# Patient Record
Sex: FEMALE | Race: WHITE | NOT HISPANIC OR LATINO | Employment: FULL TIME | URBAN - METROPOLITAN AREA
[De-identification: names, ages, dates, MRNs, and addresses within clinical notes are randomized per-mention and may not be internally consistent; named-entity substitution may affect disease eponyms.]

---

## 2017-06-03 ENCOUNTER — OFFICE VISIT (OUTPATIENT)
Dept: RETAIL CLINIC | Facility: CLINIC | Age: 57
End: 2017-06-03

## 2017-06-03 VITALS
OXYGEN SATURATION: 96 % | HEART RATE: 112 BPM | DIASTOLIC BLOOD PRESSURE: 78 MMHG | TEMPERATURE: 98.2 F | RESPIRATION RATE: 20 BRPM | SYSTOLIC BLOOD PRESSURE: 152 MMHG

## 2017-06-03 DIAGNOSIS — J44.1 COPD EXACERBATION (HCC): Primary | ICD-10-CM

## 2017-06-03 DIAGNOSIS — J01.10 ACUTE FRONTAL SINUSITIS, RECURRENCE NOT SPECIFIED: ICD-10-CM

## 2017-06-03 PROBLEM — M19.90 ARTHRITIS: Status: ACTIVE | Noted: 2017-06-03

## 2017-06-03 PROBLEM — J43.9 PULMONARY EMPHYSEMA: Status: ACTIVE | Noted: 2017-06-03

## 2017-06-03 PROBLEM — E78.00 HYPERCHOLESTEROLEMIA: Status: ACTIVE | Noted: 2017-06-03

## 2017-06-03 PROBLEM — E66.9 OBESITY: Status: ACTIVE | Noted: 2017-06-03

## 2017-06-03 PROBLEM — J44.9 COPD (CHRONIC OBSTRUCTIVE PULMONARY DISEASE): Status: ACTIVE | Noted: 2017-06-03

## 2017-06-03 PROBLEM — I10 ESSENTIAL HYPERTENSION: Status: ACTIVE | Noted: 2017-06-03

## 2017-06-03 PROBLEM — E11.9 TYPE 2 DIABETES MELLITUS: Status: ACTIVE | Noted: 2017-06-03

## 2017-06-03 PROCEDURE — 94640 AIRWAY INHALATION TREATMENT: CPT | Performed by: NURSE PRACTITIONER

## 2017-06-03 PROCEDURE — 99214 OFFICE O/P EST MOD 30 MIN: CPT | Performed by: NURSE PRACTITIONER

## 2017-06-03 RX ORDER — ALBUTEROL SULFATE 0.63 MG/3ML
0.63 SOLUTION RESPIRATORY (INHALATION) EVERY 6 HOURS PRN
Status: SHIPPED | OUTPATIENT
Start: 2017-06-03

## 2017-06-03 RX ORDER — MULTIVITAMIN
1 TABLET ORAL
COMMUNITY

## 2017-06-03 RX ORDER — PREDNISONE 10 MG/1
TABLET ORAL
Qty: 21 TABLET | Refills: 0 | Status: SHIPPED | OUTPATIENT
Start: 2017-06-03

## 2017-06-03 RX ORDER — DOXYCYCLINE HYCLATE 100 MG
100 TABLET ORAL 2 TIMES DAILY
Qty: 20 TABLET | Refills: 0 | Status: SHIPPED | OUTPATIENT
Start: 2017-06-03 | End: 2017-06-13

## 2017-06-03 RX ORDER — MIRTAZAPINE 15 MG/1
15 TABLET, FILM COATED ORAL
COMMUNITY

## 2017-06-03 RX ORDER — PRAVASTATIN SODIUM 10 MG
10 TABLET ORAL DAILY
COMMUNITY

## 2017-06-03 RX ADMIN — ALBUTEROL SULFATE 0.63 MG: 0.63 SOLUTION RESPIRATORY (INHALATION) at 14:48

## 2017-06-03 NOTE — PATIENT INSTRUCTIONS
Chronic Obstructive Pulmonary Disease  Chronic obstructive pulmonary disease (COPD) is a common lung condition in which airflow from the lungs is limited. COPD is a general term that can be used to describe many different lung problems that limit airflow, including both chronic bronchitis and emphysema. If you have COPD, your lung function will probably never return to normal, but there are measures you can take to improve lung function and make yourself feel better.  CAUSES   · Smoking (common).  · Exposure to secondhand smoke.  · Genetic problems.  · Chronic inflammatory lung diseases or recurrent infections.  SYMPTOMS  · Shortness of breath, especially with physical activity.  · Deep, persistent (chronic) cough with a large amount of thick mucus.  · Wheezing.  · Rapid breaths (tachypnea).  · Gray or bluish discoloration (cyanosis) of the skin, especially in your fingers, toes, or lips.  · Fatigue.  · Weight loss.  · Frequent infections or episodes when breathing symptoms become much worse (exacerbations).  · Chest tightness.  DIAGNOSIS  Your health care provider will take a medical history and perform a physical examination to diagnose COPD. Additional tests for COPD may include:  · Lung (pulmonary) function tests.  · Chest X-ray.  · CT scan.  · Blood tests.  TREATMENT   Treatment for COPD may include:  · Inhaler and nebulizer medicines. These help manage the symptoms of COPD and make your breathing more comfortable.  · Supplemental oxygen. Supplemental oxygen is only helpful if you have a low oxygen level in your blood.  · Exercise and physical activity. These are beneficial for nearly all people with COPD.  · Lung surgery or transplant.  · Nutrition therapy to gain weight, if you are underweight.  · Pulmonary rehabilitation. This may involve working with a team of health care providers and specialists, such as respiratory, occupational, and physical therapists.  HOME CARE INSTRUCTIONS  · Take all medicines  (inhaled or pills) as directed by your health care provider.  · Avoid over-the-counter medicines or cough syrups that dry up your airway (such as antihistamines) and slow down the elimination of secretions unless instructed otherwise by your health care provider.  · If you are a smoker, the most important thing that you can do is stop smoking. Continuing to smoke will cause further lung damage and breathing trouble. Ask your health care provider for help with quitting smoking. He or she can direct you to community resources or hospitals that provide support.  · Avoid exposure to irritants such as smoke, chemicals, and fumes that aggravate your breathing.  · Use oxygen therapy and pulmonary rehabilitation if directed by your health care provider. If you require home oxygen therapy, ask your health care provider whether you should purchase a pulse oximeter to measure your oxygen level at home.  · Avoid contact with individuals who have a contagious illness.  · Avoid extreme temperature and humidity changes.  · Eat healthy foods. Eating smaller, more frequent meals and resting before meals may help you maintain your strength.  · Stay active, but balance activity with periods of rest. Exercise and physical activity will help you maintain your ability to do things you want to do.  · Preventing infection and hospitalization is very important when you have COPD. Make sure to receive all the vaccines your health care provider recommends, especially the pneumococcal and influenza vaccines. Ask your health care provider whether you need a pneumonia vaccine.  · Learn and use relaxation techniques to manage stress.  · Learn and use controlled breathing techniques as directed by your health care provider. Controlled breathing techniques include:    Pursed lip breathing. Start by breathing in (inhaling) through your nose for 1 second. Then, purse your lips as if you were going to whistle and breathe out (exhale) through the  pursed lips for 2 seconds.    Diaphragmatic breathing. Start by putting one hand on your abdomen just above your waist. Inhale slowly through your nose. The hand on your abdomen should move out. Then purse your lips and exhale slowly. You should be able to feel the hand on your abdomen moving in as you exhale.  · Learn and use controlled coughing to clear mucus from your lungs. Controlled coughing is a series of short, progressive coughs. The steps of controlled coughing are:  . Lean your head slightly forward.  . Breathe in deeply using diaphragmatic breathing.  . Try to hold your breath for 3 seconds.  . Keep your mouth slightly open while coughing twice.  . Spit any mucus out into a tissue.  . Rest and repeat the steps once or twice as needed.  SEEK MEDICAL CARE IF:  · You are coughing up more mucus than usual.  · There is a change in the color or thickness of your mucus.  · Your breathing is more labored than usual.  · Your breathing is faster than usual.  SEEK IMMEDIATE MEDICAL CARE IF:  · You have shortness of breath while you are resting.  · You have shortness of breath that prevents you from:    Being able to talk.    Performing your usual physical activities.  · You have chest pain lasting longer than 5 minutes.  · Your skin color is more cyanotic than usual.  · You measure low oxygen saturations for longer than 5 minutes with a pulse oximeter.  MAKE SURE YOU:  · Understand these instructions.  · Will watch your condition.  · Will get help right away if you are not doing well or get worse.     This information is not intended to replace advice given to you by your health care provider. Make sure you discuss any questions you have with your health care provider.     Document Released: 09/27/2006 Document Revised: 01/08/2016 Document Reviewed: 08/14/2014  [employer] Interactive Patient Education ©2017 [employer]

## 2017-06-03 NOTE — PROGRESS NOTES
Subjective   Gely Kimble is a 56 y.o. female.     URI    This is a new problem. The current episode started yesterday. There has been no fever. Associated symptoms include chest pain, congestion, coughing (productive), headaches, neck pain, a sore throat and wheezing. Pertinent negatives include no nausea or vomiting. Treatments tried: nyquil, tessalon pearls. The treatment provided no relief.   Cough   This is a new problem. The current episode started yesterday. The cough is productive of sputum. Associated symptoms include chest pain, headaches, a sore throat, shortness of breath and wheezing. Pertinent negatives include no chills, fever or postnasal drip. The symptoms are aggravated by lying down. Risk factors for lung disease include smoking/tobacco exposure. She has tried a beta-agonist inhaler for the symptoms. The treatment provided mild relief. Her past medical history is significant for COPD and emphysema.        The following portions of the patient's history were reviewed and updated as appropriate: allergies, current medications, past family history, past medical history, past social history, past surgical history and problem list.    Review of Systems   Constitutional: Negative for chills and fever.   HENT: Positive for congestion and sore throat. Negative for postnasal drip.    Respiratory: Positive for cough (productive), shortness of breath and wheezing.    Cardiovascular: Positive for chest pain.   Gastrointestinal: Negative for nausea and vomiting.   Musculoskeletal: Positive for neck pain.   Neurological: Positive for headaches.       Objective   Physical Exam   Constitutional: She is oriented to person, place, and time. She appears well-developed and well-nourished. No distress.   HENT:   Head: Normocephalic and atraumatic.   Right Ear: Hearing, tympanic membrane, external ear and ear canal normal.   Left Ear: Hearing, tympanic membrane, external ear and ear canal normal.   Nose: Nose normal.    Mouth/Throat: Oropharynx is clear and moist.   Eyes: Conjunctivae and EOM are normal. Pupils are equal, round, and reactive to light.   Neck: Normal range of motion. Neck supple.   Cardiovascular: Normal rate, regular rhythm and normal heart sounds.    Pulmonary/Chest: Effort normal. Tachypnea noted. No respiratory distress. She has decreased breath sounds in the right lower field and the left lower field. She has wheezes in the right upper field, the right middle field, the right lower field, the left upper field, the left middle field and the left lower field.   Post nebulizer treatment wheezing still present throughout all lung fields but air moving now in lower lobes, no rales, no rhonchi   Neurological: She is alert and oriented to person, place, and time.   Skin: Skin is warm and dry.   Psychiatric: She has a normal mood and affect. Her behavior is normal.       Assessment/Plan   Diagnoses and all orders for this visit:    COPD exacerbation  -     albuterol (ACCUNEB) nebulizer solution 0.63 mg; Take 3 mL by nebulization Every 6 (Six) Hours As Needed for Wheezing.    Acute frontal sinusitis, recurrence not specified    Other orders  -     mirtazapine (REMERON) 15 MG tablet; Take 15 mg by mouth.  -     Multiple Vitamin (MULTIVITAMIN) tablet; Take 1 tablet by mouth.  -     Potassium 95 MG tablet; Take 595 mg by mouth.  -     insulin detemir (LEVEMIR) 100 UNIT/ML injection; Inject  under the skin Daily.  -     pravastatin (PRAVACHOL) 10 MG tablet; Take 10 mg by mouth Daily.  -     doxycycline (VIBRAMYICN) 100 MG tablet; Take 1 tablet by mouth 2 (Two) Times a Day for 10 days.  -     predniSONE (DELTASONE) 10 MG tablet; Take 6 tabs on day 1, 5 tabs on day 2, 4 tabs on day 3, 3 tabs on day 4, 2 tabs on day 5 and 1 tab on day 6  Use Combivent inhaler 2 puffs twice daily instead of as needed as currently doing.  Use Albuterol either in nebulizer when at home or in ProAir inhaler if out of home every 4 hours while  awake for the next 48 hours.  Go to ER for fever, worsening of symptoms, for low oxygen levels for longer than 5 minutes, no improvement with ALbuterol or for any new concerns.  Follow up with PCP on Monday.  Monitor blood glucose and completely avoid sweets while on steroids.

## 2017-07-15 ENCOUNTER — APPOINTMENT (OUTPATIENT)
Dept: CARDIOLOGY | Facility: HOSPITAL | Age: 57
End: 2017-07-15

## 2017-07-15 ENCOUNTER — HOSPITAL ENCOUNTER (EMERGENCY)
Facility: HOSPITAL | Age: 57
Discharge: HOME | End: 2017-07-15
Attending: EMERGENCY MEDICINE | Admitting: EMERGENCY MEDICINE

## 2017-07-15 ENCOUNTER — APPOINTMENT (OUTPATIENT)
Dept: CT IMAGING | Facility: HOSPITAL | Age: 57
End: 2017-07-15

## 2017-07-15 VITALS
BODY MASS INDEX: 28.25 KG/M2 | SYSTOLIC BLOOD PRESSURE: 148 MMHG | HEART RATE: 78 BPM | DIASTOLIC BLOOD PRESSURE: 81 MMHG | RESPIRATION RATE: 18 BRPM | TEMPERATURE: 99.4 F | OXYGEN SATURATION: 97 % | WEIGHT: 180 LBS | HEIGHT: 67 IN

## 2017-07-15 DIAGNOSIS — S76.211A GROIN STRAIN, RIGHT, INITIAL ENCOUNTER: ICD-10-CM

## 2017-07-15 DIAGNOSIS — M25.551 RIGHT HIP PAIN: Primary | ICD-10-CM

## 2017-07-15 LAB
ALBUMIN SERPL-MCNC: 4.3 G/DL (ref 3.5–5.2)
ALBUMIN/GLOB SERPL: 1.6 G/DL
ALP SERPL-CCNC: 60 U/L (ref 39–117)
ALT SERPL W P-5'-P-CCNC: 21 U/L (ref 1–33)
ANION GAP SERPL CALCULATED.3IONS-SCNC: 15.2 MMOL/L
AST SERPL-CCNC: 12 U/L (ref 1–32)
BACTERIA UR QL AUTO: ABNORMAL /HPF
BASOPHILS # BLD AUTO: 0.09 10*3/MM3 (ref 0–0.2)
BASOPHILS NFR BLD AUTO: 1 % (ref 0–1.5)
BILIRUB SERPL-MCNC: 0.2 MG/DL (ref 0.1–1.2)
BILIRUB UR QL STRIP: NEGATIVE
BUN BLD-MCNC: 14 MG/DL (ref 6–20)
BUN/CREAT SERPL: 28 (ref 7–25)
CALCIUM SPEC-SCNC: 9.5 MG/DL (ref 8.6–10.5)
CHLORIDE SERPL-SCNC: 100 MMOL/L (ref 98–107)
CLARITY UR: CLEAR
CO2 SERPL-SCNC: 25.8 MMOL/L (ref 22–29)
COLOR UR: YELLOW
CREAT BLD-MCNC: 0.5 MG/DL (ref 0.57–1)
DEPRECATED RDW RBC AUTO: 43.3 FL (ref 37–54)
EOSINOPHIL # BLD AUTO: 0.04 10*3/MM3 (ref 0–0.7)
EOSINOPHIL NFR BLD AUTO: 0.4 % (ref 0.3–6.2)
ERYTHROCYTE [DISTWIDTH] IN BLOOD BY AUTOMATED COUNT: 13.1 % (ref 11.7–13)
GFR SERPL CREATININE-BSD FRML MDRD: 127 ML/MIN/1.73
GLOBULIN UR ELPH-MCNC: 2.7 GM/DL
GLUCOSE BLD-MCNC: 123 MG/DL (ref 65–99)
GLUCOSE UR STRIP-MCNC: NEGATIVE MG/DL
HCT VFR BLD AUTO: 43 % (ref 35.6–45.5)
HGB BLD-MCNC: 14.3 G/DL (ref 11.9–15.5)
HGB UR QL STRIP.AUTO: NEGATIVE
HYALINE CASTS UR QL AUTO: ABNORMAL /LPF
IMM GRANULOCYTES # BLD: 0.09 10*3/MM3 (ref 0–0.03)
IMM GRANULOCYTES NFR BLD: 1 % (ref 0–0.5)
KETONES UR QL STRIP: NEGATIVE
LEUKOCYTE ESTERASE UR QL STRIP.AUTO: ABNORMAL
LYMPHOCYTES # BLD AUTO: 2.71 10*3/MM3 (ref 0.9–4.8)
LYMPHOCYTES NFR BLD AUTO: 29.6 % (ref 19.6–45.3)
MCH RBC QN AUTO: 29.9 PG (ref 26.9–32)
MCHC RBC AUTO-ENTMCNC: 33.3 G/DL (ref 32.4–36.3)
MCV RBC AUTO: 90 FL (ref 80.5–98.2)
MONOCYTES # BLD AUTO: 0.47 10*3/MM3 (ref 0.2–1.2)
MONOCYTES NFR BLD AUTO: 5.1 % (ref 5–12)
NEUTROPHILS # BLD AUTO: 5.76 10*3/MM3 (ref 1.9–8.1)
NEUTROPHILS NFR BLD AUTO: 62.9 % (ref 42.7–76)
NITRITE UR QL STRIP: NEGATIVE
NRBC BLD MANUAL-RTO: 0 /100 WBC (ref 0–0)
PH UR STRIP.AUTO: 6.5 [PH] (ref 5–8)
PLATELET # BLD AUTO: 173 10*3/MM3 (ref 140–500)
PMV BLD AUTO: 11.6 FL (ref 6–12)
POTASSIUM BLD-SCNC: 3.7 MMOL/L (ref 3.5–5.2)
PROT SERPL-MCNC: 7 G/DL (ref 6–8.5)
PROT UR QL STRIP: NEGATIVE
RBC # BLD AUTO: 4.78 10*6/MM3 (ref 3.9–5.2)
RBC # UR: ABNORMAL /HPF
REF LAB TEST METHOD: ABNORMAL
SODIUM BLD-SCNC: 141 MMOL/L (ref 136–145)
SP GR UR STRIP: >=1.03 (ref 1–1.03)
SQUAMOUS #/AREA URNS HPF: ABNORMAL /HPF
TROPONIN T SERPL-MCNC: <0.01 NG/ML (ref 0–0.03)
UROBILINOGEN UR QL STRIP: ABNORMAL
WBC NRBC COR # BLD: 9.16 10*3/MM3 (ref 4.5–10.7)
WBC UR QL AUTO: ABNORMAL /HPF

## 2017-07-15 PROCEDURE — 93971 EXTREMITY STUDY: CPT

## 2017-07-15 PROCEDURE — 80053 COMPREHEN METABOLIC PANEL: CPT | Performed by: PHYSICIAN ASSISTANT

## 2017-07-15 PROCEDURE — 93010 ELECTROCARDIOGRAM REPORT: CPT | Performed by: INTERNAL MEDICINE

## 2017-07-15 PROCEDURE — 25010000002 ONDANSETRON PER 1 MG: Performed by: PHYSICIAN ASSISTANT

## 2017-07-15 PROCEDURE — 81001 URINALYSIS AUTO W/SCOPE: CPT | Performed by: PHYSICIAN ASSISTANT

## 2017-07-15 PROCEDURE — 85025 COMPLETE CBC W/AUTO DIFF WBC: CPT | Performed by: PHYSICIAN ASSISTANT

## 2017-07-15 PROCEDURE — 96374 THER/PROPH/DIAG INJ IV PUSH: CPT

## 2017-07-15 PROCEDURE — 96375 TX/PRO/DX INJ NEW DRUG ADDON: CPT

## 2017-07-15 PROCEDURE — 99284 EMERGENCY DEPT VISIT MOD MDM: CPT

## 2017-07-15 PROCEDURE — 74177 CT ABD & PELVIS W/CONTRAST: CPT

## 2017-07-15 PROCEDURE — 84484 ASSAY OF TROPONIN QUANT: CPT | Performed by: PHYSICIAN ASSISTANT

## 2017-07-15 PROCEDURE — 25010000002 MORPHINE PER 10 MG: Performed by: EMERGENCY MEDICINE

## 2017-07-15 PROCEDURE — 0 IOPAMIDOL 61 % SOLUTION: Performed by: EMERGENCY MEDICINE

## 2017-07-15 PROCEDURE — 93005 ELECTROCARDIOGRAM TRACING: CPT | Performed by: PHYSICIAN ASSISTANT

## 2017-07-15 RX ORDER — ONDANSETRON 2 MG/ML
4 INJECTION INTRAMUSCULAR; INTRAVENOUS ONCE
Status: COMPLETED | OUTPATIENT
Start: 2017-07-15 | End: 2017-07-15

## 2017-07-15 RX ORDER — PAROXETINE HYDROCHLORIDE 40 MG/1
40 TABLET, FILM COATED ORAL NIGHTLY
COMMUNITY

## 2017-07-15 RX ORDER — HYDROCHLOROTHIAZIDE 25 MG/1
25 TABLET ORAL DAILY
COMMUNITY

## 2017-07-15 RX ORDER — SODIUM CHLORIDE 0.9 % (FLUSH) 0.9 %
10 SYRINGE (ML) INJECTION AS NEEDED
Status: DISCONTINUED | OUTPATIENT
Start: 2017-07-15 | End: 2017-07-16 | Stop reason: HOSPADM

## 2017-07-15 RX ORDER — ALBUTEROL SULFATE 90 UG/1
2 AEROSOL, METERED RESPIRATORY (INHALATION) EVERY 4 HOURS PRN
COMMUNITY

## 2017-07-15 RX ORDER — METHOCARBAMOL 500 MG/1
1000 TABLET, FILM COATED ORAL 4 TIMES DAILY
Qty: 40 TABLET | Refills: 0 | Status: SHIPPED | OUTPATIENT
Start: 2017-07-15

## 2017-07-15 RX ADMIN — IOPAMIDOL 85 ML: 612 INJECTION, SOLUTION INTRAVENOUS at 21:07

## 2017-07-15 RX ADMIN — ONDANSETRON 4 MG: 2 INJECTION INTRAMUSCULAR; INTRAVENOUS at 18:52

## 2017-07-15 RX ADMIN — MORPHINE SULFATE 4 MG: 4 INJECTION, SOLUTION INTRAMUSCULAR; INTRAVENOUS at 18:52

## 2017-07-15 NOTE — ED PROVIDER NOTES
The patient presents complaining of R groin pain onset 2 weeks ago. Pt states that the pain began radiating down her R leg to her R knee 2 days ago. Pt denies diarrhea, numbness/tingling, urinary symptoms, or urinary/bowel incontinence. Pt states that nothing worsens her pain and nothing betters her pain.     Patient is nontoxic appearing   Abdomen: Soft, non tender  Back/extremities: Femoral pulses intact and equal bilaterally, NVID, intact distal pulses, pain in inguinal crease, no swelling    Plan is to check pt's labs and doppler for further evaluation.   Lab work reviewed.       I supervised care provided by the midlevel provider.  We have discussed this patient's history, physical exam, and treatment plan.  I have reviewed the note and personally saw and examined the patient and agree with the plan of care.    Entered by Sunny Enriquez, acting as scribe for Reji Quach MD.       Sunny Enriquez  07/15/17 [id number]       Reji Quach MD  07/15/17 [id number]

## 2017-07-15 NOTE — PROGRESS NOTES
RLE Venous doppler study complete. Preliminary negative for DVT, new onset of C/P called to Duglas GONSALEZ

## 2017-07-15 NOTE — ED TRIAGE NOTES
"2 weeks of R leg pain/ R groin pain. PT states pain now radiates down leg. \"Family wants me to rule out clot\"  "

## 2017-07-15 NOTE — ED PROVIDER NOTES
EMERGENCY DEPARTMENT ENCOUNTER    CHIEF COMPLAINT  Chief Complaint: Groin pain  History given by: patient  History limited by: nothing  Room Number: 03/03  PMD: Cristela Lazcano MD      HPI:  Pt is a 56 y.o. female who presents complaining of groin pain onset 2 weeks ago which began to radiate to her anterior RLE. Pt denies experiencing any trauma or injury to cause her pain. Pt states she feels numbness, and has water retention in her RLE (she takes H2O pills) but denies having trouble ambulating, dysuria, bowel/bladder incontinence, knee pain, back pain ,fever, or N/V/D. Pt states her pain worsens when she turns her pelvis. Pt denies having similar sx in the past. Pt states she smokes cigarettes. Pt states she has family hx of sciatica, and blood clots.     Duration:  2 weeks  Onset: gradual  Timing: constant  Location: R groin  Radiation: RLE  Quality: numbness  Intensity/Severity: moderate  Progression: worsening  Associated Symptoms: numbness  Aggravating Factors: pelvis movement  Alleviating Factors: unknown  Previous Episodes: Pt denies having similar episodes in the past  Treatment before arrival: none.     PAST MEDICAL HISTORY  Active Ambulatory Problems     Diagnosis Date Noted   • Type 2 diabetes mellitus 06/03/2017   • Pulmonary emphysema 06/03/2017   • Arthritis 06/03/2017   • Hypercholesterolemia 06/03/2017   • Essential hypertension 06/03/2017   • Obesity 06/03/2017   • Myocardial perfusion defect 02/16/2016   • Abnormal liver function tests 02/16/2016   • Impingement syndrome of shoulder region 07/03/2013   • Neck pain 05/08/2013   • Tobacco use 02/12/2016     Resolved Ambulatory Problems     Diagnosis Date Noted   • No Resolved Ambulatory Problems     Past Medical History:   Diagnosis Date   • Arthritis    • COPD (chronic obstructive pulmonary disease)    • Elevated cholesterol    • Emphysema of lung    • Gallbladder abscess    • Hypertension    • Type 2 diabetes mellitus        PAST SURGICAL  HISTORY  Past Surgical History:   Procedure Laterality Date   • APPENDECTOMY     • CHOLECYSTECTOMY     • HYSTERECTOMY     • TUBAL ABDOMINAL LIGATION         FAMILY HISTORY  Family History   Problem Relation Age of Onset   • Diabetes Mother    • Heart disease Mother    • Diabetes Father    • Diabetes Brother    • Heart disease Maternal Grandmother    • Cancer Paternal Grandfather        SOCIAL HISTORY  Social History     Social History   • Marital status:      Spouse name: N/A   • Number of children: N/A   • Years of education: N/A     Occupational History   • Not on file.     Social History Main Topics   • Smoking status: Current Every Day Smoker   • Smokeless tobacco: Not on file   • Alcohol use No   • Drug use: No   • Sexual activity: Not on file     Other Topics Concern   • Not on file     Social History Narrative   • No narrative on file       ALLERGIES  Review of patient's allergies indicates no known allergies.    REVIEW OF SYSTEMS  Review of Systems   Constitutional: Negative for fever.   HENT: Negative for sore throat.    Eyes: Negative.    Respiratory: Negative for cough and shortness of breath.    Cardiovascular: Negative for chest pain.   Gastrointestinal: Negative for abdominal pain, diarrhea, nausea and vomiting.   Genitourinary: Negative for dysuria.        Bowel/bladder incontinence negative   Musculoskeletal: Positive for myalgias (R groin, RLE). Negative for back pain and neck pain.   Skin: Negative for rash.   Allergic/Immunologic: Negative.    Neurological: Positive for numbness. Negative for weakness and headaches.   Hematological: Negative.    Psychiatric/Behavioral: Negative.    All other systems reviewed and are negative.      PHYSICAL EXAM  ED Triage Vitals   Temp Heart Rate Resp BP SpO2   07/15/17 1523 07/15/17 1523 07/15/17 1523 07/15/17 1526 07/15/17 1523   99.4 °F (37.4 °C) 118 20 117/79 93 %      Temp src Heart Rate Source Patient Position BP Location FiO2 (%)   -- -- -- -- --               Physical Exam   Constitutional: She is oriented to person, place, and time and well-developed, well-nourished, and in no distress. No distress.   HENT:   Head: Normocephalic and atraumatic.   Eyes: EOM are normal. Pupils are equal, round, and reactive to light.   Neck: Normal range of motion. Neck supple.   Cardiovascular: Normal rate, regular rhythm and normal heart sounds.    Good femoral and pedal pulses bilaterally   Pulmonary/Chest: Effort normal. No respiratory distress. She has wheezes. She has rhonchi.   Abdominal: Soft. There is no tenderness. There is no rebound and no guarding.   Musculoskeletal: Normal range of motion. She exhibits no edema.        Right hip: She exhibits tenderness.   Pain w/ R hip flection.   Neurological: She is alert and oriented to person, place, and time. She has normal sensation and normal strength.   Skin: Skin is warm and dry. No rash noted.   Psychiatric: Mood and affect normal.   Nursing note and vitals reviewed.      LAB RESULTS  Lab Results (last 24 hours)     Procedure Component Value Units Date/Time    CBC & Differential [[id number]] Collected:  07/15/17 1851    Specimen:  Blood Updated:  07/15/17 1902    Narrative:       The following orders were created for panel order CBC & Differential.  Procedure                               Abnormality         Status                     ---------                               -----------         ------                     CBC Auto Differential[[id number]]        Abnormal            Final result                 Please view results for these tests on the individual orders.    Comprehensive Metabolic Panel [[id number]]  (Abnormal) Collected:  07/15/17 1851    Specimen:  Blood Updated:  07/15/17 1923     Glucose 123 (H) mg/dL      BUN 14 mg/dL      Creatinine 0.50 (L) mg/dL      Sodium 141 mmol/L      Potassium 3.7 mmol/L      Chloride 100 mmol/L      CO2 25.8 mmol/L      Calcium 9.5 mg/dL      Total Protein 7.0 g/dL      Albumin  4.30 g/dL      ALT (SGPT) 21 U/L      AST (SGOT) 12 U/L      Alkaline Phosphatase 60 U/L      Total Bilirubin 0.2 mg/dL      eGFR Non African Amer 127 mL/min/1.73      Globulin 2.7 gm/dL      A/G Ratio 1.6 g/dL      BUN/Creatinine Ratio 28.0 (H)     Anion Gap 15.2 mmol/L     CBC Auto Differential [[id number]]  (Abnormal) Collected:  07/15/17 1851    Specimen:  Blood Updated:  07/15/17 1902     WBC 9.16 10*3/mm3      RBC 4.78 10*6/mm3      Hemoglobin 14.3 g/dL      Hematocrit 43.0 %      MCV 90.0 fL      MCH 29.9 pg      MCHC 33.3 g/dL      RDW 13.1 (H) %      RDW-SD 43.3 fl      MPV 11.6 fL      Platelets 173 10*3/mm3      Neutrophil % 62.9 %      Lymphocyte % 29.6 %      Monocyte % 5.1 %      Eosinophil % 0.4 %      Basophil % 1.0 %      Immature Grans % 1.0 (H) %      Neutrophils, Absolute 5.76 10*3/mm3      Lymphocytes, Absolute 2.71 10*3/mm3      Monocytes, Absolute 0.47 10*3/mm3      Eosinophils, Absolute 0.04 10*3/mm3      Basophils, Absolute 0.09 10*3/mm3      Immature Grans, Absolute 0.09 (H) 10*3/mm3      nRBC 0.0 /100 WBC     Troponin [[id number]]  (Normal) Collected:  07/15/17 1851    Specimen:  Blood Updated:  07/15/17 2035     Troponin T <0.010 ng/mL     Narrative:       Troponin T Reference Ranges:  Less than 0.03 ng/mL:    Negative for AMI  0.03 to 0.09 ng/mL:      Indeterminant for AMI  Greater than 0.09 ng/mL: Positive for AMI    Urinalysis With / Culture If Indicated [[id number]]  (Abnormal) Collected:  07/15/17 2134    Specimen:  Urine from Urine, Clean Catch Updated:  07/15/17 2210     Color, UA Yellow     Appearance, UA Clear     pH, UA 6.5     Specific Gravity, UA >=1.030     Glucose, UA Negative     Ketones, UA Negative     Bilirubin, UA Negative     Blood, UA Negative     Protein, UA Negative     Leuk Esterase, UA Small (1+) (A)     Nitrite, UA Negative     Urobilinogen, UA 1.0 E.U./dL    Urinalysis, Microscopic Only [[id number]]  (Abnormal) Collected:  07/15/17 8130    Specimen:  Urine from  Urine, Clean Catch Updated:  07/15/17 2211     RBC, UA 0-2 /HPF      WBC, UA 3-5 (A) /HPF      Bacteria, UA None Seen /HPF      Squamous Epithelial Cells, UA 0-2 /HPF      Hyaline Casts, UA None Seen /LPF      Methodology Automated Microscopy          I ordered the above labs and reviewed the results    RADIOLOGY  CT Abdomen Pelvis With Contrast   Final Result   IMPRESSION :    1. Mild colonic diverticulosis, otherwise unremarkable                           This study was performed with techniques to keep radiation doses as low   as reasonably achievable (ALARA). Individualized dose reduction   techniques using automated exposure control or adjustment of mA and/or   kV according to the patient size were employed.        This report was finalized on 7/15/2017 9:18 PM by Hoang Camara MD.             Duplex Venous Lower Extremity - Right results negative acute.       I ordered the above noted radiological studies. Interpreted by radiologist.  Reviewed by me in PACS.       PROCEDURES  Procedures  EKG           EKG time: 2010  Rhythm/Rate: NSR 80  P waves and NV: normal  QRS, axis: normal   ST and T waves: normal     Interpreted Contemporaneously by me, independently viewed  unchanged compared to prior 10/29/2008      PROGRESS AND CONSULTS  ED Course   1824- Ordered RLE doppler and labs for further evaluation, morphine for pain, zofran for nausea, and IVF for hydration.  1915- Discussed pt's case w/ Dr. Quach who agrees w/ plan and will consult.   2006- Per RT, pt states she is having CP. Ordered troponin and EKG for further evaluation.   2029- BP- 136/74 HR- 84 Temp- 99.4 °F (37.4 °C) O2 sat- 96%  Rechecked the patient who is itin NAD and is resting comfortably. Pt states she is currenty experiencing CP and SOA. Notified pt of unremarkable imaging results, including normal doppler.   2039- Ordered Abdomen/pelvis CT for further evaluation.  2214-BP- 136/74 HR- 84 Temp- 99.4 °F (37.4 °C) O2 sat- 96%  Rechecked the  patient who is in NAD and is resting comfortably. Notified pt of unremarkable lab and imaging results. Discussed w/ pt plan to discharge, and she is in agreement. All questions have been addressed at this time.     MEDICAL DECISION MAKING  Results were reviewed/discussed with the patient and they were also made aware of online access. Pt also made aware that some labs, such as cultures, will not be resulted during ER visit and follow up with PMD is necessary.     MDM  Number of Diagnoses or Management Options     Amount and/or Complexity of Data Reviewed  Clinical lab tests: ordered and reviewed (BUN/Creatinine Ratio 28.0, Troponin T <0.010, Creatinine 0.5)  Tests in the radiology section of CPT®: ordered and reviewed (Duplex Venous Lower Extremity - Right results negative acute. Abdomen/pelvis CT shows mild colonic diverticulosis, otherwise unremarkable.)  Tests in the medicine section of CPT®: ordered and reviewed (See Note.)  Decide to obtain previous medical records or to obtain history from someone other than the patient: yes  Review and summarize past medical records: yes  Discuss the patient with other providers: yes (Dr. Quach ([facility]  Independent visualization of images, tracings, or specimens: yes           DIAGNOSIS  Final diagnoses:   Right hip pain   Groin strain, right, initial encounter       DISPOSITION  DISCHARGE    Patient discharged in stable condition.    Reviewed implications of results, diagnosis, meds, responsibility to follow up, warning signs and symptoms of possible worsening, potential complications and reasons to return to ER.    Patient/Family voiced understanding of above instructions.    Discussed plan for discharge, as there is no emergent indication for admission.  Pt/family is agreeable and understands need for follow up and repeat testing.  Pt is aware that discharge does not mean that nothing is wrong but it indicates no emergency is present that requires admission and they must  continue care with follow-up as given below or physician of their choice.     FOLLOW-UP  Cristela Lazcano MD  [address] [facility] [address] [phone]    Schedule an appointment as soon as possible for a visit in 3 days           Medication List      New Prescriptions          methocarbamol 500 MG tablet   Commonly known as:  ROBAXIN   Take 2 tablets by mouth 4 (Four) Times a Day.         Stop          benzonatate 100 MG capsule   Commonly known as:  TESSALON PERLES       guaiFENesin 600 MG 12 hr tablet   Commonly known as:  MUCINEX       predniSONE 10 MG tablet   Commonly known as:  DELTASONE               Latest Documented Vital Signs:  As of 10:38 PM  BP- 148/81 HR- 78 Temp- 99.4 °F (37.4 °C) O2 sat- 97%    --  Documentation assistance provided by nathan Jensen for Duglas Hernandez PA-C.  Information recorded by the scribe was done at my direction and has been verified and validated by me.         Margot Jensen  07/15/17 2227       PARISA Bello  07/15/17 9393

## 2017-07-16 LAB
BH CV LOWER VASCULAR LEFT COMMON FEMORAL AUGMENT: NORMAL
BH CV LOWER VASCULAR LEFT COMMON FEMORAL COMPETENT: NORMAL
BH CV LOWER VASCULAR LEFT COMMON FEMORAL COMPRESS: NORMAL
BH CV LOWER VASCULAR LEFT COMMON FEMORAL PHASIC: NORMAL
BH CV LOWER VASCULAR LEFT COMMON FEMORAL SPONT: NORMAL
BH CV LOWER VASCULAR RIGHT COMMON FEMORAL AUGMENT: NORMAL
BH CV LOWER VASCULAR RIGHT COMMON FEMORAL COMPETENT: NORMAL
BH CV LOWER VASCULAR RIGHT COMMON FEMORAL COMPRESS: NORMAL
BH CV LOWER VASCULAR RIGHT COMMON FEMORAL PHASIC: NORMAL
BH CV LOWER VASCULAR RIGHT COMMON FEMORAL SPONT: NORMAL
BH CV LOWER VASCULAR RIGHT DISTAL FEMORAL COMPRESS: NORMAL
BH CV LOWER VASCULAR RIGHT GASTRONEMIUS COMPRESS: NORMAL
BH CV LOWER VASCULAR RIGHT GREATER SAPH AK COMPRESS: NORMAL
BH CV LOWER VASCULAR RIGHT GREATER SAPH BK COMPRESS: NORMAL
BH CV LOWER VASCULAR RIGHT MID FEMORAL AUGMENT: NORMAL
BH CV LOWER VASCULAR RIGHT MID FEMORAL COMPETENT: NORMAL
BH CV LOWER VASCULAR RIGHT MID FEMORAL COMPRESS: NORMAL
BH CV LOWER VASCULAR RIGHT MID FEMORAL PHASIC: NORMAL
BH CV LOWER VASCULAR RIGHT MID FEMORAL SPONT: NORMAL
BH CV LOWER VASCULAR RIGHT PERONEAL COMPRESS: NORMAL
BH CV LOWER VASCULAR RIGHT POPLITEAL AUGMENT: NORMAL
BH CV LOWER VASCULAR RIGHT POPLITEAL COMPETENT: NORMAL
BH CV LOWER VASCULAR RIGHT POPLITEAL COMPRESS: NORMAL
BH CV LOWER VASCULAR RIGHT POPLITEAL PHASIC: NORMAL
BH CV LOWER VASCULAR RIGHT POPLITEAL SPONT: NORMAL
BH CV LOWER VASCULAR RIGHT POSTERIOR TIBIAL COMPRESS: NORMAL
BH CV LOWER VASCULAR RIGHT PROXIMAL FEMORAL COMPRESS: NORMAL
BH CV LOWER VASCULAR RIGHT SAPHENOFEMORAL JUNCTION AUGMENT: NORMAL
BH CV LOWER VASCULAR RIGHT SAPHENOFEMORAL JUNCTION COMPETENT: NORMAL
BH CV LOWER VASCULAR RIGHT SAPHENOFEMORAL JUNCTION COMPRESS: NORMAL
BH CV LOWER VASCULAR RIGHT SAPHENOFEMORAL JUNCTION PHASIC: NORMAL
BH CV LOWER VASCULAR RIGHT SAPHENOFEMORAL JUNCTION SPONT: NORMAL

## 2017-07-16 NOTE — DISCHARGE INSTRUCTIONS
Home, rest, medicine as directed, home medicine as prescribed, apply heat or ice to affected area, follow up with PCP for recheck. Return to care with further concerns.

## 2020-03-20 ENCOUNTER — HOSPITAL ENCOUNTER (EMERGENCY)
Facility: HOSPITAL | Age: 60
Discharge: HOME | End: 2020-03-21
Attending: EMERGENCY MEDICINE | Admitting: EMERGENCY MEDICINE

## 2020-03-20 ENCOUNTER — APPOINTMENT (OUTPATIENT)
Dept: GENERAL RADIOLOGY | Facility: HOSPITAL | Age: 60
End: 2020-03-20

## 2020-03-20 ENCOUNTER — APPOINTMENT (OUTPATIENT)
Dept: MRI IMAGING | Facility: HOSPITAL | Age: 60
End: 2020-03-20

## 2020-03-20 DIAGNOSIS — H81.391 PERIPHERAL VERTIGO INVOLVING RIGHT EAR: Primary | ICD-10-CM

## 2020-03-20 DIAGNOSIS — R11.0 NAUSEA: ICD-10-CM

## 2020-03-20 DIAGNOSIS — R73.9 HYPERGLYCEMIA: ICD-10-CM

## 2020-03-20 LAB
ALBUMIN SERPL-MCNC: 4.4 G/DL (ref 3.5–5.2)
ALBUMIN/GLOB SERPL: 2 G/DL
ALP SERPL-CCNC: 60 U/L (ref 39–117)
ALT SERPL W P-5'-P-CCNC: 22 U/L (ref 1–33)
ANION GAP SERPL CALCULATED.3IONS-SCNC: 11.8 MMOL/L (ref 5–15)
AST SERPL-CCNC: 9 U/L (ref 1–32)
BASOPHILS # BLD AUTO: 0.06 10*3/MM3 (ref 0–0.2)
BASOPHILS NFR BLD AUTO: 0.7 % (ref 0–1.5)
BILIRUB SERPL-MCNC: 0.2 MG/DL (ref 0.2–1.2)
BUN BLD-MCNC: 14 MG/DL (ref 8–23)
BUN/CREAT SERPL: 24.6 (ref 7–25)
CALCIUM SPEC-SCNC: 9.7 MG/DL (ref 8.6–10.5)
CHLORIDE SERPL-SCNC: 101 MMOL/L (ref 98–107)
CO2 SERPL-SCNC: 26.2 MMOL/L (ref 22–29)
CREAT BLD-MCNC: 0.57 MG/DL (ref 0.57–1)
DEPRECATED RDW RBC AUTO: 42.8 FL (ref 37–54)
EOSINOPHIL # BLD AUTO: 0.08 10*3/MM3 (ref 0–0.4)
EOSINOPHIL NFR BLD AUTO: 1 % (ref 0.3–6.2)
ERYTHROCYTE [DISTWIDTH] IN BLOOD BY AUTOMATED COUNT: 13.2 % (ref 12.3–15.4)
FLUAV AG NPH QL: NEGATIVE
FLUBV AG NPH QL IA: NEGATIVE
GFR SERPL CREATININE-BSD FRML MDRD: 108 ML/MIN/1.73
GLOBULIN UR ELPH-MCNC: 2.2 GM/DL
GLUCOSE BLD-MCNC: 236 MG/DL (ref 65–99)
HCT VFR BLD AUTO: 39.8 % (ref 34–46.6)
HGB BLD-MCNC: 13.4 G/DL (ref 12–15.9)
HOLD SPECIMEN: NORMAL
HOLD SPECIMEN: NORMAL
IMM GRANULOCYTES # BLD AUTO: 0.03 10*3/MM3 (ref 0–0.05)
IMM GRANULOCYTES NFR BLD AUTO: 0.4 % (ref 0–0.5)
LIPASE SERPL-CCNC: 51 U/L (ref 13–60)
LYMPHOCYTES # BLD AUTO: 1.56 10*3/MM3 (ref 0.7–3.1)
LYMPHOCYTES NFR BLD AUTO: 19.4 % (ref 19.6–45.3)
MCH RBC QN AUTO: 30.1 PG (ref 26.6–33)
MCHC RBC AUTO-ENTMCNC: 33.7 G/DL (ref 31.5–35.7)
MCV RBC AUTO: 89.4 FL (ref 79–97)
MONOCYTES # BLD AUTO: 0.51 10*3/MM3 (ref 0.1–0.9)
MONOCYTES NFR BLD AUTO: 6.3 % (ref 5–12)
NEUTROPHILS # BLD AUTO: 5.8 10*3/MM3 (ref 1.7–7)
NEUTROPHILS NFR BLD AUTO: 72.2 % (ref 42.7–76)
NRBC BLD AUTO-RTO: 0 /100 WBC (ref 0–0.2)
PLATELET # BLD AUTO: 218 10*3/MM3 (ref 140–450)
PMV BLD AUTO: 11.4 FL (ref 6–12)
POTASSIUM BLD-SCNC: 4.2 MMOL/L (ref 3.5–5.2)
PROT SERPL-MCNC: 6.6 G/DL (ref 6–8.5)
RBC # BLD AUTO: 4.45 10*6/MM3 (ref 3.77–5.28)
SODIUM BLD-SCNC: 139 MMOL/L (ref 136–145)
TROPONIN T SERPL-MCNC: <0.01 NG/ML (ref 0–0.03)
TROPONIN T SERPL-MCNC: <0.01 NG/ML (ref 0–0.03)
WBC NRBC COR # BLD: 8.04 10*3/MM3 (ref 3.4–10.8)
WHOLE BLOOD HOLD SPECIMEN: NORMAL
WHOLE BLOOD HOLD SPECIMEN: NORMAL

## 2020-03-20 PROCEDURE — 25010000002 METHYLPREDNISOLONE PER 125 MG: Performed by: EMERGENCY MEDICINE

## 2020-03-20 PROCEDURE — 99284 EMERGENCY DEPT VISIT MOD MDM: CPT

## 2020-03-20 PROCEDURE — 87804 INFLUENZA ASSAY W/OPTIC: CPT | Performed by: EMERGENCY MEDICINE

## 2020-03-20 PROCEDURE — 84484 ASSAY OF TROPONIN QUANT: CPT | Performed by: EMERGENCY MEDICINE

## 2020-03-20 PROCEDURE — 93005 ELECTROCARDIOGRAM TRACING: CPT | Performed by: EMERGENCY MEDICINE

## 2020-03-20 PROCEDURE — 80053 COMPREHEN METABOLIC PANEL: CPT | Performed by: EMERGENCY MEDICINE

## 2020-03-20 PROCEDURE — 25010000002 ONDANSETRON PER 1 MG: Performed by: EMERGENCY MEDICINE

## 2020-03-20 PROCEDURE — 96375 TX/PRO/DX INJ NEW DRUG ADDON: CPT

## 2020-03-20 PROCEDURE — 93010 ELECTROCARDIOGRAM REPORT: CPT | Performed by: INTERNAL MEDICINE

## 2020-03-20 PROCEDURE — 70551 MRI BRAIN STEM W/O DYE: CPT

## 2020-03-20 PROCEDURE — 70544 MR ANGIOGRAPHY HEAD W/O DYE: CPT

## 2020-03-20 PROCEDURE — 71045 X-RAY EXAM CHEST 1 VIEW: CPT

## 2020-03-20 PROCEDURE — 83690 ASSAY OF LIPASE: CPT | Performed by: EMERGENCY MEDICINE

## 2020-03-20 PROCEDURE — 96374 THER/PROPH/DIAG INJ IV PUSH: CPT

## 2020-03-20 PROCEDURE — 85025 COMPLETE CBC W/AUTO DIFF WBC: CPT

## 2020-03-20 PROCEDURE — 25010000002 LORAZEPAM PER 2 MG: Performed by: EMERGENCY MEDICINE

## 2020-03-20 RX ORDER — SODIUM CHLORIDE 0.9 % (FLUSH) 0.9 %
10 SYRINGE (ML) INJECTION AS NEEDED
Status: DISCONTINUED | OUTPATIENT
Start: 2020-03-20 | End: 2020-03-21 | Stop reason: HOSPADM

## 2020-03-20 RX ORDER — MECLIZINE HYDROCHLORIDE 25 MG/1
TABLET ORAL
Qty: 30 TABLET | Refills: 0 | Status: SHIPPED | OUTPATIENT
Start: 2020-03-20

## 2020-03-20 RX ORDER — ASPIRIN 81 MG/1
324 TABLET, CHEWABLE ORAL ONCE
Status: COMPLETED | OUTPATIENT
Start: 2020-03-20 | End: 2020-03-20

## 2020-03-20 RX ORDER — MECLIZINE HYDROCHLORIDE 25 MG/1
25 TABLET ORAL ONCE
Status: COMPLETED | OUTPATIENT
Start: 2020-03-20 | End: 2020-03-20

## 2020-03-20 RX ORDER — METHYLPREDNISOLONE SODIUM SUCCINATE 125 MG/2ML
125 INJECTION, POWDER, LYOPHILIZED, FOR SOLUTION INTRAMUSCULAR; INTRAVENOUS ONCE
Status: COMPLETED | OUTPATIENT
Start: 2020-03-20 | End: 2020-03-20

## 2020-03-20 RX ORDER — ONDANSETRON 2 MG/ML
4 INJECTION INTRAMUSCULAR; INTRAVENOUS ONCE
Status: COMPLETED | OUTPATIENT
Start: 2020-03-20 | End: 2020-03-20

## 2020-03-20 RX ORDER — LORAZEPAM 2 MG/ML
0.5 INJECTION INTRAMUSCULAR ONCE
Status: COMPLETED | OUTPATIENT
Start: 2020-03-20 | End: 2020-03-20

## 2020-03-20 RX ADMIN — LORAZEPAM 0.5 MG: 2 INJECTION INTRAMUSCULAR; INTRAVENOUS at 21:07

## 2020-03-20 RX ADMIN — ONDANSETRON 4 MG: 2 INJECTION INTRAMUSCULAR; INTRAVENOUS at 19:56

## 2020-03-20 RX ADMIN — MECLIZINE HYDROCHLORIDE 25 MG: 25 TABLET ORAL at 22:18

## 2020-03-20 RX ADMIN — ASPIRIN 324 MG: 81 TABLET, CHEWABLE ORAL at 20:03

## 2020-03-20 RX ADMIN — METHYLPREDNISOLONE SODIUM SUCCINATE 125 MG: 125 INJECTION, POWDER, FOR SOLUTION INTRAMUSCULAR; INTRAVENOUS at 21:07

## 2020-03-20 RX ADMIN — SODIUM CHLORIDE 1000 ML: 9 INJECTION, SOLUTION INTRAVENOUS at 20:28

## 2020-03-20 RX ADMIN — SODIUM CHLORIDE, PRESERVATIVE FREE 10 ML: 5 INJECTION INTRAVENOUS at 19:07

## 2020-03-21 VITALS
HEART RATE: 97 BPM | WEIGHT: 183 LBS | SYSTOLIC BLOOD PRESSURE: 129 MMHG | BODY MASS INDEX: 28.72 KG/M2 | DIASTOLIC BLOOD PRESSURE: 93 MMHG | OXYGEN SATURATION: 91 % | RESPIRATION RATE: 18 BRPM | HEIGHT: 67 IN | TEMPERATURE: 98.6 F

## 2023-07-05 ENCOUNTER — TELEPHONE (OUTPATIENT)
Dept: FAMILY MEDICINE CLINIC | Facility: CLINIC | Age: 63
End: 2023-07-05

## 2023-07-05 NOTE — TELEPHONE ENCOUNTER
Caller: IsraelGely amaral    Relationship: Self    Best call back number: [phone]    What is the best time to reach you: ANYTIME     Who are you requesting to speak with (clinical staff, provider,  specific staff member): ERNESTO GOMEZ     What was the call regarding: GELY STATES THAT SHE SPOKE WITH ERNESTO GOMEZ AT ONE OF HER BROTHERS APPOINTMENTS AND ERNESTO GOMEZ APPROVED TO TAKE HER ON AS A PATIENT.     PLEASE ADVISE AND SCHEDULE IF AGREED.

## 2023-08-02 ENCOUNTER — OFFICE VISIT (OUTPATIENT)
Dept: FAMILY MEDICINE CLINIC | Facility: CLINIC | Age: 63
End: 2023-08-02
Payer: MEDICARE

## 2023-08-02 DIAGNOSIS — Z78.0 POSTMENOPAUSE: ICD-10-CM

## 2023-08-02 DIAGNOSIS — F33.1 MODERATE EPISODE OF RECURRENT MAJOR DEPRESSIVE DISORDER: ICD-10-CM

## 2023-08-02 DIAGNOSIS — J43.9 PULMONARY EMPHYSEMA, UNSPECIFIED EMPHYSEMA TYPE: ICD-10-CM

## 2023-08-02 DIAGNOSIS — G25.81 RESTLESS LEG SYNDROME: ICD-10-CM

## 2023-08-02 DIAGNOSIS — Z79.4 TYPE 2 DIABETES MELLITUS WITH DIABETIC NEUROPATHY, WITH LONG-TERM CURRENT USE OF INSULIN: ICD-10-CM

## 2023-08-02 DIAGNOSIS — Z00.00 ENCOUNTER FOR MEDICARE ANNUAL WELLNESS EXAM: Primary | ICD-10-CM

## 2023-08-02 DIAGNOSIS — R35.0 URINARY FREQUENCY: ICD-10-CM

## 2023-08-02 DIAGNOSIS — M19.90 ARTHRITIS: ICD-10-CM

## 2023-08-02 DIAGNOSIS — I10 ESSENTIAL HYPERTENSION: ICD-10-CM

## 2023-08-02 DIAGNOSIS — R53.83 FATIGUE, UNSPECIFIED TYPE: ICD-10-CM

## 2023-08-02 DIAGNOSIS — Z11.59 NEED FOR HEPATITIS C SCREENING TEST: ICD-10-CM

## 2023-08-02 DIAGNOSIS — R42 DIZZINESS: ICD-10-CM

## 2023-08-02 DIAGNOSIS — E55.9 VITAMIN D DEFICIENCY: ICD-10-CM

## 2023-08-02 DIAGNOSIS — Z12.31 SCREENING MAMMOGRAM FOR BREAST CANCER: ICD-10-CM

## 2023-08-02 DIAGNOSIS — E11.40 TYPE 2 DIABETES MELLITUS WITH DIABETIC NEUROPATHY, WITH LONG-TERM CURRENT USE OF INSULIN: ICD-10-CM

## 2023-08-02 DIAGNOSIS — Z87.891 FORMER HEAVY CIGARETTE SMOKER (20-39 PER DAY): ICD-10-CM

## 2023-08-02 DIAGNOSIS — E78.00 HYPERCHOLESTEROLEMIA: ICD-10-CM

## 2023-08-02 DIAGNOSIS — F41.1 GENERALIZED ANXIETY DISORDER: ICD-10-CM

## 2023-08-02 LAB
BILIRUB BLD-MCNC: NEGATIVE MG/DL
CLARITY, POC: CLEAR
COLOR UR: ABNORMAL
EXPIRATION DATE: ABNORMAL
GLUCOSE UR STRIP-MCNC: ABNORMAL MG/DL
KETONES UR QL: ABNORMAL
LEUKOCYTE EST, POC: NEGATIVE
Lab: ABNORMAL
NITRITE UR-MCNC: NEGATIVE MG/ML
PH UR: 5.5 [PH] (ref 5–8)
PROT UR STRIP-MCNC: NEGATIVE MG/DL
RBC # UR STRIP: NEGATIVE /UL
SP GR UR: 1.02 (ref 1–1.03)
UROBILINOGEN UR QL: NORMAL

## 2023-08-02 RX ORDER — LISINOPRIL 10 MG/1
10 TABLET ORAL DAILY
COMMUNITY
End: 2023-08-02 | Stop reason: SDUPTHER

## 2023-08-02 RX ORDER — ESCITALOPRAM OXALATE 10 MG/1
10 TABLET ORAL DAILY
Qty: 30 TABLET | Refills: 1 | Status: SHIPPED | OUTPATIENT
Start: 2023-08-02

## 2023-08-02 RX ORDER — LISINOPRIL 10 MG/1
5 TABLET ORAL DAILY
Start: 2023-08-02

## 2023-08-03 VITALS
HEART RATE: 84 BPM | SYSTOLIC BLOOD PRESSURE: 108 MMHG | OXYGEN SATURATION: 95 % | DIASTOLIC BLOOD PRESSURE: 66 MMHG | WEIGHT: 146 LBS | BODY MASS INDEX: 22.91 KG/M2 | HEIGHT: 67 IN

## 2023-08-03 DIAGNOSIS — R76.8 HEPATITIS C ANTIBODY POSITIVE IN BLOOD: Primary | ICD-10-CM

## 2023-08-03 PROBLEM — F41.9 ANXIETY: Status: ACTIVE | Noted: 2023-08-03

## 2023-08-03 PROBLEM — F33.1 MODERATE EPISODE OF RECURRENT MAJOR DEPRESSIVE DISORDER: Status: ACTIVE | Noted: 2023-08-03

## 2023-08-03 PROBLEM — E55.9 VITAMIN D DEFICIENCY: Status: ACTIVE | Noted: 2023-08-03

## 2023-08-03 PROBLEM — B02.9 HERPES ZOSTER: Status: RESOLVED | Noted: 2023-08-03 | Resolved: 2023-08-03

## 2023-08-03 PROBLEM — B02.9 HERPES ZOSTER: Status: ACTIVE | Noted: 2023-08-03

## 2023-08-03 PROBLEM — F41.1 GENERALIZED ANXIETY DISORDER: Status: ACTIVE | Noted: 2023-08-03

## 2023-08-03 PROBLEM — R35.0 URINARY FREQUENCY: Status: ACTIVE | Noted: 2023-08-03

## 2023-08-03 PROBLEM — R42 DIZZINESS: Status: ACTIVE | Noted: 2023-08-03

## 2023-08-03 PROBLEM — G47.00 INSOMNIA: Status: ACTIVE | Noted: 2023-08-03

## 2023-08-03 PROBLEM — Z78.0 POSTMENOPAUSE: Status: ACTIVE | Noted: 2023-08-03

## 2023-08-03 PROBLEM — G25.81 RESTLESS LEG SYNDROME: Status: ACTIVE | Noted: 2023-08-03

## 2023-08-03 PROBLEM — E66.9 OBESITY: Status: RESOLVED | Noted: 2017-06-03 | Resolved: 2023-08-03

## 2023-08-03 PROBLEM — R53.83 FATIGUE: Status: ACTIVE | Noted: 2023-08-03

## 2023-08-03 LAB
25(OH)D3+25(OH)D2 SERPL-MCNC: 63.2 NG/ML (ref 30–100)
ALBUMIN SERPL-MCNC: 5 G/DL (ref 3.9–4.9)
ALBUMIN/CREAT UR: 13 MG/G CREAT (ref 0–29)
ALBUMIN/GLOB SERPL: 2.1 {RATIO} (ref 1.2–2.2)
ALP SERPL-CCNC: 52 IU/L (ref 44–121)
ALT SERPL-CCNC: 11 IU/L (ref 0–32)
AST SERPL-CCNC: 12 IU/L (ref 0–40)
BASOPHILS # BLD AUTO: 0.1 X10E3/UL (ref 0–0.2)
BASOPHILS NFR BLD AUTO: 1 %
BILIRUB SERPL-MCNC: 0.3 MG/DL (ref 0–1.2)
BUN SERPL-MCNC: 19 MG/DL (ref 8–27)
BUN/CREAT SERPL: 34 (ref 12–28)
CALCIUM SERPL-MCNC: 9.7 MG/DL (ref 8.7–10.3)
CHLORIDE SERPL-SCNC: 102 MMOL/L (ref 96–106)
CHOLEST SERPL-MCNC: 187 MG/DL (ref 100–199)
CO2 SERPL-SCNC: 24 MMOL/L (ref 20–29)
CREAT SERPL-MCNC: 0.56 MG/DL (ref 0.57–1)
CREAT UR-MCNC: 56.3 MG/DL
EGFRCR SERPLBLD CKD-EPI 2021: 101 ML/MIN/1.73
EOSINOPHIL # BLD AUTO: 0.1 X10E3/UL (ref 0–0.4)
EOSINOPHIL NFR BLD AUTO: 1 %
ERYTHROCYTE [DISTWIDTH] IN BLOOD BY AUTOMATED COUNT: 14.1 % (ref 11.7–15.4)
FOLATE SERPL-MCNC: 9.3 NG/ML
GLOBULIN SER CALC-MCNC: 2.4 G/DL (ref 1.5–4.5)
GLUCOSE SERPL-MCNC: 78 MG/DL (ref 70–99)
HBA1C MFR BLD: 5.9 % (ref 4.8–5.6)
HCT VFR BLD AUTO: 41.7 % (ref 34–46.6)
HCV IGG SERPL QL IA: REACTIVE
HDLC SERPL-MCNC: 57 MG/DL
HGB BLD-MCNC: 13.9 G/DL (ref 11.1–15.9)
IMM GRANULOCYTES # BLD AUTO: 0 X10E3/UL (ref 0–0.1)
IMM GRANULOCYTES NFR BLD AUTO: 0 %
LDLC SERPL CALC-MCNC: 109 MG/DL (ref 0–99)
LDLC/HDLC SERPL: 1.9 RATIO (ref 0–3.2)
LYMPHOCYTES # BLD AUTO: 2.1 X10E3/UL (ref 0.7–3.1)
LYMPHOCYTES NFR BLD AUTO: 24 %
MCH RBC QN AUTO: 29.5 PG (ref 26.6–33)
MCHC RBC AUTO-ENTMCNC: 33.3 G/DL (ref 31.5–35.7)
MCV RBC AUTO: 89 FL (ref 79–97)
MICROALBUMIN UR-MCNC: 7.2 UG/ML
MONOCYTES # BLD AUTO: 0.5 X10E3/UL (ref 0.1–0.9)
MONOCYTES NFR BLD AUTO: 6 %
NEUTROPHILS # BLD AUTO: 6.2 X10E3/UL (ref 1.4–7)
NEUTROPHILS NFR BLD AUTO: 68 %
PLATELET # BLD AUTO: 266 X10E3/UL (ref 150–450)
POTASSIUM SERPL-SCNC: 4.4 MMOL/L (ref 3.5–5.2)
PROT SERPL-MCNC: 7.4 G/DL (ref 6–8.5)
RBC # BLD AUTO: 4.71 X10E6/UL (ref 3.77–5.28)
SODIUM SERPL-SCNC: 141 MMOL/L (ref 134–144)
T4 FREE SERPL-MCNC: 1.43 NG/DL (ref 0.82–1.77)
TRIGL SERPL-MCNC: 118 MG/DL (ref 0–149)
TSH SERPL DL<=0.005 MIU/L-ACNC: 0.37 UIU/ML (ref 0.45–4.5)
VIT B12 SERPL-MCNC: 762 PG/ML (ref 232–1245)
VLDLC SERPL CALC-MCNC: 21 MG/DL (ref 5–40)
WBC # BLD AUTO: 9 X10E3/UL (ref 3.4–10.8)

## 2023-08-03 RX ORDER — ERGOCALCIFEROL 1.25 MG/1
50000 CAPSULE ORAL WEEKLY
COMMUNITY

## 2023-08-03 RX ORDER — LINAGLIPTIN AND METFORMIN HYDROCHLORIDE 2.5; 1 MG/1; MG/1
1 TABLET, FILM COATED, EXTENDED RELEASE ORAL
COMMUNITY
End: 2023-08-04 | Stop reason: SDUPTHER

## 2023-08-03 RX ORDER — VALACYCLOVIR HYDROCHLORIDE 1 G/1
TABLET, FILM COATED ORAL
COMMUNITY
End: 2023-08-03

## 2023-08-03 RX ORDER — INSULIN DETEMIR 100 [IU]/ML
50 INJECTION, SOLUTION SUBCUTANEOUS NIGHTLY
COMMUNITY
End: 2023-08-04 | Stop reason: SDUPTHER

## 2023-08-03 RX ORDER — PRAVASTATIN SODIUM 20 MG
20 TABLET ORAL NIGHTLY
COMMUNITY
End: 2023-08-04 | Stop reason: SDUPTHER

## 2023-08-03 RX ORDER — ROPINIROLE 2 MG/1
1 TABLET, FILM COATED ORAL
COMMUNITY
End: 2023-08-03

## 2023-08-04 DIAGNOSIS — Z79.4 TYPE 2 DIABETES MELLITUS WITH DIABETIC NEUROPATHY, WITH LONG-TERM CURRENT USE OF INSULIN: ICD-10-CM

## 2023-08-04 DIAGNOSIS — E78.00 HYPERCHOLESTEROLEMIA: ICD-10-CM

## 2023-08-04 DIAGNOSIS — M19.90 ARTHRITIS: Primary | ICD-10-CM

## 2023-08-04 DIAGNOSIS — I10 ESSENTIAL HYPERTENSION: ICD-10-CM

## 2023-08-04 DIAGNOSIS — E11.40 TYPE 2 DIABETES MELLITUS WITH DIABETIC NEUROPATHY, WITH LONG-TERM CURRENT USE OF INSULIN: ICD-10-CM

## 2023-08-04 DIAGNOSIS — R79.89 DECREASED THYROID STIMULATING HORMONE (TSH) LEVEL: ICD-10-CM

## 2023-08-04 DIAGNOSIS — R76.8 HEPATITIS C ANTIBODY POSITIVE IN BLOOD: ICD-10-CM

## 2023-08-04 RX ORDER — LINAGLIPTIN AND METFORMIN HYDROCHLORIDE 2.5; 1 MG/1; MG/1
1 TABLET, FILM COATED, EXTENDED RELEASE ORAL
Qty: 60 TABLET | Refills: 5 | Status: SHIPPED | OUTPATIENT
Start: 2023-08-04

## 2023-08-04 RX ORDER — INSULIN DETEMIR 100 [IU]/ML
44 INJECTION, SOLUTION SUBCUTANEOUS NIGHTLY
Start: 2023-08-04

## 2023-08-04 RX ORDER — PRAVASTATIN SODIUM 20 MG
20 TABLET ORAL NIGHTLY
Qty: 90 TABLET | Refills: 1 | Status: SHIPPED | OUTPATIENT
Start: 2023-08-04

## 2023-08-04 RX ORDER — NAPROXEN 500 MG/1
500 TABLET ORAL 2 TIMES DAILY WITH MEALS
Qty: 60 TABLET | Refills: 5 | Status: SHIPPED | OUTPATIENT
Start: 2023-08-04

## 2023-08-16 ENCOUNTER — OFFICE VISIT (OUTPATIENT)
Dept: FAMILY MEDICINE CLINIC | Facility: CLINIC | Age: 63
End: 2023-08-16
Payer: MEDICARE

## 2023-08-16 VITALS
OXYGEN SATURATION: 97 % | HEIGHT: 67 IN | BODY MASS INDEX: 23.23 KG/M2 | HEART RATE: 78 BPM | DIASTOLIC BLOOD PRESSURE: 72 MMHG | SYSTOLIC BLOOD PRESSURE: 124 MMHG | WEIGHT: 148 LBS

## 2023-08-16 DIAGNOSIS — F33.1 MODERATE EPISODE OF RECURRENT MAJOR DEPRESSIVE DISORDER: ICD-10-CM

## 2023-08-16 DIAGNOSIS — Z79.4 TYPE 2 DIABETES MELLITUS WITH DIABETIC POLYNEUROPATHY, WITH LONG-TERM CURRENT USE OF INSULIN: Primary | ICD-10-CM

## 2023-08-16 DIAGNOSIS — L98.9 SKIN LESION OF BACK: ICD-10-CM

## 2023-08-16 DIAGNOSIS — E16.2 LOW BLOOD SUGAR: ICD-10-CM

## 2023-08-16 DIAGNOSIS — E11.42 TYPE 2 DIABETES MELLITUS WITH DIABETIC POLYNEUROPATHY, WITH LONG-TERM CURRENT USE OF INSULIN: Primary | ICD-10-CM

## 2023-08-16 DIAGNOSIS — F41.1 GENERALIZED ANXIETY DISORDER: ICD-10-CM

## 2023-08-16 LAB — GLUCOSE BLDC GLUCOMTR-MCNC: 98 MG/DL (ref 70–130)

## 2023-08-16 PROCEDURE — 99213 OFFICE O/P EST LOW 20 MIN: CPT | Performed by: NURSE PRACTITIONER

## 2023-08-16 PROCEDURE — 3078F DIAST BP <80 MM HG: CPT | Performed by: NURSE PRACTITIONER

## 2023-08-16 PROCEDURE — 1159F MED LIST DOCD IN RCRD: CPT | Performed by: NURSE PRACTITIONER

## 2023-08-16 PROCEDURE — 82948 REAGENT STRIP/BLOOD GLUCOSE: CPT | Performed by: NURSE PRACTITIONER

## 2023-08-16 PROCEDURE — 3044F HG A1C LEVEL LT 7.0%: CPT | Performed by: NURSE PRACTITIONER

## 2023-08-16 PROCEDURE — 3074F SYST BP LT 130 MM HG: CPT | Performed by: NURSE PRACTITIONER

## 2023-08-16 PROCEDURE — 1160F RVW MEDS BY RX/DR IN RCRD: CPT | Performed by: NURSE PRACTITIONER

## 2023-08-16 NOTE — PATIENT INSTRUCTIONS
Stop Levemir and continue to monitor blood sugar.  Call with blood sugar readings in 1 week.  Continue to monitor your blood sugar once daily before a meal and record results.  Check blood sugar before dinner or at bedtime on occasion.  Continue to monitor your blood pressure periodically and record results.  Continue to work on healthy diet and exercise.  Follow up in 1 month, or sooner if symptoms persist or worsen.

## 2023-08-16 NOTE — PROGRESS NOTES
Subjective   Gely Kimble is a 63 y.o. female.     Chief Complaint   Patient presents with    Hypoglycemia     Low blood sugar   122 this AM        History of Present Illness   Patient present for follow up DM2: has had trouble with low blood sugars recently; monitors blood sugars and has been running 50s at times; has been waking up during the night with low blood sugars; will get up and eat something or drink a pepsi and will raise blood sugar; takes Jardiance daily and Levemir nightly and Jentadueto twice daily; last A1c 5.9% and decreased Levemir to 44 units daily after LOV; did not take Levemir last night and blood sugar was 122 this morning; blood sugar currently 98; has been watching carbs/sugars and going to gym 3 days per week; last eye exam yesterday at [facility].    F/U anxiety/depression: started Escitalopram daily and been helping; son told her she needs to stay on medication; has had trouble sleeping since 2014 when mother passed; has been sleeping a little better and only waking up about twice per night now; no SI/HI.    F/U decreased TSH: will be getting repeat labs today.      The following portions of the patient's history were reviewed and updated as appropriate: allergies, current medications, past family history, past medical history, past social history, past surgical history and problem list.    Current Outpatient Medications on File Prior to Visit   Medication Sig    albuterol (PROVENTIL HFA;VENTOLIN HFA) 108 (90 BASE) MCG/ACT inhaler Inhale 2 puffs Every 4 (Four) Hours As Needed for Wheezing.    albuterol (PROVENTIL) (2.5 MG/3ML) 0.083% nebulizer solution Take 2.5 mg by nebulization Every 4 (Four) Hours As Needed for Wheezing.    aspirin 81 MG chewable tablet Chew 1 tablet Daily.    empagliflozin (Jardiance) 25 MG tablet tablet Take 1 tablet by mouth Daily.    escitalopram (Lexapro) 10 MG tablet Take 1 tablet by mouth Daily. Start with half tablet daily x1 week, then  increase to whole tablet daily.    ipratropium-albuterol (COMBIVENT RESPIMAT)  MCG/ACT inhaler Inhale 1 puff 4 (Four) Times a Day As Needed for Wheezing.    linaGLIPtin-metFORMIN HCl ER (Jentadueto XR) 2.5-1000 MG tablet sustained-release 24 hour Take 1 tablet by mouth 2 (Two) Times a Day Before Meals.    lisinopril (PRINIVIL,ZESTRIL) 10 MG tablet Take 0.5 tablets by mouth Daily.    Multiple Vitamin (MULTIVITAMIN) tablet Take 1 tablet by mouth.    naproxen (NAPROSYN) 500 MG tablet Take 1 tablet by mouth 2 (Two) Times a Day With Meals.    pravastatin (PRAVACHOL) 20 MG tablet Take 1 tablet by mouth Every Night.    vitamin D (ERGOCALCIFEROL) 1.25 MG (47533 UT) capsule capsule Take 1 capsule by mouth 1 (One) Time Per Week.    [DISCONTINUED] insulin detemir (Levemir FlexPen) 100 UNIT/ML injection Inject 44 Units under the skin into the appropriate area as directed Every Night.     No current facility-administered medications on file prior to visit.        Past Medical History:   Diagnosis Date    Arthritis     COPD (chronic obstructive pulmonary disease)     Elevated cholesterol     Emphysema of lung     Gallbladder abscess     Herpes zoster 2023    Hypertension     Type 2 diabetes mellitus        Past Surgical History:   Procedure Laterality Date    APPENDECTOMY      CHOLECYSTECTOMY      HYSTERECTOMY      TUBAL ABDOMINAL LIGATION         Family History   Problem Relation Age of Onset    Diabetes Mother     Heart disease Mother     Diabetes Father     Diabetes Brother     Heart disease Maternal Grandmother     Cancer Paternal Grandfather        Social History     Socioeconomic History    Marital status:    Tobacco Use    Smoking status: Former     Packs/day: 1.50     Types: Cigarettes     Quit date: 10/11/2017     Years since quittin.8    Tobacco comments:     Previously smoked about 1.5 packs per day for about 45 years   Substance and Sexual Activity    Alcohol use: No     Comment: rare addisonmoreno  1-2 times per year    Drug use: No     Comment: in past; clean since 2007    Sexual activity: Not Currently       Review of Systems   Constitutional:  Positive for fatigue. Negative for appetite change, unexpected weight gain and unexpected weight loss.   Respiratory:  Negative for cough, chest tightness and shortness of breath.    Cardiovascular:  Negative for chest pain and palpitations.   Gastrointestinal:  Negative for abdominal pain, blood in stool, constipation and diarrhea.   Genitourinary:  Negative for dysuria and frequency.   Skin:  Negative for rash.   Neurological:  Negative for dizziness, light-headedness (only with episodes of hypoglycemia) and headache.   Hematological:  Bruises/bleeds easily: no change in easy bruising.     PHQ-9 Depression Screening  Little interest or pleasure in doing things? 0-->not at all   Feeling down, depressed, or hopeless? 0-->not at all   Trouble falling or staying asleep, or sleeping too much? 3-->nearly every day   Feeling tired or having little energy? 3-->nearly every day   Poor appetite or overeating? 0-->not at all   Feeling bad about yourself - or that you are a failure or have let yourself or your family down? 0-->not at all   Trouble concentrating on things, such as reading the newspaper or watching television? 0-->not at all   Moving or speaking so slowly that other people could have noticed? Or the opposite - being so fidgety or restless that you have been moving around a lot more than usual? 0-->not at all   Thoughts that you would be better off dead, or of hurting yourself in some way? 0-->not at all   PHQ-9 Total Score 6   If you checked off any problems, how difficult have these problems made it for you to do your work, take care of things at home, or get along with other people?       ARMEN-7 anxiety score: 2    Objective   Vitals:    08/16/23 1352   BP: 124/72   BP Location: Left arm   Patient Position: Sitting   Cuff Size: Adult   Pulse: 78   SpO2: 97%  "  Weight: 67.1 kg (148 lb)   Height: 170.2 cm (67\")     Body mass index is 23.18 kg/mý.    Physical Exam  Vitals and nursing note reviewed.   Constitutional:       General: She is not in acute distress.     Appearance: She is well-developed and well-groomed. She is not diaphoretic.   HENT:      Head: Normocephalic.      Right Ear: External ear normal.      Left Ear: External ear normal.   Eyes:      Conjunctiva/sclera: Conjunctivae normal.   Neck:      Vascular: No carotid bruit.   Cardiovascular:      Rate and Rhythm: Normal rate and regular rhythm.      Pulses: Normal pulses.      Heart sounds: Normal heart sounds. No murmur heard.  Pulmonary:      Effort: Pulmonary effort is normal. No respiratory distress.      Breath sounds: Normal breath sounds.   Abdominal:      General: Bowel sounds are normal.      Palpations: Abdomen is soft. There is no hepatomegaly or splenomegaly.      Tenderness: There is no abdominal tenderness. There is no guarding.   Musculoskeletal:      Cervical back: Normal range of motion and neck supple.      Right lower leg: No edema.      Left lower leg: No edema.   Skin:     General: Skin is warm and dry.      Findings: No rash.          Neurological:      Mental Status: She is alert and oriented to person, place, and time.      Gait: Gait normal.   Psychiatric:         Mood and Affect: Mood normal.         Behavior: Behavior normal.         Thought Content: Thought content normal.         Cognition and Memory: Cognition normal.         Judgment: Judgment normal.       Lab Results   Component Value Date    WBC 9.0 08/02/2023    RBC 4.71 08/02/2023    HGB 13.9 08/02/2023    HCT 41.7 08/02/2023    MCV 89 08/02/2023    MCH 29.5 08/02/2023    MCHC 33.3 08/02/2023    RDW 14.1 08/02/2023    RDWSD 42.8 03/20/2020    MPV 11.5 10/28/2020     08/02/2023    NEUTRORELPCT 68 08/02/2023    LYMPHORELPCT 24 08/02/2023    MONORELPCT 6 08/02/2023    EOSRELPCT 1 08/02/2023    BASORELPCT 1 08/02/2023    " AUTOIGPER 0.3 10/28/2020    NEUTROABS 6.2 08/02/2023    LYMPHSABS 2.1 08/02/2023    MONOSABS 0.5 08/02/2023    EOSABS 0.1 08/02/2023    BASOSABS 0.1 08/02/2023    AUTOIGNUM 0.04 10/28/2020    NRBC 0 10/28/2020     Lab Results   Component Value Date    GLUCOSE 78 08/02/2023    BUN 19 08/02/2023    CREATININE 0.56 (L) 08/02/2023    EGFRIFNONA 108 03/20/2020    BCR 34 (H) 08/02/2023    K 4.4 08/02/2023    CO2 24 08/02/2023    CALCIUM 9.7 08/02/2023    PROTENTOTREF 7.4 08/02/2023    ALBUMIN 5.0 (H) 08/02/2023    LABIL2 2.1 08/02/2023    AST 12 08/02/2023    ALT 11 08/02/2023      Lab Results   Component Value Date    CHLPL 187 08/02/2023    TRIG 118 08/02/2023    HDL 57 08/02/2023    VLDL 21 08/02/2023     (H) 08/02/2023     Lab Results   Component Value Date    TSH 0.366 (L) 08/02/2023     Lab Results   Component Value Date    HGBA1C 5.9 (H) 08/02/2023         Assessment    Problem List Items Addressed This Visit       Type 2 diabetes mellitus - Primary    Current Assessment & Plan     Diabetes is  over treated .   Regular aerobic exercise.  Medication changes per orders.  Diabetes will be reassessed  1 week .  Continue Jardiance daily and Jentadueto twice daily.  Stop Levemir and continue to monitor blood sugar.  Call with blood sugar readings in 1 week.  Check blood sugar before breakfast daily and before dinner or at bedtime on occasion.         Moderate episode of recurrent major depressive disorder    Current Assessment & Plan     Patient's depression is recurrent and is moderate without psychosis. Their depression is currently active and the condition is improving with treatment. This will be reassessed in 4 weeks. F/U as described:patient will continue current medication therapy.  Continue Escitalopram daily.         Generalized anxiety disorder    Current Assessment & Plan     Psychological condition is improving with treatment.  Continue current treatment regimen.  Psychological condition  will be  reassessed in 4 weeks.  Continue Escitalopram daily.         Skin lesion of back    Relevant Orders    Ambulatory Referral to Dermatology     Other Visit Diagnoses       Low blood sugar        Relevant Orders    POCT Glucose (Completed)            Return in about 1 month (around 9/16/2023) for Recheck.or sooner if symptoms persist or worsen.  Will stop Levemir due to hypoglycemia and continue to monitor blood sugars; pt will call with blood sugar readings in 1 week; Jentadueto is too expensive with new insurance; will consider Rx for Metformin and Tradjenta with next refill.

## 2023-08-17 PROBLEM — L98.9 SKIN LESION OF BACK: Status: ACTIVE | Noted: 2023-08-17

## 2023-08-17 NOTE — ASSESSMENT & PLAN NOTE
Patient's depression is recurrent and is moderate without psychosis. Their depression is currently active and the condition is improving with treatment. This will be reassessed in 4 weeks. F/U as described:patient will continue current medication therapy.  Continue Escitalopram daily.

## 2023-08-17 NOTE — ASSESSMENT & PLAN NOTE
Diabetes is  over treated .   Regular aerobic exercise.  Medication changes per orders.  Diabetes will be reassessed  1 week .  Continue Jardiance daily and Jentadueto twice daily.  Stop Levemir and continue to monitor blood sugar.  Call with blood sugar readings in 1 week.  Check blood sugar before breakfast daily and before dinner or at bedtime on occasion.

## 2023-08-17 NOTE — ASSESSMENT & PLAN NOTE
Psychological condition is improving with treatment.  Continue current treatment regimen.  Psychological condition  will be reassessed in 4 weeks.  Continue Escitalopram daily.

## 2023-08-22 ENCOUNTER — TELEPHONE (OUTPATIENT)
Dept: FAMILY MEDICINE CLINIC | Facility: CLINIC | Age: 63
End: 2023-08-22

## 2023-08-22 NOTE — TELEPHONE ENCOUNTER
DCaller: Gely Kimble    Relationship to patient: Self    Patient is needing: PATIENT STATES ON 8/16 SHE WAS TOLD TO KEEP TRACK OF HER BLOOD SUGAR READINGS AND CALL TODAY TO ADVISE ERNESTO WHAT THEY ARE.      PATIENT STATES THAT ON:      8/16 P.M.: 125.      8/17 A.M.: 117; P.M. 145.      8/18 A.M. 131; P.M. 166.      8/19: A.M. 121, P.M. 191.       8/20: A.M. 141; P.M. 150.      8/21: A.M. 150; P.M. 230.      8/22: A.M. 130.

## 2023-09-12 ENCOUNTER — OFFICE VISIT (OUTPATIENT)
Dept: FAMILY MEDICINE CLINIC | Facility: CLINIC | Age: 63
End: 2023-09-12
Payer: MEDICARE

## 2023-09-12 VITALS
OXYGEN SATURATION: 96 % | HEART RATE: 78 BPM | SYSTOLIC BLOOD PRESSURE: 96 MMHG | HEIGHT: 67 IN | BODY MASS INDEX: 21.5 KG/M2 | DIASTOLIC BLOOD PRESSURE: 70 MMHG | WEIGHT: 137 LBS

## 2023-09-12 DIAGNOSIS — Z79.4 TYPE 2 DIABETES MELLITUS WITH DIABETIC NEUROPATHY, WITH LONG-TERM CURRENT USE OF INSULIN: ICD-10-CM

## 2023-09-12 DIAGNOSIS — E11.40 TYPE 2 DIABETES MELLITUS WITH DIABETIC NEUROPATHY, WITH LONG-TERM CURRENT USE OF INSULIN: ICD-10-CM

## 2023-09-12 DIAGNOSIS — F33.1 MODERATE EPISODE OF RECURRENT MAJOR DEPRESSIVE DISORDER: ICD-10-CM

## 2023-09-12 DIAGNOSIS — Z79.4 TYPE 2 DIABETES MELLITUS WITH DIABETIC POLYNEUROPATHY, WITH LONG-TERM CURRENT USE OF INSULIN: Primary | ICD-10-CM

## 2023-09-12 DIAGNOSIS — E11.42 TYPE 2 DIABETES MELLITUS WITH DIABETIC POLYNEUROPATHY, WITH LONG-TERM CURRENT USE OF INSULIN: Primary | ICD-10-CM

## 2023-09-12 DIAGNOSIS — I45.10 INCOMPLETE RIGHT BUNDLE BRANCH BLOCK (RBBB): ICD-10-CM

## 2023-09-12 DIAGNOSIS — R79.89 DECREASED THYROID STIMULATING HORMONE (TSH) LEVEL: ICD-10-CM

## 2023-09-12 DIAGNOSIS — R11.0 NAUSEA: ICD-10-CM

## 2023-09-12 DIAGNOSIS — F41.1 GENERALIZED ANXIETY DISORDER: ICD-10-CM

## 2023-09-12 DIAGNOSIS — R42 DIZZINESS: ICD-10-CM

## 2023-09-12 DIAGNOSIS — I10 ESSENTIAL HYPERTENSION: ICD-10-CM

## 2023-09-12 DIAGNOSIS — R53.83 FATIGUE, UNSPECIFIED TYPE: ICD-10-CM

## 2023-09-12 PROCEDURE — 93000 ELECTROCARDIOGRAM COMPLETE: CPT | Performed by: NURSE PRACTITIONER

## 2023-09-12 PROCEDURE — 1160F RVW MEDS BY RX/DR IN RCRD: CPT | Performed by: NURSE PRACTITIONER

## 2023-09-12 PROCEDURE — 3078F DIAST BP <80 MM HG: CPT | Performed by: NURSE PRACTITIONER

## 2023-09-12 PROCEDURE — 3044F HG A1C LEVEL LT 7.0%: CPT | Performed by: NURSE PRACTITIONER

## 2023-09-12 PROCEDURE — 99214 OFFICE O/P EST MOD 30 MIN: CPT | Performed by: NURSE PRACTITIONER

## 2023-09-12 PROCEDURE — 3074F SYST BP LT 130 MM HG: CPT | Performed by: NURSE PRACTITIONER

## 2023-09-12 PROCEDURE — 1159F MED LIST DOCD IN RCRD: CPT | Performed by: NURSE PRACTITIONER

## 2023-09-12 RX ORDER — INSULIN DETEMIR 100 [IU]/ML
10 INJECTION, SOLUTION SUBCUTANEOUS NIGHTLY
Start: 2023-09-12

## 2023-09-12 RX ORDER — OMEPRAZOLE 20 MG/1
20 CAPSULE, DELAYED RELEASE ORAL DAILY
Qty: 30 CAPSULE | Refills: 1 | Status: SHIPPED | OUTPATIENT
Start: 2023-09-12

## 2023-09-12 NOTE — PROGRESS NOTES
Subjective   Gely Kimble is a 63 y.o. female.     Chief Complaint   Patient presents with    type 2 diabetes    Weight Loss     Nausea and stomach pains     Fatigue       History of Present Illness   Patient present for follow up DM2: takes Jardiance daily and Jentadueto twice daily; last A1c 5.9%; stopped Levemir LOV due to low blood sugars; no more low blood sugars; blood sugars have been running 140-150s, sometimes into 160-190s; watches intake of carbs/sugars in diet; not much exercise; some numbness in right leg and feet; has neuropathy but is tolerable; no recent back pain; went to [facility] for lumbar sprain and UTI recently; finished antibiotic and symptoms resolved; last eye exam last month at [facility].    Also, c/o weight loss with nausea; no abdominal pain; has fatigue and no energy; symptoms of fatigue started on 9/8/23; has noted weight loss for last 1.5 weeks; no constipation; mild diarrhea last 2 days; no known sick exposures; no blood in BM; takes Naproxen twice daily with food for arthritis; had recently taken Diclofenac twice daily with lumbar strain and then resumed Naproxen twice daily; no heartburn or reflux; nausea is ongoing, not related to eating or not; no fever; no SOA; no chest pain or chest tightness.    F/U HTN: takes Lisinopril daily; decreased dose to 5 mg daily about 1 month ago; monitors BP and has been 120s/70s; no headaches; has had orthostasis; drinks plenty of liquids; mild swelling in legs at end of the day.     F/U anxiety/depression: takes Escitalopram daily and working well; no change in trouble falling asleep and staying asleep; no SI/HI.     F/U decreased TSH: needs repeat labs today.       The following portions of the patient's history were reviewed and updated as appropriate: allergies, current medications, past family history, past medical history, past social history, past surgical history and problem list.    Current Outpatient  Medications on File Prior to Visit   Medication Sig    aspirin 81 MG chewable tablet Chew 1 tablet Daily.    empagliflozin (Jardiance) 25 MG tablet tablet Take 1 tablet by mouth Daily.    escitalopram (Lexapro) 10 MG tablet Take 1 tablet by mouth Daily. Start with half tablet daily x1 week, then increase to whole tablet daily.    ipratropium-albuterol (COMBIVENT RESPIMAT)  MCG/ACT inhaler Inhale 1 puff 4 (Four) Times a Day As Needed for Wheezing.    lisinopril (PRINIVIL,ZESTRIL) 10 MG tablet Take 0.5 tablets by mouth Daily.    Multiple Vitamin (MULTIVITAMIN) tablet Take 1 tablet by mouth.    naproxen (NAPROSYN) 500 MG tablet Take 1 tablet by mouth 2 (Two) Times a Day With Meals.    pravastatin (PRAVACHOL) 20 MG tablet Take 1 tablet by mouth Every Night.    vitamin D (ERGOCALCIFEROL) 1.25 MG (38677 UT) capsule capsule Take 1 capsule by mouth 1 (One) Time Per Week.    [DISCONTINUED] linaGLIPtin-metFORMIN HCl ER (Jentadueto XR) 2.5-1000 MG tablet sustained-release 24 hour Take 1 tablet by mouth 2 (Two) Times a Day Before Meals.    albuterol (PROVENTIL HFA;VENTOLIN HFA) 108 (90 BASE) MCG/ACT inhaler Inhale 2 puffs Every 4 (Four) Hours As Needed for Wheezing. (Patient not taking: Reported on 9/12/2023)    albuterol (PROVENTIL) (2.5 MG/3ML) 0.083% nebulizer solution Take 2.5 mg by nebulization Every 4 (Four) Hours As Needed for Wheezing. (Patient not taking: Reported on 9/12/2023)     No current facility-administered medications on file prior to visit.        Past Medical History:   Diagnosis Date    Arthritis     COPD (chronic obstructive pulmonary disease)     Elevated cholesterol     Emphysema of lung     Gallbladder abscess     Herpes zoster 08/03/2023    History of positive hepatitis C     previously positive, never treated, negative RNA finding    Hypertension     Type 2 diabetes mellitus        Past Surgical History:   Procedure Laterality Date    APPENDECTOMY      CHOLECYSTECTOMY      HYSTERECTOMY      TUBAL  "ABDOMINAL LIGATION         Family History   Problem Relation Age of Onset    Diabetes Mother     Heart disease Mother     Diabetes Father     Diabetes Brother     Heart disease Maternal Grandmother     Cancer Paternal Grandfather        Social History     Socioeconomic History    Marital status:    Tobacco Use    Smoking status: Former     Packs/day: 1.50     Types: Cigarettes     Quit date: 10/11/2017     Years since quittin.9    Tobacco comments:     Previously smoked about 1.5 packs per day for about 45 years   Substance and Sexual Activity    Alcohol use: No     Comment: rare margaritia 1-2 times per year    Drug use: No     Comment: in past; clean since     Sexual activity: Not Currently       Review of Systems   Constitutional:  Positive for fatigue. Negative for appetite change, chills and fever. Weight loss: see HPI; has lost 5 pounds in the last week or so.  HENT:  Negative for ear pain, sinus pressure and trouble swallowing.    Eyes:  Negative for blurred vision.   Respiratory:  Negative for cough, chest tightness and shortness of breath.    Cardiovascular:  Negative for chest pain. Palpitations: rare with activity.  Gastrointestinal:  Negative for abdominal pain, vomiting and GERD.   Endocrine: Negative for cold intolerance, heat intolerance and polydipsia.   Genitourinary:  Negative for dysuria and frequency.   Musculoskeletal:  Negative for back pain (see HPI).   Skin:  Negative for rash.   Neurological:  Negative for syncope and weakness.   Hematological:  Does not bruise/bleed easily.     Objective   Vitals:    23 0802 23 0832 23 0833   BP: 102/68 102/70 96/70   BP Location: Left arm Left arm Left arm   Patient Position: Sitting Sitting Standing   Cuff Size: Adult     Pulse: 78     SpO2: 96%     Weight: 62.1 kg (137 lb)     Height: 170.2 cm (67\")       Body mass index is 21.46 kg/m².    Physical Exam  Vitals and nursing note reviewed.   Constitutional:       General: " She is not in acute distress.     Appearance: She is well-developed and well-groomed. She is not diaphoretic.   HENT:      Head: Normocephalic.      Right Ear: External ear normal. No decreased hearing noted. Right ear middle ear effusion: TM dull. Tympanic membrane is not erythematous.      Left Ear: External ear normal. No decreased hearing noted. Left ear middle ear effusion: mild. Tympanic membrane is not erythematous.      Nose: Nose normal.      Right Sinus: No maxillary sinus tenderness or frontal sinus tenderness.      Left Sinus: No maxillary sinus tenderness or frontal sinus tenderness.      Mouth/Throat:      Mouth: Mucous membranes are moist.      Pharynx: No oropharyngeal exudate or posterior oropharyngeal erythema.   Eyes:      Conjunctiva/sclera: Conjunctivae normal.   Neck:      Vascular: No carotid bruit.   Cardiovascular:      Rate and Rhythm: Normal rate and regular rhythm.      Pulses: Normal pulses.      Heart sounds: Normal heart sounds. No murmur heard.  Pulmonary:      Effort: Pulmonary effort is normal. No respiratory distress.      Breath sounds: Normal breath sounds. Decreased breath sounds: bilateral lower lobes.   Abdominal:      General: Bowel sounds are normal.      Palpations: Abdomen is soft. There is no hepatomegaly or splenomegaly.      Tenderness: There is no abdominal tenderness. There is no guarding.   Musculoskeletal:      Cervical back: Normal range of motion and neck supple.      Right lower leg: No edema.      Left lower leg: No edema.   Lymphadenopathy:      Cervical: No cervical adenopathy.   Skin:     General: Skin is warm and dry.      Findings: No rash.   Neurological:      Mental Status: She is alert and oriented to person, place, and time.      Gait: Abnormal gait: guarded gait.   Psychiatric:         Mood and Affect: Mood normal.         Behavior: Behavior normal.         Thought Content: Thought content normal.         Cognition and Memory: Cognition normal.          Judgment: Judgment normal.           ECG 12 Lead    Date/Time: 9/12/2023 8:33 AM  Performed by: Sofi Fernandes APRN  Authorized by: Sofi Fernandes APRN   Comparison: compared with previous ECG from 3/21/2020  Similar to previous ECG  Rhythm: sinus rhythm  Rate: normal  Conduction: incomplete right bundle branch block  T Waves: T waves normal    Clinical impression: abnormal EKG          Lab Results   Component Value Date    WBC 9.0 08/02/2023    RBC 4.71 08/02/2023    HGB 13.9 08/02/2023    HCT 41.7 08/02/2023    MCV 89 08/02/2023    MCH 29.5 08/02/2023    MCHC 33.3 08/02/2023    RDW 14.1 08/02/2023    RDWSD 42.8 03/20/2020    MPV 11.5 10/28/2020     08/02/2023    NEUTRORELPCT 68 08/02/2023    LYMPHORELPCT 24 08/02/2023    MONORELPCT 6 08/02/2023    EOSRELPCT 1 08/02/2023    BASORELPCT 1 08/02/2023    AUTOIGPER 0.3 10/28/2020    NEUTROABS 6.2 08/02/2023    LYMPHSABS 2.1 08/02/2023    MONOSABS 0.5 08/02/2023    EOSABS 0.1 08/02/2023    BASOSABS 0.1 08/02/2023    AUTOIGNUM 0.04 10/28/2020    NRBC 0 10/28/2020     Lab Results   Component Value Date    GLUCOSE 78 08/02/2023    BUN 19 08/02/2023    CREATININE 0.56 (L) 08/02/2023    EGFRIFNONA 108 03/20/2020    BCR 34 (H) 08/02/2023    K 4.4 08/02/2023    CO2 24 08/02/2023    CALCIUM 9.7 08/02/2023    PROTENTOTREF 7.4 08/02/2023    ALBUMIN 5.0 (H) 08/02/2023    LABIL2 2.1 08/02/2023    AST 12 08/02/2023    ALT 11 08/02/2023      Lab Results   Component Value Date    CHLPL 187 08/02/2023    TRIG 118 08/02/2023    HDL 57 08/02/2023    VLDL 21 08/02/2023     (H) 08/02/2023     Lab Results   Component Value Date    TSH 0.437 (L) 08/16/2023     Lab Results   Component Value Date    HGBA1C 5.9 (H) 08/02/2023     Lab Results   Component Value Date    RBCUA 0-2 07/15/2017    BACTERIA None Seen 07/15/2017    COLORU Dark Yellow 08/02/2023    CLARITYU Clear 08/02/2023    LEUKOCYTESUR Negative (A) 08/02/2023    GLUCOSEU Negative 07/15/2017    BLOODU Negative  07/15/2017    BILIRUBINUR Negative 08/02/2023    NITRITEU Negative 07/15/2017          Assessment    Problem List Items Addressed This Visit       Type 2 diabetes mellitus - Primary    Current Assessment & Plan     Diabetes is  not as well controlled since stopped Levemir .   Regular aerobic exercise.  Reminded to get yearly retinal exam.  Medication changes per orders.  Diabetes will be reassessed in 1 month.  Resume Levemir at 10 units nightly.  Change Jenatdueto twice daily to Metformin twice daily and Linagliptan daily due to cost.  Continue Jardiance daily.         Relevant Medications    insulin detemir (Levemir FlexPen) 100 UNIT/ML injection    linagliptin (TRADJENTA) 5 MG tablet tablet    metFORMIN ER (GLUCOPHAGE-XR) 500 MG 24 hr tablet    Other Relevant Orders    Comprehensive Metabolic Panel    Essential hypertension    Current Assessment & Plan     Hypertension is  possibly over treated .  Medication changes per orders.  Ambulatory blood pressure monitoring.  Blood pressure will be reassessed in 4 weeks.  Stop Lisinopril for now.         Relevant Orders    CBC & Differential    Comprehensive Metabolic Panel    Fatigue    Relevant Orders    ECG 12 Lead    TSH Rfx On Abnormal To Free T4    CBC & Differential    Comprehensive Metabolic Panel    Ambulatory Referral to Cardiology    Moderate episode of recurrent major depressive disorder    Current Assessment & Plan     Patient's depression is recurrent and is moderate without psychosis. Their depression is currently active and the condition is  stable . This will be reassessed in 4 weeks. F/U as described:patient will continue current medication therapy.  Continue Escitalopram daily.         Generalized anxiety disorder    Current Assessment & Plan     Psychological condition is  stable .  Continue current treatment regimen.  Psychological condition  will be reassessed in 4 weeks.  Continue Escitalopram daily.         Dizziness    Current Assessment & Plan      Stop Lisinopril for now.  Try over the counter antihistamine, such as Claritin or Allegra.         Relevant Orders    ECG 12 Lead    Nausea    Current Assessment & Plan     Start Omeprazole daily.  Try over the antihistamine, such as Claritin or Allegra.         Relevant Medications    omeprazole (priLOSEC) 20 MG capsule    Decreased thyroid stimulating hormone (TSH) level    Relevant Orders    TSH Rfx On Abnormal To Free T4    Incomplete right bundle branch block (RBBB)    Relevant Orders    Ambulatory Referral to Cardiology        Return in about 1 month (around 10/12/2023) for Recheck.or sooner if symptoms persist or worsen.  Will stop Lisinopril for now and see helps dizziness due to low blood pressure today.  Will start Omeprazole daily and see if helps nausea and decreased appetite; pt had recently been taking Diclofenac instead of Naproxen with lower back strain; will also check labs today for further evaluation of symptoms.

## 2023-09-12 NOTE — PATIENT INSTRUCTIONS
Stop Lisinopril for now.  Start Omeprazole daily.  Continue to take Naproxen with food.  Resume Levemir at 10 units nightly.  Try over the counter antihistamine, such as Claritin or Allegra.  Continue to monitor your blood sugar once daily before a meal and record results.  Continue to monitor your blood pressure periodically and record results.  Continue to work on healthy diet.  Follow up pending lab results.  Follow up in 1 month, or sooner if symptoms persist or worsen.

## 2023-09-13 ENCOUNTER — APPOINTMENT (OUTPATIENT)
Dept: GENERAL RADIOLOGY | Facility: HOSPITAL | Age: 63
End: 2023-09-13
Payer: MEDICARE

## 2023-09-13 ENCOUNTER — HOSPITAL ENCOUNTER (OUTPATIENT)
Facility: HOSPITAL | Age: 63
Setting detail: OBSERVATION
Discharge: HOME | End: 2023-09-14
Attending: EMERGENCY MEDICINE | Admitting: EMERGENCY MEDICINE
Payer: MEDICARE

## 2023-09-13 DIAGNOSIS — I10 HYPERTENSION, UNSPECIFIED TYPE: ICD-10-CM

## 2023-09-13 DIAGNOSIS — R73.9 HYPERGLYCEMIA: ICD-10-CM

## 2023-09-13 DIAGNOSIS — R07.9 CHEST PAIN, UNSPECIFIED TYPE: Primary | ICD-10-CM

## 2023-09-13 DIAGNOSIS — R53.1 GENERAL WEAKNESS: ICD-10-CM

## 2023-09-13 PROBLEM — R11.0 NAUSEA: Status: ACTIVE | Noted: 2023-09-13

## 2023-09-13 PROBLEM — I45.10 RIGHT BUNDLE BRANCH BLOCK (RBBB) ON ELECTROCARDIOGRAM (ECG): Status: ACTIVE | Noted: 2023-09-13

## 2023-09-13 PROBLEM — R79.89 DECREASED THYROID STIMULATING HORMONE (TSH) LEVEL: Status: ACTIVE | Noted: 2023-09-13

## 2023-09-13 LAB
ALBUMIN SERPL-MCNC: 4.3 G/DL (ref 3.5–5.2)
ALBUMIN SERPL-MCNC: 5.1 G/DL (ref 3.5–5.2)
ALBUMIN/GLOB SERPL: 1.7 G/DL
ALBUMIN/GLOB SERPL: 2.2 G/DL
ALP SERPL-CCNC: 61 U/L (ref 39–117)
ALP SERPL-CCNC: 63 U/L (ref 39–117)
ALT SERPL W P-5'-P-CCNC: 10 U/L (ref 1–33)
ALT SERPL-CCNC: 15 U/L (ref 1–33)
ANION GAP SERPL CALCULATED.3IONS-SCNC: 11 MMOL/L (ref 5–15)
AST SERPL-CCNC: 12 U/L (ref 1–32)
AST SERPL-CCNC: 12 U/L (ref 1–32)
BASOPHILS # BLD AUTO: 0.06 10*3/MM3 (ref 0–0.2)
BASOPHILS # BLD AUTO: 0.09 10*3/MM3 (ref 0–0.2)
BASOPHILS NFR BLD AUTO: 0.6 % (ref 0–1.5)
BASOPHILS NFR BLD AUTO: 1 % (ref 0–1.5)
BILIRUB SERPL-MCNC: 0.2 MG/DL (ref 0–1.2)
BILIRUB SERPL-MCNC: 0.3 MG/DL (ref 0–1.2)
BILIRUB UR QL STRIP: NEGATIVE
BUN SERPL-MCNC: 12 MG/DL (ref 8–23)
BUN SERPL-MCNC: 19 MG/DL (ref 8–23)
BUN/CREAT SERPL: 20.7 (ref 7–25)
BUN/CREAT SERPL: 40.4 (ref 7–25)
CALCIUM SERPL-MCNC: 10.6 MG/DL (ref 8.6–10.5)
CALCIUM SPEC-SCNC: 9.8 MG/DL (ref 8.6–10.5)
CHLORIDE SERPL-SCNC: 104 MMOL/L (ref 98–107)
CHLORIDE SERPL-SCNC: 98 MMOL/L (ref 98–107)
CHOLEST SERPL-MCNC: 125 MG/DL (ref 0–200)
CLARITY UR: CLEAR
CO2 SERPL-SCNC: 23 MMOL/L (ref 22–29)
CO2 SERPL-SCNC: 25.9 MMOL/L (ref 22–29)
COLOR UR: YELLOW
CREAT SERPL-MCNC: 0.47 MG/DL (ref 0.57–1)
CREAT SERPL-MCNC: 0.58 MG/DL (ref 0.57–1)
D DIMER PPP FEU-MCNC: 0.59 MCGFEU/ML (ref 0–0.65)
DEPRECATED RDW RBC AUTO: 41.9 FL (ref 37–54)
EGFRCR SERPLBLD CKD-EPI 2021: 100.6 ML/MIN/1.73
EGFRCR SERPLBLD CKD-EPI 2021: 105.8 ML/MIN/1.73
EOSINOPHIL # BLD AUTO: 0.08 10*3/MM3 (ref 0–0.4)
EOSINOPHIL # BLD AUTO: 0.1 10*3/MM3 (ref 0–0.4)
EOSINOPHIL NFR BLD AUTO: 0.8 % (ref 0.3–6.2)
EOSINOPHIL NFR BLD AUTO: 1.1 % (ref 0.3–6.2)
ERYTHROCYTE [DISTWIDTH] IN BLOOD BY AUTOMATED COUNT: 13.1 % (ref 12.3–15.4)
ERYTHROCYTE [DISTWIDTH] IN BLOOD BY AUTOMATED COUNT: 13.4 % (ref 12.3–15.4)
GEN 5 2HR TROPONIN T REFLEX: 9 NG/L
GLOBULIN SER CALC-MCNC: 2.3 GM/DL
GLOBULIN UR ELPH-MCNC: 2.6 GM/DL
GLUCOSE BLDC GLUCOMTR-MCNC: 155 MG/DL (ref 70–130)
GLUCOSE SERPL-MCNC: 117 MG/DL (ref 65–99)
GLUCOSE SERPL-MCNC: 157 MG/DL (ref 65–99)
GLUCOSE UR STRIP-MCNC: ABNORMAL MG/DL
HBA1C MFR BLD: 6.4 % (ref 4.8–5.6)
HCT VFR BLD AUTO: 40.9 % (ref 34–46.6)
HCT VFR BLD AUTO: 45.2 % (ref 34–46.6)
HDLC SERPL-MCNC: 32 MG/DL (ref 40–60)
HGB BLD-MCNC: 13.8 G/DL (ref 12–15.9)
HGB BLD-MCNC: 14.6 G/DL (ref 12–15.9)
HGB UR QL STRIP.AUTO: NEGATIVE
IMM GRANULOCYTES # BLD AUTO: 0.02 10*3/MM3 (ref 0–0.05)
IMM GRANULOCYTES # BLD AUTO: 0.03 10*3/MM3 (ref 0–0.05)
IMM GRANULOCYTES NFR BLD AUTO: 0.2 % (ref 0–0.5)
IMM GRANULOCYTES NFR BLD AUTO: 0.3 % (ref 0–0.5)
KETONES UR QL STRIP: ABNORMAL
LDLC SERPL CALC-MCNC: 41 MG/DL (ref 0–100)
LDLC/HDLC SERPL: 0.71 {RATIO}
LEUKOCYTE ESTERASE UR QL STRIP.AUTO: NEGATIVE
LYMPHOCYTES # BLD AUTO: 1.92 10*3/MM3 (ref 0.7–3.1)
LYMPHOCYTES # BLD AUTO: 1.95 10*3/MM3 (ref 0.7–3.1)
LYMPHOCYTES NFR BLD AUTO: 18.2 % (ref 19.6–45.3)
LYMPHOCYTES NFR BLD AUTO: 21.9 % (ref 19.6–45.3)
MAGNESIUM SERPL-MCNC: 1.7 MG/DL (ref 1.6–2.4)
MCH RBC QN AUTO: 28.3 PG (ref 26.6–33)
MCH RBC QN AUTO: 29.2 PG (ref 26.6–33)
MCHC RBC AUTO-ENTMCNC: 32.3 G/DL (ref 31.5–35.7)
MCHC RBC AUTO-ENTMCNC: 33.7 G/DL (ref 31.5–35.7)
MCV RBC AUTO: 86.5 FL (ref 79–97)
MCV RBC AUTO: 87.6 FL (ref 79–97)
MONOCYTES # BLD AUTO: 0.55 10*3/MM3 (ref 0.1–0.9)
MONOCYTES # BLD AUTO: 0.67 10*3/MM3 (ref 0.1–0.9)
MONOCYTES NFR BLD AUTO: 6.2 % (ref 5–12)
MONOCYTES NFR BLD AUTO: 6.4 % (ref 5–12)
NEUTROPHILS # BLD AUTO: 6.18 10*3/MM3 (ref 1.7–7)
NEUTROPHILS NFR BLD AUTO: 69.6 % (ref 42.7–76)
NEUTROPHILS NFR BLD AUTO: 7.77 10*3/MM3 (ref 1.7–7)
NEUTROPHILS NFR BLD AUTO: 73.7 % (ref 42.7–76)
NITRITE UR QL STRIP: NEGATIVE
NRBC BLD AUTO-RTO: 0 /100 WBC (ref 0–0.2)
NRBC BLD AUTO-RTO: 0 /100 WBC (ref 0–0.2)
NT-PROBNP SERPL-MCNC: 64.6 PG/ML (ref 0–900)
PH UR STRIP.AUTO: 5.5 [PH] (ref 5–8)
PLATELET # BLD AUTO: 283 10*3/MM3 (ref 140–450)
PLATELET # BLD AUTO: 340 10*3/MM3 (ref 140–450)
PMV BLD AUTO: 10.8 FL (ref 6–12)
POTASSIUM SERPL-SCNC: 4.2 MMOL/L (ref 3.5–5.2)
POTASSIUM SERPL-SCNC: 4.6 MMOL/L (ref 3.5–5.2)
PROT SERPL-MCNC: 6.9 G/DL (ref 6–8.5)
PROT SERPL-MCNC: 7.4 G/DL (ref 6–8.5)
PROT UR QL STRIP: NEGATIVE
QT INTERVAL: 355 MS
QTC INTERVAL: 451 MS
RBC # BLD AUTO: 4.73 10*6/MM3 (ref 3.77–5.28)
RBC # BLD AUTO: 5.16 10*6/MM3 (ref 3.77–5.28)
SODIUM SERPL-SCNC: 138 MMOL/L (ref 136–145)
SODIUM SERPL-SCNC: 138 MMOL/L (ref 136–145)
SP GR UR STRIP: >=1.03 (ref 1–1.03)
T4 FREE SERPL-MCNC: 1.49 NG/DL (ref 0.93–1.7)
TRIGL SERPL-MCNC: 352 MG/DL (ref 0–150)
TROPONIN T DELTA: 0 NG/L
TROPONIN T SERPL HS-MCNC: 9 NG/L
TSH SERPL DL<=0.005 MIU/L-ACNC: 0.49 UIU/ML (ref 0.27–4.2)
TSH SERPL DL<=0.05 MIU/L-ACNC: 0.33 UIU/ML (ref 0.27–4.2)
UROBILINOGEN UR QL STRIP: ABNORMAL
VLDLC SERPL-MCNC: 52 MG/DL (ref 5–40)
WBC # BLD AUTO: 8.89 10*3/MM3 (ref 3.4–10.8)
WBC NRBC COR # BLD: 10.53 10*3/MM3 (ref 3.4–10.8)

## 2023-09-13 PROCEDURE — 93010 ELECTROCARDIOGRAM REPORT: CPT | Performed by: INTERNAL MEDICINE

## 2023-09-13 PROCEDURE — 85025 COMPLETE CBC W/AUTO DIFF WBC: CPT | Performed by: EMERGENCY MEDICINE

## 2023-09-13 PROCEDURE — 99284 EMERGENCY DEPT VISIT MOD MDM: CPT

## 2023-09-13 PROCEDURE — 36415 COLL VENOUS BLD VENIPUNCTURE: CPT

## 2023-09-13 PROCEDURE — G0378 HOSPITAL OBSERVATION PER HR: HCPCS

## 2023-09-13 PROCEDURE — 84443 ASSAY THYROID STIM HORMONE: CPT | Performed by: EMERGENCY MEDICINE

## 2023-09-13 PROCEDURE — 84439 ASSAY OF FREE THYROXINE: CPT | Performed by: EMERGENCY MEDICINE

## 2023-09-13 PROCEDURE — 83880 ASSAY OF NATRIURETIC PEPTIDE: CPT | Performed by: EMERGENCY MEDICINE

## 2023-09-13 PROCEDURE — 82948 REAGENT STRIP/BLOOD GLUCOSE: CPT

## 2023-09-13 PROCEDURE — 71045 X-RAY EXAM CHEST 1 VIEW: CPT

## 2023-09-13 PROCEDURE — 81003 URINALYSIS AUTO W/O SCOPE: CPT | Performed by: EMERGENCY MEDICINE

## 2023-09-13 PROCEDURE — 80061 LIPID PANEL: CPT | Performed by: PHYSICIAN ASSISTANT

## 2023-09-13 PROCEDURE — P9612 CATHETERIZE FOR URINE SPEC: HCPCS

## 2023-09-13 PROCEDURE — 80053 COMPREHEN METABOLIC PANEL: CPT | Performed by: EMERGENCY MEDICINE

## 2023-09-13 PROCEDURE — 83036 HEMOGLOBIN GLYCOSYLATED A1C: CPT | Performed by: PHYSICIAN ASSISTANT

## 2023-09-13 PROCEDURE — 84484 ASSAY OF TROPONIN QUANT: CPT | Performed by: EMERGENCY MEDICINE

## 2023-09-13 PROCEDURE — 93005 ELECTROCARDIOGRAM TRACING: CPT | Performed by: EMERGENCY MEDICINE

## 2023-09-13 PROCEDURE — 83735 ASSAY OF MAGNESIUM: CPT | Performed by: EMERGENCY MEDICINE

## 2023-09-13 PROCEDURE — 85379 FIBRIN DEGRADATION QUANT: CPT | Performed by: PHYSICIAN ASSISTANT

## 2023-09-13 PROCEDURE — 63710000001 INSULIN LISPRO (HUMAN) PER 5 UNITS: Performed by: PHYSICIAN ASSISTANT

## 2023-09-13 RX ORDER — LISINOPRIL 10 MG/1
10 TABLET ORAL
Status: DISCONTINUED | OUTPATIENT
Start: 2023-09-13 | End: 2023-09-14 | Stop reason: HOSPADM

## 2023-09-13 RX ORDER — INSULIN LISPRO 100 [IU]/ML
2-9 INJECTION, SOLUTION INTRAVENOUS; SUBCUTANEOUS
Status: DISCONTINUED | OUTPATIENT
Start: 2023-09-13 | End: 2023-09-14 | Stop reason: HOSPADM

## 2023-09-13 RX ORDER — ASPIRIN 81 MG/1
81 TABLET, CHEWABLE ORAL DAILY
Status: DISCONTINUED | OUTPATIENT
Start: 2023-09-14 | End: 2023-09-14 | Stop reason: HOSPADM

## 2023-09-13 RX ORDER — POLYETHYLENE GLYCOL 3350 17 G/17G
17 POWDER, FOR SOLUTION ORAL DAILY PRN
Status: DISCONTINUED | OUTPATIENT
Start: 2023-09-13 | End: 2023-09-14 | Stop reason: HOSPADM

## 2023-09-13 RX ORDER — AMOXICILLIN 250 MG
2 CAPSULE ORAL 2 TIMES DAILY
Status: DISCONTINUED | OUTPATIENT
Start: 2023-09-13 | End: 2023-09-14 | Stop reason: HOSPADM

## 2023-09-13 RX ORDER — ROPINIROLE 2 MG/1
2 TABLET, FILM COATED ORAL NIGHTLY
COMMUNITY
End: 2023-09-25

## 2023-09-13 RX ORDER — IBUPROFEN 600 MG/1
1 TABLET ORAL
Status: DISCONTINUED | OUTPATIENT
Start: 2023-09-13 | End: 2023-09-14 | Stop reason: HOSPADM

## 2023-09-13 RX ORDER — PANTOPRAZOLE SODIUM 40 MG/1
40 TABLET, DELAYED RELEASE ORAL
Status: DISCONTINUED | OUTPATIENT
Start: 2023-09-14 | End: 2023-09-14 | Stop reason: HOSPADM

## 2023-09-13 RX ORDER — SODIUM CHLORIDE 9 MG/ML
40 INJECTION, SOLUTION INTRAVENOUS AS NEEDED
Status: DISCONTINUED | OUTPATIENT
Start: 2023-09-13 | End: 2023-09-14 | Stop reason: HOSPADM

## 2023-09-13 RX ORDER — ROPINIROLE 2 MG/1
2 TABLET, FILM COATED ORAL NIGHTLY
Status: DISCONTINUED | OUTPATIENT
Start: 2023-09-13 | End: 2023-09-14 | Stop reason: HOSPADM

## 2023-09-13 RX ORDER — SODIUM CHLORIDE 0.9 % (FLUSH) 0.9 %
10 SYRINGE (ML) INJECTION AS NEEDED
Status: DISCONTINUED | OUTPATIENT
Start: 2023-09-13 | End: 2023-09-14 | Stop reason: HOSPADM

## 2023-09-13 RX ORDER — NITROGLYCERIN 0.4 MG/1
0.4 TABLET SUBLINGUAL
Status: DISCONTINUED | OUTPATIENT
Start: 2023-09-13 | End: 2023-09-14 | Stop reason: HOSPADM

## 2023-09-13 RX ORDER — BISACODYL 10 MG
10 SUPPOSITORY, RECTAL RECTAL DAILY PRN
Status: DISCONTINUED | OUTPATIENT
Start: 2023-09-13 | End: 2023-09-14 | Stop reason: HOSPADM

## 2023-09-13 RX ORDER — METFORMIN HYDROCHLORIDE 500 MG/1
1000 TABLET, EXTENDED RELEASE ORAL 2 TIMES DAILY WITH MEALS
Status: DISCONTINUED | OUTPATIENT
Start: 2023-09-13 | End: 2023-09-13

## 2023-09-13 RX ORDER — ALBUTEROL SULFATE 90 UG/1
2 AEROSOL, METERED RESPIRATORY (INHALATION) EVERY 4 HOURS PRN
Status: DISCONTINUED | OUTPATIENT
Start: 2023-09-13 | End: 2023-09-14 | Stop reason: HOSPADM

## 2023-09-13 RX ORDER — SODIUM CHLORIDE 0.9 % (FLUSH) 0.9 %
10 SYRINGE (ML) INJECTION EVERY 12 HOURS SCHEDULED
Status: DISCONTINUED | OUTPATIENT
Start: 2023-09-13 | End: 2023-09-14 | Stop reason: HOSPADM

## 2023-09-13 RX ORDER — ESCITALOPRAM OXALATE 10 MG/1
10 TABLET ORAL DAILY
Status: DISCONTINUED | OUTPATIENT
Start: 2023-09-14 | End: 2023-09-14 | Stop reason: HOSPADM

## 2023-09-13 RX ORDER — PRAVASTATIN SODIUM 40 MG
20 TABLET ORAL NIGHTLY
Status: DISCONTINUED | OUTPATIENT
Start: 2023-09-13 | End: 2023-09-14 | Stop reason: HOSPADM

## 2023-09-13 RX ORDER — METFORMIN HYDROCHLORIDE 500 MG/1
1000 TABLET, EXTENDED RELEASE ORAL 2 TIMES DAILY WITH MEALS
Qty: 120 TABLET | Refills: 5 | Status: SHIPPED | OUTPATIENT
Start: 2023-09-13

## 2023-09-13 RX ORDER — DEXTROSE MONOHYDRATE 25 G/50ML
25 INJECTION, SOLUTION INTRAVENOUS
Status: DISCONTINUED | OUTPATIENT
Start: 2023-09-13 | End: 2023-09-14 | Stop reason: HOSPADM

## 2023-09-13 RX ORDER — BISACODYL 5 MG/1
5 TABLET, DELAYED RELEASE ORAL DAILY PRN
Status: DISCONTINUED | OUTPATIENT
Start: 2023-09-13 | End: 2023-09-14 | Stop reason: HOSPADM

## 2023-09-13 RX ORDER — NICOTINE POLACRILEX 4 MG
15 LOZENGE BUCCAL
Status: DISCONTINUED | OUTPATIENT
Start: 2023-09-13 | End: 2023-09-14 | Stop reason: HOSPADM

## 2023-09-13 RX ADMIN — Medication 10 ML: at 20:03

## 2023-09-13 RX ADMIN — PRAVASTATIN SODIUM 20 MG: 40 TABLET ORAL at 21:04

## 2023-09-13 RX ADMIN — ROPINIROLE 2 MG: 2 TABLET, FILM COATED ORAL at 21:25

## 2023-09-13 RX ADMIN — INSULIN LISPRO 2 UNITS: 100 INJECTION, SOLUTION INTRAVENOUS; SUBCUTANEOUS at 22:05

## 2023-09-13 RX ADMIN — LISINOPRIL 10 MG: 10 TABLET ORAL at 20:02

## 2023-09-13 RX ADMIN — SODIUM CHLORIDE 1000 ML: 9 INJECTION, SOLUTION INTRAVENOUS at 17:51

## 2023-09-13 NOTE — PROGRESS NOTES
MD ATTESTATION NOTE    The NAIMA and I have discussed this patient's history, physical exam, and treatment plan.  I have reviewed the documentation and personally had a face to face interaction with the patient. I affirm the documentation and agree with the treatment and plan.  The attached note describes my personal findings.      I provided a substantive portion of the care of the patient.  I personally performed the physical exam in its entirety, and below are my findings.    History is obtained from the patient as well as medical records.  Brief HPI: This is a 63-year-old female history of hypertension hyperlipidemia and diabetes who is a former smoker.  She has multiple family members that do have a history of heart disease.  She is never been evaluated for heart disease in the past.  She presents with some generalized weakness that is been occurring for several days.  She will also have some chest heaviness.  It is just a mild chest heaviness across the anterior portion of her chest.  This chest heaviness will come and go.  It has been constant since yesterday evening.  She was able to sleep last night.  She is not aware of anything that makes the chest heaviness worse.  Is not worse with exertion or worse with deep breathing.  She does also states she does have some mild shortness of breath at times.  She currently has no shortness of breath.  The weakness and fatigue is generalized.  She has no visual change, speech change or focal weakness to arms or legs.  Denies any headache, or abdominal pain.  She is scheduled to see Dr. Najera but has not seen him as of yet.  She came here because of this worsening of this generalized weakness and fatigue.  Denies any fever or chills.  Denies any urinary symptoms.    General : This is an elderly female.  Appears a little tired and weak.  Does not appear septic or toxic ipatient is awake alert and oriented she is not in any acute cardiovascular or respiratory  distress on my exam heart rate is normal blood pressure is normal oxygen saturation 98% on room air  HEENT: NCAT  CV: Heart is regular with no murmurs.  Normal inspection to chest.  Pain is not reproduced with palpation or with deep breathing  Respiratory: CTA bilaterally.  No respiratory distress  Abd: Soft and nontender  Ext: No acute abnormalities.  No edema.  Intact distal pulses that are equal strong and symmetric.  Skin: No rash  Neuro: Cranial nerves II through XII grossly intact as tested.  No acute lateralizing deficits.  Psych: Normal mood and affect    I have reviewed the patient's vital signs, lab work, EKG and imaging.    Plan:  #1  Chest heaviness: EKG does not show any acute injury pattern and is normal sinus rhythm.  Patient's troponin is normal as well as a other lab work is unremarkable.  Chest x-ray done in the emergency department is unremarkable as well.  It is somewhat reassuring that she has a negative troponin and this chest heaviness has been constant since yesterday evening.  We will treat her with aspirin.  I will go ahead and check a dimer on her as well.  I do have a low index patient that this is a pulmonary embolism as she has no pleuritic component.  Cardiology has been consulted.  Accompanying this chest discomfort is generalized weakness and fatigue lab work is unremarkable.  Discussed the plan with the patient.  All questions answered.        Note Disclaimer: At [facility], we believe that sharing information builds trust and better relationships. You are receiving this note because you recently visited [facility]. It is possible you will see health information before a provider has talked with you about it. This kind of information can be easy to misunderstand. To help you fully understand what it means for your health, we urge you to discuss this note with your provider.

## 2023-09-13 NOTE — ASSESSMENT & PLAN NOTE
Patient's depression is recurrent and is moderate without psychosis. Their depression is currently active and the condition is  stable . This will be reassessed in 4 weeks. F/U as described:patient will continue current medication therapy.  Continue Escitalopram daily.

## 2023-09-13 NOTE — ASSESSMENT & PLAN NOTE
Diabetes is  not as well controlled since stopped Levemir .   Regular aerobic exercise.  Reminded to get yearly retinal exam.  Medication changes per orders.  Diabetes will be reassessed in 1 month.  Resume Levemir at 10 units nightly.  Change Jenatdueto twice daily to Metformin twice daily and Linagliptan daily due to cost.  Continue Jardiance daily.

## 2023-09-13 NOTE — ASSESSMENT & PLAN NOTE
Psychological condition is  stable .  Continue current treatment regimen.  Psychological condition  will be reassessed in 4 weeks.  Continue Escitalopram daily.

## 2023-09-13 NOTE — H&P
[facility]   HISTORY AND PHYSICAL    Patient Name: Gely Kimble  : 1960  MRN: [id number]  Primary Care Physician:  Sofi Fernandes, DARIAN  Date of admission: 2023    Subjective   Subjective     Chief Complaint:   Chief Complaint   Patient presents with    Weakness - Generalized         HPI:    Gely Kimble is a 63 y.o. female with a history of arthritis, COPD, hypercholesterolemia, hypertension, and diabetes who presented to the emergency department with complaints of weakness and fatigue.  Patient reports for the past 3 days all she feels like doing is sleeping.  Last night she did develop some dull pain in the lower part of her chest that resolved.  Today she developed a constant pressure/heaviness in the lower portion of her chest.  Mild associated dyspnea.  No alleviating or exacerbating factors.  No injury or trauma to the chest.  No prior cardiac work-up.  She is scheduled to see Dr. Najera next month.  No personal cardiac history but does have very strong family history of MI.  Former tobacco user, quit in 2017.    Patient was worked up in the emergency department with labs and UA.  Initial troponin negative.  EKG obtained with no evidence of acute ischemia.  Chest x-ray negative acute.  Patient will be admitted to the observation unit to trend troponin and obtain cardiology consultation.    Review of Systems   All systems were reviewed and negative except for: Fatigue, chest pain    Personal History     Past Medical History:   Diagnosis Date    Arthritis     COPD (chronic obstructive pulmonary disease)     Elevated cholesterol     Emphysema of lung     Gallbladder abscess     Herpes zoster 2023    History of positive hepatitis C     previously positive, never treated, negative RNA finding    Hypertension     Type 2 diabetes mellitus        Past Surgical History:   Procedure Laterality Date    APPENDECTOMY      CHOLECYSTECTOMY      HYSTERECTOMY      TUBAL ABDOMINAL LIGATION          Family History: family history includes Cancer in her paternal grandfather; Diabetes in her brother, father, and mother; Heart disease in her maternal grandmother and mother. Otherwise pertinent FHx was reviewed and not pertinent to current issue.    Social History:  reports that she quit smoking about 5 years ago. Her smoking use included cigarettes. She smoked an average of 1.5 packs per day. She does not have any smokeless tobacco history on file. She reports that she does not drink alcohol and does not use drugs.    Home Medications:  SITagliptin, albuterol, albuterol sulfate HFA, aspirin, empagliflozin, escitalopram, insulin detemir, ipratropium-albuterol, linagliptin, lisinopril, metFORMIN ER, multivitamin, naproxen, omeprazole, pravastatin, and vitamin D    Allergies:  No Known Allergies    Objective   Objective     Vitals:   Temp:  [98.2 °F (36.8 °C)] 98.2 °F (36.8 °C)  Heart Rate:  [] 89  Resp:  [17] 17  BP: (100-151)/(65-85) 121/71  Physical Exam    Constitutional: Awake, alert   Eyes: PERRLA, sclerae anicteric, no conjunctival injection   HENT: NCAT, mucous membranes moist   Neck: Supple, no thyromegaly, no lymphadenopathy, trachea midline   Respiratory: Clear to auscultation bilaterally, nonlabored respirations    Cardiovascular: RRR, no murmurs, rubs, or gallops, palpable pedal pulses bilaterally   Gastrointestinal: Positive bowel sounds, soft, nontender, nondistended   Musculoskeletal: No bilateral ankle edema, no clubbing or cyanosis to extremities   Psychiatric: Appropriate affect, cooperative   Neurologic: Oriented x 3, strength symmetric in all extremities, Cranial Nerves grossly intact to confrontation, speech clear   Skin: No rashes     Result Review    Result Review:  I have personally reviewed the results from the time of this admission to 9/13/2023 18:45 EDT and agree with these findings:  [x]  Laboratory list / accordion  []  Microbiology  [x]  Radiology  [x]  EKG/Telemetry   []   Cardiology/Vascular   []  Pathology  []  Old records  []  Other:  Most notable findings include: Troponin 9, hemoglobin 13.8, creatinine 0.47, UA nitrite negative.  Chest x-ray negative acute.  EKG no acute ischemia.      Assessment & Plan   Assessment / Plan     Brief Patient Summary:  Gely Kimble is a 63 y.o. female who will be admitted to the observation unit for cardiac monitoring and cardiology consultation.    Active Hospital Problems:  Active Hospital Problems    Diagnosis     **Chest pain      Plan:     Chest pain  -Initial troponin 9, continue to trend  -Check d-dimer  -Check Hgb A1c, lipid panel  -EKG no acute ischemia  -CXR negative acute  -Obtain ECHO  -Cardiology consult to [facility]  -Telemetry  -Vital signs per nursing    Fatigue  -BNP, TSH, UA unremarkable  -CXR negative acute  -Check AM vitamin D, B12    Hypertension  -Continue lisinopril    Hyperlipidemia  -Continue pravastatin    Diabetes  -Insulin glargine qHS  -Continue Jardiance, linagliptin, metformin    Anxiety and Depression  -Continue lexapro      DVT prophylaxis:  Mechanical DVT prophylaxis orders are present.    CODE STATUS:       Admission Status:  I believe this patient meets observation status.    Electronically signed by Alyce Okeefe PA-C, 09/13/23, 6:45 PM EDT.        75 minutes has been spent by [facility] providers in the care of this patient while under observation status      I have worn appropriate PPE during this patient encounter, sanitized my hands both with entering and exiting patient's room.

## 2023-09-13 NOTE — ASSESSMENT & PLAN NOTE
Hypertension is  possibly over treated .  Medication changes per orders.  Ambulatory blood pressure monitoring.  Blood pressure will be reassessed in 4 weeks.  Stop Lisinopril for now.

## 2023-09-13 NOTE — ED PROVIDER NOTES
EMERGENCY DEPARTMENT ENCOUNTER  Room Number:  10/10  Date of encounter:  9/13/2023  PCP: Sofi Fernandes APRN  Patient Care Team:  Sofi Fernandes APRN as PCP - General (Family Medicine)     HPI:  Context: Gely Kimble is a 63 y.o. female who presents to the ED c/o chief complaint of weakness and fatigue.  Patient complains of generalized weakness for last 3 days, denies any focal weakness, no facial droop, no difficulty with speech.  Patient denies any fever shakes chills or night sweats, no cough or upper respiratory symptoms, no nausea vomiting, no diarrhea, no urinary symptoms.  Patient denies any abdominal pain.  Patient reports that she did have some dull chest pain last night, that resolved, has had constant chest heaviness since.  Chest heaviness is diffuse, does not radiate, is not exertional.  Patient does endorse some occasional shortness of breath coming and going, no nausea vomiting or diaphoresis.  Patient denies any cardiac history, reports history of prior stress test, no prior cardiac cath, reports that she is currently scheduled to follow-up with Dr. Hayes but this is a new appointment, has not yet asked establish care with him.  Patient reports history of hypertension hyperlipidemia and diabetes, patient is a former smoker, quit in 2017, reports multiple first-degree relatives with history of MI.  Patient denies any unusual bruising, no rash, no unexplained weight loss.    MEDICAL HISTORY REVIEW  Reviewed in EPIC    PAST MEDICAL HISTORY  Active Ambulatory Problems     Diagnosis Date Noted    Type 2 diabetes mellitus 06/03/2017    Pulmonary emphysema 06/03/2017    Arthritis 06/03/2017    Hypercholesterolemia 06/03/2017    Essential hypertension 06/03/2017    Myocardial perfusion defect 02/16/2016    Abnormal liver function tests 02/16/2016    Impingement syndrome of shoulder region 07/03/2013    Neck pain 05/08/2013    Postmenopause 08/03/2023    Restless leg syndrome 08/03/2023     Urinary frequency 2023    Vitamin D deficiency 2023    Fatigue 2023    Moderate episode of recurrent major depressive disorder 2023    Anxiety 2023    Generalized anxiety disorder 2023    Insomnia 2023    Dizziness 2023    Skin lesion of back 2023    Nausea 2023    Decreased thyroid stimulating hormone (TSH) level 2023    Incomplete right bundle branch block (RBBB) 2023     Resolved Ambulatory Problems     Diagnosis Date Noted    Obesity 2017    Tobacco use 2016    Herpes zoster 2023     Past Medical History:   Diagnosis Date    COPD (chronic obstructive pulmonary disease)     Elevated cholesterol     Emphysema of lung     Gallbladder abscess     History of positive hepatitis C     Hypertension        PAST SURGICAL HISTORY  Past Surgical History:   Procedure Laterality Date    APPENDECTOMY      CHOLECYSTECTOMY      HYSTERECTOMY      TUBAL ABDOMINAL LIGATION         FAMILY HISTORY  Family History   Problem Relation Age of Onset    Diabetes Mother     Heart disease Mother     Diabetes Father     Diabetes Brother     Heart disease Maternal Grandmother     Cancer Paternal Grandfather        SOCIAL HISTORY  Social History     Socioeconomic History    Marital status:    Tobacco Use    Smoking status: Former     Packs/day: 1.50     Types: Cigarettes     Quit date: 10/11/2017     Years since quittin.9    Tobacco comments:     Previously smoked about 1.5 packs per day for about 45 years   Substance and Sexual Activity    Alcohol use: No     Comment: rare margaritia 1-2 times per year    Drug use: No     Comment: in past; clean since     Sexual activity: Not Currently       ALLERGIES  Patient has no known allergies.    The patient's allergies have been reviewed    REVIEW OF SYSTEMS  All systems reviewed and negative except for those discussed in HPI.     PHYSICAL EXAM  I have reviewed the triage vital signs and nursing  notes.  ED Triage Vitals [09/13/23 1504]   Temp Heart Rate Resp BP SpO2   98.2 °F (36.8 °C) (!) 127 -- -- 93 %      Temp src Heart Rate Source Patient Position BP Location FiO2 (%)   Tympanic -- -- -- --       General: No acute distress.  HENT: NCAT, PERRL, Nares patent.  Eyes: no scleral icterus.  Neck: trachea midline, no ROM limitations.  CV: regular rhythm, regular rate.  Respiratory: normal effort, CTAB.  Abdomen: soft, nondistended, NTTP, no rebound tenderness, no guarding or rigidity.  Musculoskeletal: no deformity.  Neuro: alert, moves all extremities, follows commands.  Skin: warm, dry.    LAB RESULTS  Recent Results (from the past 24 hour(s))   ECG 12 Lead Other; wkness    Collection Time: 09/13/23  3:16 PM   Result Value Ref Range    QT Interval 355 ms    QTC Interval 451 ms   Comprehensive Metabolic Panel    Collection Time: 09/13/23  3:40 PM    Specimen: Blood   Result Value Ref Range    Glucose 117 (H) 65 - 99 mg/dL    BUN 19 8 - 23 mg/dL    Creatinine 0.47 (L) 0.57 - 1.00 mg/dL    Sodium 138 136 - 145 mmol/L    Potassium 4.2 3.5 - 5.2 mmol/L    Chloride 104 98 - 107 mmol/L    CO2 23.0 22.0 - 29.0 mmol/L    Calcium 9.8 8.6 - 10.5 mg/dL    Total Protein 6.9 6.0 - 8.5 g/dL    Albumin 4.3 3.5 - 5.2 g/dL    ALT (SGPT) 10 1 - 33 U/L    AST (SGOT) 12 1 - 32 U/L    Alkaline Phosphatase 61 39 - 117 U/L    Total Bilirubin 0.2 0.0 - 1.2 mg/dL    Globulin 2.6 gm/dL    A/G Ratio 1.7 g/dL    BUN/Creatinine Ratio 40.4 (H) 7.0 - 25.0    Anion Gap 11.0 5.0 - 15.0 mmol/L    eGFR 105.8 >60.0 mL/min/1.73   High Sensitivity Troponin T    Collection Time: 09/13/23  3:40 PM    Specimen: Blood   Result Value Ref Range    HS Troponin T 9 <10 ng/L   BNP    Collection Time: 09/13/23  3:40 PM    Specimen: Blood   Result Value Ref Range    proBNP 64.6 0.0 - 900.0 pg/mL   T4, Free    Collection Time: 09/13/23  3:40 PM    Specimen: Blood   Result Value Ref Range    Free T4 1.49 0.93 - 1.70 ng/dL   TSH    Collection Time: 09/13/23   3:40 PM    Specimen: Blood   Result Value Ref Range    TSH 0.327 0.270 - 4.200 uIU/mL   Magnesium    Collection Time: 09/13/23  3:40 PM    Specimen: Blood   Result Value Ref Range    Magnesium 1.7 1.6 - 2.4 mg/dL   CBC Auto Differential    Collection Time: 09/13/23  3:40 PM    Specimen: Blood   Result Value Ref Range    WBC 10.53 3.40 - 10.80 10*3/mm3    RBC 4.73 3.77 - 5.28 10*6/mm3    Hemoglobin 13.8 12.0 - 15.9 g/dL    Hematocrit 40.9 34.0 - 46.6 %    MCV 86.5 79.0 - 97.0 fL    MCH 29.2 26.6 - 33.0 pg    MCHC 33.7 31.5 - 35.7 g/dL    RDW 13.4 12.3 - 15.4 %    RDW-SD 41.9 37.0 - 54.0 fl    MPV 10.8 6.0 - 12.0 fL    Platelets 283 140 - 450 10*3/mm3    Neutrophil % 73.7 42.7 - 76.0 %    Lymphocyte % 18.2 (L) 19.6 - 45.3 %    Monocyte % 6.4 5.0 - 12.0 %    Eosinophil % 0.8 0.3 - 6.2 %    Basophil % 0.6 0.0 - 1.5 %    Immature Grans % 0.3 0.0 - 0.5 %    Neutrophils, Absolute 7.77 (H) 1.70 - 7.00 10*3/mm3    Lymphocytes, Absolute 1.92 0.70 - 3.10 10*3/mm3    Monocytes, Absolute 0.67 0.10 - 0.90 10*3/mm3    Eosinophils, Absolute 0.08 0.00 - 0.40 10*3/mm3    Basophils, Absolute 0.06 0.00 - 0.20 10*3/mm3    Immature Grans, Absolute 0.03 0.00 - 0.05 10*3/mm3    nRBC 0.0 0.0 - 0.2 /100 WBC   Urinalysis With Microscopic If Indicated (No Culture) - Urine, Catheter    Collection Time: 09/13/23  4:16 PM    Specimen: Urine, Catheter   Result Value Ref Range    Color, UA Yellow Yellow, Straw    Appearance, UA Clear Clear    pH, UA 5.5 5.0 - 8.0    Specific Gravity, UA >=1.030 1.005 - 1.030    Glucose, UA >=1000 mg/dL (3+) (A) Negative    Ketones, UA 15 mg/dL (1+) (A) Negative    Bilirubin, UA Negative Negative    Blood, UA Negative Negative    Protein, UA Negative Negative    Leuk Esterase, UA Negative Negative    Nitrite, UA Negative Negative    Urobilinogen, UA 0.2 E.U./dL 0.2 - 1.0 E.U./dL       I ordered the above labs and reviewed the results.    RADIOLOGY  XR Chest 1 View    Result Date: 9/13/2023  XR CHEST 1 VW-  HISTORY:  Female who is 63 years-old, weakness  TECHNIQUE: Frontal view of the chest  COMPARISON: 3/20/2020  FINDINGS: Heart, mediastinum and pulmonary vasculature are unremarkable. No focal pulmonary consolidation, pleural effusion, or pneumothorax. No acute osseous process.      No evidence for acute pulmonary process. Follow-up as clinical indications persist.  This report was finalized on 9/13/2023 4:09 PM by Dr. Donta Thompson M.D.       I ordered the above noted radiological studies. I reviewed the images and results. I agree with the radiologist interpretation.    PROCEDURES  Procedures    MEDICATIONS GIVEN IN ER  Medications   sodium chloride 0.9 % bolus 1,000 mL (has no administration in time range)       PROGRESS, DATA ANALYSIS, CONSULTS, AND MEDICAL DECISION MAKING  A complete history and physical exam have been performed.  All available laboratory and imaging results have been reviewed by myself prior to disposition.    MDM    After the initial H&P, I discussed pertinent information from history and physical exam with patient/family.  Discussed differential diagnosis.  Discussed plan for ED evaluation/workup/treatment.  All questions answered.  Patient/family is agreeable with plan.  ED Course as of 09/13/23 1733   Wed Sep 13, 2023   1511 My differential diagnosis for generalized weakness includes but is not limited to:  Neuromuscular weakness   CVA  Hemorrhagic stroke  Multiple sclerosis  Amyotrophic Lateral Sclerosis (ALS) (UMN & LMN)  Spinal and bulbar muscular atrophy (Harlan's syndrome)  Spinal cord disease: Infection (Epidural abscess)  Infarction/ischemia  Trauma (Spinal Cord Syndromes)  Inflammation (Transverse Myelitis)  Degenerative (Spinal muscular atrophy)  Tumor  Peripheral nerve disease: Guillain-[place]minnville syndrome  Toxins ([place])  Tick paralysis  Diabetic peripheral neuropathy  NMJ disease: Myasthenia gravis crisis  Botulism  Organophosphate toxicity  Lambert-Eaton myasthenic  syndrome  Rhabdomyolysis  Dermatomyositis  Polymyositis  Alcoholic myopathy  Non-neuromuscular weakness   ACS  Arrhythmia/Syncope  Severe infection/Sepsis  Hypoglycemia  Periodic paralysis (electrolyte disturbance, K, Mg, Ca)   Hypokalemic periodic paralysis  Thyrotoxic periodic paralysis  Respiratory failure  Symptomatic Anemia  Severe dehydration  Hypothyroidism  Polypharmacy  Malignancy           [JG]   1545 EKG independently viewed and contemporaneously interpreted by ED physician. Time: 1516.  Rate 97.  Interpretation: Normal sinus rhythm, right axis deviation, left atrial enlargement, incomplete right bundle branch block, no acute ST changes. [JG]   1546 When compared with prior EKG on 3/20/2020, there is minimal QRS widening but EKG is otherwise unchanged. [JG]   1712 Patient orthostatic positive, receiving IV fluids. [JG]   1713 I reviewed chest x-ray in PACS, no pulmonary infiltrates per my read. [JG]   1722 HEART SCORE:    History #1  (Highly suspicious 2, Moderately suspicious 1, Slightly or non-suspicious 0)    ECG #0  (Significant ST depression 2,  Nonspecific repol disturbance 1, Normal 0)    Age #2  (> or = 65 2, 46-65 1,  < or = 45 0)    Risk factors #2  (hypercholesterolemia, HTN, DM, smoking, pos fam hx, obesity)  (> or = to 3 RF 2, 1 or 2 1, No risk factors 0)    Troponin #0  (> or = 3x normal limit 2, 1-3x normal limit 1, < or = Normal limit 0)    HEART Score Key:  Scores 0-3: 0.9-1.7% risk of adverse cardiac event. In the HEART Score study, these patients were discharged (0.99% in the retrospective study, 1.7% in the prospective study)  Scores 4-6: 12-16.6% risk of adverse cardiac event. In the HEART Score study, these patients were admitted to the hospital. (11.6% retrospective, 16.6% prospective)  Scores =7: 50-65% risk of adverse cardiac event. In the HEART Score study, these patients were candidates for early invasive measures. (65.2% retrospective, 50.1% prospective)      This patient's  HEART score is 4     [JG]   1727 ED work-up is unremarkable but patient is complaining of chest pain, has significant risk factors, chest pain work-up to present is negative but patient is moderate risk on heart score.  Patient reassessed, discussed ED work-up and results, offered admission for further evaluation and treatment.  Patient is agreeable with this.  Plan for admission with cardiology consult. [JG]   1732 Phone call with Alyce, NAIMA with [facility].  Discussed the patient, relevant history, exam, diagnostics, ED findings/progress, and concerns. They agree to admit the patient to telemetry observation under Dr. Quach. Care assumed by the admitting physician at this time.     [JG]      ED Course User Index  [JG] Edin Dunn MD       AS OF 17:33 EDT VITALS:    BP - 100/65  HR - 97  TEMP - 98.2 °F (36.8 °C) (Tympanic)  O2 SATS - 94%    DIAGNOSIS  Final diagnoses:   Chest pain, unspecified type   Hypertension, unspecified type   Hyperglycemia   General weakness         DISPOSITION  ADMISSION    Discussed treatment plan and reason for admission with pt/family and admitting physician.  Pt/family voiced understanding of the plan for admission for further testing/treatment as needed.        Edin Dunn MD  09/13/23 1468

## 2023-09-14 ENCOUNTER — APPOINTMENT (OUTPATIENT)
Dept: NUCLEAR MEDICINE | Facility: HOSPITAL | Age: 63
End: 2023-09-14
Payer: MEDICARE

## 2023-09-14 ENCOUNTER — APPOINTMENT (OUTPATIENT)
Dept: CARDIOLOGY | Facility: HOSPITAL | Age: 63
End: 2023-09-14
Payer: MEDICARE

## 2023-09-14 ENCOUNTER — READMISSION MANAGEMENT (OUTPATIENT)
Dept: CALL CENTER | Facility: HOSPITAL | Age: 63
End: 2023-09-14
Payer: MEDICARE

## 2023-09-14 VITALS
HEIGHT: 67 IN | SYSTOLIC BLOOD PRESSURE: 112 MMHG | OXYGEN SATURATION: 95 % | TEMPERATURE: 97.9 F | BODY MASS INDEX: 21.5 KG/M2 | WEIGHT: 137 LBS | HEART RATE: 88 BPM | DIASTOLIC BLOOD PRESSURE: 66 MMHG | RESPIRATION RATE: 16 BRPM

## 2023-09-14 LAB
25(OH)D3 SERPL-MCNC: 62.6 NG/ML (ref 30–100)
ANION GAP SERPL CALCULATED.3IONS-SCNC: 10 MMOL/L (ref 5–15)
AORTIC DIMENSIONLESS INDEX: 0.8 (DI)
BASOPHILS # BLD AUTO: 0.06 10*3/MM3 (ref 0–0.2)
BASOPHILS NFR BLD AUTO: 0.8 % (ref 0–1.5)
BH CV ECHO MEAS - ACS: 1.37 CM
BH CV ECHO MEAS - AO MAX PG: 6.9 MMHG
BH CV ECHO MEAS - AO MEAN PG: 3.6 MMHG
BH CV ECHO MEAS - AO ROOT DIAM: 2.9 CM
BH CV ECHO MEAS - AO V2 MAX: 130.9 CM/SEC
BH CV ECHO MEAS - AO V2 VTI: 23.9 CM
BH CV ECHO MEAS - AVA(I,D): 2.29 CM2
BH CV ECHO MEAS - EDV(CUBED): 57.7 ML
BH CV ECHO MEAS - EDV(MOD-SP2): 59 ML
BH CV ECHO MEAS - EDV(MOD-SP4): 62 ML
BH CV ECHO MEAS - EF(MOD-BP): 57.3 %
BH CV ECHO MEAS - EF(MOD-SP2): 54.2 %
BH CV ECHO MEAS - EF(MOD-SP4): 58.1 %
BH CV ECHO MEAS - ESV(CUBED): 20.4 ML
BH CV ECHO MEAS - ESV(MOD-SP2): 27 ML
BH CV ECHO MEAS - ESV(MOD-SP4): 26 ML
BH CV ECHO MEAS - FS: 29.3 %
BH CV ECHO MEAS - IVS/LVPW: 1.03 CM
BH CV ECHO MEAS - IVSD: 1.02 CM
BH CV ECHO MEAS - LAT PEAK E' VEL: 11.1 CM/SEC
BH CV ECHO MEAS - LV MASS(C)D: 120.3 GRAMS
BH CV ECHO MEAS - LV MAX PG: 4.2 MMHG
BH CV ECHO MEAS - LV MEAN PG: 2.06 MMHG
BH CV ECHO MEAS - LV V1 MAX: 102.8 CM/SEC
BH CV ECHO MEAS - LV V1 VTI: 20.2 CM
BH CV ECHO MEAS - LVIDD: 3.9 CM
BH CV ECHO MEAS - LVIDS: 2.7 CM
BH CV ECHO MEAS - LVOT AREA: 2.7 CM2
BH CV ECHO MEAS - LVOT DIAM: 1.86 CM
BH CV ECHO MEAS - LVPWD: 0.98 CM
BH CV ECHO MEAS - MED PEAK E' VEL: 8.2 CM/SEC
BH CV ECHO MEAS - MV A DUR: 0.11 SEC
BH CV ECHO MEAS - MV A MAX VEL: 90.4 CM/SEC
BH CV ECHO MEAS - MV DEC SLOPE: 220 CM/SEC2
BH CV ECHO MEAS - MV DEC TIME: 0.27 MSEC
BH CV ECHO MEAS - MV E MAX VEL: 77.1 CM/SEC
BH CV ECHO MEAS - MV E/A: 0.85
BH CV ECHO MEAS - MV MAX PG: 3.2 MMHG
BH CV ECHO MEAS - MV MEAN PG: 1.45 MMHG
BH CV ECHO MEAS - MV P1/2T: 103.6 MSEC
BH CV ECHO MEAS - MV V2 VTI: 23.9 CM
BH CV ECHO MEAS - MVA(P1/2T): 2.12 CM2
BH CV ECHO MEAS - MVA(VTI): 2.29 CM2
BH CV ECHO MEAS - PA ACC TIME: 0.13 SEC
BH CV ECHO MEAS - PA V2 MAX: 101.6 CM/SEC
BH CV ECHO MEAS - PULM A REVS DUR: 0.13 SEC
BH CV ECHO MEAS - PULM A REVS VEL: 40.3 CM/SEC
BH CV ECHO MEAS - PULM DIAS VEL: 37.7 CM/SEC
BH CV ECHO MEAS - PULM S/D: 1.31
BH CV ECHO MEAS - PULM SYS VEL: 49.3 CM/SEC
BH CV ECHO MEAS - RV MAX PG: 2.38 MMHG
BH CV ECHO MEAS - RV V1 MAX: 77.1 CM/SEC
BH CV ECHO MEAS - RV V1 VTI: 14.8 CM
BH CV ECHO MEAS - SV(LVOT): 54.7 ML
BH CV ECHO MEAS - SV(MOD-SP2): 32 ML
BH CV ECHO MEAS - SV(MOD-SP4): 36 ML
BH CV ECHO MEAS - TAPSE (>1.6): 1.7 CM
BH CV ECHO MEASUREMENTS AVERAGE E/E' RATIO: 7.99
BH CV REST NUCLEAR ISOTOPE DOSE: 10.1 MCI
BH CV STRESS COMMENTS STAGE 1: NORMAL
BH CV STRESS DOSE REGADENOSON STAGE 1: 0.4
BH CV STRESS DURATION MIN STAGE 1: 0
BH CV STRESS DURATION SEC STAGE 1: 10
BH CV STRESS NUCLEAR ISOTOPE DOSE: 27.1 MCI
BH CV STRESS PROTOCOL 1: NORMAL
BH CV STRESS RECOVERY BP: NORMAL MMHG
BH CV STRESS RECOVERY HR: 98 BPM
BH CV STRESS STAGE 1: 1
BH CV XLRA - RV BASE: 3 CM
BH CV XLRA - RV LENGTH: 6.4 CM
BH CV XLRA - RV MID: 2.08 CM
BH CV XLRA - TDI S': 10.2 CM/SEC
BUN SERPL-MCNC: 13 MG/DL (ref 8–23)
BUN/CREAT SERPL: 28.9 (ref 7–25)
CALCIUM SPEC-SCNC: 9.3 MG/DL (ref 8.6–10.5)
CHLORIDE SERPL-SCNC: 105 MMOL/L (ref 98–107)
CO2 SERPL-SCNC: 24 MMOL/L (ref 22–29)
CREAT SERPL-MCNC: 0.45 MG/DL (ref 0.57–1)
DEPRECATED RDW RBC AUTO: 42.7 FL (ref 37–54)
EGFRCR SERPLBLD CKD-EPI 2021: 106.9 ML/MIN/1.73
EOSINOPHIL # BLD AUTO: 0.13 10*3/MM3 (ref 0–0.4)
EOSINOPHIL NFR BLD AUTO: 1.7 % (ref 0.3–6.2)
ERYTHROCYTE [DISTWIDTH] IN BLOOD BY AUTOMATED COUNT: 13.4 % (ref 12.3–15.4)
GLUCOSE BLDC GLUCOMTR-MCNC: 117 MG/DL (ref 70–130)
GLUCOSE BLDC GLUCOMTR-MCNC: 137 MG/DL (ref 70–130)
GLUCOSE SERPL-MCNC: 136 MG/DL (ref 65–99)
HCT VFR BLD AUTO: 38.2 % (ref 34–46.6)
HGB BLD-MCNC: 12.6 G/DL (ref 12–15.9)
IMM GRANULOCYTES # BLD AUTO: 0.02 10*3/MM3 (ref 0–0.05)
IMM GRANULOCYTES NFR BLD AUTO: 0.3 % (ref 0–0.5)
LV EF NUC BP: 60 %
LYMPHOCYTES # BLD AUTO: 2.3 10*3/MM3 (ref 0.7–3.1)
LYMPHOCYTES NFR BLD AUTO: 30 % (ref 19.6–45.3)
MAXIMAL PREDICTED HEART RATE: 155 BPM
MCH RBC QN AUTO: 28.8 PG (ref 26.6–33)
MCHC RBC AUTO-ENTMCNC: 33 G/DL (ref 31.5–35.7)
MCV RBC AUTO: 87.4 FL (ref 79–97)
MONOCYTES # BLD AUTO: 0.51 10*3/MM3 (ref 0.1–0.9)
MONOCYTES NFR BLD AUTO: 6.7 % (ref 5–12)
NEUTROPHILS NFR BLD AUTO: 4.64 10*3/MM3 (ref 1.7–7)
NEUTROPHILS NFR BLD AUTO: 60.5 % (ref 42.7–76)
NRBC BLD AUTO-RTO: 0 /100 WBC (ref 0–0.2)
PERCENT MAX PREDICTED HR: 80 %
PLATELET # BLD AUTO: 252 10*3/MM3 (ref 140–450)
PMV BLD AUTO: 11 FL (ref 6–12)
POTASSIUM SERPL-SCNC: 4.1 MMOL/L (ref 3.5–5.2)
RBC # BLD AUTO: 4.37 10*6/MM3 (ref 3.77–5.28)
SODIUM SERPL-SCNC: 139 MMOL/L (ref 136–145)
STRESS BASELINE BP: NORMAL MMHG
STRESS BASELINE HR: 81 BPM
STRESS PERCENT HR: 94 %
STRESS POST PEAK BP: NORMAL MMHG
STRESS POST PEAK HR: 124 BPM
STRESS TARGET HR: 132 BPM
TROPONIN T SERPL HS-MCNC: 6 NG/L
VIT B12 BLD-MCNC: 733 PG/ML (ref 211–946)
WBC NRBC COR # BLD: 7.66 10*3/MM3 (ref 3.4–10.8)

## 2023-09-14 PROCEDURE — A9500 TC99M SESTAMIBI: HCPCS | Performed by: EMERGENCY MEDICINE

## 2023-09-14 PROCEDURE — G0378 HOSPITAL OBSERVATION PER HR: HCPCS

## 2023-09-14 PROCEDURE — 82306 VITAMIN D 25 HYDROXY: CPT | Performed by: PHYSICIAN ASSISTANT

## 2023-09-14 PROCEDURE — 84484 ASSAY OF TROPONIN QUANT: CPT | Performed by: PHYSICIAN ASSISTANT

## 2023-09-14 PROCEDURE — 80048 BASIC METABOLIC PNL TOTAL CA: CPT | Performed by: PHYSICIAN ASSISTANT

## 2023-09-14 PROCEDURE — 0 TECHNETIUM SESTAMIBI: Performed by: EMERGENCY MEDICINE

## 2023-09-14 PROCEDURE — 78452 HT MUSCLE IMAGE SPECT MULT: CPT

## 2023-09-14 PROCEDURE — 93017 CV STRESS TEST TRACING ONLY: CPT

## 2023-09-14 PROCEDURE — 85025 COMPLETE CBC W/AUTO DIFF WBC: CPT | Performed by: PHYSICIAN ASSISTANT

## 2023-09-14 PROCEDURE — 93306 TTE W/DOPPLER COMPLETE: CPT

## 2023-09-14 PROCEDURE — 93010 ELECTROCARDIOGRAM REPORT: CPT | Performed by: INTERNAL MEDICINE

## 2023-09-14 PROCEDURE — 82607 VITAMIN B-12: CPT | Performed by: PHYSICIAN ASSISTANT

## 2023-09-14 PROCEDURE — 78452 HT MUSCLE IMAGE SPECT MULT: CPT | Performed by: INTERNAL MEDICINE

## 2023-09-14 PROCEDURE — 93306 TTE W/DOPPLER COMPLETE: CPT | Performed by: INTERNAL MEDICINE

## 2023-09-14 PROCEDURE — 93018 CV STRESS TEST I&R ONLY: CPT | Performed by: INTERNAL MEDICINE

## 2023-09-14 PROCEDURE — 93005 ELECTROCARDIOGRAM TRACING: CPT | Performed by: PHYSICIAN ASSISTANT

## 2023-09-14 PROCEDURE — 93016 CV STRESS TEST SUPVJ ONLY: CPT | Performed by: INTERNAL MEDICINE

## 2023-09-14 PROCEDURE — 99204 OFFICE O/P NEW MOD 45 MIN: CPT | Performed by: INTERNAL MEDICINE

## 2023-09-14 PROCEDURE — 25010000002 REGADENOSON 0.4 MG/5ML SOLUTION: Performed by: EMERGENCY MEDICINE

## 2023-09-14 PROCEDURE — 82948 REAGENT STRIP/BLOOD GLUCOSE: CPT

## 2023-09-14 RX ORDER — REGADENOSON 0.08 MG/ML
0.4 INJECTION, SOLUTION INTRAVENOUS
Status: COMPLETED | OUTPATIENT
Start: 2023-09-14 | End: 2023-09-14

## 2023-09-14 RX ORDER — NITROGLYCERIN 0.4 MG/1
0.4 TABLET SUBLINGUAL
Qty: 100 TABLET | Refills: 12 | Status: SHIPPED | OUTPATIENT
Start: 2023-09-14

## 2023-09-14 RX ADMIN — TECHNETIUM TC 99M SESTAMIBI 1 DOSE: 1 INJECTION INTRAVENOUS at 11:07

## 2023-09-14 RX ADMIN — ASPIRIN 81 MG: 81 TABLET, CHEWABLE ORAL at 12:07

## 2023-09-14 RX ADMIN — REGADENOSON 0.4 MG: 0.08 INJECTION, SOLUTION INTRAVENOUS at 11:07

## 2023-09-14 RX ADMIN — TECHNETIUM TC 99M SESTAMIBI 1 DOSE: 1 INJECTION INTRAVENOUS at 08:44

## 2023-09-14 RX ADMIN — LISINOPRIL 10 MG: 10 TABLET ORAL at 12:07

## 2023-09-14 RX ADMIN — Medication 10 ML: at 12:10

## 2023-09-14 RX ADMIN — PANTOPRAZOLE SODIUM 40 MG: 40 TABLET, DELAYED RELEASE ORAL at 05:52

## 2023-09-14 RX ADMIN — ESCITALOPRAM OXALATE 10 MG: 10 TABLET, FILM COATED ORAL at 12:07

## 2023-09-14 NOTE — PLAN OF CARE
Problem: Adult Inpatient Plan of Care  Goal: Plan of Care Review  Outcome: Ongoing, Progressing  Flowsheets (Taken 9/14/2023 0642)  Progress: improving  Plan of Care Reviewed With: patient  Outcome Evaluation: Patient is a 63 year old female admitted to the observation unit for chest pain. Troponin is 6. Patient is alert and oriented, room air and ambulates to the bathroom independently. Patient is NPO. Cardiology is consulted and Echo is scheduled. Plan of care on-going.  Goal: Patient-Specific Goal (Individualized)  Outcome: Ongoing, Progressing  Goal: Absence of Hospital-Acquired Illness or Injury  Outcome: Ongoing, Progressing  Intervention: Identify and Manage Fall Risk  Recent Flowsheet Documentation  Taken 9/14/2023 0600 by Elena Ramirez, RN  Safety Promotion/Fall Prevention:   assistive device/personal items within reach   clutter free environment maintained   fall prevention program maintained   lighting adjusted   nonskid shoes/slippers when out of bed   room organization consistent   safety round/check completed  Taken 9/14/2023 0400 by Elena Ramirez, RN  Safety Promotion/Fall Prevention:   assistive device/personal items within reach   clutter free environment maintained   fall prevention program maintained   lighting adjusted   nonskid shoes/slippers when out of bed   room organization consistent   safety round/check completed  Taken 9/14/2023 0200 by Elena Ramirez, RN  Safety Promotion/Fall Prevention:   assistive device/personal items within reach   clutter free environment maintained   fall prevention program maintained   lighting adjusted   nonskid shoes/slippers when out of bed   room organization consistent   safety round/check completed  Taken 9/14/2023 0000 by Elena Ramirez, RN  Safety Promotion/Fall Prevention:   assistive device/personal items within reach   clutter free environment maintained   fall prevention program maintained   lighting adjusted   nonskid shoes/slippers when out of bed    room organization consistent   safety round/check completed  Taken 9/13/2023 2200 by Elena Ramirez RN  Safety Promotion/Fall Prevention:   assistive device/personal items within reach   clutter free environment maintained   fall prevention program maintained   lighting adjusted   nonskid shoes/slippers when out of bed   room organization consistent   safety round/check completed  Taken 9/13/2023 1956 by Elena Ramirez RN  Safety Promotion/Fall Prevention:   assistive device/personal items within reach   clutter free environment maintained   fall prevention program maintained   lighting adjusted   nonskid shoes/slippers when out of bed   room organization consistent   safety round/check completed  Intervention: Prevent Skin Injury  Recent Flowsheet Documentation  Taken 9/14/2023 0600 by Elena Ramirez RN  Body Position: position changed independently  Taken 9/14/2023 0400 by Elena Ramirez RN  Body Position: position changed independently  Taken 9/14/2023 0200 by Elena Ramirez RN  Body Position: position changed independently  Taken 9/14/2023 0000 by Elena Ramirez RN  Body Position: position changed independently  Taken 9/13/2023 2200 by Elena Ramirez RN  Body Position: position changed independently  Taken 9/13/2023 1956 by Elena Ramirez RN  Body Position: position changed independently  Intervention: Prevent and Manage VTE (Venous Thromboembolism) Risk  Recent Flowsheet Documentation  Taken 9/14/2023 0600 by Elena Ramirez RN  Activity Management: activity minimized  Taken 9/14/2023 0400 by Elena Ramirez RN  Activity Management: activity minimized  Taken 9/14/2023 0200 by Elena Ramirez RN  Activity Management: activity minimized  Taken 9/14/2023 0000 by Elena Ramirez RN  Activity Management: activity minimized  Taken 9/13/2023 2200 by Elena Ramirez RN  Activity Management: activity minimized  Taken 9/13/2023 1956 by Elena Ramirez RN  Activity Management: activity encouraged  VTE Prevention/Management:  patient refused intervention  Range of Motion: active ROM (range of motion) encouraged  Intervention: Prevent Infection  Recent Flowsheet Documentation  Taken 9/14/2023 0600 by Elena Ramirez RN  Infection Prevention:   hand hygiene promoted   rest/sleep promoted   single patient room provided  Taken 9/14/2023 0400 by Elena Ramirez RN  Infection Prevention:   hand hygiene promoted   rest/sleep promoted   single patient room provided  Taken 9/14/2023 0200 by Elena Ramirez RN  Infection Prevention:   hand hygiene promoted   rest/sleep promoted   single patient room provided  Taken 9/14/2023 0000 by Elena Ramirez RN  Infection Prevention:   hand hygiene promoted   rest/sleep promoted   single patient room provided  Taken 9/13/2023 2200 by Elena Ramirez RN  Infection Prevention:   hand hygiene promoted   rest/sleep promoted   single patient room provided  Taken 9/13/2023 1956 by Elena Ramirez RN  Infection Prevention:   hand hygiene promoted   rest/sleep promoted   single patient room provided  Goal: Optimal Comfort and Wellbeing  Outcome: Ongoing, Progressing  Intervention: Provide Person-Centered Care  Recent Flowsheet Documentation  Taken 9/13/2023 1956 by Elena Ramirez RN  Trust Relationship/Rapport:   care explained   choices provided   questions answered   questions encouraged  Goal: Readiness for Transition of Care  Outcome: Ongoing, Progressing  Intervention: Mutually Develop Transition Plan  Recent Flowsheet Documentation  Taken 9/13/2023 1957 by Elena Ramirez RN  Transportation Anticipated: family or friend will provide  Patient/Family Anticipated Services at Transition: none  Patient/Family Anticipates Transition to: home with family  Taken 9/13/2023 1955 by Elena Ramirez RN  Equipment Currently Used at Home: none  Goal: Plan of Care Review  Outcome: Ongoing, Progressing  Flowsheets (Taken 9/14/2023 0642)  Progress: improving  Plan of Care Reviewed With: patient  Outcome Evaluation: Patient is a 63  year old female admitted to the observation unit for chest pain. Troponin is 6. Patient is alert and oriented, room air and ambulates to the bathroom independently. Patient is NPO. Cardiology is consulted and Echo is scheduled. Plan of care on-going.  Goal: Patient-Specific Goal (Individualized)  Outcome: Ongoing, Progressing  Goal: Absence of Hospital-Acquired Illness or Injury  Outcome: Ongoing, Progressing  Intervention: Identify and Manage Fall Risk  Recent Flowsheet Documentation  Taken 9/14/2023 0600 by Elena Ramirez RN  Safety Promotion/Fall Prevention:   assistive device/personal items within reach   clutter free environment maintained   fall prevention program maintained   lighting adjusted   nonskid shoes/slippers when out of bed   room organization consistent   safety round/check completed  Taken 9/14/2023 0400 by Elena Ramirez RN  Safety Promotion/Fall Prevention:   assistive device/personal items within reach   clutter free environment maintained   fall prevention program maintained   lighting adjusted   nonskid shoes/slippers when out of bed   room organization consistent   safety round/check completed  Taken 9/14/2023 0200 by Elena Ramirez, JAMES  Safety Promotion/Fall Prevention:   assistive device/personal items within reach   clutter free environment maintained   fall prevention program maintained   lighting adjusted   nonskid shoes/slippers when out of bed   room organization consistent   safety round/check completed  Taken 9/14/2023 0000 by Elena Ramirez RN  Safety Promotion/Fall Prevention:   assistive device/personal items within reach   clutter free environment maintained   fall prevention program maintained   lighting adjusted   nonskid shoes/slippers when out of bed   room organization consistent   safety round/check completed  Taken 9/13/2023 2200 by Elena Ramirez, JAMES  Safety Promotion/Fall Prevention:   assistive device/personal items within reach   clutter free environment maintained    fall prevention program maintained   lighting adjusted   nonskid shoes/slippers when out of bed   room organization consistent   safety round/check completed  Taken 9/13/2023 1956 by Elena Ramirez RN  Safety Promotion/Fall Prevention:   assistive device/personal items within reach   clutter free environment maintained   fall prevention program maintained   lighting adjusted   nonskid shoes/slippers when out of bed   room organization consistent   safety round/check completed  Intervention: Prevent Skin Injury  Recent Flowsheet Documentation  Taken 9/14/2023 0600 by Elena Ramirez RN  Body Position: position changed independently  Taken 9/14/2023 0400 by Elena Ramirez RN  Body Position: position changed independently  Taken 9/14/2023 0200 by Elena Ramirez RN  Body Position: position changed independently  Taken 9/14/2023 0000 by Elena Ramirez RN  Body Position: position changed independently  Taken 9/13/2023 2200 by Elena Ramirez RN  Body Position: position changed independently  Taken 9/13/2023 1956 by Elena Ramirez RN  Body Position: position changed independently  Intervention: Prevent and Manage VTE (Venous Thromboembolism) Risk  Recent Flowsheet Documentation  Taken 9/14/2023 0600 by Elena Ramirez RN  Activity Management: activity minimized  Taken 9/14/2023 0400 by Elena Ramirez RN  Activity Management: activity minimized  Taken 9/14/2023 0200 by Elena Ramirez RN  Activity Management: activity minimized  Taken 9/14/2023 0000 by Elena Ramirez RN  Activity Management: activity minimized  Taken 9/13/2023 2200 by Elena Ramirez RN  Activity Management: activity minimized  Taken 9/13/2023 1956 by Elena Ramirez RN  Activity Management: activity encouraged  VTE Prevention/Management: patient refused intervention  Range of Motion: active ROM (range of motion) encouraged  Intervention: Prevent Infection  Recent Flowsheet Documentation  Taken 9/14/2023 0600 by Elena Ramirez RN  Infection Prevention:   hand  hygiene promoted   rest/sleep promoted   single patient room provided  Taken 9/14/2023 0400 by Elena Ramirez RN  Infection Prevention:   hand hygiene promoted   rest/sleep promoted   single patient room provided  Taken 9/14/2023 0200 by Elena Ramirez RN  Infection Prevention:   hand hygiene promoted   rest/sleep promoted   single patient room provided  Taken 9/14/2023 0000 by Elena Ramirez RN  Infection Prevention:   hand hygiene promoted   rest/sleep promoted   single patient room provided  Taken 9/13/2023 2200 by Elena Ramirez RN  Infection Prevention:   hand hygiene promoted   rest/sleep promoted   single patient room provided  Taken 9/13/2023 1956 by Elena Ramirez RN  Infection Prevention:   hand hygiene promoted   rest/sleep promoted   single patient room provided  Goal: Optimal Comfort and Wellbeing  Outcome: Ongoing, Progressing  Intervention: Provide Person-Centered Care  Recent Flowsheet Documentation  Taken 9/13/2023 1956 by Elena Ramirez RN  Trust Relationship/Rapport:   care explained   choices provided   questions answered   questions encouraged  Goal: Readiness for Transition of Care  Outcome: Ongoing, Progressing  Intervention: Mutually Develop Transition Plan  Recent Flowsheet Documentation  Taken 9/13/2023 1957 by Elena Ramirez RN  Transportation Anticipated: family or friend will provide  Patient/Family Anticipated Services at Transition: none  Patient/Family Anticipates Transition to: home with family  Taken 9/13/2023 1955 by Elena Ramirez RN  Equipment Currently Used at Home: none     Problem: Chest Pain  Goal: Resolution of Chest Pain Symptoms  Outcome: Ongoing, Progressing     Problem: Pain Acute  Goal: Acceptable Pain Control and Functional Ability  Outcome: Ongoing, Progressing  Intervention: Prevent or Manage Pain  Recent Flowsheet Documentation  Taken 9/13/2023 2200 by Elena Ramirez RN  Medication Review/Management: medications reviewed  Intervention: Optimize Psychosocial  Wellbeing  Recent Flowsheet Documentation  Taken 9/13/2023 1956 by Elena Ramirez RN  Supportive Measures:   active listening utilized   self-care encouraged  Diversional Activities:   television   smartphone   Goal Outcome Evaluation:  Plan of Care Reviewed With: patient        Progress: improving  Outcome Evaluation: Patient is a 63 year old female admitted to the observation unit for chest pain. Troponin is 6. Patient is alert and oriented, room air and ambulates to the bathroom independently. Patient is NPO. Cardiology is consulted and Echo is scheduled. Plan of care on-going.

## 2023-09-14 NOTE — PLAN OF CARE
Goal Outcome Evaluation:              Outcome Evaluation: patient left obsrevation unit at this time via private vehicle with all her belongings, discharge summary provided and education included, aware to follow upwith  primary care as indicated and cardiology as indicated, aware to pickup medication at the pharmcy of choice and aware of the side effects, patient had no other questions at the time of discharge. IV removed.

## 2023-09-14 NOTE — CONSULTS
[facility]  Cardiology Consult Note    Patient Identification:  Name: Gely Kimble  Age: 63 y.o.  Sex: female  :  1960  MRN: [id number]             Requesting Physician: Alyce MCCAULEY    Reason for Consultation / Chief Complaint: chest pain    History of Present Illness:     This is a 63 year old female who is new with our service with a history to include coronary artery disease, COPD, hyperlipidemia, hypertension, DM type II. She presented to [facility] ER with complaints of chest pressure and heaviness. Work up in ER with negative troponin x3. ECG SR with no acute ST changes. Chest XR negative for acute. D Dimer negative. She was admitted for further management.    Cardiac catheterization with Nortons normal Left main, LAD proxiaml 20% stenosis, distal LAD after take off of D3 30%. Lcx nondominant normal, RCA dominant mid 20% stenosis. Minimal nonobstructive CAD, recommendations, medical management.  Echo  EF 60%, normal LV function, no significant valvular abnormalities noted.       Comorbid cardiac risk factors: hyperlipidemia, hypertension, DM, CAD    Past Medical History:  Past Medical History:   Diagnosis Date    Arthritis     COPD (chronic obstructive pulmonary disease)     Elevated cholesterol     Emphysema of lung     Gallbladder abscess     Herpes zoster 2023    History of positive hepatitis C     previously positive, never treated, negative RNA finding    Hypertension     Type 2 diabetes mellitus      Past Surgical History:  Past Surgical History:   Procedure Laterality Date    APPENDECTOMY      CHOLECYSTECTOMY      HYSTERECTOMY      TUBAL ABDOMINAL LIGATION        Allergies:  No Known Allergies  Home Meds:  Medications Prior to Admission   Medication Sig Dispense Refill Last Dose    albuterol (PROVENTIL HFA;VENTOLIN HFA) 108 (90 BASE) MCG/ACT inhaler Inhale 2 puffs Every 4 (Four) Hours As Needed for Wheezing.   Past Week    albuterol (PROVENTIL)  (2.5 MG/3ML) 0.083% nebulizer solution Take 2.5 mg by nebulization Every 4 (Four) Hours As Needed for Wheezing.       aspirin 81 MG chewable tablet Chew 1 tablet Daily.   9/12/2023    empagliflozin (Jardiance) 25 MG tablet tablet Take 1 tablet by mouth Daily. 30 tablet 5 9/13/2023    escitalopram (Lexapro) 10 MG tablet Take 1 tablet by mouth Daily. Start with half tablet daily x1 week, then increase to whole tablet daily. 30 tablet 1 9/13/2023    insulin detemir (Levemir FlexPen) 100 UNIT/ML injection Inject 10 Units under the skin into the appropriate area as directed Every Night.   9/12/2023    ipratropium-albuterol (COMBIVENT RESPIMAT)  MCG/ACT inhaler Inhale 1 puff 4 (Four) Times a Day As Needed for Wheezing.       linagliptin (TRADJENTA) 5 MG tablet tablet Take 1 tablet by mouth Daily. 30 tablet 2     lisinopril (PRINIVIL,ZESTRIL) 10 MG tablet Take 0.5 tablets by mouth Daily.   Past Week    metFORMIN ER (GLUCOPHAGE-XR) 500 MG 24 hr tablet Take 2 tablets by mouth 2 (Two) Times a Day With Meals. 120 tablet 5 9/13/2023    Multiple Vitamin (MULTIVITAMIN) tablet Take 1 tablet by mouth.   9/13/2023    naproxen (NAPROSYN) 500 MG tablet Take 1 tablet by mouth 2 (Two) Times a Day With Meals. 60 tablet 5 9/13/2023    omeprazole (priLOSEC) 20 MG capsule Take 1 capsule by mouth Daily. 30 capsule 1 9/13/2023    pravastatin (PRAVACHOL) 20 MG tablet Take 1 tablet by mouth Every Night. 90 tablet 1 9/12/2023    rOPINIRole (REQUIP) 2 MG tablet Take 1 tablet by mouth Every Night.   9/12/2023    SITagliptin (JANUVIA) 25 MG tablet Take  by mouth Daily. Unknown dose   9/12/2023    vitamin D (ERGOCALCIFEROL) 1.25 MG (08184 UT) capsule capsule Take 1 capsule by mouth 1 (One) Time Per Week.   Past Week     Current Meds:   Current Facility-Administered Medications   Medication Dose Route Frequency Provider Last Rate Last Admin    albuterol sulfate HFA (PROVENTIL HFA;VENTOLIN HFA;PROAIR HFA) inhaler 2 puff  2 puff Inhalation Q4H  PRN Alyce Okeefe PA-C        aspirin chewable tablet 81 mg  81 mg Oral Daily Alyce Okeefe PA-C        sennosides-docusate (PERICOLACE) 8.6-50 MG per tablet 2 tablet  2 tablet Oral BID Alyce Okeefe PA-C        And    polyethylene glycol (MIRALAX) packet 17 g  17 g Oral Daily PRN Alyce Okeefe PA-C        And    bisacodyl (DULCOLAX) EC tablet 5 mg  5 mg Oral Daily PRN Alyce Okeefe PA-C        And    bisacodyl (DULCOLAX) suppository 10 mg  10 mg Rectal Daily PRN Alyce Okeefe PA-C        dextrose (D50W) (25 g/50 mL) IV injection 25 g  25 g Intravenous Q15 Min PRN Concepcion Bettencourt PA        dextrose (GLUTOSE) oral gel 15 g  15 g Oral Q15 Min PRN Concepcion Bettencourt PA        empagliflozin (JARDIANCE) tablet 25 mg  25 mg Oral Daily Alyce Okeefe PA-C        escitalopram (LEXAPRO) tablet 10 mg  10 mg Oral Daily Alyce Okeefe PA-C        glucagon (GLUCAGEN) injection 1 mg  1 mg Intramuscular Q15 Min PRN Concepcion Bettencourt PA        insulin lispro (HUMALOG/ADMELOG) injection 2-9 Units  2-9 Units Subcutaneous 4x Daily AC & at Bedtime Concepcion Bettencourt PA   2 Units at 09/13/23 2205    lisinopril (PRINIVIL,ZESTRIL) tablet 10 mg  10 mg Oral Q24H Alyce Okeefe PA-C   10 mg at 09/13/23 2002    nitroglycerin (NITROSTAT) SL tablet 0.4 mg  0.4 mg Sublingual Q5 Min PRN Alyce Okeefe PA-C        pantoprazole (PROTONIX) EC tablet 40 mg  40 mg Oral Q AM Alyce Okeefe PA-C   40 mg at 09/14/23 0552    pravastatin (PRAVACHOL) tablet 20 mg  20 mg Oral Nightly Alyce Okeefe PA-C   20 mg at 09/13/23 2104    rOPINIRole (REQUIP) tablet 2 mg  2 mg Oral Nightly Concepcion Bettencourt PA   2 mg at 09/13/23 2125    sodium chloride 0.9 % flush 10 mL  10 mL Intravenous Q12H Alyce Okeefe PA-C   10 mL at 09/13/23 2003    sodium chloride 0.9 % flush 10 mL  10 mL Intravenous PRN Alyce Okeefe PA-C        sodium chloride 0.9 % infusion 40 mL  40 mL Intravenous PRN Alyce Okeefe PA-C   "         Social History:   Social History     Tobacco Use    Smoking status: Former     Packs/day: 1.50     Types: Cigarettes     Quit date: 10/11/2017     Years since quittin.9    Smokeless tobacco: Not on file    Tobacco comments:     Previously smoked about 1.5 packs per day for about 45 years   Substance Use Topics    Alcohol use: No     Comment: rare pan 1-2 times per year      Family History:  Family History   Problem Relation Age of Onset    Diabetes Mother     Heart disease Mother     Diabetes Father     Diabetes Brother     Heart disease Maternal Grandmother     Cancer Paternal Grandfather         Review of Systems    Constitutional: No weakness,fatigue, fever, rigors, chills   Eyes: No vision changes, eye pain   ENT/oropharynx: No difficulty swallowing, sore throat, epistaxis, changes in hearing   Cardiovascular: No chest pain, chest tightness, palpitations, paroxysmal nocturnal dyspnea, orthopnea, diaphoresis, dizziness / syncopal episode   Respiratory: No shortness of breath, dyspnea on exertion, cough, wheezing hemoptysis   Gastrointestinal: No abdominal pain, nausea, vomiting, diarrhea, bloody stools   Genitourinary: No hematuria, dysuria   Neurological: No headache, tremors, numbness,  one-sided weakness    Musculoskeletal: No cramps, myalgias,  joint pain, joint swelling   Integument: No rash, edema           Constitutional:  Temp:  [98.2 °F (36.8 °C)-98.6 °F (37 °C)] 98.3 °F (36.8 °C)  Heart Rate:  [] 87  Resp:  [16-18] 16  BP: (100-151)/(60-85) 107/70  /66 (BP Location: Right arm, Patient Position: Lying)   Pulse 88   Temp 97.9 °F (36.6 °C) (Oral)   Resp 16   Ht 170.2 cm (67\")   Wt 62.1 kg (137 lb)   SpO2 95%   BMI 21.46 kg/m²   General appearance: No acute changes   Neck: Trachea midline; NECK, supple, no thyromegaly or lymphadenopathy   Lungs: Normal size and shape, normal breath sounds, equal distribution of air, no rales and rhonchi   CV: S1-S2 regular, no murmurs, " no rub, no gallop   Abdomen: Soft, nontender; no masses , no abnormal abdominal sounds   Extremities: No deformity , normal color , no peripheral edema   Skin: Normal temperature, turgor and texture; no rash, ulcers            I reviewed the patient's new clinical results, and personally reviewed and interpreted the patient's ECG and telemetry data from the last 24 hours        Cardiographics  ECG:         Echocardiogram:   2016  Conclusions:   Global left ventricular wall motion and contractility are within normal   limits.   The estimated ejection fraction is 60-65%.   No significant valvular abnormalities noted.   The study quality is fair.     Cath 2016RESULTS:     A. Hemodynamics:  Ao: 12/70mmHg;  LV: 122/14mmHg     B. LM:  nl     C.  LAD:  prox 20%, Large D2,  Distal LAD after take off of D3 30%     D.  Circumflex:  Non-dominant, Moderate caliber OM2 and OM3     E.  RCA:  Dominant, mid 20%, Large RPDA and branching RPL      F.  Left Ventriculogram:   55-60%       Final Impressions:  Minimal non-obstructive CAD     Recommendations:  Continued medical therapy.  Imaging  Chest X-ray:   IMPRESSION:  No evidence for acute pulmonary process. Follow-up as  clinical indications persist.     This report was finalized on 9/13/2023 4:09 PM by Dr. Donta Thompson M.D.  Lab Review   Results from last 7 days   Lab Units 09/14/23  0428 09/13/23  1757 09/13/23  1540   HSTROP T ng/L 6 9 9     Results from last 7 days   Lab Units 09/13/23  1540   MAGNESIUM mg/dL 1.7     Results from last 7 days   Lab Units 09/14/23  0428   SODIUM mmol/L 139   POTASSIUM mmol/L 4.1   BUN mg/dL 13   CREATININE mg/dL 0.45*   CALCIUM mg/dL 9.3     @[id number]@  Results from last 7 days   Lab Units 09/14/23  0428 09/13/23  1540 09/12/23  0852   WBC 10*3/mm3 7.66 10.53 8.89   HEMOGLOBIN g/dL 12.6 13.8 14.6   HEMATOCRIT % 38.2 40.9 45.2   PLATELETS 10*3/mm3 252 283 340   The following medical decision was discussed in detail with   Abigail            Assessment:  Chest pain  Coronary artery disease  Hypertension  Hyperlipidemia  DM Type II    Recommendations / Plan:   This is a 63 year old female who is new with our service. She is admitted for chest pain for which we have been consulted. Troponin negative x3. She had heart cath in 2016 that revealed mild nonobstructive cad. Echo in 2016 normal EF and LV function. Given risk factors, we will proceed with stress and echo.     It was explained to the patient that stress testing carries 85% specificity/sensitivity, and does not rule out future cardiac event.  Risks of the procedure were explained to the patient including shortness of breath, induction of myocardial infarction, and dizziness.  Patient is agreeable to proceeding with stress testing.     Vero Parada, APRN  9/14/2023, 08:43 EDT    Patient personally interviewed and above subjective findings personally confirmed during a face to face contact with patient today  All findings of physical examination confirmed  All pertinent and performed labs, cardiac procedures ,  radiographs of the last 24 hours personally reviewed  Impression and plans discussed/elaborated and implemented jointly as described above     Tasneem Hayes MD          EMR Dragon/Transcription:   Dictated utilizing Dragon dictation

## 2023-09-14 NOTE — CASE MANAGEMENT/SOCIAL WORK
Discharge Planning Assessment  [facility]     Patient Name: Gely Kimble  MRN: [id number]  Today's Date: 9/14/2023    Admit Date: 9/13/2023        Discharge Needs Assessment       Row Name 09/14/23 1444       Living Environment    Current Living Arrangements home    Potentially Unsafe Housing Conditions none    Primary Care Provided by self    Provides Primary Care For no one    Family Caregiver if Needed sibling(s)    Quality of Family Relationships helpful;involved;supportive    Able to Return to Prior Arrangements yes       Resource/Environmental Concerns    Resource/Environmental Concerns none       Food Insecurity    Within the past 12 months, you worried that your food would run out before you got the money to buy more. Never true    Within the past 12 months, the food you bought just didn't last and you didn't have money to get more. Never true       Transition Planning    Patient/Family Anticipates Transition to home with family    Transportation Anticipated family or friend will provide       Discharge Needs Assessment    Equipment Currently Used at Home glucometer;nebulizer    Concerns to be Addressed denies needs/concerns at this time;discharge planning    Anticipated Changes Related to Illness none    Equipment Needed After Discharge none    Provided Post Acute Provider List? N/A    Current Discharge Risk chronically ill                   Discharge Plan       Row Name 09/14/23 1440       Plan    Plan Comments Entered room, introduced self and explained role to pt and sister at bedside; received permission to speak in front of family. verified information on facesheet; patient lives in a trailer w/sister Nan, son and his girlfriend; patient is employed part time, is independent w/ADL's, Pt uses a nebulizer machine and a glucose machine at home; Verified PCP listed on facesheet; RX are filled at [facility] in [place]; Pt denies any difficulty affording or obtaining RX. Pt presented to the ER with  complaints of chest heaviness- Pt denies any d/c needs upon d/c. Family to transport home upon d/c.                  Continued Care and Services - Admitted Since 9/13/2023    Coordination has not been started for this encounter.          Demographic Summary       Row Name 09/14/23 1443       General Information    Admission Type observation    Arrived From home    Required Notices Provided Observation Status Notice    Reason for Consult decision-making;discharge planning    Preferred Language English       Contact Information    Permission Granted to Share Info With ;family/designee                   Functional Status       Row Name 09/14/23 1443       Functional Status    Usual Activity Tolerance good    Current Activity Tolerance good       Assessment of Health Literacy    How often do you have someone help you read hospital materials? Never    How often do you have problems learning about your medical condition because of difficulty understanding written information? Never    How often do you have a problem understanding what is told to you about your medical condition? Never    How confident are you filling out medical forms by yourself? Extremely    Health Literacy Good       Functional Status, IADL    Medications independent    Meal Preparation independent    Housekeeping independent    Laundry independent    Shopping independent       Mental Status    General Appearance WDL WDL       Mental Status Summary    Recent Changes in Mental Status/Cognitive Functioning no changes       Employment/    Employment Status employed part-time                   Psychosocial    No documentation.                  Abuse/Neglect    No documentation.                  Legal    No documentation.                  Substance Abuse    No documentation.                  Patient Forms    No documentation.                     Ember Lopez RN

## 2023-09-14 NOTE — OUTREACH NOTE
Prep Survey      Flowsheet Row Responses   [facility] facility patient discharged from? [place]   Is LACE score < 7 ? Yes   Eligibility [facility]   Date of Admission 09/13/23   Date of Discharge 09/14/23   Discharge Disposition Home or Self Care   Discharge diagnosis Chest pain   Does the patient have one of the following disease processes/diagnoses(primary or secondary)? Other   Does the patient have Home health ordered? No   Is there a DME ordered? No   Prep survey completed? Yes            SHAHLA QUISPE - Registered Nurse

## 2023-09-14 NOTE — PROGRESS NOTES
MD ATTESTATION NOTE    The NAIMA and I have discussed this patient's history, physical exam, and treatment plan.  I have reviewed the documentation and personally had a face to face interaction with the patient. I affirm the documentation and agree with the treatment and plan.  The attached note describes my personal findings.      I provided a substantive portion of the care of the patient.  I personally performed the physical exam in its entirety, and below are my findings.     Brief HPI: Patient mid to the observation unit for generalized weakness.  Patient also states she has been having some chest tightness.  States chest tightness is not worse with exertion.  Patient was scheduled to see her heart doctor.  Patient is going on a cruise this weekend and wants to make sure that she is safe.  Continues to have some chest tightness.    PHYSICAL EXAM  ED Triage Vitals   Temp Heart Rate Resp BP SpO2   09/13/23 1504 09/13/23 1504 09/13/23 1513 09/13/23 1513 09/13/23 1504   98.2 °F (36.8 °C) (!) 127 17 136/85 93 %      Temp src Heart Rate Source Patient Position BP Location FiO2 (%)   09/13/23 1504 09/13/23 1601 09/13/23 1601 09/13/23 1601 --   Tympanic Monitor Sitting Right arm          GENERAL: no acute distress  HENT: nares patent  EYES: no scleral icterus  CV: regular rhythm, normal rate  RESPIRATORY: normal effort  ABDOMEN: soft  MUSCULOSKELETAL: no deformity  NEURO: alert, moves all extremities, follows commands  PSYCH:  calm, cooperative  SKIN: warm, dry    Vital signs and nursing notes reviewed.        Plan: Echo, stress test, cardiology evaluation

## 2023-09-14 NOTE — PROGRESS NOTES
ED OBSERVATION PROGRESS/DISCHARGE SUMMARY    Date of Admission: 9/13/2023   LOS: 0 days   PCP: Sofi Fernandes, DARIAN    Subjective      Hospital Outcome: 63-year-old female admitted to the observation unit for further evaluation of chest pain.  Patient was ruled out for ACS with serial troponins which remained negative.  D-dimer also negative.  Chest x-ray negative acute.  EKG this morning shows normal sinus rhythm without acute evidence of ischemia.    She was seen and evaluated by cardiology service and deemed appropriate to undergo stress testing.  This was found to be negative and she has been cleared for discharge home.  Patient is agreeable to plan.    ROS:  General: no fevers, chills  Respiratory: no cough, dyspnea  Cardiovascular: no chest pain, palpitations  Abdomen: No abdominal pain, nausea, vomiting, or diarrhea  Neurologic: No focal weakness    Objective   Physical Exam:  I have reviewed the vital signs.  Temp:  [98.2 °F (36.8 °C)-98.6 °F (37 °C)] 98.6 °F (37 °C)  Heart Rate:  [] 87  Resp:  [16-18] 16  BP: (100-151)/(60-85) 104/60  General Appearance:    Alert, cooperative, no distress  Head:    Normocephalic, atraumatic  Eyes:    Sclerae anicteric  Neck:   Supple, no mass  Lungs: Clear to auscultation bilaterally, respirations unlabored  Heart: Regular rate and rhythm, S1 and S2 normal, no murmur, rub or gallop  Abdomen:  Soft, nontender, bowel sounds active, nondistended  Extremities: No clubbing, cyanosis, or edema to lower extremities  Pulses:  2+ and symmetric in distal lower extremities  Skin: No rashes   Neurologic: Oriented x3, Normal strength to extremities    Results Review:    I have reviewed the labs, radiology results and diagnostic studies.    Results from last 7 days   Lab Units 09/13/23  1540   WBC 10*3/mm3 10.53   HEMOGLOBIN g/dL 13.8   HEMATOCRIT % 40.9   PLATELETS 10*3/mm3 283     Results from last 7 days   Lab Units 09/13/23  1540 09/12/23  0852   SODIUM mmol/L 138 138    POTASSIUM mmol/L 4.2 4.6   CHLORIDE mmol/L 104 98   CO2 mmol/L 23.0 25.9   BUN mg/dL 19 12   CREATININE mg/dL 0.47* 0.58   CALCIUM mg/dL 9.8 10.6*   BILIRUBIN mg/dL 0.2 0.3   ALK PHOS U/L 61 63   ALT (SGPT) U/L 10 15   AST (SGOT) U/L 12 12   GLUCOSE mg/dL 117* 157*     Imaging Results (Last 24 Hours)       Procedure Component Value Units Date/Time    XR Chest 1 View [[id number]] Collected: 09/13/23 1609     Updated: 09/13/23 1612    Narrative:      XR CHEST 1 VW-     HISTORY: Female who is 63 years-old, weakness     TECHNIQUE: Frontal view of the chest     COMPARISON: 3/20/2020     FINDINGS: Heart, mediastinum and pulmonary vasculature are unremarkable.  No focal pulmonary consolidation, pleural effusion, or pneumothorax. No  acute osseous process.       Impression:      No evidence for acute pulmonary process. Follow-up as  clinical indications persist.     This report was finalized on 9/13/2023 4:09 PM by Dr. Donta Thompson M.D.               I have reviewed the medications.  ---------------------------------------------------------------------------------------------  Assessment & Plan   Assessment/Problem List    Chest pain      Plan:    Chest pain  -Initial troponin 9, continue to trend  -D-dimer negative  -Globin A1c 6.40, lipid panel shows triglycerides 352  -EKG no acute ischemia  -CXR negative acute  -Echocardiogram shows ejection fraction 61 to 65%, no pericardial effusion  -Cardiology consult, cleared for discharge home  -Telemetry overnight did not reveal any arrhythmias  -Stress testing negative     Fatigue  -BNP, TSH, UA unremarkable  -CXR negative acute    Hypertension  -Continue lisinopril     Hyperlipidemia  -Continue pravastatin     Diabetes  -Insulin glargine qHS  -Continue Jardiance, linagliptin, metformin     Anxiety and Depression  -Continue lexapro    Disposition: Home    Follow-up after Discharge: PCP & cardiology    34 minutes has been spent by [facility] providers in the care of this patient while under observation status     This note will serve as a discharge summary.     PARISA Garza 09/14/23 00:23 EDT    I have worn appropriate PPE during this patient encounter and sanitized my hands with entering and exiting patient room.

## 2023-09-15 ENCOUNTER — TRANSITIONAL CARE MANAGEMENT TELEPHONE ENCOUNTER (OUTPATIENT)
Dept: CALL CENTER | Facility: HOSPITAL | Age: 63
End: 2023-09-15
Payer: MEDICARE

## 2023-09-15 NOTE — OUTREACH NOTE
Call Center TCM Note      Flowsheet Row Responses   [facility] patient discharged from? [facility]   Does the patient have one of the following disease processes/diagnoses(primary or secondary)? Other   TCM attempt successful? Yes   Call start time 1511   Call end time 1514   Discharge diagnosis Chest pain   Person spoke with today (if not patient) and relationship pt   Meds reviewed with patient/caregiver? Yes   Is the patient having any side effects they believe may be caused by any medication additions or changes? No   Does the patient have all medications ordered at discharge? No   Prescription comments Picking up today   Is the patient taking all medications as directed (includes completed medication regime)? N/A   Comments Cardiology 10/12/23   Does the patient have an appointment with their PCP within 7-14 days of discharge? No  [OV 10/10/2023 at 9:00 AM]   Nursing Interventions Routed TCM call to PCP office   Psychosocial issues? No   Did the patient receive a copy of their discharge instructions? Yes   Nursing interventions Reviewed instructions with patient   What is the patient's perception of their health status since discharge? Improving   Is the patient/caregiver able to teach back signs and symptoms related to disease process for when to call PCP? Yes   Is the patient/caregiver able to teach back signs and symptoms related to disease process for when to call 911? Yes   Is the patient/caregiver able to teach back the hierarchy of who to call/visit for symptoms/problems? PCP, Specialist, Home health nurse, Urgent Care, ED, 911 Yes   If the patient is a current smoker, are they able to teach back resources for cessation? Not a smoker   TCM call completed? Yes   Wrap up additional comments Pt states she is doing good, and deines any chest pain/SOB. Reviewed AVS/meds with pt. Pt verified PCP/cardiologist appts.   Call end time 1514   Would this patient benefit from a Referral to [facility] Social Work? No    Is the patient interested in additional calls from an ambulatory ? No            Marlyn Persaud RN    9/15/2023, 15:15 EDT

## 2023-09-18 ENCOUNTER — TELEPHONE (OUTPATIENT)
Dept: FAMILY MEDICINE CLINIC | Facility: CLINIC | Age: 63
End: 2023-09-18
Payer: MEDICARE

## 2023-09-18 NOTE — TELEPHONE ENCOUNTER
HUB TO READ AND RELAY    Left message on voicemail that hospital follow up was made with Sofi Fernandes on 9/25 at 1:00 since she does not have anything sooner; asked patient to call and confirm that this is ok or if she would like to see Tatiana Perez sooner we can reschedule

## 2023-09-20 LAB
QT INTERVAL: 397 MS
QTC INTERVAL: 450 MS

## 2023-09-25 ENCOUNTER — OFFICE VISIT (OUTPATIENT)
Dept: FAMILY MEDICINE CLINIC | Facility: CLINIC | Age: 63
End: 2023-09-25
Payer: MEDICARE

## 2023-09-25 VITALS
HEART RATE: 78 BPM | DIASTOLIC BLOOD PRESSURE: 72 MMHG | BODY MASS INDEX: 21.82 KG/M2 | SYSTOLIC BLOOD PRESSURE: 124 MMHG | WEIGHT: 144 LBS | HEIGHT: 68 IN | OXYGEN SATURATION: 100 %

## 2023-09-25 DIAGNOSIS — Z09 HOSPITAL DISCHARGE FOLLOW-UP: Primary | ICD-10-CM

## 2023-09-25 DIAGNOSIS — Z79.4 TYPE 2 DIABETES MELLITUS WITH DIABETIC POLYNEUROPATHY, WITH LONG-TERM CURRENT USE OF INSULIN: ICD-10-CM

## 2023-09-25 DIAGNOSIS — M19.90 ARTHRITIS: ICD-10-CM

## 2023-09-25 DIAGNOSIS — F33.1 MODERATE EPISODE OF RECURRENT MAJOR DEPRESSIVE DISORDER: ICD-10-CM

## 2023-09-25 DIAGNOSIS — J43.9 PULMONARY EMPHYSEMA, UNSPECIFIED EMPHYSEMA TYPE: ICD-10-CM

## 2023-09-25 DIAGNOSIS — I10 ESSENTIAL HYPERTENSION: ICD-10-CM

## 2023-09-25 DIAGNOSIS — R11.0 NAUSEA: ICD-10-CM

## 2023-09-25 DIAGNOSIS — E11.42 TYPE 2 DIABETES MELLITUS WITH DIABETIC POLYNEUROPATHY, WITH LONG-TERM CURRENT USE OF INSULIN: ICD-10-CM

## 2023-09-25 DIAGNOSIS — F41.1 GENERALIZED ANXIETY DISORDER: ICD-10-CM

## 2023-09-25 DIAGNOSIS — E78.00 HYPERCHOLESTEROLEMIA: ICD-10-CM

## 2023-09-25 DIAGNOSIS — R42 DIZZINESS: ICD-10-CM

## 2023-09-25 NOTE — PROGRESS NOTES
Subjective   Gely Kimble is a 63 y.o. female.     Chief Complaint   Patient presents with    Follow-up     Hospital     Dizziness     Concerned with balance       History of Present Illness  Patient went to [facility] ER on 9/13/23 with c/o chest pressure and heaviness with recent fatigue; cardiology was consulted; had negative troponin x3, ECG SR without ST changes; chest x-ray negative; D-dimer negative; was admitted for further evaluation; pt had negative stress test and Echo showed EF 61-65%, no pericardial effusion; no changes in medications; discharged home on 9/14/23; has follow up with cardiology on 10/12/23; gave Rx for Nitroglycerin to take as needed to see if helped; has not needed to takes NTG; told symptoms may be related to GI.    F/U DM2: takes Jardiance and Tradjenta daily and Metformin twice daily; last A1c 5.9%; had stopped Levemir due to low blood sugars, but resumed 10 units daily LOV due to elevated blood sugars; has been monitoring blood sugar and has been running 130-150s; watches intake of carbs/sugars; has been doing a lot of walking with recent vacation; plans to get back to gym soon; last eye exam last month at [facility].    F/U weight loss with nausea and fatigue: started One-a-Day Vitamin and Omeprazole and has been feeling much better; nausea resolved; still losing balance a lot; notes dizziness with position changes, but can also be walking and start staggering; occasional room spinning when turning over in bed; does not take daily antihistamine; takes OTC sinus and allergy med as needed; does not seem to change dizziness if has taken sinus med; weight has improved since recent vacation--went on cruise on 9/17/23 through 9/23/23.     F/U HTN: takes Lisinopril 5 mg daily; monitors BP, typically runs 120s/70s; no headaches; no swelling for most part; will have some mild swelling with ankles with prolonged sitting.     F/U anxiety/depression: takes Escitalopram  daily and working well; has trouble sleeping, no change since mother passed in 2014 and then spouse in 2016; will wake up every 2 hours, occasional trouble falling back to sleep; no SI/HI.    Uses Combivent rarely as needed and helps; has not needed to use Albuterol inhaler recently.    F/U arthritis: takes Naproxen twice daily for arthritis and works well; has arthritis in multiple joints and Naproxen helps.        Within 48 business hours after discharge our office contacted her via telephone to coordinate her care and needs.        9/14/2023     6:02 PM   Date of TCM Phone Call   [facility]   Date of Admission 9/13/2023   Date of Discharge 9/14/2023   Discharge Disposition Home or Self Care     Risk for Readmission (LACE) Score: 4 (9/14/2023  6:00 AM)      Patient was admitted to [facility]   ALL records were obtained and reviewed.  Date of admission 9/13/23  Date of discharge 9/14/23  Diagnosis: Chest pain, Fatigue, HTN, Hyperlipidemia, DM2, Anxiety/depression  Medications upon discharge: Reviewed and reconciled  Condition stable    Current outpatient and discharge medications have been reconciled for the patient.    I reviewed and requested records labs and diagnostics from the hospital with the patient and family.  The patient is to follow-up with specialist as discussed and directed.    If any problems arise or further questions develop patient is to call or to contact us for any needs.     The following portions of the patient's history were reviewed and updated as appropriate: allergies, current medications, past family history, past medical history, past social history, past surgical history and problem list.    Current Outpatient Medications   Medication Sig Dispense Refill    albuterol (PROVENTIL HFA;VENTOLIN HFA) 108 (90 BASE) MCG/ACT inhaler Inhale 2 puffs Every 4 (Four) Hours As Needed for Wheezing.      albuterol (PROVENTIL) (2.5 MG/3ML) 0.083% nebulizer solution Take 2.5 mg by  nebulization Every 4 (Four) Hours As Needed for Wheezing.      aspirin 81 MG chewable tablet Chew 1 tablet Daily.      empagliflozin (Jardiance) 25 MG tablet tablet Take 1 tablet by mouth Daily. 30 tablet 5    escitalopram (Lexapro) 10 MG tablet Take 1 tablet by mouth Daily. Start with half tablet daily x1 week, then increase to whole tablet daily. 30 tablet 1    Homeopathic Products (LEG CRAMP RELIEF PO) Take 1 tablet by mouth Daily.      insulin detemir (Levemir FlexPen) 100 UNIT/ML injection Inject 10 Units under the skin into the appropriate area as directed Every Night.      ipratropium-albuterol (COMBIVENT RESPIMAT)  MCG/ACT inhaler Inhale 1 puff 4 (Four) Times a Day As Needed for Wheezing.      linagliptin (TRADJENTA) 5 MG tablet tablet Take 1 tablet by mouth Daily. 30 tablet 2    lisinopril (PRINIVIL,ZESTRIL) 10 MG tablet Take 0.5 tablets by mouth Daily.      metFORMIN ER (GLUCOPHAGE-XR) 500 MG 24 hr tablet Take 2 tablets by mouth 2 (Two) Times a Day With Meals. 120 tablet 5    Multiple Vitamin (MULTIVITAMIN) tablet Take 1 tablet by mouth.      naproxen (NAPROSYN) 500 MG tablet Take 1 tablet by mouth 2 (Two) Times a Day With Meals. 60 tablet 5    nitroglycerin (Nitrostat) 0.4 MG SL tablet Place 1 tablet under the tongue Every 5 (Five) Minutes As Needed for Chest Pain. Take no more than 3 doses in 15 minutes. 100 tablet 12    omeprazole (priLOSEC) 20 MG capsule Take 1 capsule by mouth Daily. 30 capsule 1    pravastatin (PRAVACHOL) 20 MG tablet Take 1 tablet by mouth Every Night. 90 tablet 1    vitamin D (ERGOCALCIFEROL) 1.25 MG (89669 UT) capsule capsule Take 1 capsule by mouth 1 (One) Time Per Week.       No current facility-administered medications for this visit.       Current Outpatient Medications on File Prior to Visit   Medication Sig    albuterol (PROVENTIL HFA;VENTOLIN HFA) 108 (90 BASE) MCG/ACT inhaler Inhale 2 puffs Every 4 (Four) Hours As Needed for Wheezing.    albuterol (PROVENTIL) (2.5  MG/3ML) 0.083% nebulizer solution Take 2.5 mg by nebulization Every 4 (Four) Hours As Needed for Wheezing.    aspirin 81 MG chewable tablet Chew 1 tablet Daily.    empagliflozin (Jardiance) 25 MG tablet tablet Take 1 tablet by mouth Daily.    escitalopram (Lexapro) 10 MG tablet Take 1 tablet by mouth Daily. Start with half tablet daily x1 week, then increase to whole tablet daily.    Homeopathic Products (LEG CRAMP RELIEF PO) Take 1 tablet by mouth Daily.    insulin detemir (Levemir FlexPen) 100 UNIT/ML injection Inject 10 Units under the skin into the appropriate area as directed Every Night.    ipratropium-albuterol (COMBIVENT RESPIMAT)  MCG/ACT inhaler Inhale 1 puff 4 (Four) Times a Day As Needed for Wheezing.    linagliptin (TRADJENTA) 5 MG tablet tablet Take 1 tablet by mouth Daily.    lisinopril (PRINIVIL,ZESTRIL) 10 MG tablet Take 0.5 tablets by mouth Daily.    metFORMIN ER (GLUCOPHAGE-XR) 500 MG 24 hr tablet Take 2 tablets by mouth 2 (Two) Times a Day With Meals.    Multiple Vitamin (MULTIVITAMIN) tablet Take 1 tablet by mouth.    naproxen (NAPROSYN) 500 MG tablet Take 1 tablet by mouth 2 (Two) Times a Day With Meals.    nitroglycerin (Nitrostat) 0.4 MG SL tablet Place 1 tablet under the tongue Every 5 (Five) Minutes As Needed for Chest Pain. Take no more than 3 doses in 15 minutes.    omeprazole (priLOSEC) 20 MG capsule Take 1 capsule by mouth Daily.    pravastatin (PRAVACHOL) 20 MG tablet Take 1 tablet by mouth Every Night.    vitamin D (ERGOCALCIFEROL) 1.25 MG (17484 UT) capsule capsule Take 1 capsule by mouth 1 (One) Time Per Week.     No current facility-administered medications on file prior to visit.       Past Medical History:   Diagnosis Date    Arthritis     COPD (chronic obstructive pulmonary disease)     Elevated cholesterol     Emphysema of lung     Gallbladder abscess     Herpes zoster 08/03/2023    History of positive hepatitis C     previously positive, never treated, negative RNA  finding    Hypertension     Myocardial perfusion defect 2016    Type 2 diabetes mellitus        Past Surgical History:   Procedure Laterality Date    APPENDECTOMY      CHOLECYSTECTOMY      HYSTERECTOMY      TUBAL ABDOMINAL LIGATION         Family History   Problem Relation Age of Onset    Diabetes Mother     Heart disease Mother     Diabetes Father     Diabetes Brother     Heart disease Maternal Grandmother     Cancer Paternal Grandfather        Social History     Socioeconomic History    Marital status:    Tobacco Use    Smoking status: Former     Packs/day: 1.50     Types: Cigarettes     Quit date: 10/11/2017     Years since quittin.9    Tobacco comments:     Previously smoked about 1.5 packs per day for about 45 years   Vaping Use    Vaping Use: Never used   Substance and Sexual Activity    Alcohol use: No     Comment: rare margaritia 1-2 times per year    Drug use: No     Comment: in past; clean since     Sexual activity: Not Currently       Review of Systems   Constitutional:  Negative for appetite change, chills, fatigue, fever, unexpected weight gain and unexpected weight loss.   HENT:  Negative for ear pain, sinus pressure, sore throat and trouble swallowing.    Eyes:  Blurred vision: no change with cataract.   Respiratory:  Negative for cough, chest tightness and shortness of breath.    Cardiovascular:  Negative for chest pain and palpitations.   Gastrointestinal:  Negative for abdominal pain, blood in stool, constipation, diarrhea, GERD and indigestion.   Endocrine: Positive for polydipsia (no change). Negative for cold intolerance and heat intolerance.   Genitourinary:  Negative for dysuria and frequency.   Musculoskeletal:  Back pain: some with arthritis.   Skin:  Negative for rash.   Neurological:  Negative for syncope and weakness. Light-headedness: see HPI.  Hematological:  Bruises/bleeds easily: no change.     Objective   Vitals:    23 1257   BP: 124/72   BP Location: Left  "arm   Patient Position: Sitting   Cuff Size: Adult   Pulse: 78   SpO2: 100%   Weight: 65.3 kg (144 lb)   Height: 171.5 cm (67.5\")     Body mass index is 22.22 kg/m².    Physical Exam  Vitals and nursing note reviewed.   Constitutional:       General: She is not in acute distress.     Appearance: She is well-developed and well-groomed. She is not diaphoretic.   HENT:      Head: Normocephalic.      Right Ear: External ear normal. No decreased hearing noted. Right ear middle ear effusion: mild. Tympanic membrane is not erythematous.      Left Ear: External ear normal. No decreased hearing noted. Left ear middle ear effusion: mild. Tympanic membrane is not erythematous.      Nose:      Right Sinus: No maxillary sinus tenderness or frontal sinus tenderness.      Left Sinus: No maxillary sinus tenderness or frontal sinus tenderness.      Mouth/Throat:      Mouth: Mucous membranes are moist.      Pharynx: No oropharyngeal exudate or posterior oropharyngeal erythema.   Eyes:      Extraocular Movements: Extraocular movements intact.      Conjunctiva/sclera: Conjunctivae normal.      Pupils: Pupils are equal, round, and reactive to light.   Neck:      Vascular: No carotid bruit.   Cardiovascular:      Rate and Rhythm: Normal rate and regular rhythm.      Pulses: Normal pulses.      Heart sounds: Normal heart sounds. No murmur heard.  Pulmonary:      Effort: Pulmonary effort is normal. No respiratory distress.      Breath sounds: Normal breath sounds.   Abdominal:      General: Bowel sounds are normal.      Palpations: Abdomen is soft. There is no hepatomegaly or splenomegaly.      Tenderness: There is no abdominal tenderness. There is no guarding.   Musculoskeletal:      Cervical back: Normal range of motion and neck supple.      Right lower leg: No edema.      Left lower leg: No edema.   Lymphadenopathy:      Cervical: No cervical adenopathy.   Skin:     General: Skin is warm and dry.      Findings: No rash.   Neurological: "      Mental Status: She is alert and oriented to person, place, and time.      Cranial Nerves: Cranial nerves 2-12 are intact.      Gait: Abnormal gait: guarded gait.      Comments: Positive Dallas-Hallpike test bilaterally, no nystagmus   Psychiatric:         Mood and Affect: Mood normal.         Behavior: Behavior normal.         Thought Content: Thought content normal.         Cognition and Memory: Cognition normal.         Judgment: Judgment normal.       Lab Results   Component Value Date    WBC 7.66 09/14/2023    RBC 4.37 09/14/2023    HGB 12.6 09/14/2023    HCT 38.2 09/14/2023    MCV 87.4 09/14/2023    MCH 28.8 09/14/2023    MCHC 33.0 09/14/2023    RDW 13.4 09/14/2023    RDWSD 42.7 09/14/2023    MPV 11.0 09/14/2023     09/14/2023    NEUTRORELPCT 60.5 09/14/2023    LYMPHORELPCT 30.0 09/14/2023    MONORELPCT 6.7 09/14/2023    EOSRELPCT 1.7 09/14/2023    BASORELPCT 0.8 09/14/2023    AUTOIGPER 0.3 09/14/2023    NEUTROABS 4.64 09/14/2023    LYMPHSABS 2.30 09/14/2023    MONOSABS 0.51 09/14/2023    EOSABS 0.13 09/14/2023    BASOSABS 0.06 09/14/2023    AUTOIGNUM 0.02 09/14/2023    NRBC 0.0 09/14/2023     Lab Results   Component Value Date    GLUCOSE 136 (H) 09/14/2023    BUN 13 09/14/2023    CREATININE 0.45 (L) 09/14/2023    EGFRIFNONA 108 03/20/2020    BCR 28.9 (H) 09/14/2023    K 4.1 09/14/2023    CO2 24.0 09/14/2023    CALCIUM 9.3 09/14/2023    PROTENTOTREF 7.4 09/12/2023    ALBUMIN 4.3 09/13/2023    LABIL2 2.2 09/12/2023    AST 12 09/13/2023    ALT 10 09/13/2023      Lab Results   Component Value Date    CHLPL 187 08/02/2023    TRIG 352 (H) 09/13/2023    HDL 32 (L) 09/13/2023    VLDL 52 (H) 09/13/2023    LDL 41 09/13/2023     Lab Results   Component Value Date    TSH 0.327 09/13/2023     Lab Results   Component Value Date    HGBA1C 6.40 (H) 09/13/2023     Lab Results   Component Value Date    RBCUA 0-2 07/15/2017    BACTERIA None Seen 07/15/2017    COLORU Yellow 09/13/2023    CLARITYU Clear 09/13/2023     LEUKOCYTESUR Negative 09/13/2023    GLUCOSEU >=1000 mg/dL (3+) (A) 09/13/2023    BLOODU Negative 09/13/2023    BILIRUBINUR Negative 09/13/2023    NITRITEU Negative 09/13/2023     Records reviewed from [facility] from 9/13/23--9/14/23; pt admitted for observation for further evaluation of chest pain; pt was ruled out for ACS with serial troponins negative; D-dimer also negative; chest x-ray negative; EKG NSR without acute ischemia; was seen by cardiology and had stress test; stress test was negative and cleared for discharge on 9/14/23.      Assessment & Plan .  Problem List Items Addressed This Visit       Type 2 diabetes mellitus    Current Assessment & Plan     Diabetes is  stable per home blood sugar readings .   Continue current treatment regimen.  Regular aerobic exercise.  Reminded to get yearly retinal exam.  Diabetes will be reassessed  2 months .  Continue Jardiance and Tradjenta daily and Metformin twice daily.  Continue Levemir daily.         Pulmonary emphysema    Current Assessment & Plan     COPD is  stable .  Continue current medications.  Continue Combivent as needed.         Arthritis    Current Assessment & Plan     Stable.  Continue Naproxen twice daily.         Hypercholesterolemia    Current Assessment & Plan     Continue Pravastatin daily.  Continue to work on healthy diet and exercise as tolerated.         Essential hypertension    Current Assessment & Plan     Hypertension is  stable .  Regular aerobic exercise.  Continue current medications.  Ambulatory blood pressure monitoring.  Blood pressure will be reassessed  2 months .  Continue Lisinopril daily.         Moderate episode of recurrent major depressive disorder    Current Assessment & Plan     Patient's depression is recurrent and is moderate without psychosis. Their depression is currently active and the condition is  stable . This will be reassessed at the next regular appointment. F/U as described:patient will continue  current medication therapy.  Continue Escitalopram daily.         Generalized anxiety disorder    Current Assessment & Plan     Psychological condition is  stable .  Continue current treatment regimen.  Psychological condition  will be reassessed at the next regular appointment.  Continue Escitalopram daily.         Dizziness    Current Assessment & Plan     Take daily antihistamine, such as Claritin or Allegra.  Continue increased intake of clear liquids and rest.  Change positions slowly.         Relevant Orders    Ambulatory Referral to ENT (Otolaryngology)    Nausea    Current Assessment & Plan     Resolved with Omeprazole daily.          Other Visit Diagnoses       Hospital discharge follow-up    -  Primary            Return in about 6 weeks (around 11/6/2023) for Recheck.or sooner if symptoms persist or worsen.  Discussed recent nausea may have been related to gastritis with change to Diclofenac short term after seen in ER for lower back pain; pt has resumed Naproxen and no problems; will continue Omeprazole daily for now.  Pt would like to see ENT for dizziness before trial of vestibular therapy for positional vertigo.

## 2023-09-25 NOTE — PATIENT INSTRUCTIONS
Take daily antihistamine, such as Claritin or Allegra.  Continue increased intake of clear liquids and rest.  Change positions slowly.  Continue to monitor your blood sugar once daily before a meal and record results.  Continue to monitor your blood pressure periodically and record results.  Continue to work on healthy diet and exercise.  Follow up pending lab results.  Follow up in 6 weeks, or sooner if problems or concerns.

## 2023-09-26 PROBLEM — R79.89 DECREASED THYROID STIMULATING HORMONE (TSH) LEVEL: Status: RESOLVED | Noted: 2023-09-13 | Resolved: 2023-09-26

## 2023-09-26 NOTE — ASSESSMENT & PLAN NOTE
Psychological condition is  stable .  Continue current treatment regimen.  Psychological condition  will be reassessed at the next regular appointment.  Continue Escitalopram daily.

## 2023-09-26 NOTE — ASSESSMENT & PLAN NOTE
Take daily antihistamine, such as Claritin or Allegra.  Continue increased intake of clear liquids and rest.  Change positions slowly.

## 2023-09-26 NOTE — ASSESSMENT & PLAN NOTE
Patient's depression is recurrent and is moderate without psychosis. Their depression is currently active and the condition is  stable . This will be reassessed at the next regular appointment. F/U as described:patient will continue current medication therapy.  Continue Escitalopram daily.

## 2023-09-26 NOTE — ASSESSMENT & PLAN NOTE
Diabetes is  stable per home blood sugar readings .   Continue current treatment regimen.  Regular aerobic exercise.  Reminded to get yearly retinal exam.  Diabetes will be reassessed  2 months .  Continue Jardiance and Tradjenta daily and Metformin twice daily.  Continue Levemir daily.

## 2023-09-26 NOTE — ASSESSMENT & PLAN NOTE
Hypertension is  stable .  Regular aerobic exercise.  Continue current medications.  Ambulatory blood pressure monitoring.  Blood pressure will be reassessed  2 months .  Continue Lisinopril daily.

## 2023-09-28 ENCOUNTER — EXTERNAL PBMM DATA (OUTPATIENT)
Dept: PHARMACY | Facility: OTHER | Age: 63
End: 2023-09-28
Payer: MEDICARE

## 2023-09-28 DIAGNOSIS — F33.1 MODERATE EPISODE OF RECURRENT MAJOR DEPRESSIVE DISORDER: ICD-10-CM

## 2023-09-28 RX ORDER — ESCITALOPRAM OXALATE 10 MG/1
TABLET ORAL
Qty: 30 TABLET | Refills: 1 | Status: SHIPPED | OUTPATIENT
Start: 2023-09-28

## 2023-10-09 ENCOUNTER — TELEPHONE (OUTPATIENT)
Dept: FAMILY MEDICINE CLINIC | Facility: CLINIC | Age: 63
End: 2023-10-09

## 2023-10-09 DIAGNOSIS — E11.40 TYPE 2 DIABETES MELLITUS WITH DIABETIC NEUROPATHY, WITH LONG-TERM CURRENT USE OF INSULIN: ICD-10-CM

## 2023-10-09 DIAGNOSIS — Z79.4 TYPE 2 DIABETES MELLITUS WITH DIABETIC NEUROPATHY, WITH LONG-TERM CURRENT USE OF INSULIN: ICD-10-CM

## 2023-10-09 DIAGNOSIS — Z79.4 TYPE 2 DIABETES MELLITUS WITH DIABETIC POLYNEUROPATHY, WITH LONG-TERM CURRENT USE OF INSULIN: ICD-10-CM

## 2023-10-09 DIAGNOSIS — E11.42 TYPE 2 DIABETES MELLITUS WITH DIABETIC POLYNEUROPATHY, WITH LONG-TERM CURRENT USE OF INSULIN: ICD-10-CM

## 2023-10-09 RX ORDER — INSULIN DETEMIR 100 [IU]/ML
20 INJECTION, SOLUTION SUBCUTANEOUS NIGHTLY
Start: 2023-10-09

## 2023-10-09 NOTE — TELEPHONE ENCOUNTER
Spoke with patient over the phone; clarified that patient was actually taking Tradjenta with Metformin when taking the Jentadueto; will send another Rx of Tradjenta to pharmacy since patient did not receive; patient is not going to be able to afford the Jardiance and took last pill today; will increase Levemir to 15 units for several days and then up to 20 units if needed; patient instructed to reach out to [facility] [place] regarding patient assistance; blood sugars are currently running 86-130s; instructed to continue to monitor blood sugar; to follow up as scheduled next month, or sooner if problems or concerns..

## 2023-10-09 NOTE — TELEPHONE ENCOUNTER
Caller: Gely Kimble    Relationship: Self    Best call back number:[phone]    Requested Prescriptions:   Requested Prescriptions      No prescriptions requested or ordered in this encounter        Pharmacy where request should be sent: [facility] [id number] [address] AT [address] [facility] & [address] - [phone]  - [phone] FX     Last office visit with prescribing clinician: 9/25/2023   Last telemedicine visit with prescribing clinician: Visit date not found   Next office visit with prescribing clinician: 11/21/2023     Additional details provided by patient: PATIENT STATES THAT SHE WAS SUPPOSED TO RECEIVE A METFORMIN AND JANUVIA SEPARATE. PATIENT STATES THAT SHE WAS SUPPOSED TO RECEIVE BOTH OF THESE, BUT SHE RECEIVED TWO BOTTLES OF METFORMIN. PLEASE SEND JANUIVA ASAP.     Does the patient have less than a 3 day supply:  [x] Yes  [] No    Would you like a call back once the refill request has been completed: [] Yes [x] No    If the office needs to give you a call back, can they leave a voicemail: [] Yes [x] No    Janice Little Rep   10/09/23 13:47 EDT

## 2023-10-09 NOTE — TELEPHONE ENCOUNTER
Caller: Gely Kimble    Relationship: Self    Best call back number: [phone]  What medications are you currently taking:   Current Outpatient Medications on File Prior to Visit   Medication Sig Dispense Refill    albuterol (PROVENTIL HFA;VENTOLIN HFA) 108 (90 BASE) MCG/ACT inhaler Inhale 2 puffs Every 4 (Four) Hours As Needed for Wheezing.      albuterol (PROVENTIL) (2.5 MG/3ML) 0.083% nebulizer solution Take 2.5 mg by nebulization Every 4 (Four) Hours As Needed for Wheezing.      aspirin 81 MG chewable tablet Chew 1 tablet Daily.      empagliflozin (Jardiance) 25 MG tablet tablet Take 1 tablet by mouth Daily. 30 tablet 5    escitalopram (LEXAPRO) 10 MG tablet TAKE 1 TABLET BY MOUTH DAILY (START WITH HALF TABLET DAILY FOR ONE WEEK, THEN INCREASE TO WHOLE TABLET DAILY) 30 tablet 1    Homeopathic Products (LEG CRAMP RELIEF PO) Take 1 tablet by mouth Daily.      insulin detemir (Levemir FlexPen) 100 UNIT/ML injection Inject 10 Units under the skin into the appropriate area as directed Every Night.      ipratropium-albuterol (COMBIVENT RESPIMAT)  MCG/ACT inhaler Inhale 1 puff 4 (Four) Times a Day As Needed for Wheezing.      linagliptin (TRADJENTA) 5 MG tablet tablet Take 1 tablet by mouth Daily. 30 tablet 2    lisinopril (PRINIVIL,ZESTRIL) 10 MG tablet Take 0.5 tablets by mouth Daily.      metFORMIN ER (GLUCOPHAGE-XR) 500 MG 24 hr tablet Take 2 tablets by mouth 2 (Two) Times a Day With Meals. 120 tablet 5    Multiple Vitamin (MULTIVITAMIN) tablet Take 1 tablet by mouth.      naproxen (NAPROSYN) 500 MG tablet Take 1 tablet by mouth 2 (Two) Times a Day With Meals. 60 tablet 5    nitroglycerin (Nitrostat) 0.4 MG SL tablet Place 1 tablet under the tongue Every 5 (Five) Minutes As Needed for Chest Pain. Take no more than 3 doses in 15 minutes. 100 tablet 12    omeprazole (priLOSEC) 20 MG capsule Take 1 capsule by mouth Daily. 30 capsule 1    pravastatin (PRAVACHOL) 20 MG tablet Take 1 tablet by mouth Every  Night. 90 tablet 1    vitamin D (ERGOCALCIFEROL) 1.25 MG (31274 UT) capsule capsule Take 1 capsule by mouth 1 (One) Time Per Week.       No current facility-administered medications on file prior to visit.          What are your concerns: PATIENT CALLED AND STATED THAT NORMALLY HER JAURDIANCE IS $47 A MONTH, PATIENT WENT TO PICK THIS UP FROM THE PHARMACY AND IT WAS $152. PATIENT CANNOT AFFORD THIS. PLEASE ADVISE PATIENT ASAP.

## 2023-10-12 ENCOUNTER — OFFICE VISIT (OUTPATIENT)
Dept: CARDIOLOGY | Facility: CLINIC | Age: 63
End: 2023-10-12
Payer: MEDICARE

## 2023-10-12 VITALS
HEIGHT: 67 IN | BODY MASS INDEX: 22.29 KG/M2 | HEART RATE: 74 BPM | SYSTOLIC BLOOD PRESSURE: 118 MMHG | OXYGEN SATURATION: 99 % | DIASTOLIC BLOOD PRESSURE: 72 MMHG | WEIGHT: 142 LBS

## 2023-10-12 DIAGNOSIS — R07.82 INTERCOSTAL PAIN: ICD-10-CM

## 2023-10-12 DIAGNOSIS — R07.2 PRECORDIAL PAIN: ICD-10-CM

## 2023-10-12 DIAGNOSIS — I45.10 INCOMPLETE RIGHT BUNDLE BRANCH BLOCK (RBBB): Primary | ICD-10-CM

## 2023-10-12 DIAGNOSIS — R94.39 ABNORMAL NUCLEAR STRESS TEST: ICD-10-CM

## 2023-10-12 PROCEDURE — 3074F SYST BP LT 130 MM HG: CPT | Performed by: INTERNAL MEDICINE

## 2023-10-12 PROCEDURE — 3078F DIAST BP <80 MM HG: CPT | Performed by: INTERNAL MEDICINE

## 2023-10-12 PROCEDURE — 99214 OFFICE O/P EST MOD 30 MIN: CPT | Performed by: INTERNAL MEDICINE

## 2023-10-12 NOTE — H&P (VIEW-ONLY)
Patient was seen at [facility] by . Stress test completed and EKG   Subjective:        Gely Kimble is a 63 y.o. female who here for follow up    CC  Still have chest tightness  Tired and fatigue  HPI  63-year-old complaining of the significant tightness and pressure sensations in the chest associated feeling weak and tired     Problems Addressed this Visit          Cardiac and Vasculature    Incomplete right bundle branch block (RBBB) - Primary    Relevant Orders    Case Request Cath Lab: Left Heart Cath (Completed)    CBC & Differential    Basic Metabolic Panel    aPTT    Protime-INR    Chest pain    Relevant Orders    Protime-INR    aPTT    Abnormal nuclear stress test    Relevant Orders    Case Request Cath Lab: Left Heart Cath (Completed)    CBC & Differential    Basic Metabolic Panel    aPTT    Protime-INR     Diagnoses         Codes Comments    Incomplete right bundle branch block (RBBB)    -  Primary ICD-10-CM: I45.10  ICD-9-CM: 426.4     Abnormal nuclear stress test     ICD-10-CM: R94.39  ICD-9-CM: 794.39     Intercostal pain     ICD-10-CM: R07.82  ICD-9-CM: 786.59     Precordial pain     ICD-10-CM: R07.2  ICD-9-CM: 786.51           .    The following portions of the patient's history were reviewed and updated as appropriate: allergies, current medications, past family history, past medical history, past social history, past surgical history and problem list.    Past Medical History:   Diagnosis Date    Arthritis     COPD (chronic obstructive pulmonary disease)     Elevated cholesterol     Emphysema of lung     Gallbladder abscess     Herpes zoster 08/03/2023    History of positive hepatitis C     previously positive, never treated, negative RNA finding    Hypertension     Myocardial perfusion defect 02/16/2016    Type 2 diabetes mellitus      reports that she quit smoking about 6 years ago. Her smoking use included cigarettes. She smoked an average of 1.5 packs per day. She does not have any smokeless  "tobacco history on file. She reports that she does not drink alcohol and does not use drugs.   Family History   Problem Relation Age of Onset    Diabetes Mother     Heart disease Mother     Diabetes Father     Diabetes Brother     Heart disease Maternal Grandmother     Cancer Paternal Grandfather        Review of Systems  Constitutional: No wt loss, fever, fatigue  Gastrointestinal: No nausea, abdominal pain  Behavioral/Psych: No insomnia or anxiety   Cardiovascular weak and tired  Objective:       Physical Exam           Physical Exam  /72 (BP Location: Right arm, Patient Position: Sitting, Cuff Size: Adult)   Pulse 74   Ht 170.2 cm (67\")   Wt 64.4 kg (142 lb)   SpO2 99%   BMI 22.24 kg/m²     General appearance: No acute changes   Eyes: Sclerae conjunctivae normal, pupils reactive   HENT: Atraumatic; oropharynx clear with moist mucous membranes and no mucosal ulcerations;  Neck: Trachea midline; NECK, supple, no thyromegaly or lymphadenopathy   Lungs: Normal size and shape, normal breath sounds, equal distribution of air, no rales and rhonchi   CV: S1-S2 regular, no murmurs, no rub, no gallop   Abdomen: Soft, nontender; no masses , no abnormal abdominal sounds   Extremities: No deformity , normal color , no peripheral edema   Skin: Normal temperature, turgor and texture; no rash, ulcers  Psych: Appropriate affect, alert and oriented to person, place and time           Procedures      Echocardiogram:        Current Outpatient Medications:     albuterol (PROVENTIL HFA;VENTOLIN HFA) 108 (90 BASE) MCG/ACT inhaler, Inhale 2 puffs Every 4 (Four) Hours As Needed for Wheezing., Disp: , Rfl:     albuterol (PROVENTIL) (2.5 MG/3ML) 0.083% nebulizer solution, Take 2.5 mg by nebulization Every 4 (Four) Hours As Needed for Wheezing., Disp: , Rfl:     aspirin 81 MG chewable tablet, Chew 1 tablet Daily., Disp: , Rfl:     escitalopram (LEXAPRO) 10 MG tablet, TAKE 1 TABLET BY MOUTH DAILY (START WITH HALF TABLET DAILY FOR " ONE WEEK, THEN INCREASE TO WHOLE TABLET DAILY), Disp: 30 tablet, Rfl: 1    Homeopathic Products (LEG CRAMP RELIEF PO), Take 1 tablet by mouth Daily., Disp: , Rfl:     insulin detemir (Levemir FlexPen) 100 UNIT/ML injection, Inject 20 Units under the skin into the appropriate area as directed Every Night., Disp: , Rfl:     ipratropium-albuterol (COMBIVENT RESPIMAT)  MCG/ACT inhaler, Inhale 1 puff 4 (Four) Times a Day As Needed for Wheezing., Disp: , Rfl:     linagliptin (TRADJENTA) 5 MG tablet tablet, Take 1 tablet by mouth Daily., Disp: 30 tablet, Rfl: 2    lisinopril (PRINIVIL,ZESTRIL) 10 MG tablet, Take 0.5 tablets by mouth Daily., Disp: , Rfl:     metFORMIN ER (GLUCOPHAGE-XR) 500 MG 24 hr tablet, Take 2 tablets by mouth 2 (Two) Times a Day With Meals., Disp: 120 tablet, Rfl: 5    Multiple Vitamin (MULTIVITAMIN) tablet, Take 1 tablet by mouth., Disp: , Rfl:     naproxen (NAPROSYN) 500 MG tablet, Take 1 tablet by mouth 2 (Two) Times a Day With Meals., Disp: 60 tablet, Rfl: 5    nitroglycerin (Nitrostat) 0.4 MG SL tablet, Place 1 tablet under the tongue Every 5 (Five) Minutes As Needed for Chest Pain. Take no more than 3 doses in 15 minutes., Disp: 100 tablet, Rfl: 12    omeprazole (priLOSEC) 20 MG capsule, Take 1 capsule by mouth Daily., Disp: 30 capsule, Rfl: 1    pravastatin (PRAVACHOL) 20 MG tablet, Take 1 tablet by mouth Every Night., Disp: 90 tablet, Rfl: 1    vitamin D (ERGOCALCIFEROL) 1.25 MG (97624 UT) capsule capsule, Take 1 capsule by mouth 1 (One) Time Per Week., Disp: , Rfl:    Assessment:        Patient Active Problem List   Diagnosis    Type 2 diabetes mellitus    Pulmonary emphysema    Arthritis    Hypercholesterolemia    Essential hypertension    Abnormal liver function tests    Impingement syndrome of shoulder region    Neck pain    Postmenopause    Restless leg syndrome    Urinary frequency    Vitamin D deficiency    Fatigue    Moderate episode of recurrent major depressive disorder     Anxiety    Generalized anxiety disorder    Insomnia    Dizziness    Skin lesion of back    Nausea    Incomplete right bundle branch block (RBBB)    Chest pain    Abnormal nuclear stress test       Interpretation Summary         Findings consistent with a normal ECG stress test.    Left ventricular ejection fraction is normal (Calculated EF = 60%).    Myocardial perfusion imaging indicates a normal myocardial perfusion study with no evidence of ischemia.    Impressions are consistent with a low risk study.    Diaphragmatic attenuation artifact is present.    Inferior wall artifact          Plan:            ICD-10-CM ICD-9-CM   1. Incomplete right bundle branch block (RBBB)  I45.10 426.4   2. Abnormal nuclear stress test  R94.39 794.39   3. Intercostal pain  R07.82 786.59   4. Precordial pain  R07.2 786.51     1. Incomplete right bundle branch block (RBBB)    - Case Request Cath Lab: Left Heart Cath  - CBC & Differential; Future  - Basic Metabolic Panel; Future  - aPTT; Future  - Protime-INR; Future    2. Abnormal nuclear stress test  Needs diagnostic heart catheter  - Case Request Cath Lab: Left Heart Cath  - CBC & Differential; Future  - Basic Metabolic Panel; Future  - aPTT; Future  - Protime-INR; Future    3. Intercostal pain  Needs diagnostic heart catheter  - aPTT; Future    4. Precordial pain  Atypical  - Protime-INR; Future      Procedure, risks and options of cardiac cath explained to pt INCLUDING BUT NOT LIMITED TO MI, STROKE, DEATH, INFECTION HAEMORRHAGE, . Pt understands well and agrees with no further questions.'    COUNSELING:    Gely Ash was given to patient for the following topics: diagnostic results, risk factor reductions, impressions, risks and benefits of treatment options and importance of treatment compliance .       SMOKING COUNSELING:        Dictated using Dragon dictation

## 2023-10-12 NOTE — PROGRESS NOTES
Patient was seen at [facility] by . Stress test completed and EKG   Subjective:        Gely Kimble is a 63 y.o. female who here for follow up    CC  Still have chest tightness  Tired and fatigue  HPI  63-year-old complaining of the significant tightness and pressure sensations in the chest associated feeling weak and tired     Problems Addressed this Visit          Cardiac and Vasculature    Incomplete right bundle branch block (RBBB) - Primary    Relevant Orders    Case Request Cath Lab: Left Heart Cath (Completed)    CBC & Differential    Basic Metabolic Panel    aPTT    Protime-INR    Chest pain    Relevant Orders    Protime-INR    aPTT    Abnormal nuclear stress test    Relevant Orders    Case Request Cath Lab: Left Heart Cath (Completed)    CBC & Differential    Basic Metabolic Panel    aPTT    Protime-INR     Diagnoses         Codes Comments    Incomplete right bundle branch block (RBBB)    -  Primary ICD-10-CM: I45.10  ICD-9-CM: 426.4     Abnormal nuclear stress test     ICD-10-CM: R94.39  ICD-9-CM: 794.39     Intercostal pain     ICD-10-CM: R07.82  ICD-9-CM: 786.59     Precordial pain     ICD-10-CM: R07.2  ICD-9-CM: 786.51           .    The following portions of the patient's history were reviewed and updated as appropriate: allergies, current medications, past family history, past medical history, past social history, past surgical history and problem list.    Past Medical History:   Diagnosis Date    Arthritis     COPD (chronic obstructive pulmonary disease)     Elevated cholesterol     Emphysema of lung     Gallbladder abscess     Herpes zoster 08/03/2023    History of positive hepatitis C     previously positive, never treated, negative RNA finding    Hypertension     Myocardial perfusion defect 02/16/2016    Type 2 diabetes mellitus      reports that she quit smoking about 6 years ago. Her smoking use included cigarettes. She smoked an average of 1.5 packs per day. She does not have any smokeless  "tobacco history on file. She reports that she does not drink alcohol and does not use drugs.   Family History   Problem Relation Age of Onset    Diabetes Mother     Heart disease Mother     Diabetes Father     Diabetes Brother     Heart disease Maternal Grandmother     Cancer Paternal Grandfather        Review of Systems  Constitutional: No wt loss, fever, fatigue  Gastrointestinal: No nausea, abdominal pain  Behavioral/Psych: No insomnia or anxiety   Cardiovascular weak and tired  Objective:       Physical Exam           Physical Exam  /72 (BP Location: Right arm, Patient Position: Sitting, Cuff Size: Adult)   Pulse 74   Ht 170.2 cm (67\")   Wt 64.4 kg (142 lb)   SpO2 99%   BMI 22.24 kg/mý     General appearance: No acute changes   Eyes: Sclerae conjunctivae normal, pupils reactive   HENT: Atraumatic; oropharynx clear with moist mucous membranes and no mucosal ulcerations;  Neck: Trachea midline; NECK, supple, no thyromegaly or lymphadenopathy   Lungs: Normal size and shape, normal breath sounds, equal distribution of air, no rales and rhonchi   CV: S1-S2 regular, no murmurs, no rub, no gallop   Abdomen: Soft, nontender; no masses , no abnormal abdominal sounds   Extremities: No deformity , normal color , no peripheral edema   Skin: Normal temperature, turgor and texture; no rash, ulcers  Psych: Appropriate affect, alert and oriented to person, place and time           Procedures      Echocardiogram:        Current Outpatient Medications:     albuterol (PROVENTIL HFA;VENTOLIN HFA) 108 (90 BASE) MCG/ACT inhaler, Inhale 2 puffs Every 4 (Four) Hours As Needed for Wheezing., Disp: , Rfl:     albuterol (PROVENTIL) (2.5 MG/3ML) 0.083% nebulizer solution, Take 2.5 mg by nebulization Every 4 (Four) Hours As Needed for Wheezing., Disp: , Rfl:     aspirin 81 MG chewable tablet, Chew 1 tablet Daily., Disp: , Rfl:     escitalopram (LEXAPRO) 10 MG tablet, TAKE 1 TABLET BY MOUTH DAILY (START WITH HALF TABLET DAILY FOR " ONE WEEK, THEN INCREASE TO WHOLE TABLET DAILY), Disp: 30 tablet, Rfl: 1    Homeopathic Products (LEG CRAMP RELIEF PO), Take 1 tablet by mouth Daily., Disp: , Rfl:     insulin detemir (Levemir FlexPen) 100 UNIT/ML injection, Inject 20 Units under the skin into the appropriate area as directed Every Night., Disp: , Rfl:     ipratropium-albuterol (COMBIVENT RESPIMAT)  MCG/ACT inhaler, Inhale 1 puff 4 (Four) Times a Day As Needed for Wheezing., Disp: , Rfl:     linagliptin (TRADJENTA) 5 MG tablet tablet, Take 1 tablet by mouth Daily., Disp: 30 tablet, Rfl: 2    lisinopril (PRINIVIL,ZESTRIL) 10 MG tablet, Take 0.5 tablets by mouth Daily., Disp: , Rfl:     metFORMIN ER (GLUCOPHAGE-XR) 500 MG 24 hr tablet, Take 2 tablets by mouth 2 (Two) Times a Day With Meals., Disp: 120 tablet, Rfl: 5    Multiple Vitamin (MULTIVITAMIN) tablet, Take 1 tablet by mouth., Disp: , Rfl:     naproxen (NAPROSYN) 500 MG tablet, Take 1 tablet by mouth 2 (Two) Times a Day With Meals., Disp: 60 tablet, Rfl: 5    nitroglycerin (Nitrostat) 0.4 MG SL tablet, Place 1 tablet under the tongue Every 5 (Five) Minutes As Needed for Chest Pain. Take no more than 3 doses in 15 minutes., Disp: 100 tablet, Rfl: 12    omeprazole (priLOSEC) 20 MG capsule, Take 1 capsule by mouth Daily., Disp: 30 capsule, Rfl: 1    pravastatin (PRAVACHOL) 20 MG tablet, Take 1 tablet by mouth Every Night., Disp: 90 tablet, Rfl: 1    vitamin D (ERGOCALCIFEROL) 1.25 MG (97055 UT) capsule capsule, Take 1 capsule by mouth 1 (One) Time Per Week., Disp: , Rfl:    Assessment:        Patient Active Problem List   Diagnosis    Type 2 diabetes mellitus    Pulmonary emphysema    Arthritis    Hypercholesterolemia    Essential hypertension    Abnormal liver function tests    Impingement syndrome of shoulder region    Neck pain    Postmenopause    Restless leg syndrome    Urinary frequency    Vitamin D deficiency    Fatigue    Moderate episode of recurrent major depressive disorder     Anxiety    Generalized anxiety disorder    Insomnia    Dizziness    Skin lesion of back    Nausea    Incomplete right bundle branch block (RBBB)    Chest pain    Abnormal nuclear stress test       Interpretation Summary         Findings consistent with a normal ECG stress test.    Left ventricular ejection fraction is normal (Calculated EF = 60%).    Myocardial perfusion imaging indicates a normal myocardial perfusion study with no evidence of ischemia.    Impressions are consistent with a low risk study.    Diaphragmatic attenuation artifact is present.    Inferior wall artifact          Plan:            ICD-10-CM ICD-9-CM   1. Incomplete right bundle branch block (RBBB)  I45.10 426.4   2. Abnormal nuclear stress test  R94.39 794.39   3. Intercostal pain  R07.82 786.59   4. Precordial pain  R07.2 786.51     1. Incomplete right bundle branch block (RBBB)    - Case Request Cath Lab: Left Heart Cath  - CBC & Differential; Future  - Basic Metabolic Panel; Future  - aPTT; Future  - Protime-INR; Future    2. Abnormal nuclear stress test  Needs diagnostic heart catheter  - Case Request Cath Lab: Left Heart Cath  - CBC & Differential; Future  - Basic Metabolic Panel; Future  - aPTT; Future  - Protime-INR; Future    3. Intercostal pain  Needs diagnostic heart catheter  - aPTT; Future    4. Precordial pain  Atypical  - Protime-INR; Future      Procedure, risks and options of cardiac cath explained to pt INCLUDING BUT NOT LIMITED TO MI, STROKE, DEATH, INFECTION HAEMORRHAGE, . Pt understands well and agrees with no further questions.'    COUNSELING:    Gely Ash was given to patient for the following topics: diagnostic results, risk factor reductions, impressions, risks and benefits of treatment options and importance of treatment compliance .       SMOKING COUNSELING:        Dictated using Dragon dictation

## 2023-10-12 NOTE — H&P (VIEW-ONLY)
Patient was seen at [facility] by . Stress test completed and EKG   Subjective:        Gely Kimble is a 63 y.o. female who here for follow up    CC  Still have chest tightness  Tired and fatigue  HPI  63-year-old complaining of the significant tightness and pressure sensations in the chest associated feeling weak and tired     Problems Addressed this Visit          Cardiac and Vasculature    Incomplete right bundle branch block (RBBB) - Primary    Relevant Orders    Case Request Cath Lab: Left Heart Cath (Completed)    CBC & Differential    Basic Metabolic Panel    aPTT    Protime-INR    Chest pain    Relevant Orders    Protime-INR    aPTT    Abnormal nuclear stress test    Relevant Orders    Case Request Cath Lab: Left Heart Cath (Completed)    CBC & Differential    Basic Metabolic Panel    aPTT    Protime-INR     Diagnoses         Codes Comments    Incomplete right bundle branch block (RBBB)    -  Primary ICD-10-CM: I45.10  ICD-9-CM: 426.4     Abnormal nuclear stress test     ICD-10-CM: R94.39  ICD-9-CM: 794.39     Intercostal pain     ICD-10-CM: R07.82  ICD-9-CM: 786.59     Precordial pain     ICD-10-CM: R07.2  ICD-9-CM: 786.51           .    The following portions of the patient's history were reviewed and updated as appropriate: allergies, current medications, past family history, past medical history, past social history, past surgical history and problem list.    Past Medical History:   Diagnosis Date    Arthritis     COPD (chronic obstructive pulmonary disease)     Elevated cholesterol     Emphysema of lung     Gallbladder abscess     Herpes zoster 08/03/2023    History of positive hepatitis C     previously positive, never treated, negative RNA finding    Hypertension     Myocardial perfusion defect 02/16/2016    Type 2 diabetes mellitus      reports that she quit smoking about 6 years ago. Her smoking use included cigarettes. She smoked an average of 1.5 packs per day. She does not have any smokeless  "tobacco history on file. She reports that she does not drink alcohol and does not use drugs.   Family History   Problem Relation Age of Onset    Diabetes Mother     Heart disease Mother     Diabetes Father     Diabetes Brother     Heart disease Maternal Grandmother     Cancer Paternal Grandfather        Review of Systems  Constitutional: No wt loss, fever, fatigue  Gastrointestinal: No nausea, abdominal pain  Behavioral/Psych: No insomnia or anxiety   Cardiovascular weak and tired  Objective:       Physical Exam           Physical Exam  /72 (BP Location: Right arm, Patient Position: Sitting, Cuff Size: Adult)   Pulse 74   Ht 170.2 cm (67\")   Wt 64.4 kg (142 lb)   SpO2 99%   BMI 22.24 kg/m²     General appearance: No acute changes   Eyes: Sclerae conjunctivae normal, pupils reactive   HENT: Atraumatic; oropharynx clear with moist mucous membranes and no mucosal ulcerations;  Neck: Trachea midline; NECK, supple, no thyromegaly or lymphadenopathy   Lungs: Normal size and shape, normal breath sounds, equal distribution of air, no rales and rhonchi   CV: S1-S2 regular, no murmurs, no rub, no gallop   Abdomen: Soft, nontender; no masses , no abnormal abdominal sounds   Extremities: No deformity , normal color , no peripheral edema   Skin: Normal temperature, turgor and texture; no rash, ulcers  Psych: Appropriate affect, alert and oriented to person, place and time           Procedures      Echocardiogram:        Current Outpatient Medications:     albuterol (PROVENTIL HFA;VENTOLIN HFA) 108 (90 BASE) MCG/ACT inhaler, Inhale 2 puffs Every 4 (Four) Hours As Needed for Wheezing., Disp: , Rfl:     albuterol (PROVENTIL) (2.5 MG/3ML) 0.083% nebulizer solution, Take 2.5 mg by nebulization Every 4 (Four) Hours As Needed for Wheezing., Disp: , Rfl:     aspirin 81 MG chewable tablet, Chew 1 tablet Daily., Disp: , Rfl:     escitalopram (LEXAPRO) 10 MG tablet, TAKE 1 TABLET BY MOUTH DAILY (START WITH HALF TABLET DAILY FOR " ONE WEEK, THEN INCREASE TO WHOLE TABLET DAILY), Disp: 30 tablet, Rfl: 1    Homeopathic Products (LEG CRAMP RELIEF PO), Take 1 tablet by mouth Daily., Disp: , Rfl:     insulin detemir (Levemir FlexPen) 100 UNIT/ML injection, Inject 20 Units under the skin into the appropriate area as directed Every Night., Disp: , Rfl:     ipratropium-albuterol (COMBIVENT RESPIMAT)  MCG/ACT inhaler, Inhale 1 puff 4 (Four) Times a Day As Needed for Wheezing., Disp: , Rfl:     linagliptin (TRADJENTA) 5 MG tablet tablet, Take 1 tablet by mouth Daily., Disp: 30 tablet, Rfl: 2    lisinopril (PRINIVIL,ZESTRIL) 10 MG tablet, Take 0.5 tablets by mouth Daily., Disp: , Rfl:     metFORMIN ER (GLUCOPHAGE-XR) 500 MG 24 hr tablet, Take 2 tablets by mouth 2 (Two) Times a Day With Meals., Disp: 120 tablet, Rfl: 5    Multiple Vitamin (MULTIVITAMIN) tablet, Take 1 tablet by mouth., Disp: , Rfl:     naproxen (NAPROSYN) 500 MG tablet, Take 1 tablet by mouth 2 (Two) Times a Day With Meals., Disp: 60 tablet, Rfl: 5    nitroglycerin (Nitrostat) 0.4 MG SL tablet, Place 1 tablet under the tongue Every 5 (Five) Minutes As Needed for Chest Pain. Take no more than 3 doses in 15 minutes., Disp: 100 tablet, Rfl: 12    omeprazole (priLOSEC) 20 MG capsule, Take 1 capsule by mouth Daily., Disp: 30 capsule, Rfl: 1    pravastatin (PRAVACHOL) 20 MG tablet, Take 1 tablet by mouth Every Night., Disp: 90 tablet, Rfl: 1    vitamin D (ERGOCALCIFEROL) 1.25 MG (28431 UT) capsule capsule, Take 1 capsule by mouth 1 (One) Time Per Week., Disp: , Rfl:    Assessment:        Patient Active Problem List   Diagnosis    Type 2 diabetes mellitus    Pulmonary emphysema    Arthritis    Hypercholesterolemia    Essential hypertension    Abnormal liver function tests    Impingement syndrome of shoulder region    Neck pain    Postmenopause    Restless leg syndrome    Urinary frequency    Vitamin D deficiency    Fatigue    Moderate episode of recurrent major depressive disorder     Anxiety    Generalized anxiety disorder    Insomnia    Dizziness    Skin lesion of back    Nausea    Incomplete right bundle branch block (RBBB)    Chest pain    Abnormal nuclear stress test       Interpretation Summary         Findings consistent with a normal ECG stress test.    Left ventricular ejection fraction is normal (Calculated EF = 60%).    Myocardial perfusion imaging indicates a normal myocardial perfusion study with no evidence of ischemia.    Impressions are consistent with a low risk study.    Diaphragmatic attenuation artifact is present.    Inferior wall artifact          Plan:            ICD-10-CM ICD-9-CM   1. Incomplete right bundle branch block (RBBB)  I45.10 426.4   2. Abnormal nuclear stress test  R94.39 794.39   3. Intercostal pain  R07.82 786.59   4. Precordial pain  R07.2 786.51     1. Incomplete right bundle branch block (RBBB)    - Case Request Cath Lab: Left Heart Cath  - CBC & Differential; Future  - Basic Metabolic Panel; Future  - aPTT; Future  - Protime-INR; Future    2. Abnormal nuclear stress test  Needs diagnostic heart catheter  - Case Request Cath Lab: Left Heart Cath  - CBC & Differential; Future  - Basic Metabolic Panel; Future  - aPTT; Future  - Protime-INR; Future    3. Intercostal pain  Needs diagnostic heart catheter  - aPTT; Future    4. Precordial pain  Atypical  - Protime-INR; Future      Procedure, risks and options of cardiac cath explained to pt INCLUDING BUT NOT LIMITED TO MI, STROKE, DEATH, INFECTION HAEMORRHAGE, . Pt understands well and agrees with no further questions.'    COUNSELING:    Gely Ash was given to patient for the following topics: diagnostic results, risk factor reductions, impressions, risks and benefits of treatment options and importance of treatment compliance .       SMOKING COUNSELING:        Dictated using Dragon dictation

## 2023-10-16 ENCOUNTER — PATIENT ROUNDING (BHMG ONLY) (OUTPATIENT)
Dept: CARDIOLOGY | Facility: CLINIC | Age: 63
End: 2023-10-16
Payer: MEDICARE

## 2023-10-17 NOTE — PROGRESS NOTES
October 16, 2023    Tell me about your visit with us. What things went well?         We're always looking for ways to make our patients' experiences even better. Do you have recommendations on ways we may improve?      Overall were you satisfied with your first visit to our practice?        I appreciate you taking the time to speak with me today. Is there anything else I can do for you?       Thank you, and have a great day.

## 2023-10-18 ENCOUNTER — TELEPHONE (OUTPATIENT)
Dept: FAMILY MEDICINE CLINIC | Facility: CLINIC | Age: 63
End: 2023-10-18

## 2023-10-18 DIAGNOSIS — Z79.4 TYPE 2 DIABETES MELLITUS WITH DIABETIC POLYNEUROPATHY, WITH LONG-TERM CURRENT USE OF INSULIN: Primary | ICD-10-CM

## 2023-10-18 DIAGNOSIS — E11.42 TYPE 2 DIABETES MELLITUS WITH DIABETIC POLYNEUROPATHY, WITH LONG-TERM CURRENT USE OF INSULIN: Primary | ICD-10-CM

## 2023-10-18 RX ORDER — CALCIUM CARB/VITAMIN D3/VIT K1 500-100-40
1 TABLET,CHEWABLE ORAL DAILY
Qty: 100 EACH | Refills: 2 | Status: SHIPPED | OUTPATIENT
Start: 2023-10-18

## 2023-10-18 RX ORDER — INSULIN DETEMIR 100 [IU]/ML
20 INJECTION, SOLUTION SUBCUTANEOUS NIGHTLY
Start: 2023-10-18

## 2023-10-18 NOTE — TELEPHONE ENCOUNTER
Caller: Gely Kimble    Relationship: Self    Best call back number:   [phone]    What medication are you requesting:  BD INSULIN [id number] UNIT    Have you had these symptoms before:    [x] Yes  [] No    Have you been treated for these symptoms before:   [x] Yes  [] No    If a prescription is needed, what is your preferred pharmacy and phone number: [id number] - [facility] [address] [facility] [phone] [facility] [phone] FX     Additional notes:PATIENT STATED WESTLEY HAS REQUESTED THIS REFILL SEVERAL TIMES.      PATIENT STATED THE LEVEMIR FLEXPEN WAS TOO EXPENSIVE AND HAS BEEN USING THE ABOVE FOR MANY YEARS.  PATIENTS INSURANCE PAYS FOR THE BD INSULIN SYRINGES IN FULL.    PATIENT HAS ONLY TWO SYRINGES LEFT EACH PACKAGES HAS 10 SYRINGES AND EACH SYRINGE  UNITS.  PATIENT USES ONE SYRINGE PER DAY AND HAS TO REQUEST A REFILL EVERY 10 DAYS.

## 2023-10-18 NOTE — TELEPHONE ENCOUNTER
Patient just needs the syringes and needles to give it to herself she has plenty of insulin which is in a vial   Please advise and ask me if needed

## 2023-10-18 NOTE — TELEPHONE ENCOUNTER
Spoke with patient over the phone; clarified size of syringes needed; sent refill of insulin syringes as requested.

## 2023-10-20 ENCOUNTER — HOSPITAL ENCOUNTER (OUTPATIENT)
Dept: CARDIOLOGY | Facility: HOSPITAL | Age: 63
Discharge: HOME | End: 2023-10-20
Payer: MEDICARE

## 2023-10-20 DIAGNOSIS — R07.82 INTERCOSTAL PAIN: ICD-10-CM

## 2023-10-20 DIAGNOSIS — R07.2 PRECORDIAL PAIN: ICD-10-CM

## 2023-10-20 DIAGNOSIS — R94.39 ABNORMAL NUCLEAR STRESS TEST: ICD-10-CM

## 2023-10-20 DIAGNOSIS — I45.10 INCOMPLETE RIGHT BUNDLE BRANCH BLOCK (RBBB): ICD-10-CM

## 2023-10-20 LAB
ANION GAP SERPL CALCULATED.3IONS-SCNC: 7.6 MMOL/L (ref 5–15)
APTT PPP: 29.2 SECONDS (ref 22.7–35.4)
BASOPHILS # BLD AUTO: 0.07 10*3/MM3 (ref 0–0.2)
BASOPHILS NFR BLD AUTO: 1.2 % (ref 0–1.5)
BUN SERPL-MCNC: 11 MG/DL (ref 8–23)
BUN/CREAT SERPL: 25 (ref 7–25)
CALCIUM SPEC-SCNC: 9.3 MG/DL (ref 8.6–10.5)
CHLORIDE SERPL-SCNC: 106 MMOL/L (ref 98–107)
CO2 SERPL-SCNC: 30.4 MMOL/L (ref 22–29)
CREAT SERPL-MCNC: 0.44 MG/DL (ref 0.57–1)
DEPRECATED RDW RBC AUTO: 45.1 FL (ref 37–54)
EGFRCR SERPLBLD CKD-EPI 2021: 107.5 ML/MIN/1.73
EOSINOPHIL # BLD AUTO: 0.11 10*3/MM3 (ref 0–0.4)
EOSINOPHIL NFR BLD AUTO: 1.9 % (ref 0.3–6.2)
ERYTHROCYTE [DISTWIDTH] IN BLOOD BY AUTOMATED COUNT: 14.3 % (ref 12.3–15.4)
GLUCOSE SERPL-MCNC: 165 MG/DL (ref 65–99)
HCT VFR BLD AUTO: 37.7 % (ref 34–46.6)
HGB BLD-MCNC: 13.4 G/DL (ref 12–15.9)
IMM GRANULOCYTES # BLD AUTO: 0.02 10*3/MM3 (ref 0–0.05)
IMM GRANULOCYTES NFR BLD AUTO: 0.3 % (ref 0–0.5)
INR PPP: 0.97 (ref 0.9–1.1)
LYMPHOCYTES # BLD AUTO: 1.89 10*3/MM3 (ref 0.7–3.1)
LYMPHOCYTES NFR BLD AUTO: 32.5 % (ref 19.6–45.3)
MCH RBC QN AUTO: 31.5 PG (ref 26.6–33)
MCHC RBC AUTO-ENTMCNC: 35.5 G/DL (ref 31.5–35.7)
MCV RBC AUTO: 88.5 FL (ref 79–97)
MONOCYTES # BLD AUTO: 0.36 10*3/MM3 (ref 0.1–0.9)
MONOCYTES NFR BLD AUTO: 6.2 % (ref 5–12)
NEUTROPHILS NFR BLD AUTO: 3.36 10*3/MM3 (ref 1.7–7)
NEUTROPHILS NFR BLD AUTO: 57.9 % (ref 42.7–76)
NRBC BLD AUTO-RTO: 0.2 /100 WBC (ref 0–0.2)
PLATELET # BLD AUTO: 229 10*3/MM3 (ref 140–450)
PMV BLD AUTO: 12.2 FL (ref 6–12)
POTASSIUM SERPL-SCNC: 4.6 MMOL/L (ref 3.5–5.2)
PROTHROMBIN TIME: 13 SECONDS (ref 11.7–14.2)
RBC # BLD AUTO: 4.26 10*6/MM3 (ref 3.77–5.28)
SODIUM SERPL-SCNC: 144 MMOL/L (ref 136–145)
WBC NRBC COR # BLD: 5.81 10*3/MM3 (ref 3.4–10.8)

## 2023-10-20 PROCEDURE — 85730 THROMBOPLASTIN TIME PARTIAL: CPT | Performed by: INTERNAL MEDICINE

## 2023-10-20 PROCEDURE — 85025 COMPLETE CBC W/AUTO DIFF WBC: CPT | Performed by: INTERNAL MEDICINE

## 2023-10-20 PROCEDURE — 80048 BASIC METABOLIC PNL TOTAL CA: CPT | Performed by: INTERNAL MEDICINE

## 2023-10-20 PROCEDURE — 36415 COLL VENOUS BLD VENIPUNCTURE: CPT | Performed by: INTERNAL MEDICINE

## 2023-10-20 PROCEDURE — 85610 PROTHROMBIN TIME: CPT | Performed by: INTERNAL MEDICINE

## 2023-10-25 ENCOUNTER — HOSPITAL ENCOUNTER (OUTPATIENT)
Facility: HOSPITAL | Age: 63
Setting detail: HOSPITAL OUTPATIENT SURGERY
Discharge: HOME | End: 2023-10-25
Attending: INTERNAL MEDICINE | Admitting: INTERNAL MEDICINE
Payer: MEDICARE

## 2023-10-25 ENCOUNTER — TELEPHONE (OUTPATIENT)
Dept: CARDIOLOGY | Facility: CLINIC | Age: 63
End: 2023-10-25
Payer: MEDICARE

## 2023-10-25 VITALS
OXYGEN SATURATION: 96 % | SYSTOLIC BLOOD PRESSURE: 131 MMHG | HEART RATE: 79 BPM | RESPIRATION RATE: 16 BRPM | TEMPERATURE: 98 F | BODY MASS INDEX: 22.6 KG/M2 | DIASTOLIC BLOOD PRESSURE: 75 MMHG | WEIGHT: 144 LBS | HEIGHT: 67 IN

## 2023-10-25 DIAGNOSIS — I45.10 INCOMPLETE RIGHT BUNDLE BRANCH BLOCK (RBBB): ICD-10-CM

## 2023-10-25 DIAGNOSIS — Z79.4 TYPE 2 DIABETES MELLITUS WITH DIABETIC POLYNEUROPATHY, WITH LONG-TERM CURRENT USE OF INSULIN: ICD-10-CM

## 2023-10-25 DIAGNOSIS — I25.118 CORONARY ARTERY DISEASE OF NATIVE ARTERY OF NATIVE HEART WITH STABLE ANGINA PECTORIS: Primary | ICD-10-CM

## 2023-10-25 DIAGNOSIS — R94.39 ABNORMAL NUCLEAR STRESS TEST: ICD-10-CM

## 2023-10-25 DIAGNOSIS — E11.42 TYPE 2 DIABETES MELLITUS WITH DIABETIC POLYNEUROPATHY, WITH LONG-TERM CURRENT USE OF INSULIN: ICD-10-CM

## 2023-10-25 LAB
GLUCOSE BLDC GLUCOMTR-MCNC: 124 MG/DL (ref 70–130)
GLUCOSE BLDC GLUCOMTR-MCNC: 132 MG/DL (ref 70–130)
QT INTERVAL: 387 MS
QTC INTERVAL: 458 MS

## 2023-10-25 PROCEDURE — C1769 GUIDE WIRE: HCPCS | Performed by: INTERNAL MEDICINE

## 2023-10-25 PROCEDURE — 93458 L HRT ARTERY/VENTRICLE ANGIO: CPT | Performed by: INTERNAL MEDICINE

## 2023-10-25 PROCEDURE — C1725 CATH, TRANSLUMIN NON-LASER: HCPCS | Performed by: INTERNAL MEDICINE

## 2023-10-25 PROCEDURE — 93010 ELECTROCARDIOGRAM REPORT: CPT | Performed by: INTERNAL MEDICINE

## 2023-10-25 PROCEDURE — C9600 PERC DRUG-EL COR STENT SING: HCPCS | Performed by: INTERNAL MEDICINE

## 2023-10-25 PROCEDURE — 25010000002 MIDAZOLAM PER 1 MG: Performed by: INTERNAL MEDICINE

## 2023-10-25 PROCEDURE — 25810000003 SODIUM CHLORIDE 0.9 % SOLUTION: Performed by: INTERNAL MEDICINE

## 2023-10-25 PROCEDURE — 25010000002 HEPARIN (PORCINE) PER 1000 UNITS: Performed by: INTERNAL MEDICINE

## 2023-10-25 PROCEDURE — C1894 INTRO/SHEATH, NON-LASER: HCPCS | Performed by: INTERNAL MEDICINE

## 2023-10-25 PROCEDURE — 92928 PRQ TCAT PLMT NTRAC ST 1 LES: CPT | Performed by: INTERNAL MEDICINE

## 2023-10-25 PROCEDURE — C1887 CATHETER, GUIDING: HCPCS | Performed by: INTERNAL MEDICINE

## 2023-10-25 PROCEDURE — 25510000001 IOPAMIDOL PER 1 ML: Performed by: INTERNAL MEDICINE

## 2023-10-25 PROCEDURE — 25010000002 FENTANYL CITRATE (PF) 50 MCG/ML SOLUTION: Performed by: INTERNAL MEDICINE

## 2023-10-25 PROCEDURE — 85347 COAGULATION TIME ACTIVATED: CPT

## 2023-10-25 PROCEDURE — 92921: CPT | Performed by: INTERNAL MEDICINE

## 2023-10-25 PROCEDURE — 92921 PR PRQ TRLUML CORONARY ANGIOPLASTY ADDL BRANCH: CPT | Performed by: INTERNAL MEDICINE

## 2023-10-25 PROCEDURE — 93005 ELECTROCARDIOGRAM TRACING: CPT | Performed by: INTERNAL MEDICINE

## 2023-10-25 PROCEDURE — C1874 STENT, COATED/COV W/DEL SYS: HCPCS | Performed by: INTERNAL MEDICINE

## 2023-10-25 PROCEDURE — 82948 REAGENT STRIP/BLOOD GLUCOSE: CPT

## 2023-10-25 DEVICE — XIENCE SKYPOINT™ EVEROLIMUS ELUTING CORONARY STENT SYSTEM 3.50 MM X 18 MM / RAPID-EXCHANGE
Type: IMPLANTABLE DEVICE | Site: CORONARY | Status: FUNCTIONAL
Brand: XIENCE SKYPOINT™

## 2023-10-25 RX ORDER — MIDAZOLAM HYDROCHLORIDE 1 MG/ML
INJECTION INTRAMUSCULAR; INTRAVENOUS
Status: DISCONTINUED | OUTPATIENT
Start: 2023-10-25 | End: 2023-10-25 | Stop reason: HOSPADM

## 2023-10-25 RX ORDER — ASPIRIN 81 MG/1
TABLET, CHEWABLE ORAL
Status: DISCONTINUED | OUTPATIENT
Start: 2023-10-25 | End: 2023-10-25 | Stop reason: HOSPADM

## 2023-10-25 RX ORDER — VERAPAMIL HYDROCHLORIDE 2.5 MG/ML
INJECTION, SOLUTION INTRAVENOUS
Status: DISCONTINUED | OUTPATIENT
Start: 2023-10-25 | End: 2023-10-25 | Stop reason: HOSPADM

## 2023-10-25 RX ORDER — LIDOCAINE HYDROCHLORIDE 20 MG/ML
INJECTION, SOLUTION INFILTRATION; PERINEURAL
Status: DISCONTINUED | OUTPATIENT
Start: 2023-10-25 | End: 2023-10-25 | Stop reason: HOSPADM

## 2023-10-25 RX ORDER — SODIUM CHLORIDE 0.9 % (FLUSH) 0.9 %
10 SYRINGE (ML) INJECTION AS NEEDED
Status: DISCONTINUED | OUTPATIENT
Start: 2023-10-25 | End: 2023-10-25 | Stop reason: HOSPADM

## 2023-10-25 RX ORDER — ACETAMINOPHEN 325 MG/1
650 TABLET ORAL EVERY 4 HOURS PRN
Status: DISCONTINUED | OUTPATIENT
Start: 2023-10-25 | End: 2023-10-25 | Stop reason: HOSPADM

## 2023-10-25 RX ORDER — METFORMIN HYDROCHLORIDE 500 MG/1
1000 TABLET, EXTENDED RELEASE ORAL 2 TIMES DAILY WITH MEALS
Start: 2023-10-28

## 2023-10-25 RX ORDER — SODIUM CHLORIDE 0.9 % (FLUSH) 0.9 %
10 SYRINGE (ML) INJECTION EVERY 12 HOURS SCHEDULED
Status: DISCONTINUED | OUTPATIENT
Start: 2023-10-25 | End: 2023-10-25 | Stop reason: HOSPADM

## 2023-10-25 RX ORDER — SODIUM CHLORIDE 9 MG/ML
75 INJECTION, SOLUTION INTRAVENOUS CONTINUOUS
Status: DISCONTINUED | OUTPATIENT
Start: 2023-10-25 | End: 2023-10-25 | Stop reason: HOSPADM

## 2023-10-25 RX ORDER — FENTANYL CITRATE 50 UG/ML
INJECTION, SOLUTION INTRAMUSCULAR; INTRAVENOUS
Status: DISCONTINUED | OUTPATIENT
Start: 2023-10-25 | End: 2023-10-25 | Stop reason: HOSPADM

## 2023-10-25 RX ORDER — NITROGLYCERIN 0.4 MG/1
0.4 TABLET SUBLINGUAL
Status: DISCONTINUED | OUTPATIENT
Start: 2023-10-25 | End: 2023-10-25 | Stop reason: HOSPADM

## 2023-10-25 RX ORDER — HEPARIN SODIUM 1000 [USP'U]/ML
INJECTION, SOLUTION INTRAVENOUS; SUBCUTANEOUS
Status: DISCONTINUED | OUTPATIENT
Start: 2023-10-25 | End: 2023-10-25 | Stop reason: HOSPADM

## 2023-10-25 RX ADMIN — SODIUM CHLORIDE 75 ML/HR: 9 INJECTION, SOLUTION INTRAVENOUS at 08:05

## 2023-10-25 RX ADMIN — NITROGLYCERIN 0.4 MG: 0.4 TABLET SUBLINGUAL at 12:44

## 2023-10-25 RX ADMIN — NITROGLYCERIN 0.4 MG: 0.4 TABLET SUBLINGUAL at 12:30

## 2023-10-25 RX ADMIN — NITROGLYCERIN 0.4 MG: 0.4 TABLET SUBLINGUAL at 12:36

## 2023-10-25 NOTE — Clinical Note
First balloon inflation max pressure = 4 temi. First balloon inflation duration = 6 seconds. Second inflation of balloon - Max pressure = 4 temi. 2nd Inflation of balloon - Duration = 6 seconds.

## 2023-10-25 NOTE — SIGNIFICANT NOTE
Dr. Dinero in to speak to pt/friend, noted EKG just performed. No further orders rec'ed. Pt soa/cp improving dramatically

## 2023-10-25 NOTE — Clinical Note
First balloon inflation max pressure = 12 temi. First balloon inflation duration = 6 seconds. Second inflation of balloon - Max pressure = 12 temi. 2nd Inflation of balloon - Duration = 7 seconds.

## 2023-10-25 NOTE — Clinical Note
First balloon inflation max pressure = 8 temi. First balloon inflation duration = 8 seconds. Second inflation of balloon - Max pressure = 6 temi. 2nd Inflation of balloon - Duration = 6 seconds. Third inflation of balloon - Max pressure = 4 temi. 3rd Inflation of balloon - Duration = 7 seconds.

## 2023-10-25 NOTE — DISCHARGE INSTRUCTIONS
[facility]  [address] [place] [address]    Coronary Angioplasty with or without  Stent (Radial Approach) After Care    Refer to this sheet in the next few weeks. These instructions provide you with information on caring for yourself after your procedure. Your health care provider may also give you more specific instructions. Your treatment has been planned according to current medical practices, but problems sometimes occur. Call your health care provider if you have any problems or questions after your procedure.       Home Care Instructions:  You may shower the day after the procedure. Remove the bandage (dressing) and gently wash the site with plain soap and water. Gently pat the site dry. You may apply a band aid daily for 2 days if desired.    Do not apply powder or lotion to the site.  Do not submerge the affected site in water for 3 to 5 days or until the site is completely healed.   Do not flex or bend at the wrist with affected arm for 24 hours.  Do not lift, push or pull anything over 5 pounds for 5 days after your procedure or as directed by your physician.  For a reference, a gallon of milk weighs 8 pounds.    Do not operate machinery or power tools for 24 hours.  Inspect the site at least twice daily. You may notice some bruising at the site and it may be tender for 1 to 2 weeks.     Increase your fluid intake for the next 2 days.    Keep arm elevated for 24 hours.  For the remainder of the day, keep your arm in the “Pledge of Allegiance” position when up and about.    Limit your activity for the next 48 hours and avoid strenuous activity as directed by your physician.   Cardiac Rehab may or may not be ordered.  Please consult with your physician  You may drive 24 hours after the procedure unless otherwise instructed by your caregiver.  A responsible adult should be with you for the first 24 hours after you arrive home.   Do not make any important legal decisions or sign legal  papers for 24 hours. Do not drink alcohol for 24 hours.    Take medicines only as directed by your health care provider.  Blood thinners may be prescribed after your procedure to improve blood flow through the stent.    Metformin or any medications containing Metformin should not be taken for 48 hours after your procedure.    Eat a heart-healthy diet. This should include plenty of fresh fruits and vegetables. Meat should be lean cuts. Avoid the following types of food:    Food that is high in salt.    Canned or highly processed food.    Food that is high in saturated fat or sugar.    Fried food.    Make any other lifestyle changes recommended by your health care provider. This may include:    Not using any tobacco products including cigarettes, chewing tobacco, or electronic cigarettes.   Managing your weight.    Getting regular exercise.    Managing your blood pressure.    Limiting your alcohol intake.    Managing other health problems, such as diabetes.    If you need an MRI after your heart stent was placed, be sure to tell the health care provider who orders the MRI that you have a heart stent.    Keep all follow-up visits as directed by your health care provider.      Call Your Doctor If:    You have unusual pain at the radial/ulnar (wrist) site.  You have redness, warmth, swelling, or pain at the radial/ulnar (wrist) site.  You have drainage (other than a small amount of blood on the dressing).  `You have chills or a fever > 101.  Your arm becomes pale or dark, cool, tingly, or numb.  You develop chest pain, shortness of breath, feel faint or pass out.    You have heavy bleeding from the site.  If you do, hold pressure on the site for 20 minutes.  If the bleeding stops, apply a fresh bandage and call your cardiologist.  However, if you continue to have bleeding, maintain pressure and call 911.    You have any symptoms of a stroke.  Remember BE FAST  B-balance. Sudden trouble walking or loss of  balance.  E-eyes.  Sudden changes in how you see or a sudden onset of a very bad headache.   F-face. Sudden weakness or loss of feeling of the face or facial droop on one side.   A-arms Sudden weakness or numbness in one arm. One arm drifts down if they are both held out in front of you. This happens suddenly and usually on one side of the body.   S-speech.  Sudden trouble speaking, slurred speech or trouble understanding what people are saying.   T-time  Time to call emergency services.  Write down the symptoms and the time they started.

## 2023-10-25 NOTE — Clinical Note
Hemostasis started on the right brachial artery. R-Band was used in achieving hemostasis. Radial compression device applied to vessel. Hemostasis achieved successfully. Closure device additional comment: 12cc in the band

## 2023-10-27 LAB — ACT BLD: 341 SECONDS (ref 82–152)

## 2023-11-01 ENCOUNTER — TRANSCRIBE ORDERS (OUTPATIENT)
Dept: CARDIAC REHAB | Facility: HOSPITAL | Age: 63
End: 2023-11-01
Payer: MEDICARE

## 2023-11-01 DIAGNOSIS — Z95.5 STATUS POST INSERTION OF DRUG-ELUTING STENT INTO LEFT ANTERIOR DESCENDING (LAD) ARTERY FOR CORONARY ARTERY DISEASE: Primary | ICD-10-CM

## 2023-11-03 ENCOUNTER — HOSPITAL ENCOUNTER (OUTPATIENT)
Facility: HOSPITAL | Age: 63
Setting detail: HOSPITAL OUTPATIENT SURGERY
Discharge: HOME | End: 2023-11-03
Attending: INTERNAL MEDICINE | Admitting: INTERNAL MEDICINE
Payer: MEDICARE

## 2023-11-03 VITALS
RESPIRATION RATE: 16 BRPM | TEMPERATURE: 97.2 F | BODY MASS INDEX: 22.13 KG/M2 | OXYGEN SATURATION: 94 % | WEIGHT: 141 LBS | DIASTOLIC BLOOD PRESSURE: 69 MMHG | HEART RATE: 72 BPM | SYSTOLIC BLOOD PRESSURE: 136 MMHG | HEIGHT: 67 IN

## 2023-11-03 DIAGNOSIS — Z79.4 TYPE 2 DIABETES MELLITUS WITH DIABETIC POLYNEUROPATHY, WITH LONG-TERM CURRENT USE OF INSULIN: ICD-10-CM

## 2023-11-03 DIAGNOSIS — I25.118 CORONARY ARTERY DISEASE OF NATIVE ARTERY OF NATIVE HEART WITH STABLE ANGINA PECTORIS: ICD-10-CM

## 2023-11-03 DIAGNOSIS — R94.39 ABNORMAL NUCLEAR STRESS TEST: Primary | ICD-10-CM

## 2023-11-03 DIAGNOSIS — E11.42 TYPE 2 DIABETES MELLITUS WITH DIABETIC POLYNEUROPATHY, WITH LONG-TERM CURRENT USE OF INSULIN: ICD-10-CM

## 2023-11-03 LAB — GLUCOSE BLDC GLUCOMTR-MCNC: 125 MG/DL (ref 70–130)

## 2023-11-03 PROCEDURE — C1894 INTRO/SHEATH, NON-LASER: HCPCS | Performed by: INTERNAL MEDICINE

## 2023-11-03 PROCEDURE — 25010000002 MIDAZOLAM PER 1 MG: Performed by: INTERNAL MEDICINE

## 2023-11-03 PROCEDURE — C1769 GUIDE WIRE: HCPCS | Performed by: INTERNAL MEDICINE

## 2023-11-03 PROCEDURE — 25010000002 HEPARIN (PORCINE) PER 1000 UNITS: Performed by: INTERNAL MEDICINE

## 2023-11-03 PROCEDURE — 25010000002 FENTANYL CITRATE (PF) 50 MCG/ML SOLUTION: Performed by: INTERNAL MEDICINE

## 2023-11-03 PROCEDURE — C1725 CATH, TRANSLUMIN NON-LASER: HCPCS | Performed by: INTERNAL MEDICINE

## 2023-11-03 PROCEDURE — 85347 COAGULATION TIME ACTIVATED: CPT

## 2023-11-03 PROCEDURE — 25510000001 IOPAMIDOL PER 1 ML: Performed by: INTERNAL MEDICINE

## 2023-11-03 PROCEDURE — C1887 CATHETER, GUIDING: HCPCS | Performed by: INTERNAL MEDICINE

## 2023-11-03 PROCEDURE — 82948 REAGENT STRIP/BLOOD GLUCOSE: CPT

## 2023-11-03 PROCEDURE — C9600 PERC DRUG-EL COR STENT SING: HCPCS | Performed by: INTERNAL MEDICINE

## 2023-11-03 PROCEDURE — 92928 PRQ TCAT PLMT NTRAC ST 1 LES: CPT | Performed by: INTERNAL MEDICINE

## 2023-11-03 PROCEDURE — C1874 STENT, COATED/COV W/DEL SYS: HCPCS | Performed by: INTERNAL MEDICINE

## 2023-11-03 PROCEDURE — 25810000003 SODIUM CHLORIDE 0.9 % SOLUTION: Performed by: INTERNAL MEDICINE

## 2023-11-03 DEVICE — XIENCE SKYPOINT™ EVEROLIMUS ELUTING CORONARY STENT SYSTEM 2.50 MM X 12 MM / RAPID-EXCHANGE
Type: IMPLANTABLE DEVICE | Site: CORONARY | Status: FUNCTIONAL
Brand: XIENCE SKYPOINT™

## 2023-11-03 RX ORDER — ACETAMINOPHEN 325 MG/1
650 TABLET ORAL EVERY 6 HOURS PRN
Status: DISCONTINUED | OUTPATIENT
Start: 2023-11-03 | End: 2023-11-03 | Stop reason: HOSPADM

## 2023-11-03 RX ORDER — SODIUM CHLORIDE 0.9 % (FLUSH) 0.9 %
10 SYRINGE (ML) INJECTION EVERY 12 HOURS SCHEDULED
Status: DISCONTINUED | OUTPATIENT
Start: 2023-11-03 | End: 2023-11-03 | Stop reason: HOSPADM

## 2023-11-03 RX ORDER — HEPARIN SODIUM 1000 [USP'U]/ML
INJECTION, SOLUTION INTRAVENOUS; SUBCUTANEOUS
Status: DISCONTINUED | OUTPATIENT
Start: 2023-11-03 | End: 2023-11-03 | Stop reason: HOSPADM

## 2023-11-03 RX ORDER — ACETAMINOPHEN 325 MG/1
650 TABLET ORAL EVERY 4 HOURS PRN
Status: DISCONTINUED | OUTPATIENT
Start: 2023-11-03 | End: 2023-11-03 | Stop reason: HOSPADM

## 2023-11-03 RX ORDER — NITROGLYCERIN 0.4 MG/1
0.4 TABLET SUBLINGUAL
Status: DISCONTINUED | OUTPATIENT
Start: 2023-11-03 | End: 2023-11-03 | Stop reason: HOSPADM

## 2023-11-03 RX ORDER — SODIUM CHLORIDE 0.9 % (FLUSH) 0.9 %
10 SYRINGE (ML) INJECTION AS NEEDED
Status: DISCONTINUED | OUTPATIENT
Start: 2023-11-03 | End: 2023-11-03 | Stop reason: HOSPADM

## 2023-11-03 RX ORDER — ASPIRIN 81 MG/1
TABLET, CHEWABLE ORAL
Status: DISCONTINUED | OUTPATIENT
Start: 2023-11-03 | End: 2023-11-03 | Stop reason: HOSPADM

## 2023-11-03 RX ORDER — LIDOCAINE HYDROCHLORIDE 20 MG/ML
INJECTION, SOLUTION INFILTRATION; PERINEURAL
Status: DISCONTINUED | OUTPATIENT
Start: 2023-11-03 | End: 2023-11-03 | Stop reason: HOSPADM

## 2023-11-03 RX ORDER — FENTANYL CITRATE 50 UG/ML
INJECTION, SOLUTION INTRAMUSCULAR; INTRAVENOUS
Status: DISCONTINUED | OUTPATIENT
Start: 2023-11-03 | End: 2023-11-03 | Stop reason: HOSPADM

## 2023-11-03 RX ORDER — SODIUM CHLORIDE 9 MG/ML
40 INJECTION, SOLUTION INTRAVENOUS AS NEEDED
Status: DISCONTINUED | OUTPATIENT
Start: 2023-11-03 | End: 2023-11-03

## 2023-11-03 RX ORDER — METFORMIN HYDROCHLORIDE 500 MG/1
1000 TABLET, EXTENDED RELEASE ORAL 2 TIMES DAILY WITH MEALS
Start: 2023-11-05

## 2023-11-03 RX ORDER — MIDAZOLAM HYDROCHLORIDE 1 MG/ML
INJECTION INTRAMUSCULAR; INTRAVENOUS
Status: DISCONTINUED | OUTPATIENT
Start: 2023-11-03 | End: 2023-11-03 | Stop reason: HOSPADM

## 2023-11-03 RX ORDER — SODIUM CHLORIDE 9 MG/ML
75 INJECTION, SOLUTION INTRAVENOUS CONTINUOUS
Status: DISCONTINUED | OUTPATIENT
Start: 2023-11-03 | End: 2023-11-03 | Stop reason: HOSPADM

## 2023-11-03 RX ORDER — VERAPAMIL HYDROCHLORIDE 2.5 MG/ML
INJECTION, SOLUTION INTRAVENOUS
Status: DISCONTINUED | OUTPATIENT
Start: 2023-11-03 | End: 2023-11-03 | Stop reason: HOSPADM

## 2023-11-03 RX ADMIN — ACETAMINOPHEN 650 MG: 325 TABLET ORAL at 14:18

## 2023-11-03 RX ADMIN — SODIUM CHLORIDE 75 ML/HR: 9 INJECTION, SOLUTION INTRAVENOUS at 09:52

## 2023-11-03 NOTE — Clinical Note
First balloon inflation max pressure = 9 temi. First balloon inflation duration = 7 seconds. Second inflation of balloon - Max pressure = 9 temi. 2nd Inflation of balloon - Duration = 7 seconds.

## 2023-11-03 NOTE — Clinical Note
Hemostasis started on the right radial artery. R-Band was used in achieving hemostasis. Radial compression device applied to vessel. Hemostasis achieved successfully. Closure device additional comment: Tr band with 12cc of air

## 2023-11-03 NOTE — DISCHARGE INSTRUCTIONS
[facility]  [address] [place] [address]    Coronary Angiogram (Radial/Ulnar Approach) After Care    Refer to this sheet in the next few weeks. These instructions provide you with information on caring for yourself after your procedure. Your caregiver may also give you more specific instructions. Your treatment has been planned according to current medical practices, but problems sometimes occur. Call your caregiver if you have any problems or questions after your procedure.    Home Care Instructions:  You may shower the day after the procedure. Remove the bandage (dressing) and gently wash the site with plain soap and water. Gently pat the site dry. You may apply a band aid daily for 2 days if desired.    Do not apply powder or lotion to the site.  Do not submerge the affected site in water for 3 to 5 days or until the site is completely healed.   Do not lift, push or pull anything over 5 pounds for 5 days after your procedure or as directed by your physician.  As a reference, a gallon of milk weighs 8 pounds.   Inspect the site at least twice daily. You may notice some bruising at the site and it may be tender for 1 to 2 weeks.     Increase your fluid intake for the next 2 days.    Keep arm elevated for 24 hours. For the remainder of the day, keep your arm in “Pledge of Allegiance” position when up and about.     You may drive 24 hours after the procedure unless otherwise instructed by your caregiver.  Do not operate machinery or power tools for 24 hours.  A responsible adult should be with you for the first 24 hours after you arrive home. Do not make any important legal decisions or sign legal papers for 24 hours.  Do not drink alcohol for 24 hours.    Metformin or any medications containing Metformin should not be taken for 48 hours after your procedure.      Call Your Doctor if:   You have unusual pain at the radial/ulnar (wrist) site.  You have redness, warmth, swelling, or pain at the  radial/ulnar (wrist) site.  You have drainage (other than a small amount of blood on the dressing).  `You have chills or a fever > 101.  Your arm becomes pale or dark, cool, tingly, or numb.  You develop chest pain, shortness of breath, feel faint or pass out.    You have heavy bleeding from the site, hold pressure on the site for 20 minutes.  If the bleeding stops, apply a fresh bandage and call your cardiologist.  However, if you        continue to have bleeding, call 911 and continue to apply pressure to the site.   You have any symptoms of a stroke.  Remember BE FAST  B-balance. Sudden trouble walking or loss of balance.  E-eyes.  Sudden changes in how you see or a sudden onset of a very bad headache.   F-face. Sudden weakness or loss of feeling of the face or facial droop on one side.   A-arms Sudden weakness or numbness in one arm.  One arm drifts down if they are both held out in front of you. This happens suddenly and usually on one side of the body.   S-speech.  Sudden trouble speaking, slurred speech or trouble understanding what are saying.   T-time  Time to call emergency services.  Write down the symptoms and the time they started.

## 2023-11-03 NOTE — Clinical Note
First balloon inflation max pressure = 8 temi. First balloon inflation duration = 7 seconds. Second inflation of balloon - Max pressure = 8 temi. 2nd Inflation of balloon - Duration = 7 seconds. 2nd inflation was done at 10:54 EDT.

## 2023-11-08 LAB — ACT BLD: 287 SECONDS (ref 82–152)

## 2023-11-09 ENCOUNTER — OFFICE VISIT (OUTPATIENT)
Dept: CARDIAC REHAB | Facility: HOSPITAL | Age: 63
End: 2023-11-09
Payer: MEDICARE

## 2023-11-09 DIAGNOSIS — R11.0 NAUSEA: ICD-10-CM

## 2023-11-09 DIAGNOSIS — Z95.5 STATUS POST INSERTION OF DRUG-ELUTING STENT INTO LEFT ANTERIOR DESCENDING (LAD) ARTERY: Primary | ICD-10-CM

## 2023-11-09 PROCEDURE — 93798 PHYS/QHP OP CAR RHAB W/ECG: CPT

## 2023-11-09 RX ORDER — OMEPRAZOLE 20 MG/1
20 CAPSULE, DELAYED RELEASE ORAL DAILY
Qty: 30 CAPSULE | Refills: 1 | Status: SHIPPED | OUTPATIENT
Start: 2023-11-09

## 2023-11-09 NOTE — TELEPHONE ENCOUNTER
Rx Refill Note  Requested Prescriptions     Pending Prescriptions Disp Refills    omeprazole (priLOSEC) 20 MG capsule [Pharmacy Med Name: OMEPRAZOLE DR 20 MG CAPSULE] 30 capsule 1     Sig: TAKE 1 CAPSULE BY MOUTH DAILY      Last office visit with prescribing clinician: 9/25/2023   Last telemedicine visit with prescribing clinician: Visit date not found   Next office visit with prescribing clinician: 11/14/2023                         Would you like a call back once the refill request has been completed: [] Yes [] No    If the office needs to give you a call back, can they leave a voicemail: [] Yes [] No    Peri Christiansen MA  11/09/23, 14:46 EST

## 2023-11-14 ENCOUNTER — OFFICE VISIT (OUTPATIENT)
Dept: FAMILY MEDICINE CLINIC | Facility: CLINIC | Age: 63
End: 2023-11-14
Payer: MEDICARE

## 2023-11-14 VITALS
HEART RATE: 75 BPM | BODY MASS INDEX: 22.76 KG/M2 | SYSTOLIC BLOOD PRESSURE: 118 MMHG | HEIGHT: 67 IN | WEIGHT: 145 LBS | DIASTOLIC BLOOD PRESSURE: 72 MMHG | OXYGEN SATURATION: 96 %

## 2023-11-14 DIAGNOSIS — E78.00 HYPERCHOLESTEROLEMIA: ICD-10-CM

## 2023-11-14 DIAGNOSIS — E11.42 TYPE 2 DIABETES MELLITUS WITH DIABETIC POLYNEUROPATHY, WITH LONG-TERM CURRENT USE OF INSULIN: Primary | ICD-10-CM

## 2023-11-14 DIAGNOSIS — F41.1 GENERALIZED ANXIETY DISORDER: ICD-10-CM

## 2023-11-14 DIAGNOSIS — Z79.4 TYPE 2 DIABETES MELLITUS WITH DIABETIC POLYNEUROPATHY, WITH LONG-TERM CURRENT USE OF INSULIN: Primary | ICD-10-CM

## 2023-11-14 DIAGNOSIS — F33.1 MODERATE EPISODE OF RECURRENT MAJOR DEPRESSIVE DISORDER: ICD-10-CM

## 2023-11-14 DIAGNOSIS — M19.90 ARTHRITIS: ICD-10-CM

## 2023-11-14 DIAGNOSIS — I10 ESSENTIAL HYPERTENSION: ICD-10-CM

## 2023-11-14 DIAGNOSIS — R42 DIZZINESS: ICD-10-CM

## 2023-11-14 DIAGNOSIS — I25.118 CORONARY ARTERY DISEASE OF NATIVE ARTERY OF NATIVE HEART WITH STABLE ANGINA PECTORIS: ICD-10-CM

## 2023-11-14 RX ORDER — ESCITALOPRAM OXALATE 20 MG/1
20 TABLET ORAL DAILY
Qty: 30 TABLET | Refills: 2 | Status: SHIPPED | OUTPATIENT
Start: 2023-11-14

## 2023-11-14 NOTE — PROGRESS NOTES
Subjective   Gely Kimble is a 63 y.o. female.     Chief Complaint   Patient presents with    Diabetes     Follow up    Depression     Follow up       History of Present Illness   Patient presents for follow up DM2: takes Tradjenta and Levemir daily and Metformin twice daily; last A1c 6.4%; monitors blood sugar, typically runs 80-130s; no low blood sugars; watches carbs/sugars some; expensive to eat healthy but tries to watch carbs/sugars; has not been going to gym since had cardiac stents recently; will be starting cardiac rehab tomorrow; last eye exam 8/2023 at [facility].    Had recent heart cath and found 2 blockages; had one stent placed one week and then went back 1 week later and had another stent placed; started Brilinta twice daily and still takes aspirin daily; will be on Brilinta for about 6 months; has been feeling better since had stents placed; breathing and sleep have also improved; has follow up with cardiology 11/29/23.     F/U weight loss with nausea and fatigue: started One-a-Day Vitamin and Omeprazole and has been feeling much better; weight has stabilized; no nausea.    F/U dizziness: saw ENT and told crystals were out of alignment; had vestibular therapy and much better.     F/U HTN: takes Lisinopril 5 mg daily; monitors BP, typically runs 110-120s/70s; occasional mild headaches; takes Claritin daily; no orthostasis unless gets up too fast; tries to change positions slowly and helps; no swelling.    F/U Hyperlipidemia: takes Pravastatin daily; no mylagias.     F/U anxiety/depression: takes Escitalopram daily but has not been working as well recently; had positive PHQ-9 at cardiac rehab recently; has had trouble sleeping since mother passed in 2014 and then spouse in 2016, but no more problems sleeping; has some sadness with upcoming holidays and lost brother and father this year; see PHQ-9 and ARMEN-7; no SI/HI.     Uses Combivent rarely as needed and helps; has not needed to  "use Albuterol inhaler recently.     F/U arthritis: takes Naproxen twice daily for arthritis in multiple joints and helps; takes Naproxen with food.      The following portions of the patient's history were reviewed and updated as appropriate: allergies, current medications, past family history, past medical history, past social history, past surgical history and problem list.    Current Outpatient Medications on File Prior to Visit   Medication Sig    aspirin 81 MG chewable tablet Chew 1 tablet Daily.    Homeopathic Products (LEG CRAMP RELIEF PO) Take 1 tablet by mouth Daily.    insulin detemir (Levemir) 100 UNIT/ML injection Inject 20 Units under the skin into the appropriate area as directed Every Night.    Insulin Syringe 31G X 5/16\" 1 ML misc Use 1 syringe Daily. For use with Levemir vials    linagliptin (TRADJENTA) 5 MG tablet tablet Take 1 tablet by mouth Daily.    lisinopril (PRINIVIL,ZESTRIL) 10 MG tablet Take 0.5 tablets by mouth Daily.    LORATADINE PO Take 1 tablet by mouth Daily.    metFORMIN ER (GLUCOPHAGE-XR) 500 MG 24 hr tablet Take 2 tablets by mouth 2 (Two) Times a Day With Meals.    Multiple Vitamin (MULTIVITAMIN) tablet Take 1 tablet by mouth.    naproxen (NAPROSYN) 500 MG tablet Take 1 tablet by mouth 2 (Two) Times a Day With Meals.    nitroglycerin (Nitrostat) 0.4 MG SL tablet Place 1 tablet under the tongue Every 5 (Five) Minutes As Needed for Chest Pain. Take no more than 3 doses in 15 minutes.    omeprazole (priLOSEC) 20 MG capsule TAKE 1 CAPSULE BY MOUTH DAILY    pravastatin (PRAVACHOL) 20 MG tablet Take 1 tablet by mouth Every Night.    ticagrelor (BRILINTA) 90 MG tablet tablet Take 1 tablet by mouth 2 (Two) Times a Day.    vitamin D (ERGOCALCIFEROL) 1.25 MG (95245 UT) capsule capsule Take 1 capsule by mouth 1 (One) Time Per Week.    albuterol (PROVENTIL HFA;VENTOLIN HFA) 108 (90 BASE) MCG/ACT inhaler Inhale 2 puffs Every 4 (Four) Hours As Needed for Wheezing. (Patient not taking: Reported " on 11/14/2023)    albuterol (PROVENTIL) (2.5 MG/3ML) 0.083% nebulizer solution Take 2.5 mg by nebulization Every 4 (Four) Hours As Needed for Wheezing. (Patient not taking: Reported on 11/14/2023)     No current facility-administered medications on file prior to visit.        Past Medical History:   Diagnosis Date    Abnormal nuclear stress test     Arthritis     COPD (chronic obstructive pulmonary disease)     Elevated cholesterol     Emphysema of lung     Gallbladder abscess     Herpes zoster 08/03/2023    History of positive hepatitis C     previously positive, never treated, negative RNA finding    Hypertension     Myocardial perfusion defect 02/16/2016    Type 2 diabetes mellitus        Past Surgical History:   Procedure Laterality Date    APPENDECTOMY      CARDIAC CATHETERIZATION N/A 10/25/2023    Procedure: Left Heart Cath;  Surgeon: Tasneem Hayes MD;  Location: [facility] CATH INVASIVE LOCATION;  Service: Cardiovascular;  Laterality: N/A;    CARDIAC CATHETERIZATION N/A 10/25/2023    Procedure: Left ventriculography;  Surgeon: Tasneem Hayes MD;  Location: [facility] CATH INVASIVE LOCATION;  Service: Cardiovascular;  Laterality: N/A;    CARDIAC CATHETERIZATION N/A 10/25/2023    Procedure: Stent LEYLA coronary;  Surgeon: Tasneem Hayes MD;  Location: [facility] CATH INVASIVE LOCATION;  Service: Cardiovascular;  Laterality: N/A;    CARDIAC CATHETERIZATION N/A 10/25/2023    Procedure: Coronary angiography;  Surgeon: Tasneem Hayes MD;  Location: [facility] CATH INVASIVE LOCATION;  Service: Cardiovascular;  Laterality: N/A;    CARDIAC CATHETERIZATION N/A 10/25/2023    Procedure: Percutaneous Coronary Intervention;  Surgeon: Tasneem Hayes MD;  Location: [facility] CATH INVASIVE LOCATION;  Service: Cardiovascular;  Laterality: N/A;    CARDIAC CATHETERIZATION N/A 11/3/2023    Procedure: Percutaneous Coronary Intervention RCA;  Surgeon: Tasneem Hayes MD;  Location: [facility] CATH INVASIVE  LOCATION;  Service: Cardiovascular;  Laterality: N/A;    CARDIAC CATHETERIZATION N/A 11/3/2023    Procedure: Coronary angiography;  Surgeon: Tasneem Hayes MD;  Location:  [facility] CATH INVASIVE LOCATION;  Service: Cardiovascular;  Laterality: N/A;    CARDIAC CATHETERIZATION N/A 11/3/2023    Procedure: Stent LEYLA coronary;  Surgeon: Tasneem Hayes MD;  Location:  [facility] CATH INVASIVE LOCATION;  Service: Cardiovascular;  Laterality: N/A;    CHOLECYSTECTOMY      HYSTERECTOMY      TUBAL ABDOMINAL LIGATION         Family History   Problem Relation Age of Onset    Diabetes Mother     Heart disease Mother     Diabetes Father     Diabetes Brother     Heart disease Maternal Grandmother     Cancer Paternal Grandfather        Social History     Socioeconomic History    Marital status:    Tobacco Use    Smoking status: Former     Packs/day: 1.5     Types: Cigarettes     Quit date: 10/11/2017     Years since quittin.0    Tobacco comments:     Previously smoked about 1.5 packs per day for about 45 years   Vaping Use    Vaping Use: Never used   Substance and Sexual Activity    Alcohol use: No     Comment: rare margaritia 1-2 times per year    Drug use: No     Comment: in past; clean since     Sexual activity: Not Currently       Review of Systems   Constitutional:  Positive for fatigue. Negative for appetite change, chills, fever, unexpected weight gain and unexpected weight loss.   HENT:  Negative for ear pain, sore throat and trouble swallowing.    Eyes:  Negative for blurred vision and discharge.   Respiratory:  Negative for cough, chest tightness and shortness of breath.    Cardiovascular:  Negative for chest pain and palpitations.   Gastrointestinal:  Negative for abdominal pain, blood in stool, constipation, diarrhea, GERD (none since has been on Omeprazole daily) and indigestion.   Endocrine: Negative for polydipsia. Cold intolerance: stays cold much of the time now.  Genitourinary:  Positive for  "frequency (no change). Negative for dysuria.   Musculoskeletal:  Back pain: some in lower back on occasion; no radiation of pain down legs.   Skin:  Negative for rash.   Neurological:  Negative for syncope and weakness.       PHQ-9 Depression Screening  Little interest or pleasure in doing things? 0-->not at all   Feeling down, depressed, or hopeless? 2-->more than half the days   Trouble falling or staying asleep, or sleeping too much? 0-->not at all   Feeling tired or having little energy? 3-->nearly every day   Poor appetite or overeating? 0-->not at all   Feeling bad about yourself - or that you are a failure or have let yourself or your family down? 1-->several days   Trouble concentrating on things, such as reading the newspaper or watching television? 0-->not at all   Moving or speaking so slowly that other people could have noticed? Or the opposite - being so fidgety or restless that you have been moving around a lot more than usual? 1-->several days   Thoughts that you would be better off dead, or of hurting yourself in some way? 0-->not at all   PHQ-9 Total Score 7   If you checked off any problems, how difficult have these problems made it for you to do your work, take care of things at home, or get along with other people? not difficult at all     ARMEN-7 anxiety score: 6    Objective   Vitals:    11/14/23 0956   BP: 118/72   BP Location: Left arm   Patient Position: Sitting   Cuff Size: Adult   Pulse: 75   SpO2: 96%   Weight: 65.8 kg (145 lb)   Height: 170.2 cm (67\")     Body mass index is 22.71 kg/m².    Physical Exam  Vitals and nursing note reviewed.   Constitutional:       General: She is not in acute distress.     Appearance: She is well-developed and well-groomed. She is not diaphoretic.   HENT:      Head: Normocephalic.      Right Ear: External ear normal.      Left Ear: External ear normal.   Eyes:      Conjunctiva/sclera: Conjunctivae normal.   Neck:      Vascular: No carotid bruit. "   Cardiovascular:      Rate and Rhythm: Normal rate and regular rhythm.      Pulses: Normal pulses.      Heart sounds: Normal heart sounds. No murmur heard.  Pulmonary:      Effort: Pulmonary effort is normal. No respiratory distress.      Breath sounds: Normal breath sounds.   Abdominal:      General: Bowel sounds are normal.      Palpations: Abdomen is soft. There is no hepatomegaly or splenomegaly.      Tenderness: There is no abdominal tenderness. There is no guarding.   Musculoskeletal:      Cervical back: Normal range of motion and neck supple.      Right lower leg: No edema.      Left lower leg: No edema.   Skin:     General: Skin is warm and dry.      Findings: No rash.   Neurological:      Mental Status: She is alert and oriented to person, place, and time.      Gait: Abnormal gait: guarded gait.   Psychiatric:         Mood and Affect: Mood normal.         Behavior: Behavior normal.         Thought Content: Thought content normal.         Cognition and Memory: Cognition normal.         Judgment: Judgment normal.         Lab Results   Component Value Date    WBC 5.81 10/20/2023    RBC 4.26 10/20/2023    HGB 13.4 10/20/2023    HCT 37.7 10/20/2023    MCV 88.5 10/20/2023    MCH 31.5 10/20/2023    MCHC 35.5 10/20/2023    RDW 14.3 10/20/2023    RDWSD 45.1 10/20/2023    MPV 12.2 (H) 10/20/2023     10/20/2023    NEUTRORELPCT 57.9 10/20/2023    LYMPHORELPCT 32.5 10/20/2023    MONORELPCT 6.2 10/20/2023    EOSRELPCT 1.9 10/20/2023    BASORELPCT 1.2 10/20/2023    AUTOIGPER 0.3 10/20/2023    NEUTROABS 3.36 10/20/2023    LYMPHSABS 1.89 10/20/2023    MONOSABS 0.36 10/20/2023    EOSABS 0.11 10/20/2023    BASOSABS 0.07 10/20/2023    AUTOIGNUM 0.02 10/20/2023    NRBC 0.2 10/20/2023     Lab Results   Component Value Date    GLUCOSE 165 (H) 10/20/2023    BUN 11 10/20/2023    CREATININE 0.44 (L) 10/20/2023    EGFRIFNONA 108 03/20/2020    BCR 25.0 10/20/2023    K 4.6 10/20/2023    CO2 30.4 (H) 10/20/2023    CALCIUM 9.3  10/20/2023    PROTENTOTREF 7.4 09/12/2023    ALBUMIN 4.3 09/13/2023    LABIL2 2.2 09/12/2023    AST 12 09/13/2023    ALT 10 09/13/2023      Lab Results   Component Value Date    CHLPL 187 08/02/2023    TRIG 352 (H) 09/13/2023    HDL 32 (L) 09/13/2023    VLDL 52 (H) 09/13/2023    LDL 41 09/13/2023     Lab Results   Component Value Date    TSH 0.327 09/13/2023     Lab Results   Component Value Date    HGBA1C 6.40 (H) 09/13/2023     Lab Results   Component Value Date    RBCUA 0-2 07/15/2017    BACTERIA None Seen 07/15/2017    COLORU Yellow 09/13/2023    CLARITYU Clear 09/13/2023    LEUKOCYTESUR Negative 09/13/2023    GLUCOSEU >=1000 mg/dL (3+) (A) 09/13/2023    BLOODU Negative 09/13/2023    BILIRUBINUR Negative 09/13/2023    NITRITEU Negative 09/13/2023          Assessment    Problem List Items Addressed This Visit       Type 2 diabetes mellitus - Primary    Current Assessment & Plan     Diabetes is  stable .   Continue current treatment regimen.  Regular aerobic exercise.  Reminded to get yearly retinal exam.  Diabetes will be reassessed in 3 months.  Continue Tradjenta and Levemir daily.  Continue Metformin twice daily.         Relevant Orders    Comprehensive Metabolic Panel    Hemoglobin A1c    Arthritis    Current Assessment & Plan     Continue Naproxen twice daily.  Continue to take Naproxen with food.         Hypercholesterolemia    Current Assessment & Plan     Continue Pravastatin daily.  Continue to work on healthy diet and exercise.         Relevant Orders    Comprehensive Metabolic Panel    Lipid Panel With LDL / HDL Ratio    Essential hypertension    Current Assessment & Plan     Hypertension is  stable .  Regular aerobic exercise.  Continue current medications.  Ambulatory blood pressure monitoring.  Blood pressure will be reassessed in 3 months.  Continue Lisinopril daily.         Relevant Orders    CBC & Differential    Comprehensive Metabolic Panel    Lipid Panel With LDL / HDL Ratio    Moderate  episode of recurrent major depressive disorder    Current Assessment & Plan     Patient's depression is recurrent and is moderate without psychosis. Their depression is currently active and the condition is  not as well controlled . This will be reassessed in 3 months. F/U as described:patient will continue current medication therapy.  Increase Escitalopram to 20 mg daily.         Relevant Medications    escitalopram (Lexapro) 20 MG tablet    Generalized anxiety disorder    Current Assessment & Plan     Psychological condition is  not as well controlled .  Medication changes per orders.  Psychological condition  will be reassessed in 3 months.  Increase Escitalopram to 20 mg daily.         Relevant Medications    escitalopram (Lexapro) 20 MG tablet    Coronary artery disease of native artery of native heart with stable angina pectoris    Current Assessment & Plan     Continue Brilinta twice daily and aspirin daily.  Follow up as scheduled with cardiology.         RESOLVED: Dizziness        Return in about 4 months (around 3/14/2024) for Recheck; needs lab appointment in 1 month.or sooner if symptoms persist or worsen.

## 2023-11-14 NOTE — PATIENT INSTRUCTIONS
Increase Escitalopram to 20 mg daily.  Come back for fasting labs in 1 month.  Continue to monitor your blood sugar once daily before a meal and record results.  Continue to monitor your blood pressure periodically and record results.  Continue to work on healthy diet and exercise.  Follow up pending lab results.  Follow up in 4 months, or sooner if problems or concerns.     
No

## 2023-11-15 ENCOUNTER — TREATMENT (OUTPATIENT)
Dept: CARDIAC REHAB | Facility: HOSPITAL | Age: 63
End: 2023-11-15
Payer: MEDICARE

## 2023-11-15 DIAGNOSIS — Z95.5 STATUS POST INSERTION OF DRUG-ELUTING STENT INTO LEFT ANTERIOR DESCENDING (LAD) ARTERY: Primary | ICD-10-CM

## 2023-11-15 PROBLEM — R94.39 ABNORMAL NUCLEAR STRESS TEST: Status: RESOLVED | Noted: 2023-10-12 | Resolved: 2023-11-15

## 2023-11-15 PROBLEM — R42 DIZZINESS: Status: RESOLVED | Noted: 2023-08-03 | Resolved: 2023-11-15

## 2023-11-15 PROBLEM — F41.9 ANXIETY: Status: RESOLVED | Noted: 2023-08-03 | Resolved: 2023-11-15

## 2023-11-15 PROCEDURE — 93798 PHYS/QHP OP CAR RHAB W/ECG: CPT

## 2023-11-16 NOTE — ASSESSMENT & PLAN NOTE
Diabetes is  stable .   Continue current treatment regimen.  Regular aerobic exercise.  Reminded to get yearly retinal exam.  Diabetes will be reassessed in 3 months.  Continue Tradjenta and Levemir daily.  Continue Metformin twice daily.

## 2023-11-16 NOTE — ASSESSMENT & PLAN NOTE
Patient's depression is recurrent and is moderate without psychosis. Their depression is currently active and the condition is  not as well controlled . This will be reassessed in 3 months. F/U as described:patient will continue current medication therapy.  Increase Escitalopram to 20 mg daily.

## 2023-11-16 NOTE — ASSESSMENT & PLAN NOTE
Psychological condition is  not as well controlled .  Medication changes per orders.  Psychological condition  will be reassessed in 3 months.  Increase Escitalopram to 20 mg daily.

## 2023-11-16 NOTE — ASSESSMENT & PLAN NOTE
Hypertension is  stable .  Regular aerobic exercise.  Continue current medications.  Ambulatory blood pressure monitoring.  Blood pressure will be reassessed in 3 months.  Continue Lisinopril daily.

## 2023-11-17 ENCOUNTER — TREATMENT (OUTPATIENT)
Dept: CARDIAC REHAB | Facility: HOSPITAL | Age: 63
End: 2023-11-17
Payer: MEDICARE

## 2023-11-17 DIAGNOSIS — Z95.5 STATUS POST INSERTION OF DRUG-ELUTING STENT INTO LEFT ANTERIOR DESCENDING (LAD) ARTERY: Primary | ICD-10-CM

## 2023-11-17 PROCEDURE — 93798 PHYS/QHP OP CAR RHAB W/ECG: CPT

## 2023-11-22 ENCOUNTER — TREATMENT (OUTPATIENT)
Dept: CARDIAC REHAB | Facility: HOSPITAL | Age: 63
End: 2023-11-22
Payer: MEDICARE

## 2023-11-22 ENCOUNTER — TELEPHONE (OUTPATIENT)
Dept: CARDIOLOGY | Facility: CLINIC | Age: 63
End: 2023-11-22

## 2023-11-22 DIAGNOSIS — Z95.5 STATUS POST INSERTION OF DRUG-ELUTING STENT INTO LEFT ANTERIOR DESCENDING (LAD) ARTERY: Primary | ICD-10-CM

## 2023-11-22 PROCEDURE — 93798 PHYS/QHP OP CAR RHAB W/ECG: CPT

## 2023-11-22 NOTE — TELEPHONE ENCOUNTER
Caller: Gely Kimble    Relationship: Self    Best call back number: [phone]    What is the best time to reach you: ANY    Who are you requesting to speak with (clinical staff, provider,  specific staff member): ANY    What was the call regarding: COST OF BRILINTA HAS DOUBLED AND SHE CAN'T AFFORD IT, WANTS TO KNOW IF SHE CAN DO SAMPLES OR HAVE SOMETHING CHEAPER THAT WILL DO THE SAME THING.  SHE WILL BE OUT OF THE MED MONDAY 11/27/23

## 2023-11-29 ENCOUNTER — OFFICE VISIT (OUTPATIENT)
Dept: CARDIOLOGY | Facility: CLINIC | Age: 63
End: 2023-11-29
Payer: MEDICARE

## 2023-11-29 ENCOUNTER — APPOINTMENT (OUTPATIENT)
Dept: CARDIAC REHAB | Facility: HOSPITAL | Age: 63
End: 2023-11-29
Payer: MEDICARE

## 2023-11-29 VITALS
DIASTOLIC BLOOD PRESSURE: 76 MMHG | SYSTOLIC BLOOD PRESSURE: 130 MMHG | WEIGHT: 144 LBS | HEIGHT: 68 IN | HEART RATE: 78 BPM | BODY MASS INDEX: 21.82 KG/M2

## 2023-11-29 DIAGNOSIS — I25.118 CORONARY ARTERY DISEASE OF NATIVE ARTERY OF NATIVE HEART WITH STABLE ANGINA PECTORIS: Primary | ICD-10-CM

## 2023-11-29 DIAGNOSIS — E78.49 OTHER HYPERLIPIDEMIA: ICD-10-CM

## 2023-11-29 DIAGNOSIS — I1A.0 RESISTANT HYPERTENSION: ICD-10-CM

## 2023-11-29 PROCEDURE — 1159F MED LIST DOCD IN RCRD: CPT | Performed by: NURSE PRACTITIONER

## 2023-11-29 PROCEDURE — 99214 OFFICE O/P EST MOD 30 MIN: CPT | Performed by: NURSE PRACTITIONER

## 2023-11-29 PROCEDURE — 3075F SYST BP GE 130 - 139MM HG: CPT | Performed by: NURSE PRACTITIONER

## 2023-11-29 PROCEDURE — 3078F DIAST BP <80 MM HG: CPT | Performed by: NURSE PRACTITIONER

## 2023-11-29 PROCEDURE — 1160F RVW MEDS BY RX/DR IN RCRD: CPT | Performed by: NURSE PRACTITIONER

## 2023-11-29 RX ORDER — PANTOPRAZOLE SODIUM 20 MG/1
20 TABLET, DELAYED RELEASE ORAL DAILY
Qty: 30 TABLET | Refills: 0 | Status: SHIPPED | OUTPATIENT
Start: 2023-11-29

## 2023-11-29 RX ORDER — ATORVASTATIN CALCIUM 40 MG/1
40 TABLET, FILM COATED ORAL DAILY
Qty: 30 TABLET | Refills: 11 | Status: SHIPPED | OUTPATIENT
Start: 2023-11-29

## 2023-11-29 RX ORDER — METOPROLOL SUCCINATE 25 MG/1
25 TABLET, EXTENDED RELEASE ORAL DAILY
Qty: 30 TABLET | Refills: 5 | Status: SHIPPED | OUTPATIENT
Start: 2023-11-29

## 2023-11-29 RX ORDER — CLOPIDOGREL BISULFATE 75 MG/1
75 TABLET ORAL DAILY
Qty: 30 TABLET | Refills: 11 | Status: SHIPPED | OUTPATIENT
Start: 2023-11-29

## 2023-11-29 NOTE — PROGRESS NOTES
Subjective:        Gely Kimble is a 63 y.o. female who here for follow up    Chief Complaint   Patient presents with    Hospital Follow Up Visit     PCI FOLLOW UP  CAN NOT AFFORD BRILINTA RX, WOULD LIKE TO SWITCH        HPI     This is a 63-year-old female who is new to this provider.  She is here today status post cardiac cath with recent stent placement.  She has a history to include COPD, hyperlipidemia, hepatitis C, hypertension and diabetes mellitus.      On 9/14/2023 revealed EF 61 to 65%, LV diastolic function is consistent with grade 1.  Mild mitral annular calcification.  Insufficient TR velocity profile to estimate the RV systolic pressure.  Stress test was normal prior to cardiac cath.  Cardiac cath in October with successful angioplasty and stent to the mid LAD bifurcation lesion with 80% LAD reduced to 0%.  With 3.5/18 Xience stent, origin of the diagonal 80% reduced to 30% with 2.5/12 trek balloon.  Cardiac cath November 2023 successful angioplasty and stent to the proximal PDA branch of the RCA 90% reduced to 0%.      The following portions of the patient's history were reviewed and updated as appropriate: allergies, current medications, past family history, past medical history, past social history, past surgical history and problem list.    Past Medical History:   Diagnosis Date    Abnormal nuclear stress test     Arthritis     COPD (chronic obstructive pulmonary disease)     Elevated cholesterol     Emphysema of lung     Gallbladder abscess     Herpes zoster 08/03/2023    History of positive hepatitis C     previously positive, never treated, negative RNA finding    Hypertension     Myocardial perfusion defect 02/16/2016    Type 2 diabetes mellitus          reports that she quit smoking about 6 years ago. Her smoking use included cigarettes. She smoked an average of 1.5 packs per day. She does not have any smokeless tobacco history on file. She reports that she does not drink alcohol and does  not use drugs.     Family History   Problem Relation Age of Onset    Diabetes Mother     Heart disease Mother     Diabetes Father     Diabetes Brother     Heart disease Maternal Grandmother     Cancer Paternal Grandfather        ROS     Review of Systems  Constitutional: No wt loss, fever, fatigue  Gastrointestinal: No nausea, abdominal pain  Behavioral/Psych: No insomnia or anxiety  Cardiovascular: Denies chest pain, and shortness of breath.      Objective:           Constitutional:       Appearance: Well-developed.   Neck:      Vascular: No JVD.      Trachea: No tracheal deviation.   Pulmonary:      Effort: Pulmonary effort is normal. No respiratory distress.      Breath sounds: Normal breath sounds. No stridor. No decreased breath sounds. No wheezing. No rhonchi. No rales.   Chest:      Chest wall: Not tender to palpatation.   Cardiovascular:      Normal rate. Regular rhythm.      No gallop.    Pulses:     Intact distal pulses.   Edema:     Peripheral edema absent.   Abdominal:      General: Bowel sounds are normal. There is no distension.      Palpations: Abdomen is soft.      Tenderness: There is no abdominal tenderness.   Skin:     General: Skin is warm.   Neurological:      Mental Status: Alert and oriented to person, place, and time.      Deep Tendon Reflexes: Reflexes are normal and symmetric.         Procedures  Conclusion         Successful angioplasty and stent to the proximal PDA branch of the RCA 90% reduced to 0% with 2.5/12 Xience stent     HEMODYNAMIC / ANGIOGRAPHIC DATA:    Left ventricular end diastolic pressure was 10 mmHg.  Left ventriculography revealed an EF around 60%.    The left main is normal left main.  The left anterior descending artery is , Left anterior descending midportion has 80% bifurcation lesion reduced to 0% with 3.5/18 Xience stent, ostial diagonal branch 80% reduced to 20 to 30% with 2.5/12 trek balloon, minimum irregularity of the rest of the LAD diagonal and   branches  The left circumflex is nondominant and normal.  The right coronary artery is dominant with the PDA 80% stenosis.  Successful angioplasty and stent to the mid LAD bifurcation lesion with 80% LAD reduced to 0% with 3.5/18 Xience stent, origin of the diagonal 80% reduced to 30% with 2.5/12 trek balloon    GUZMAN FLOW    PRE...2....       POST.....3.    TYPE OF LESION.........C....    RECOMMENDATIONS:  Post-procedure care will focus on prevention of any ischemic events and congestive complications.  Aggressive risk factor modification will be carried out.  Importance of taking dual antiplatelets for one year  has been explained, risk of stent thrombosis leading to the acute MI, which carries high morbidity and mortality has been explained    Discontinuation or interruptions of these medications should be under the strict guidance of appropriate health professional   Interpretation Summary         Findings consistent with a normal ECG stress test.    Left ventricular ejection fraction is normal (Calculated EF = 60%).    Myocardial perfusion imaging indicates a normal myocardial perfusion study with no evidence of ischemia.    Impressions are consistent with a low risk study.    Diaphragmatic attenuation artifact is present.    Inferior wall artifacInterpretation Summary         Left ventricular systolic function is normal. Left ventricular ejection fraction appears to be 61 - 65%.    Left ventricular diastolic function is consistent with (grade I) impaired relaxation.    Normal right ventricular cavity size and systolic function noted.    There is mild mitral annular calcification    Insufficient TR velocity profile to estimate the right ventricular systolic pressure.    There is no evidence of pericardial effusion.        Current Outpatient Medications:     albuterol (PROVENTIL HFA;VENTOLIN HFA) 108 (90 BASE) MCG/ACT inhaler, Inhale 2 puffs Every 4 (Four) Hours As Needed for Wheezing., Disp: , Rfl:     albuterol  "(PROVENTIL) (2.5 MG/3ML) 0.083% nebulizer solution, Take 2.5 mg by nebulization Every 4 (Four) Hours As Needed for Wheezing., Disp: , Rfl:     aspirin 81 MG chewable tablet, Chew 1 tablet Daily., Disp: , Rfl:     escitalopram (Lexapro) 20 MG tablet, Take 1 tablet by mouth Daily., Disp: 30 tablet, Rfl: 2    Homeopathic Products (LEG CRAMP RELIEF PO), Take 1 tablet by mouth Daily., Disp: , Rfl:     insulin detemir (Levemir) 100 UNIT/ML injection, Inject 20 Units under the skin into the appropriate area as directed Every Night., Disp: , Rfl:     Insulin Syringe 31G X 5/16\" 1 ML misc, Use 1 syringe Daily. For use with Levemir vials, Disp: 100 each, Rfl: 2    linagliptin (TRADJENTA) 5 MG tablet tablet, Take 1 tablet by mouth Daily., Disp: 30 tablet, Rfl: 2    lisinopril (PRINIVIL,ZESTRIL) 10 MG tablet, Take 0.5 tablets by mouth Daily., Disp: , Rfl:     LORATADINE PO, Take 1 tablet by mouth Daily., Disp: , Rfl:     metFORMIN ER (GLUCOPHAGE-XR) 500 MG 24 hr tablet, Take 2 tablets by mouth 2 (Two) Times a Day With Meals., Disp: , Rfl:     Multiple Vitamin (MULTIVITAMIN) tablet, Take 1 tablet by mouth., Disp: , Rfl:     naproxen (NAPROSYN) 500 MG tablet, Take 1 tablet by mouth 2 (Two) Times a Day With Meals., Disp: 60 tablet, Rfl: 5    omeprazole (priLOSEC) 20 MG capsule, TAKE 1 CAPSULE BY MOUTH DAILY, Disp: 30 capsule, Rfl: 1    pravastatin (PRAVACHOL) 20 MG tablet, Take 1 tablet by mouth Every Night., Disp: 90 tablet, Rfl: 1    ticagrelor (BRILINTA) 90 MG tablet tablet, Take 1 tablet by mouth 2 (Two) Times a Day., Disp: 60 tablet, Rfl: 11    vitamin D (ERGOCALCIFEROL) 1.25 MG (47800 UT) capsule capsule, Take 1 capsule by mouth 1 (One) Time Per Week., Disp: , Rfl:     nitroglycerin (Nitrostat) 0.4 MG SL tablet, Place 1 tablet under the tongue Every 5 (Five) Minutes As Needed for Chest Pain. Take no more than 3 doses in 15 minutes., Disp: 100 tablet, Rfl: 12     Assessment:        Patient Active Problem List   Diagnosis    " Type 2 diabetes mellitus    Pulmonary emphysema    Arthritis    Hypercholesterolemia    Essential hypertension    Abnormal liver function tests    Impingement syndrome of shoulder region    Neck pain    Postmenopause    Restless leg syndrome    Urinary frequency    Vitamin D deficiency    Fatigue    Moderate episode of recurrent major depressive disorder    Generalized anxiety disorder    Insomnia    Skin lesion of back    Nausea    Incomplete right bundle branch block (RBBB)    Chest pain    Coronary artery disease of native artery of native heart with stable angina pectoris               Plan:   1.  Hospital follow-up CAD status post cardiac cath with stent placement.  Cardiac cath site shows no signs or symptoms of infection or hematoma noted.  Blood pressure stable.  She states she missed Brilinta yesterday.  Samples are given today.  She cannot afford Brilinta so we will switch to Plavix.  She has been educated not to miss any doses of her antiplatelet.  Aspirin, atorvastatin, and Plavix.  I will add a beta-blocker.  Educated her on not taking any NSAIDs.    The importance of taking dual antiplatelets for one year  has been explained, risk of stent thrombosis leading to the acute MI, which carries high morbidity and mortality has been explained. Discontinuation or interruptions of these medications should be under the strict guidance of appropriate health professional.    Risk reduction for the coronary artery disease, controlling the blood pressure, blood sugar management, cholesterol management, exercise, stress management, and proper compliance with medications and follow-up has been discussed    2.  Incomplete right bundle sixto block.    3.  Hypertension: Controlled.  Monitor her blood pressure and heart rate as we are adding metoprolol succinate.  Follow-up with any systolics less than 110.    Educated patient on exercising for at least 30 minutes a day for 2 to 3 days a week. Importance of controlling  hypertension and blood pressure checkup on the regular basis has been explained. Hypertension as a silent killer has been discussed. Risk reduction of the weight and regular exercises to control the hypertension has been explained.    4.  Hyperlipidemia: Her pcp manages her labs and cholesterol.    Risk of the hyperlipidemia, importance of the treatment has been explained     Pros and cons of the statins has been explained     Regular blood workup as well as side effects including the liver failure, myelopathy death has been explained                                No diagnosis found.    There are no diagnoses linked to this encounter.    COUNSELING: kevan Ash was given to patient for the following topics: diagnostic results, risk factor reductions, impressions, risks and benefits of treatment options and importance of treatment compliance .       SMOKING COUNSELING: denies     Medication changes.  She will follow-up in 6 months.  She will do her blood pressure and call with any concerns with her blood pressure.    Sincerely,   DARIAN Murphy  [facility]  11/29/23  12:10 EST    EMR Dragon/Transcription disclaimer: Dictated utilizing Dragon Dictation

## 2023-12-01 ENCOUNTER — TREATMENT (OUTPATIENT)
Dept: CARDIAC REHAB | Facility: HOSPITAL | Age: 63
End: 2023-12-01
Payer: MEDICARE

## 2023-12-01 DIAGNOSIS — Z95.5 STATUS POST INSERTION OF DRUG-ELUTING STENT INTO LEFT ANTERIOR DESCENDING (LAD) ARTERY: Primary | ICD-10-CM

## 2023-12-01 PROCEDURE — 93798 PHYS/QHP OP CAR RHAB W/ECG: CPT

## 2023-12-06 ENCOUNTER — TREATMENT (OUTPATIENT)
Dept: CARDIAC REHAB | Facility: HOSPITAL | Age: 63
End: 2023-12-06
Payer: MEDICARE

## 2023-12-06 DIAGNOSIS — Z95.5 STATUS POST INSERTION OF DRUG-ELUTING STENT INTO LEFT ANTERIOR DESCENDING (LAD) ARTERY: Primary | ICD-10-CM

## 2023-12-06 PROCEDURE — 93798 PHYS/QHP OP CAR RHAB W/ECG: CPT

## 2023-12-08 ENCOUNTER — TREATMENT (OUTPATIENT)
Dept: CARDIAC REHAB | Facility: HOSPITAL | Age: 63
End: 2023-12-08
Payer: MEDICARE

## 2023-12-08 DIAGNOSIS — Z95.5 STATUS POST INSERTION OF DRUG-ELUTING STENT INTO LEFT ANTERIOR DESCENDING (LAD) ARTERY: Primary | ICD-10-CM

## 2023-12-08 PROCEDURE — 93798 PHYS/QHP OP CAR RHAB W/ECG: CPT

## 2023-12-13 ENCOUNTER — TREATMENT (OUTPATIENT)
Dept: CARDIAC REHAB | Facility: HOSPITAL | Age: 63
End: 2023-12-13
Payer: MEDICARE

## 2023-12-13 DIAGNOSIS — Z95.5 STATUS POST INSERTION OF DRUG-ELUTING STENT INTO LEFT ANTERIOR DESCENDING (LAD) ARTERY: Primary | ICD-10-CM

## 2023-12-13 PROCEDURE — 93798 PHYS/QHP OP CAR RHAB W/ECG: CPT

## 2023-12-13 PROCEDURE — 93797 PHYS/QHP OP CAR RHAB WO ECG: CPT

## 2023-12-14 DIAGNOSIS — E11.42 TYPE 2 DIABETES MELLITUS WITH DIABETIC POLYNEUROPATHY, WITH LONG-TERM CURRENT USE OF INSULIN: ICD-10-CM

## 2023-12-14 DIAGNOSIS — I10 ESSENTIAL HYPERTENSION: ICD-10-CM

## 2023-12-14 DIAGNOSIS — E78.00 HYPERCHOLESTEROLEMIA: ICD-10-CM

## 2023-12-14 DIAGNOSIS — Z79.4 TYPE 2 DIABETES MELLITUS WITH DIABETIC POLYNEUROPATHY, WITH LONG-TERM CURRENT USE OF INSULIN: ICD-10-CM

## 2023-12-15 ENCOUNTER — APPOINTMENT (OUTPATIENT)
Dept: CARDIAC REHAB | Facility: HOSPITAL | Age: 63
End: 2023-12-15
Payer: MEDICARE

## 2023-12-15 LAB
ALBUMIN SERPL-MCNC: 4.7 G/DL (ref 3.5–5.2)
ALBUMIN/GLOB SERPL: 2.4 G/DL
ALP SERPL-CCNC: 59 U/L (ref 39–117)
ALT SERPL-CCNC: 20 U/L (ref 1–33)
AST SERPL-CCNC: 15 U/L (ref 1–32)
BASOPHILS # BLD AUTO: 0.08 10*3/MM3 (ref 0–0.2)
BASOPHILS NFR BLD AUTO: 1.1 % (ref 0–1.5)
BILIRUB SERPL-MCNC: 0.3 MG/DL (ref 0–1.2)
BUN SERPL-MCNC: 10 MG/DL (ref 8–23)
BUN/CREAT SERPL: 16.1 (ref 7–25)
CALCIUM SERPL-MCNC: 9.8 MG/DL (ref 8.6–10.5)
CHLORIDE SERPL-SCNC: 103 MMOL/L (ref 98–107)
CHOLEST SERPL-MCNC: 101 MG/DL (ref 0–200)
CO2 SERPL-SCNC: 31.4 MMOL/L (ref 22–29)
CREAT SERPL-MCNC: 0.62 MG/DL (ref 0.57–1)
EGFRCR SERPLBLD CKD-EPI 2021: 99 ML/MIN/1.73
EOSINOPHIL # BLD AUTO: 0.05 10*3/MM3 (ref 0–0.4)
EOSINOPHIL NFR BLD AUTO: 0.7 % (ref 0.3–6.2)
ERYTHROCYTE [DISTWIDTH] IN BLOOD BY AUTOMATED COUNT: 13.8 % (ref 12.3–15.4)
GLOBULIN SER CALC-MCNC: 2 GM/DL
GLUCOSE SERPL-MCNC: 97 MG/DL (ref 65–99)
HBA1C MFR BLD: 6.8 % (ref 4.8–5.6)
HCT VFR BLD AUTO: 37.3 % (ref 34–46.6)
HDLC SERPL-MCNC: 51 MG/DL (ref 40–60)
HGB BLD-MCNC: 12.1 G/DL (ref 12–15.9)
IMM GRANULOCYTES # BLD AUTO: 0.01 10*3/MM3 (ref 0–0.05)
IMM GRANULOCYTES NFR BLD AUTO: 0.1 % (ref 0–0.5)
LDLC SERPL CALC-MCNC: 32 MG/DL (ref 0–100)
LDLC/HDLC SERPL: 0.6 {RATIO}
LYMPHOCYTES # BLD AUTO: 2.12 10*3/MM3 (ref 0.7–3.1)
LYMPHOCYTES NFR BLD AUTO: 27.9 % (ref 19.6–45.3)
MCH RBC QN AUTO: 28.3 PG (ref 26.6–33)
MCHC RBC AUTO-ENTMCNC: 32.4 G/DL (ref 31.5–35.7)
MCV RBC AUTO: 87.4 FL (ref 79–97)
MONOCYTES # BLD AUTO: 0.41 10*3/MM3 (ref 0.1–0.9)
MONOCYTES NFR BLD AUTO: 5.4 % (ref 5–12)
NEUTROPHILS # BLD AUTO: 4.94 10*3/MM3 (ref 1.7–7)
NEUTROPHILS NFR BLD AUTO: 64.8 % (ref 42.7–76)
NRBC BLD AUTO-RTO: 0 /100 WBC (ref 0–0.2)
PLATELET # BLD AUTO: 288 10*3/MM3 (ref 140–450)
POTASSIUM SERPL-SCNC: 4.4 MMOL/L (ref 3.5–5.2)
PROT SERPL-MCNC: 6.7 G/DL (ref 6–8.5)
RBC # BLD AUTO: 4.27 10*6/MM3 (ref 3.77–5.28)
SODIUM SERPL-SCNC: 145 MMOL/L (ref 136–145)
TRIGL SERPL-MCNC: 96 MG/DL (ref 0–150)
VLDLC SERPL CALC-MCNC: 18 MG/DL (ref 5–40)
WBC # BLD AUTO: 7.61 10*3/MM3 (ref 3.4–10.8)

## 2023-12-20 ENCOUNTER — TREATMENT (OUTPATIENT)
Dept: CARDIAC REHAB | Facility: HOSPITAL | Age: 63
End: 2023-12-20
Payer: MEDICARE

## 2023-12-20 DIAGNOSIS — Z95.5 STATUS POST INSERTION OF DRUG-ELUTING STENT INTO LEFT ANTERIOR DESCENDING (LAD) ARTERY: Primary | ICD-10-CM

## 2023-12-20 PROCEDURE — 93798 PHYS/QHP OP CAR RHAB W/ECG: CPT

## 2023-12-22 ENCOUNTER — TREATMENT (OUTPATIENT)
Dept: CARDIAC REHAB | Facility: HOSPITAL | Age: 63
End: 2023-12-22
Payer: MEDICARE

## 2023-12-22 DIAGNOSIS — Z95.5 STATUS POST INSERTION OF DRUG-ELUTING STENT INTO LEFT ANTERIOR DESCENDING (LAD) ARTERY: Primary | ICD-10-CM

## 2023-12-22 PROCEDURE — 93798 PHYS/QHP OP CAR RHAB W/ECG: CPT

## 2023-12-27 ENCOUNTER — TREATMENT (OUTPATIENT)
Dept: CARDIAC REHAB | Facility: HOSPITAL | Age: 63
End: 2023-12-27
Payer: MEDICARE

## 2023-12-27 DIAGNOSIS — Z95.5 STATUS POST INSERTION OF DRUG-ELUTING STENT INTO LEFT ANTERIOR DESCENDING (LAD) ARTERY: Primary | ICD-10-CM

## 2023-12-27 PROCEDURE — 93798 PHYS/QHP OP CAR RHAB W/ECG: CPT

## 2023-12-29 ENCOUNTER — TREATMENT (OUTPATIENT)
Dept: CARDIAC REHAB | Facility: HOSPITAL | Age: 63
End: 2023-12-29
Payer: MEDICARE

## 2023-12-29 DIAGNOSIS — Z95.5 STATUS POST INSERTION OF DRUG-ELUTING STENT INTO LEFT ANTERIOR DESCENDING (LAD) ARTERY: Primary | ICD-10-CM

## 2023-12-29 PROCEDURE — 93798 PHYS/QHP OP CAR RHAB W/ECG: CPT

## 2024-01-10 ENCOUNTER — OFFICE VISIT (OUTPATIENT)
Dept: FAMILY MEDICINE CLINIC | Facility: CLINIC | Age: 64
End: 2024-01-10
Payer: MEDICARE

## 2024-01-10 VITALS
WEIGHT: 148 LBS | HEART RATE: 72 BPM | DIASTOLIC BLOOD PRESSURE: 76 MMHG | SYSTOLIC BLOOD PRESSURE: 122 MMHG | BODY MASS INDEX: 22.43 KG/M2 | OXYGEN SATURATION: 98 % | HEIGHT: 68 IN

## 2024-01-10 DIAGNOSIS — L98.9 SKIN LESION OF LEFT ARM: ICD-10-CM

## 2024-01-10 DIAGNOSIS — E11.42 TYPE 2 DIABETES MELLITUS WITH DIABETIC POLYNEUROPATHY, WITH LONG-TERM CURRENT USE OF INSULIN: ICD-10-CM

## 2024-01-10 DIAGNOSIS — K21.9 GASTROESOPHAGEAL REFLUX DISEASE, UNSPECIFIED WHETHER ESOPHAGITIS PRESENT: ICD-10-CM

## 2024-01-10 DIAGNOSIS — Z79.4 TYPE 2 DIABETES MELLITUS WITH DIABETIC POLYNEUROPATHY, WITH LONG-TERM CURRENT USE OF INSULIN: ICD-10-CM

## 2024-01-10 DIAGNOSIS — E78.00 HYPERCHOLESTEROLEMIA: ICD-10-CM

## 2024-01-10 DIAGNOSIS — M79.642 LEFT HAND PAIN: Primary | ICD-10-CM

## 2024-01-10 DIAGNOSIS — D69.9: ICD-10-CM

## 2024-01-10 PROCEDURE — 1160F RVW MEDS BY RX/DR IN RCRD: CPT | Performed by: NURSE PRACTITIONER

## 2024-01-10 PROCEDURE — 3078F DIAST BP <80 MM HG: CPT | Performed by: NURSE PRACTITIONER

## 2024-01-10 PROCEDURE — 1159F MED LIST DOCD IN RCRD: CPT | Performed by: NURSE PRACTITIONER

## 2024-01-10 PROCEDURE — 99214 OFFICE O/P EST MOD 30 MIN: CPT | Performed by: NURSE PRACTITIONER

## 2024-01-10 PROCEDURE — 3074F SYST BP LT 130 MM HG: CPT | Performed by: NURSE PRACTITIONER

## 2024-01-10 RX ORDER — PANTOPRAZOLE SODIUM 20 MG/1
20 TABLET, DELAYED RELEASE ORAL DAILY
Qty: 30 TABLET | Refills: 5 | Status: SHIPPED | OUTPATIENT
Start: 2024-01-10

## 2024-01-10 NOTE — PATIENT INSTRUCTIONS
Try Vaseline on scabbed areas on left arm.  Continue to monitor your blood sugar once daily before a meal and record results.  Continue to monitor your blood pressure periodically and record results.  Continue to work on healthy diet and exercise.  Follow up pending lab results.  Follow up in 3 months, or sooner if symptoms persist or worsen.

## 2024-01-10 NOTE — PROGRESS NOTES
Subjective   Gely Kimble is a 63 y.o. female.     Chief Complaint   Patient presents with    Hand Pain     Started abt a month ago ..    Arm Pain     Scabs on both arms        History of Present Illness   Patient presents with c/o sharp pain in left hand between thumb and index finger; has constant dull ache, but if reaches with hand outstretched will have increased discomfort; symptoms started about 1 month ago; no known injury; does a lot of repetitive motion with left hand; no pain during play on electronics; has had pain wake her out of sleep; has numbness in 1st and 2nd fingers; no rash; no longer taking Naproxen for arthritis per cardiology at end of November; has been taking Tylenol and helps some.    S/P cardiac cath with stents; finished cardiac rehab on 1/5/24; changed to Plavix daily due to Brilinta too expensive; added beta blocker (Metoprolol) and recommended to avoid all NSAIDs; changed to Atorvastatin instead of Pravastatin; has follow up with cardiology in March.    Also started Pantoprazole daily instead of Omeprazole due to starting Plavix; needs refill per PCP.    Also, c/o rash on left arm; symptoms started about 2 weeks ago; will start with red flat spot and then red spot will get larger with dark scab in center; has 3 red spots with dark scabbed area and 2 new red spots proximal to those; had one on right arm that scab healed about 1 month ago, now just red spot left; no tenderness or tingling; no itching; no fever; no OTC treatment; will use Neosporin if needed for scab breaking open; no decreased ROM of arm.    Has had bleeding from cat scratch on left arm for 3 days; has some redness.    Takes Escitalopram daily and working well; no SI/HI.    F/U DM2: takes Levemir daily and Metformin twice daily; no longer taking Tradjenta due to cost; last A1c 6.8%; monitors blood sugar and has been running 100s for most part, some higher into 130s around the holidays; no low blood sugars; has started  back to gym 3 days per week since finished cardiac rehab; does same exercises was doing at cardiac rehab--rides bike and walks treadmill; also does some abdominal crunches.      The following portions of the patient's history were reviewed and updated as appropriate: allergies, current medications, past family history, past medical history, past social history, past surgical history and problem list.      Current Outpatient Medications   Medication Sig Dispense Refill    albuterol (PROVENTIL HFA;VENTOLIN HFA) 108 (90 BASE) MCG/ACT inhaler Inhale 2 puffs Every 4 (Four) Hours As Needed for Wheezing.      albuterol (PROVENTIL) (2.5 MG/3ML) 0.083% nebulizer solution Take 2.5 mg by nebulization Every 4 (Four) Hours As Needed for Wheezing.      aspirin 81 MG chewable tablet Chew 1 tablet Daily.      atorvastatin (LIPITOR) 40 MG tablet Take 1 tablet by mouth Daily. 30 tablet 11    clopidogrel (PLAVIX) 75 MG tablet Take 1 tablet by mouth Daily. 30 tablet 11    escitalopram (Lexapro) 20 MG tablet Take 1 tablet by mouth Daily. 30 tablet 2    Homeopathic Products (LEG CRAMP RELIEF PO) Take 1 tablet by mouth Daily.      insulin detemir (Levemir) 100 UNIT/ML injection Inject 20 Units under the skin into the appropriate area as directed Every Night.      lisinopril (PRINIVIL,ZESTRIL) 10 MG tablet Take 0.5 tablets by mouth Daily.      LORATADINE PO Take 1 tablet by mouth Daily.      metFORMIN ER (GLUCOPHAGE-XR) 500 MG 24 hr tablet Take 2 tablets by mouth 2 (Two) Times a Day With Meals.      metoprolol succinate XL (TOPROL-XL) 25 MG 24 hr tablet Take 1 tablet by mouth Daily. 30 tablet 5    Multiple Vitamin (MULTIVITAMIN) tablet Take 1 tablet by mouth.      nitroglycerin (Nitrostat) 0.4 MG SL tablet Place 1 tablet under the tongue Every 5 (Five) Minutes As Needed for Chest Pain. Take no more than 3 doses in 15 minutes. 100 tablet 12    pantoprazole (Protonix) 20 MG EC tablet Take 1 tablet by mouth Daily. 30 tablet 5    vitamin D  "(ERGOCALCIFEROL) 1.25 MG (93997 UT) capsule capsule Take 1 capsule by mouth 1 (One) Time Per Week.      Insulin Syringe 31G X 5/16\" 1 ML misc Use 1 syringe Daily. For use with Levemir vials 100 each 2     No current facility-administered medications for this visit.       Past Medical History:   Diagnosis Date    Abnormal nuclear stress test     Arthritis     COPD (chronic obstructive pulmonary disease)     Elevated cholesterol     Emphysema of lung     Gallbladder abscess     Herpes zoster 08/03/2023    History of positive hepatitis C     previously positive, never treated, negative RNA finding    Hypertension     Myocardial perfusion defect 02/16/2016    Type 2 diabetes mellitus        Past Surgical History:   Procedure Laterality Date    APPENDECTOMY      CARDIAC CATHETERIZATION N/A 10/25/2023    Procedure: Left Heart Cath;  Surgeon: Tasneem Hayes MD;  Location: [facility] CATH INVASIVE LOCATION;  Service: Cardiovascular;  Laterality: N/A;    CARDIAC CATHETERIZATION N/A 10/25/2023    Procedure: Left ventriculography;  Surgeon: Tasneem Hayes MD;  [facility];  Service: Cardiovascular;  Laterality: N/A;    CARDIAC CATHETERIZATION N/A 10/25/2023    Procedure: Stent LEYLA coronary;  Surgeon: Tasneem Hayes MD;  [facility];  Service: Cardiovascular;  Laterality: N/A;    CARDIAC CATHETERIZATION N/A 10/25/2023    Procedure: Coronary angiography;  Surgeon: Tasneem Hayes MD;  Location: [facility] CATH INVASIVE LOCATION;  Service: Cardiovascular;  Laterality: N/A;    CARDIAC CATHETERIZATION N/A 10/25/2023    Procedure: Percutaneous Coronary Intervention;  Surgeon: Tasneem Hayes MD;  Location: [facility] CATH INVASIVE LOCATION;  Service: Cardiovascular;  Laterality: N/A;    CARDIAC CATHETERIZATION N/A 11/3/2023    Procedure: Percutaneous Coronary Intervention RCA;  Surgeon: Tasneem Hayes MD;  Location: [facility] CATH INVASIVE " LOCATION;  Service: Cardiovascular;  Laterality: N/A;    CARDIAC CATHETERIZATION N/A 11/3/2023    Procedure: Coronary angiography;  Surgeon: Tasneem Hayes MD;  Location:  [facility] CATH INVASIVE LOCATION;  Service: Cardiovascular;  Laterality: N/A;    CARDIAC CATHETERIZATION N/A 11/3/2023    Procedure: Stent LEYLA coronary;  Surgeon: Tasneem Hayes MD;  Location:  [facility] CATH INVASIVE LOCATION;  Service: Cardiovascular;  Laterality: N/A;    CHOLECYSTECTOMY      HYSTERECTOMY      TUBAL ABDOMINAL LIGATION         Family History   Problem Relation Age of Onset    Diabetes Mother     Heart disease Mother     Diabetes Father     Diabetes Brother     Heart disease Maternal Grandmother     Cancer Paternal Grandfather        Social History     Socioeconomic History    Marital status:    Tobacco Use    Smoking status: Former     Packs/day: 1.5     Types: Cigarettes     Quit date: 10/11/2017     Years since quittin.2    Tobacco comments:     Previously smoked about 1.5 packs per day for about 45 years   Vaping Use    Vaping Use: Never used   Substance and Sexual Activity    Alcohol use: No     Comment: rare margaritia 1-2 times per year    Drug use: No     Comment: in past; clean since     Sexual activity: Not Currently       Review of Systems   Constitutional:  Positive for fatigue. Negative for appetite change, chills, fever, unexpected weight gain and unexpected weight loss.   HENT:  Negative for ear pain, sore throat and trouble swallowing.    Respiratory:  Negative for cough and shortness of breath. Chest tightness: some at times, will use Albuterol inhaler as needed and helps.   Cardiovascular:  Negative for chest pain, palpitations and leg swelling.   Gastrointestinal:  Negative for abdominal pain, blood in stool, constipation, diarrhea and GERD.   Genitourinary:  Negative for dysuria. Frequency: no change, drinks a lot of water.  Skin:  Rash: see HPI.   Neurological:  Negative for dizziness,  "light-headedness and headache.   Hematological:  Bruises/bleeds easily: see HPI.       Objective   Vitals:    01/10/24 0916   BP: 122/76   BP Location: Left arm   Patient Position: Sitting   Cuff Size: Adult   Pulse: 72   SpO2: 98%   Weight: 67.1 kg (148 lb)   Height: 171.5 cm (67.5\")     Body mass index is 22.84 kg/m².    Physical Exam  Vitals and nursing note reviewed.   Constitutional:       General: She is not in acute distress.     Appearance: She is well-developed and well-groomed. She is not diaphoretic.   HENT:      Head: Normocephalic.      Right Ear: External ear normal.      Left Ear: External ear normal.   Eyes:      Conjunctiva/sclera: Conjunctivae normal.   Neck:      Vascular: No carotid bruit.   Cardiovascular:      Rate and Rhythm: Normal rate and regular rhythm.      Pulses: Normal pulses.      Heart sounds: Normal heart sounds. No murmur heard.  Pulmonary:      Effort: Pulmonary effort is normal. No respiratory distress.      Breath sounds: Normal breath sounds.   Abdominal:      General: Bowel sounds are normal.      Palpations: Abdomen is soft. There is no hepatomegaly or splenomegaly.      Tenderness: There is no abdominal tenderness (mild, chronic). There is no guarding.   Musculoskeletal:      Right hand: Normal pulse.      Left hand: No bony tenderness. Tenderness: with palpation between 1st and 2nd fingers.Normal range of motion. Decreased strength: slight compared to right.Normal pulse.      Cervical back: Normal range of motion and neck supple.      Right lower leg: No edema.      Left lower leg: No edema.   Skin:     General: Skin is warm and dry.          Neurological:      Mental Status: She is alert and oriented to person, place, and time.   Psychiatric:         Mood and Affect: Mood normal.         Behavior: Behavior normal.         Thought Content: Thought content normal.         Judgment: Judgment normal.         Lab Results   Component Value Date    WBC 7.61 12/14/2023    RBC 4.27 " 12/14/2023    HGB 12.1 12/14/2023    HCT 37.3 12/14/2023    MCV 87.4 12/14/2023    MCH 28.3 12/14/2023    MCHC 32.4 12/14/2023    RDW 13.8 12/14/2023    RDWSD 45.1 10/20/2023    MPV 12.2 (H) 10/20/2023     12/14/2023    NEUTRORELPCT 64.8 12/14/2023    LYMPHORELPCT 27.9 12/14/2023    MONORELPCT 5.4 12/14/2023    EOSRELPCT 0.7 12/14/2023    BASORELPCT 1.1 12/14/2023    AUTOIGPER 0.3 10/20/2023    NEUTROABS 4.94 12/14/2023    LYMPHSABS 2.12 12/14/2023    MONOSABS 0.41 12/14/2023    EOSABS 0.05 12/14/2023    BASOSABS 0.08 12/14/2023    AUTOIGNUM 0.02 10/20/2023    NRBC 0.0 12/14/2023     Lab Results   Component Value Date    GLUCOSE 97 12/14/2023    BUN 10 12/14/2023    CREATININE 0.62 12/14/2023    EGFRIFNONA 108 03/20/2020    EGFRIFAFRI >60 10/28/2020    BCR 16.1 12/14/2023    K 4.4 12/14/2023    CO2 31.4 (H) 12/14/2023    CALCIUM 9.8 12/14/2023    PROTENTOTREF 6.7 12/14/2023    ALBUMIN 4.7 12/14/2023    LABIL2 2.4 12/14/2023    AST 15 12/14/2023    ALT 20 12/14/2023      Lab Results   Component Value Date    CHLPL 101 12/14/2023    TRIG 96 12/14/2023    HDL 51 12/14/2023    VLDL 18 12/14/2023    LDL 32 12/14/2023     Lab Results   Component Value Date    TSH 0.327 09/13/2023     Lab Results   Component Value Date    HGBA1C 6.80 (H) 12/14/2023       Assessment    Problem List Items Addressed This Visit       Type 2 diabetes mellitus    Current Assessment & Plan     Diabetes is improving with treatment.   Continue current treatment regimen.  Regular aerobic exercise.  Reminded to get yearly retinal exam.  Diabetes will be reassessed in 3 months.  Continue Levemir daily and Metformin twice daily.  Blood sugar stable off Tradjenta due to cost.         Hypercholesterolemia    Current Assessment & Plan     Continue Atorvastatin daily.  Continue to work on healthy diet and exercise.         Skin lesion of left arm    Current Assessment & Plan     Try Vaseline on scabbed areas on left arm.         Relevant Orders     Ambulatory Referral to Dermatology    Tendency to bleed    Relevant Orders    CBC & Differential    Gastroesophageal reflux disease    Current Assessment & Plan     Stable.  Continue Pantoprazole daily.         Relevant Medications    pantoprazole (Protonix) 20 MG EC tablet    Left hand pain - Primary    Current Assessment & Plan     Continue Tylenol as needed.         Relevant Orders    Ambulatory Referral to Hand Surgery         Return in about 3 months (around 4/10/2024) for Recheck.or sooner if symptoms persist or worsen.  Will refer to dermatology for further evaluation of skin lesions on left arm.

## 2024-01-11 PROBLEM — K21.9 GASTROESOPHAGEAL REFLUX DISEASE: Status: ACTIVE | Noted: 2024-01-11

## 2024-01-11 PROBLEM — M79.642 LEFT HAND PAIN: Status: ACTIVE | Noted: 2024-01-11

## 2024-01-11 PROBLEM — L98.9 SKIN LESION OF LEFT ARM: Status: ACTIVE | Noted: 2024-01-11

## 2024-01-11 PROBLEM — D69.9: Status: ACTIVE | Noted: 2024-01-11

## 2024-01-11 LAB
BASOPHILS # BLD AUTO: 0.06 10*3/MM3 (ref 0–0.2)
BASOPHILS NFR BLD AUTO: 0.7 % (ref 0–1.5)
EOSINOPHIL # BLD AUTO: 0.11 10*3/MM3 (ref 0–0.4)
EOSINOPHIL NFR BLD AUTO: 1.4 % (ref 0.3–6.2)
ERYTHROCYTE [DISTWIDTH] IN BLOOD BY AUTOMATED COUNT: 13.1 % (ref 12.3–15.4)
HCT VFR BLD AUTO: 37.2 % (ref 34–46.6)
HGB BLD-MCNC: 12.3 G/DL (ref 12–15.9)
IMM GRANULOCYTES # BLD AUTO: 0.02 10*3/MM3 (ref 0–0.05)
IMM GRANULOCYTES NFR BLD AUTO: 0.2 % (ref 0–0.5)
LYMPHOCYTES # BLD AUTO: 2.29 10*3/MM3 (ref 0.7–3.1)
LYMPHOCYTES NFR BLD AUTO: 28.5 % (ref 19.6–45.3)
MCH RBC QN AUTO: 28.5 PG (ref 26.6–33)
MCHC RBC AUTO-ENTMCNC: 33.1 G/DL (ref 31.5–35.7)
MCV RBC AUTO: 86.1 FL (ref 79–97)
MONOCYTES # BLD AUTO: 0.5 10*3/MM3 (ref 0.1–0.9)
MONOCYTES NFR BLD AUTO: 6.2 % (ref 5–12)
NEUTROPHILS # BLD AUTO: 5.06 10*3/MM3 (ref 1.7–7)
NEUTROPHILS NFR BLD AUTO: 63 % (ref 42.7–76)
NRBC BLD AUTO-RTO: 0.1 /100 WBC (ref 0–0.2)
PLATELET # BLD AUTO: 289 10*3/MM3 (ref 140–450)
RBC # BLD AUTO: 4.32 10*6/MM3 (ref 3.77–5.28)
WBC # BLD AUTO: 8.04 10*3/MM3 (ref 3.4–10.8)

## 2024-01-11 NOTE — ASSESSMENT & PLAN NOTE
Diabetes is improving with treatment.   Continue current treatment regimen.  Regular aerobic exercise.  Reminded to get yearly retinal exam.  Diabetes will be reassessed in 3 months.  Continue Levemir daily and Metformin twice daily.  Blood sugar stable off Tradjenta due to cost.

## 2024-01-27 ENCOUNTER — APPOINTMENT (OUTPATIENT)
Dept: GENERAL RADIOLOGY | Facility: HOSPITAL | Age: 64
End: 2024-01-27
Payer: MEDICARE

## 2024-01-27 ENCOUNTER — HOSPITAL ENCOUNTER (OUTPATIENT)
Facility: HOSPITAL | Age: 64
Setting detail: OBSERVATION
Discharge: HOME | End: 2024-01-28
Attending: EMERGENCY MEDICINE | Admitting: EMERGENCY MEDICINE
Payer: MEDICARE

## 2024-01-27 DIAGNOSIS — R10.13 EPIGASTRIC PAIN: ICD-10-CM

## 2024-01-27 DIAGNOSIS — R07.9 CHEST PAIN, UNSPECIFIED TYPE: Primary | ICD-10-CM

## 2024-01-27 LAB
ALBUMIN SERPL-MCNC: 4.4 G/DL (ref 3.5–5.2)
ALBUMIN/GLOB SERPL: 2 G/DL
ALP SERPL-CCNC: 57 U/L (ref 39–117)
ALT SERPL W P-5'-P-CCNC: 15 U/L (ref 1–33)
ANION GAP SERPL CALCULATED.3IONS-SCNC: 9.7 MMOL/L (ref 5–15)
AST SERPL-CCNC: 12 U/L (ref 1–32)
BASOPHILS # BLD AUTO: 0.05 10*3/MM3 (ref 0–0.2)
BASOPHILS NFR BLD AUTO: 0.6 % (ref 0–1.5)
BILIRUB SERPL-MCNC: 0.3 MG/DL (ref 0–1.2)
BUN SERPL-MCNC: 7 MG/DL (ref 8–23)
BUN/CREAT SERPL: 14 (ref 7–25)
CALCIUM SPEC-SCNC: 9.7 MG/DL (ref 8.6–10.5)
CHLORIDE SERPL-SCNC: 103 MMOL/L (ref 98–107)
CO2 SERPL-SCNC: 28.3 MMOL/L (ref 22–29)
CREAT SERPL-MCNC: 0.5 MG/DL (ref 0.57–1)
DEPRECATED RDW RBC AUTO: 41.5 FL (ref 37–54)
EGFRCR SERPLBLD CKD-EPI 2021: 104.2 ML/MIN/1.73
EOSINOPHIL # BLD AUTO: 0.11 10*3/MM3 (ref 0–0.4)
EOSINOPHIL NFR BLD AUTO: 1.4 % (ref 0.3–6.2)
ERYTHROCYTE [DISTWIDTH] IN BLOOD BY AUTOMATED COUNT: 13.3 % (ref 12.3–15.4)
GEN 5 2HR TROPONIN T REFLEX: 8 NG/L
GLOBULIN UR ELPH-MCNC: 2.2 GM/DL
GLUCOSE SERPL-MCNC: 151 MG/DL (ref 65–99)
HCT VFR BLD AUTO: 36.9 % (ref 34–46.6)
HGB BLD-MCNC: 11.5 G/DL (ref 12–15.9)
HOLD SPECIMEN: NORMAL
HOLD SPECIMEN: NORMAL
IMM GRANULOCYTES # BLD AUTO: 0.01 10*3/MM3 (ref 0–0.05)
IMM GRANULOCYTES NFR BLD AUTO: 0.1 % (ref 0–0.5)
INR PPP: 0.98 (ref 0.9–1.1)
LIPASE SERPL-CCNC: 17 U/L (ref 13–60)
LYMPHOCYTES # BLD AUTO: 2.27 10*3/MM3 (ref 0.7–3.1)
LYMPHOCYTES NFR BLD AUTO: 28.9 % (ref 19.6–45.3)
MCH RBC QN AUTO: 26.9 PG (ref 26.6–33)
MCHC RBC AUTO-ENTMCNC: 31.2 G/DL (ref 31.5–35.7)
MCV RBC AUTO: 86.4 FL (ref 79–97)
MONOCYTES # BLD AUTO: 0.45 10*3/MM3 (ref 0.1–0.9)
MONOCYTES NFR BLD AUTO: 5.7 % (ref 5–12)
NEUTROPHILS NFR BLD AUTO: 4.97 10*3/MM3 (ref 1.7–7)
NEUTROPHILS NFR BLD AUTO: 63.3 % (ref 42.7–76)
NRBC BLD AUTO-RTO: 0 /100 WBC (ref 0–0.2)
NT-PROBNP SERPL-MCNC: 309 PG/ML (ref 0–900)
PLATELET # BLD AUTO: 240 10*3/MM3 (ref 140–450)
PMV BLD AUTO: 10.6 FL (ref 6–12)
POTASSIUM SERPL-SCNC: 4.5 MMOL/L (ref 3.5–5.2)
PROT SERPL-MCNC: 6.6 G/DL (ref 6–8.5)
PROTHROMBIN TIME: 13.1 SECONDS (ref 11.7–14.2)
QT INTERVAL: 403 MS
QTC INTERVAL: 426 MS
RBC # BLD AUTO: 4.27 10*6/MM3 (ref 3.77–5.28)
SODIUM SERPL-SCNC: 141 MMOL/L (ref 136–145)
TROPONIN T DELTA: 0 NG/L
TROPONIN T SERPL HS-MCNC: 7 NG/L
TROPONIN T SERPL HS-MCNC: 8 NG/L
WBC NRBC COR # BLD AUTO: 7.86 10*3/MM3 (ref 3.4–10.8)
WHOLE BLOOD HOLD COAG: NORMAL
WHOLE BLOOD HOLD SPECIMEN: NORMAL

## 2024-01-27 PROCEDURE — 99284 EMERGENCY DEPT VISIT MOD MDM: CPT

## 2024-01-27 PROCEDURE — 96375 TX/PRO/DX INJ NEW DRUG ADDON: CPT

## 2024-01-27 PROCEDURE — 25010000002 MORPHINE PER 10 MG: Performed by: EMERGENCY MEDICINE

## 2024-01-27 PROCEDURE — 84484 ASSAY OF TROPONIN QUANT: CPT | Performed by: EMERGENCY MEDICINE

## 2024-01-27 PROCEDURE — 85025 COMPLETE CBC W/AUTO DIFF WBC: CPT | Performed by: EMERGENCY MEDICINE

## 2024-01-27 PROCEDURE — 85610 PROTHROMBIN TIME: CPT | Performed by: EMERGENCY MEDICINE

## 2024-01-27 PROCEDURE — 93010 ELECTROCARDIOGRAM REPORT: CPT | Performed by: INTERNAL MEDICINE

## 2024-01-27 PROCEDURE — 83690 ASSAY OF LIPASE: CPT | Performed by: EMERGENCY MEDICINE

## 2024-01-27 PROCEDURE — 93005 ELECTROCARDIOGRAM TRACING: CPT | Performed by: STUDENT IN AN ORGANIZED HEALTH CARE EDUCATION/TRAINING PROGRAM

## 2024-01-27 PROCEDURE — 93005 ELECTROCARDIOGRAM TRACING: CPT

## 2024-01-27 PROCEDURE — 71045 X-RAY EXAM CHEST 1 VIEW: CPT

## 2024-01-27 PROCEDURE — G0378 HOSPITAL OBSERVATION PER HR: HCPCS

## 2024-01-27 PROCEDURE — 25010000002 ONDANSETRON PER 1 MG: Performed by: EMERGENCY MEDICINE

## 2024-01-27 PROCEDURE — 83880 ASSAY OF NATRIURETIC PEPTIDE: CPT | Performed by: EMERGENCY MEDICINE

## 2024-01-27 PROCEDURE — 36415 COLL VENOUS BLD VENIPUNCTURE: CPT

## 2024-01-27 PROCEDURE — 96376 TX/PRO/DX INJ SAME DRUG ADON: CPT

## 2024-01-27 PROCEDURE — 63710000001 INSULIN GLARGINE PER 5 UNITS: Performed by: STUDENT IN AN ORGANIZED HEALTH CARE EDUCATION/TRAINING PROGRAM

## 2024-01-27 PROCEDURE — 84484 ASSAY OF TROPONIN QUANT: CPT | Performed by: STUDENT IN AN ORGANIZED HEALTH CARE EDUCATION/TRAINING PROGRAM

## 2024-01-27 PROCEDURE — 96374 THER/PROPH/DIAG INJ IV PUSH: CPT

## 2024-01-27 PROCEDURE — 80053 COMPREHEN METABOLIC PANEL: CPT | Performed by: EMERGENCY MEDICINE

## 2024-01-27 RX ORDER — ASPIRIN 81 MG/1
81 TABLET, CHEWABLE ORAL DAILY
Status: DISCONTINUED | OUTPATIENT
Start: 2024-01-27 | End: 2024-01-28 | Stop reason: HOSPADM

## 2024-01-27 RX ORDER — CHOLECALCIFEROL (VITAMIN D3) 125 MCG
5 CAPSULE ORAL NIGHTLY PRN
Status: DISCONTINUED | OUTPATIENT
Start: 2024-01-27 | End: 2024-01-28 | Stop reason: HOSPADM

## 2024-01-27 RX ORDER — CLOPIDOGREL BISULFATE 75 MG/1
75 TABLET ORAL DAILY
Status: DISCONTINUED | OUTPATIENT
Start: 2024-01-28 | End: 2024-01-28 | Stop reason: HOSPADM

## 2024-01-27 RX ORDER — ONDANSETRON 2 MG/ML
4 INJECTION INTRAMUSCULAR; INTRAVENOUS EVERY 6 HOURS PRN
Status: DISCONTINUED | OUTPATIENT
Start: 2024-01-27 | End: 2024-01-28 | Stop reason: HOSPADM

## 2024-01-27 RX ORDER — SODIUM CHLORIDE 0.9 % (FLUSH) 0.9 %
10 SYRINGE (ML) INJECTION AS NEEDED
Status: DISCONTINUED | OUTPATIENT
Start: 2024-01-27 | End: 2024-01-28 | Stop reason: HOSPADM

## 2024-01-27 RX ORDER — ERGOCALCIFEROL 1.25 MG/1
50000 CAPSULE ORAL WEEKLY
Status: DISCONTINUED | OUTPATIENT
Start: 2024-01-27 | End: 2024-01-28 | Stop reason: HOSPADM

## 2024-01-27 RX ORDER — ONDANSETRON 2 MG/ML
4 INJECTION INTRAMUSCULAR; INTRAVENOUS ONCE
Status: COMPLETED | OUTPATIENT
Start: 2024-01-27 | End: 2024-01-27

## 2024-01-27 RX ORDER — PANTOPRAZOLE SODIUM 40 MG/10ML
40 INJECTION, POWDER, LYOPHILIZED, FOR SOLUTION INTRAVENOUS ONCE
Status: COMPLETED | OUTPATIENT
Start: 2024-01-27 | End: 2024-01-27

## 2024-01-27 RX ORDER — NITROGLYCERIN 0.4 MG/1
0.4 TABLET SUBLINGUAL
Status: DISCONTINUED | OUTPATIENT
Start: 2024-01-27 | End: 2024-01-28 | Stop reason: HOSPADM

## 2024-01-27 RX ORDER — MORPHINE SULFATE 2 MG/ML
2 INJECTION, SOLUTION INTRAMUSCULAR; INTRAVENOUS ONCE
Status: COMPLETED | OUTPATIENT
Start: 2024-01-27 | End: 2024-01-27

## 2024-01-27 RX ORDER — AMOXICILLIN 250 MG
2 CAPSULE ORAL 2 TIMES DAILY
Status: DISCONTINUED | OUTPATIENT
Start: 2024-01-27 | End: 2024-01-28 | Stop reason: HOSPADM

## 2024-01-27 RX ORDER — ALBUTEROL SULFATE 2.5 MG/3ML
2.5 SOLUTION RESPIRATORY (INHALATION) EVERY 6 HOURS PRN
Status: DISCONTINUED | OUTPATIENT
Start: 2024-01-27 | End: 2024-01-28 | Stop reason: HOSPADM

## 2024-01-27 RX ORDER — PANTOPRAZOLE SODIUM 20 MG/1
20 TABLET, DELAYED RELEASE ORAL DAILY
Status: DISCONTINUED | OUTPATIENT
Start: 2024-01-27 | End: 2024-01-27

## 2024-01-27 RX ORDER — SODIUM CHLORIDE 0.9 % (FLUSH) 0.9 %
10 SYRINGE (ML) INJECTION EVERY 12 HOURS SCHEDULED
Status: DISCONTINUED | OUTPATIENT
Start: 2024-01-27 | End: 2024-01-28 | Stop reason: HOSPADM

## 2024-01-27 RX ORDER — ATORVASTATIN CALCIUM 20 MG/1
40 TABLET, FILM COATED ORAL DAILY
Status: DISCONTINUED | OUTPATIENT
Start: 2024-01-27 | End: 2024-01-28 | Stop reason: HOSPADM

## 2024-01-27 RX ORDER — ACETAMINOPHEN 325 MG/1
650 TABLET ORAL EVERY 4 HOURS PRN
Status: DISCONTINUED | OUTPATIENT
Start: 2024-01-27 | End: 2024-01-28 | Stop reason: HOSPADM

## 2024-01-27 RX ORDER — ONDANSETRON 4 MG/1
4 TABLET, ORALLY DISINTEGRATING ORAL EVERY 6 HOURS PRN
Status: DISCONTINUED | OUTPATIENT
Start: 2024-01-27 | End: 2024-01-28 | Stop reason: HOSPADM

## 2024-01-27 RX ORDER — PANTOPRAZOLE SODIUM 40 MG/1
40 TABLET, DELAYED RELEASE ORAL DAILY
Status: DISCONTINUED | OUTPATIENT
Start: 2024-01-27 | End: 2024-01-28 | Stop reason: HOSPADM

## 2024-01-27 RX ORDER — ASPIRIN 325 MG
325 TABLET ORAL ONCE
Status: COMPLETED | OUTPATIENT
Start: 2024-01-27 | End: 2024-01-27

## 2024-01-27 RX ORDER — POLYETHYLENE GLYCOL 3350 17 G/17G
17 POWDER, FOR SOLUTION ORAL DAILY PRN
Status: DISCONTINUED | OUTPATIENT
Start: 2024-01-27 | End: 2024-01-28 | Stop reason: HOSPADM

## 2024-01-27 RX ORDER — LISINOPRIL 10 MG/1
5 TABLET ORAL DAILY
Status: DISCONTINUED | OUTPATIENT
Start: 2024-01-27 | End: 2024-01-28 | Stop reason: HOSPADM

## 2024-01-27 RX ORDER — DIPHENOXYLATE HYDROCHLORIDE AND ATROPINE SULFATE 2.5; .025 MG/1; MG/1
1 TABLET ORAL DAILY
Status: DISCONTINUED | OUTPATIENT
Start: 2024-01-27 | End: 2024-01-28 | Stop reason: HOSPADM

## 2024-01-27 RX ORDER — SODIUM CHLORIDE 9 MG/ML
40 INJECTION, SOLUTION INTRAVENOUS AS NEEDED
Status: DISCONTINUED | OUTPATIENT
Start: 2024-01-27 | End: 2024-01-28 | Stop reason: HOSPADM

## 2024-01-27 RX ORDER — MORPHINE SULFATE 2 MG/ML
2 INJECTION, SOLUTION INTRAMUSCULAR; INTRAVENOUS ONCE
Status: COMPLETED | OUTPATIENT
Start: 2024-01-28 | End: 2024-01-27

## 2024-01-27 RX ORDER — BISACODYL 10 MG
10 SUPPOSITORY, RECTAL RECTAL DAILY PRN
Status: DISCONTINUED | OUTPATIENT
Start: 2024-01-27 | End: 2024-01-28 | Stop reason: HOSPADM

## 2024-01-27 RX ORDER — ESCITALOPRAM OXALATE 20 MG/1
20 TABLET ORAL DAILY
Status: DISCONTINUED | OUTPATIENT
Start: 2024-01-28 | End: 2024-01-28 | Stop reason: HOSPADM

## 2024-01-27 RX ORDER — METOPROLOL SUCCINATE 25 MG/1
25 TABLET, EXTENDED RELEASE ORAL DAILY
Status: DISCONTINUED | OUTPATIENT
Start: 2024-01-27 | End: 2024-01-28 | Stop reason: HOSPADM

## 2024-01-27 RX ORDER — BISACODYL 5 MG/1
5 TABLET, DELAYED RELEASE ORAL DAILY PRN
Status: DISCONTINUED | OUTPATIENT
Start: 2024-01-27 | End: 2024-01-28 | Stop reason: HOSPADM

## 2024-01-27 RX ADMIN — NITROGLYCERIN 0.4 MG: 0.4 TABLET SUBLINGUAL at 23:07

## 2024-01-27 RX ADMIN — ASPIRIN 81 MG: 81 TABLET, CHEWABLE ORAL at 20:32

## 2024-01-27 RX ADMIN — ACETAMINOPHEN 325MG 650 MG: 325 TABLET ORAL at 23:07

## 2024-01-27 RX ADMIN — MORPHINE SULFATE 2 MG: 2 INJECTION, SOLUTION INTRAMUSCULAR; INTRAVENOUS at 13:25

## 2024-01-27 RX ADMIN — ATORVASTATIN CALCIUM 40 MG: 20 TABLET, FILM COATED ORAL at 20:32

## 2024-01-27 RX ADMIN — ONDANSETRON 4 MG: 2 INJECTION INTRAMUSCULAR; INTRAVENOUS at 13:25

## 2024-01-27 RX ADMIN — Medication 10 ML: at 20:33

## 2024-01-27 RX ADMIN — MORPHINE SULFATE 2 MG: 2 INJECTION, SOLUTION INTRAMUSCULAR; INTRAVENOUS at 17:26

## 2024-01-27 RX ADMIN — INSULIN GLARGINE 20 UNITS: 100 INJECTION, SOLUTION SUBCUTANEOUS at 20:32

## 2024-01-27 RX ADMIN — LISINOPRIL 5 MG: 10 TABLET ORAL at 20:32

## 2024-01-27 RX ADMIN — PANTOPRAZOLE SODIUM 40 MG: 40 INJECTION, POWDER, FOR SOLUTION INTRAVENOUS at 14:41

## 2024-01-27 RX ADMIN — ASPIRIN 325 MG: 325 TABLET ORAL at 13:25

## 2024-01-27 RX ADMIN — MORPHINE SULFATE 2 MG: 2 INJECTION, SOLUTION INTRAMUSCULAR; INTRAVENOUS at 23:27

## 2024-01-27 NOTE — LETTER
January 28, 2024     Patient: Gely Kimble   YOB: 1960   Date of Visit: 1/27/2024       To Whom It May Concern,    It is my medical opinion that Gely Kimble may return to work on 1/30/24.           Sincerely,        PARISA Smith        CC:   No Recipients

## 2024-01-27 NOTE — PLAN OF CARE
Goal Outcome Evaluation:patient came to obs unit for eval/tx of chest pain. Morphine iv per md order does not seem to reflect in substantial pain level change. Patient will be seen by cardiology while and consult is called.

## 2024-01-27 NOTE — ED NOTES
"Nursing report ED to floor  Gely Kimble  63 y.o.  female    HPI :   Chief Complaint   Patient presents with    Chest Pain       Admitting doctor:   Armaan Keane MD    Admitting diagnosis:   The primary encounter diagnosis was Chest pain, unspecified type. A diagnosis of Epigastric pain was also pertinent to this visit.    Code status:   Current Code Status       Date Active Code Status Order ID Comments User Context       Prior            Allergies:   Patient has no known allergies.    Isolation:   No active isolations    Intake and Output  No intake or output data in the 24 hours ending 01/27/24 1453    Weight:       01/27/24  1257   Weight: 65.8 kg (145 lb)       Most recent vitals:   Vitals:    01/27/24 1242 01/27/24 1257 01/27/24 1258 01/27/24 1442   BP:  120/74     BP Location:  Left arm     Patient Position:  Sitting     Pulse: 75   70   Resp:   16    Temp: 98.3 °F (36.8 °C)      SpO2: 98%   97%   Weight:  65.8 kg (145 lb)     Height:  170.2 cm (67\")         Active LDAs/IV Access:   Lines, Drains & Airways       Active LDAs       Name Placement date Placement time Site Days    Peripheral IV 01/27/24 1259 Right Antecubital 01/27/24  1259  Antecubital  less than 1                    Labs (abnormal labs have a star):   Labs Reviewed   COMPREHENSIVE METABOLIC PANEL - Abnormal; Notable for the following components:       Result Value    Glucose 151 (*)     BUN 7 (*)     Creatinine 0.50 (*)     All other components within normal limits    Narrative:     GFR Normal >60  Chronic Kidney Disease <60  Kidney Failure <15     CBC WITH AUTO DIFFERENTIAL - Abnormal; Notable for the following components:    Hemoglobin 11.5 (*)     MCHC 31.2 (*)     All other components within normal limits   TROPONIN - Normal    Narrative:     High Sensitive Troponin T Reference Range:  <14.0 ng/L- Negative Female for AMI  <22.0 ng/L- Negative Male for AMI  >=14 - Abnormal Female indicating possible myocardial injury.  >=22 - Abnormal " Male indicating possible myocardial injury.   Clinicians would have to utilize clinical acumen, EKG, Troponin, and serial changes to determine if it is an Acute Myocardial Infarction or myocardial injury due to an underlying chronic condition.        PROTIME-INR - Normal   BNP (IN-HOUSE) - Normal    Narrative:     This assay is used as an aid in the diagnosis of individuals suspected of having heart failure. It can be used as an aid in the diagnosis of acute decompensated heart failure (ADHF) in patients presenting with signs and symptoms of ADHF to the emergency department (ED). In addition, NT-proBNP of <300 pg/mL indicates ADHF is not likely.    Age Range Result Interpretation  NT-proBNP Concentration (pg/mL:      <50             Positive            >450                   Gray                 300-450                    Negative             <300    50-75           Positive            >900                  Gray                300-900                  Negative            <300      >75             Positive            >1800                  Gray                300-1800                  Negative            <300   LIPASE - Normal   RAINBOW DRAW    Narrative:     The following orders were created for panel order [place] Draw.  Procedure                               Abnormality         Status                     ---------                               -----------         ------                     Green Top (Gel)[[id number]]                                  Final result               Lavender Top[[id number]]                                     Final result               Gold Top - SST[[id number]]                                   Final result               Light Blue Top[[id number]]                                   Final result                 Please view results for these tests on the individual orders.   HIGH SENSITIVITIY TROPONIN T 2HR   CBC AND DIFFERENTIAL    Narrative:     The following orders were created for panel  order CBC & Differential.  Procedure                               Abnormality         Status                     ---------                               -----------         ------                     CBC Auto Differential[[id number]]        Abnormal            Final result                 Please view results for these tests on the individual orders.   GREEN TOP   LAVENDER TOP   GOLD TOP - SST   LIGHT BLUE TOP       EKG:   ECG 12 Lead Chest Pain   Preliminary Result   HEART RATE= 67  bpm   RR Interval= 896  ms   MO Interval= 145  ms   P Horizontal Axis= -5  deg   P Front Axis= 78  deg   QRSD Interval= 96  ms   QT Interval= 403  ms   QTcB= 426  ms   QRS Axis= 83  deg   T Wave Axis= 38  deg   - BORDERLINE ECG -   Sinus rhythm   Borderline right axis deviation   Low voltage, precordial leads   RSR' in V1 or V2, probably normal variant   Electronically Signed By:    Date and Time of Study: 2024 12:46:02          Meds given in ED:   Medications   sodium chloride 0.9 % flush 10 mL (has no administration in time range)   aspirin tablet 325 mg (325 mg Oral Given 24 1325)   ondansetron (ZOFRAN) injection 4 mg (4 mg Intravenous Given 24 1325)   morphine injection 2 mg (2 mg Intravenous Given 24 1325)   pantoprazole (PROTONIX) injection 40 mg (40 mg Intravenous Given 24 1441)       Imaging results:  XR Chest 1 View    Result Date: 2024  No focal pulmonary consolidation. Mild central vascular prominence. Follow-up as clinical indications persist.  This report was finalized on 2024 1:43 PM by Dr. Donta Thompson M.D on Workstation: [id number]       Ambulatory status:   - with assist    Social issues:   Social History     Socioeconomic History    Marital status:    Tobacco Use    Smoking status: Former     Packs/day: 1.5     Types: Cigarettes     Quit date: 10/11/2017     Years since quittin.2    Tobacco comments:     Previously smoked about 1.5 packs per day for about 45 years    Vaping Use    Vaping Use: Never used   Substance and Sexual Activity    Alcohol use: No     Comment: rare margaritia 1-2 times per year    Drug use: No     Comment: in past; clean since 2007    Sexual activity: Not Currently       NIH Stroke Scale:       Reza Romero RN  01/27/24 14:53 EST

## 2024-01-27 NOTE — H&P
[facility]   HISTORY AND PHYSICAL    Patient Name: Gely Kimble  : 1960  MRN: [id number]  Primary Care Physician:  Sofi Fernandes APRN  Date of admission: 2024    Subjective   Subjective     Chief Complaint:   Chief Complaint   Patient presents with    Chest Pain         HPI:    Gely Kimble is a 63 y.o. female with a history of coronary artery disease, type 2 diabetes, hypertension, COPD who presents to [facility] ER with chest pain.  Patient reports chest pain that is waxed and waned over the past 4 to 5 days.  She reports she has had this dull ache in the center of her chest that sometimes will radiate to the back that will become severe in intensity like a tightness.  She reports associated nausea but denies any shortness of breath or palpitations.  Denies any lightheadedness.  She does report she has had some dizziness with ambulation at times.  She denies any recent swelling in her legs.  Denies any fevers or chills.  Denies abdominal pain.  Denies cough and sore throat.  Reports adherence to home medications.    Review of Systems   All systems were reviewed and negative except for: what is mentioned above in the HPI.    Personal History     Past Medical History:   Diagnosis Date    Abnormal nuclear stress test     Arthritis     COPD (chronic obstructive pulmonary disease)     Elevated cholesterol     Emphysema of lung     Gallbladder abscess     Herpes zoster 2023    History of positive hepatitis C     previously positive, never treated, negative RNA finding    Hypertension     Myocardial perfusion defect 2016    Type 2 diabetes mellitus        Past Surgical History:   Procedure Laterality Date    APPENDECTOMY      CARDIAC CATHETERIZATION N/A 10/25/2023    Procedure: Left Heart Cath;  Surgeon: Tasneem Hayes MD;  Location: [facility] CATH INVASIVE LOCATION;  Service: Cardiovascular;  Laterality: N/A;    CARDIAC CATHETERIZATION N/A 10/25/2023    Procedure:  Left ventriculography;  Surgeon: Tasneem Hayes MD;  Location:  [facility] CATH INVASIVE LOCATION;  Service: Cardiovascular;  Laterality: N/A;    CARDIAC CATHETERIZATION N/A 10/25/2023    Procedure: Stent LEYLA coronary;  Surgeon: Tasneem Hayes MD;  Location:  [facility] CATH INVASIVE LOCATION;  Service: Cardiovascular;  Laterality: N/A;    CARDIAC CATHETERIZATION N/A 10/25/2023    Procedure: Coronary angiography;  Surgeon: Tasneem Hayes MD;  Location:  [facility] CATH INVASIVE LOCATION;  Service: Cardiovascular;  Laterality: N/A;    CARDIAC CATHETERIZATION N/A 10/25/2023    Procedure: Percutaneous Coronary Intervention;  Surgeon: Tasneem Hayes MD;  Location:  [facility] CATH INVASIVE LOCATION;  Service: Cardiovascular;  Laterality: N/A;    CARDIAC CATHETERIZATION N/A 11/3/2023    Procedure: Percutaneous Coronary Intervention RCA;  Surgeon: Tasneem Hayes MD;  Location:  [facility] CATH INVASIVE LOCATION;  Service: Cardiovascular;  Laterality: N/A;    CARDIAC CATHETERIZATION N/A 11/3/2023    Procedure: Coronary angiography;  Surgeon: Tasneem Hayes MD;  Location:  [facility] CATH INVASIVE LOCATION;  Service: Cardiovascular;  Laterality: N/A;    CARDIAC CATHETERIZATION N/A 11/3/2023    Procedure: Stent LEYLA coronary;  Surgeon: Tasneem Hayes MD;  [facility];  Service: Cardiovascular;  Laterality: N/A;    CHOLECYSTECTOMY      HYSTERECTOMY      TUBAL ABDOMINAL LIGATION         Family History: family history includes Cancer in her paternal grandfather; Diabetes in her brother, father, and mother; Heart disease in her maternal grandmother and mother. Otherwise pertinent FHx was reviewed and not pertinent to current issue.    Social History:  reports that she quit smoking about 6 years ago. Her smoking use included cigarettes. She smoked an average of 1.5 packs per day. She does not have any smokeless tobacco history on file. She reports that she does not drink  alcohol and does not use drugs.    Home Medications:  Homeopathic Products, Insulin Syringe, Loratadine, albuterol, albuterol sulfate HFA, aspirin, atorvastatin, clopidogrel, escitalopram, insulin detemir, lisinopril, metFORMIN ER, metoprolol succinate XL, multivitamin, nitroglycerin, pantoprazole, and vitamin D    Allergies:  No Known Allergies    Objective   Objective     Vitals:   Temp:  [98.3 °F (36.8 °C)] 98.3 °F (36.8 °C)  Heart Rate:  [70-75] 70  Resp:  [16] 16  BP: (120)/(74) 120/74  Physical Exam    Constitutional: 63-year-old female in no acute distress on room air   Eyes: PERRLA, sclerae anicteric, no conjunctival injection   HENT: NCAT, mucous membranes moist   Neck: Supple, no thyromegaly, no lymphadenopathy, trachea midline   Respiratory: Clear to auscultation bilaterally, nonlabored respirations    Cardiovascular: RRR, no murmurs, rubs, or gallops, palpable pedal pulses bilaterally   Gastrointestinal: Positive bowel sounds, soft, nontender, nondistended   Musculoskeletal: No bilateral ankle edema, no clubbing or cyanosis to extremities   Psychiatric: Appropriate affect, cooperative   Neurologic: Oriented x 3, strength symmetric in all extremities, Cranial Nerves grossly intact to confrontation, speech clear   Skin: No rashes     Result Review    Result Review:  I have personally reviewed the results from the time of this admission to 1/27/2024 16:05 EST and agree with these findings:  [x]  Laboratory list / accordion  []  Microbiology  [x]  Radiology  [x]  EKG/Telemetry   []  Cardiology/Vascular   []  Pathology  [x]  Old records  []  Other:  Most notable findings include:     XR Chest 1 View    Result Date: 1/27/2024  XR CHEST 1 VW-  HISTORY: Female who is 63 years-old, chest pain  TECHNIQUE: Frontal view of the chest  COMPARISON: 9/13/2023  FINDINGS: The heart size is normal. Aorta is calcified. Pulmonary vasculature shows mild central prominence. No focal pulmonary consolidation, pleural effusion,  or pneumothorax. No acute osseous process.      No focal pulmonary consolidation. Mild central vascular prominence. Follow-up as clinical indications persist.  This report was finalized on 1/27/2024 1:43 PM by Dr. Donta Thompson M.D on Workstation: [id number]           Assessment & Plan   Assessment / Plan     Brief Patient Summary:  Gely Kimble is a 63 y.o. female who is being admitted the observation unit for further evaluation of chest pain.  In the ER high-sensitivity troponins negative and EKG with no acute ischemia.  Chest x-ray showed mild central vascular prominence, no focal pulmonary consolidation.    Patient had a heart cath 11/3/2023 that revealed 90% PDA stenosis reduced to 0% with cardiac stent.  Patient follows with Dr. Hayes.  We have consulted cardiology will continue to trend troponins keep patient on telemetry monitoring.    Active Hospital Problems:  Active Hospital Problems    Diagnosis     **Chest pain      Plan:     Chest pain  Coronary artery disease  -High-sensitivity troponin negative x 2.  -EKG no acute ischemia  -Chest x-ray negative acute  -Cardiology consulted  -Continue aspirin, Plavix and statin  -Nitro as needed for chest pain  -Telemetry monitoring  -N.p.o. after midnight    Hypertension  -Continue lisinopril and metoprolol  -Monitor vital signs every 4 hours    Type 2 diabetes  -Hold home metformin  -Continue basal insulin  -Sliding scale insulin and Accu-Cheks with meals and at bedtime    COPD  -No acute issues  -Albuterol as needed    DVT prophylaxis:  Mechanical DVT prophylaxis orders are present.        CODE STATUS:    Code Status (Patient has no pulse and is not breathing): CPR (Attempt to Resuscitate)  Medical Interventions (Patient has pulse or is breathing): Full Support    Admission Status:  I believe this patient meets observation status.    77 minutes have been spent by [facility] providers in the care of this patient while  under observation status.      Appropriate PPE worn during patient encounter.  Hand hygeine performed before and after seeing the patient.      Electronically signed by Jacquie Bauer PA-C, 01/27/24, 4:05 PM EST.

## 2024-01-27 NOTE — ED PROVIDER NOTES
EMERGENCY DEPARTMENT ENCOUNTER    Room Number:  118/1  PCP: Sofi Fernandes APRN  Independent Historians: Patient    HPI:  Chief Complaint: Chest pain  A complete HPI/ROS/PMH/PSH/SH/FH are unobtainable due to: None    Chronic or social conditions impacting patient care (Social Determinants of Health): None      Context: Gely Kimble is a 63 y.o. female with a medical history of coronary artery disease, diabetes, hyperlipidemia, GERD who presents to the ED c/o acute chest pain for the past 4 to 5 days.  Patient says she has a dull discomfort that is constant that she rates a 6 out of 10 currently that is epigastric.  Occasionally she will have more severe episodes of sharper pain that is much worse.  She has not noted any aggravating or alleviating factors for that sharper pain.  Patient endorses nausea but no shortness of breath and no syncope.  She denies any lower extremity edema but endorses calf pain occasionally.  Patient denies any cough or recent illnesses.        Review of prior external notes (non-ED) -and- Review of prior external test results outside of this encounter: I reviewed most recent primary care note from nurse practitioner Dom from January 10.  Patient treated for her diabetes, hypercholesterolemia, GERD, and some left hand pain for which she was referred to hand surgery to shawn further.    Patient had a heart cath November 3 after an abnormal stress test with Dr. Hayes and had a stent placed due to 90% stenosis of the PDA branch of the RCA    Prescription drug monitoring program review:     N/A    PAST MEDICAL HISTORY  Active Ambulatory Problems     Diagnosis Date Noted    Type 2 diabetes mellitus 06/03/2017    Pulmonary emphysema 06/03/2017    Arthritis 06/03/2017    Hypercholesterolemia 06/03/2017    Essential hypertension 06/03/2017    Abnormal liver function tests 02/16/2016    Impingement syndrome of shoulder region 07/03/2013    Neck pain 05/08/2013    Postmenopause  08/03/2023    Restless leg syndrome 08/03/2023    Urinary frequency 08/03/2023    Vitamin D deficiency 08/03/2023    Fatigue 08/03/2023    Moderate episode of recurrent major depressive disorder 08/03/2023    Generalized anxiety disorder 08/03/2023    Insomnia 08/03/2023    Skin lesion of back 08/17/2023    Nausea 09/13/2023    Incomplete right bundle branch block (RBBB) 09/13/2023    Chest pain 09/13/2023    Coronary artery disease of native artery of native heart with stable angina pectoris 10/25/2023    Skin lesion of left arm 01/11/2024    Tendency to bleed 01/11/2024    Gastroesophageal reflux disease 01/11/2024    Left hand pain 01/11/2024     Resolved Ambulatory Problems     Diagnosis Date Noted    Obesity 06/03/2017    Myocardial perfusion defect 02/16/2016    Tobacco use 02/12/2016    Anxiety 08/03/2023    Herpes zoster 08/03/2023    Dizziness 08/03/2023    Decreased thyroid stimulating hormone (TSH) level 09/13/2023    Abnormal nuclear stress test 10/12/2023     Past Medical History:   Diagnosis Date    COPD (chronic obstructive pulmonary disease)     Elevated cholesterol     Emphysema of lung     Gallbladder abscess     History of positive hepatitis C     Hypertension          PAST SURGICAL HISTORY  Past Surgical History:   Procedure Laterality Date    APPENDECTOMY      CARDIAC CATHETERIZATION N/A 10/25/2023    Procedure: Left Heart Cath;  Surgeon: Tasneem Hayes MD;  [facility];  Service: Cardiovascular;  Laterality: N/A;    CARDIAC CATHETERIZATION N/A 10/25/2023    Procedure: Left ventriculography;  Surgeon: Tasneem Hayes MD;  Location:  [facility] CATH INVASIVE LOCATION;  Service: Cardiovascular;  Laterality: N/A;    CARDIAC CATHETERIZATION N/A 10/25/2023    Procedure: Stent LEYLA coronary;  Surgeon: Tasneem Hayes MD;  Location:  [facility] CATH INVASIVE LOCATION;  Service: Cardiovascular;  Laterality: N/A;    CARDIAC CATHETERIZATION N/A 10/25/2023     Procedure: Coronary angiography;  Surgeon: Tasneem Hayes MD;  Location:  [facility] CATH INVASIVE LOCATION;  Service: Cardiovascular;  Laterality: N/A;    CARDIAC CATHETERIZATION N/A 10/25/2023    Procedure: Percutaneous Coronary Intervention;  Surgeon: Tasneem Hayes MD;  Location:  [facility] CATH INVASIVE LOCATION;  Service: Cardiovascular;  Laterality: N/A;    CARDIAC CATHETERIZATION N/A 11/3/2023    Procedure: Percutaneous Coronary Intervention RCA;  Surgeon: Tasneem Hayes MD;  Location:  [facility] CATH INVASIVE LOCATION;  Service: Cardiovascular;  Laterality: N/A;    CARDIAC CATHETERIZATION N/A 11/3/2023    Procedure: Coronary angiography;  Surgeon: Tasneem Hayes MD;  Location:  [facility] CATH INVASIVE LOCATION;  Service: Cardiovascular;  Laterality: N/A;    CARDIAC CATHETERIZATION N/A 11/3/2023    Procedure: Stent LEYLA coronary;  Surgeon: Tasneem Hayes MD;  Location:  [facility] CATH INVASIVE LOCATION;  Service: Cardiovascular;  Laterality: N/A;    CHOLECYSTECTOMY      HYSTERECTOMY      TUBAL ABDOMINAL LIGATION           FAMILY HISTORY  Family History   Problem Relation Age of Onset    Diabetes Mother     Heart disease Mother     Diabetes Father     Diabetes Brother     Heart disease Maternal Grandmother     Cancer Paternal Grandfather          SOCIAL HISTORY  Social History     Socioeconomic History    Marital status:    Tobacco Use    Smoking status: Former     Packs/day: 1.5     Types: Cigarettes     Quit date: 10/11/2017     Years since quittin.2    Tobacco comments:     Previously smoked about 1.5 packs per day for about 45 years   Vaping Use    Vaping Use: Never used   Substance and Sexual Activity    Alcohol use: No     Comment: rare margaritia 1-2 times per year    Drug use: No     Comment: in past; clean since     Sexual activity: Not Currently         ALLERGIES  Patient has no known allergies.        REVIEW OF SYSTEMS  Review of Systems  Included in HPI  All  systems reviewed and negative except for those discussed in HPI.      PHYSICAL EXAM    I have reviewed the triage vital signs and nursing notes.    ED Triage Vitals [01/27/24 1242]   Temp Heart Rate Resp BP SpO2   98.3 °F (36.8 °C) 75 -- -- 98 %      Temp src Heart Rate Source Patient Position BP Location FiO2 (%)   -- -- -- -- --       Physical Exam  GENERAL: Pleasant cooperative and conversant female, alert, no acute distress  SKIN: Warm, dry  HENT: Normocephalic, atraumatic  EYES: no scleral icterus  CV: regular rhythm, regular rate  RESPIRATORY: normal effort, lungs clear  ABDOMEN: soft, mild tenderness to palpation in epigastrium without any rebound or guarding, nondistended  MUSCULOSKELETAL: no deformity  NEURO: alert, moves all extremities, follows commands                                                                 LAB RESULTS  Recent Results (from the past 24 hour(s))   ECG 12 Lead Chest Pain    Collection Time: 01/27/24 12:46 PM   Result Value Ref Range    QT Interval 403 ms    QTC Interval 426 ms   Comprehensive Metabolic Panel    Collection Time: 01/27/24 12:59 PM    Specimen: Blood   Result Value Ref Range    Glucose 151 (H) 65 - 99 mg/dL    BUN 7 (L) 8 - 23 mg/dL    Creatinine 0.50 (L) 0.57 - 1.00 mg/dL    Sodium 141 136 - 145 mmol/L    Potassium 4.5 3.5 - 5.2 mmol/L    Chloride 103 98 - 107 mmol/L    CO2 28.3 22.0 - 29.0 mmol/L    Calcium 9.7 8.6 - 10.5 mg/dL    Total Protein 6.6 6.0 - 8.5 g/dL    Albumin 4.4 3.5 - 5.2 g/dL    ALT (SGPT) 15 1 - 33 U/L    AST (SGOT) 12 1 - 32 U/L    Alkaline Phosphatase 57 39 - 117 U/L    Total Bilirubin 0.3 0.0 - 1.2 mg/dL    Globulin 2.2 gm/dL    A/G Ratio 2.0 g/dL    BUN/Creatinine Ratio 14.0 7.0 - 25.0    Anion Gap 9.7 5.0 - 15.0 mmol/L    eGFR 104.2 >60.0 mL/min/1.73   High Sensitivity Troponin T    Collection Time: 01/27/24 12:59 PM    Specimen: Blood   Result Value Ref Range    HS Troponin T 8 <14 ng/L   Protime-INR    Collection Time: 01/27/24 12:59 PM     Specimen: Blood   Result Value Ref Range    Protime 13.1 11.7 - 14.2 Seconds    INR 0.98 0.90 - 1.10   BNP    Collection Time: 01/27/24 12:59 PM    Specimen: Blood   Result Value Ref Range    proBNP 309.0 0.0 - 900.0 pg/mL   Green Top (Gel)    Collection Time: 01/27/24 12:59 PM   Result Value Ref Range    Extra Tube Hold for add-ons.    Lavender Top    Collection Time: 01/27/24 12:59 PM   Result Value Ref Range    Extra Tube hold for add-on    Gold Top - SST    Collection Time: 01/27/24 12:59 PM   Result Value Ref Range    Extra Tube Hold for add-ons.    Light Blue Top    Collection Time: 01/27/24 12:59 PM   Result Value Ref Range    Extra Tube Hold for add-ons.    CBC Auto Differential    Collection Time: 01/27/24 12:59 PM    Specimen: Blood   Result Value Ref Range    WBC 7.86 3.40 - 10.80 10*3/mm3    RBC 4.27 3.77 - 5.28 10*6/mm3    Hemoglobin 11.5 (L) 12.0 - 15.9 g/dL    Hematocrit 36.9 34.0 - 46.6 %    MCV 86.4 79.0 - 97.0 fL    MCH 26.9 26.6 - 33.0 pg    MCHC 31.2 (L) 31.5 - 35.7 g/dL    RDW 13.3 12.3 - 15.4 %    RDW-SD 41.5 37.0 - 54.0 fl    MPV 10.6 6.0 - 12.0 fL    Platelets 240 140 - 450 10*3/mm3    Neutrophil % 63.3 42.7 - 76.0 %    Lymphocyte % 28.9 19.6 - 45.3 %    Monocyte % 5.7 5.0 - 12.0 %    Eosinophil % 1.4 0.3 - 6.2 %    Basophil % 0.6 0.0 - 1.5 %    Immature Grans % 0.1 0.0 - 0.5 %    Neutrophils, Absolute 4.97 1.70 - 7.00 10*3/mm3    Lymphocytes, Absolute 2.27 0.70 - 3.10 10*3/mm3    Monocytes, Absolute 0.45 0.10 - 0.90 10*3/mm3    Eosinophils, Absolute 0.11 0.00 - 0.40 10*3/mm3    Basophils, Absolute 0.05 0.00 - 0.20 10*3/mm3    Immature Grans, Absolute 0.01 0.00 - 0.05 10*3/mm3    nRBC 0.0 0.0 - 0.2 /100 WBC   Lipase    Collection Time: 01/27/24 12:59 PM    Specimen: Blood   Result Value Ref Range    Lipase 17 13 - 60 U/L   High Sensitivity Troponin T 2Hr    Collection Time: 01/27/24  2:56 PM    Specimen: Blood   Result Value Ref Range    HS Troponin T 8 <14 ng/L    Troponin T Delta 0 >=-4 -  <+4 ng/L         RADIOLOGY  XR Chest 1 View    Result Date: 1/27/2024  XR CHEST 1 VW-  HISTORY: Female who is 63 years-old, chest pain  TECHNIQUE: Frontal view of the chest  COMPARISON: 9/13/2023  FINDINGS: The heart size is normal. Aorta is calcified. Pulmonary vasculature shows mild central prominence. No focal pulmonary consolidation, pleural effusion, or pneumothorax. No acute osseous process.      No focal pulmonary consolidation. Mild central vascular prominence. Follow-up as clinical indications persist.  This report was finalized on 1/27/2024 1:43 PM by Dr. Donta Thompson M.D on Workstation: [id number]         MEDICATIONS GIVEN IN ER  Medications   sodium chloride 0.9 % flush 10 mL (has no administration in time range)   sodium chloride 0.9 % flush 10 mL (has no administration in time range)   sodium chloride 0.9 % flush 10 mL (has no administration in time range)   sodium chloride 0.9 % infusion 40 mL (has no administration in time range)   sennosides-docusate (PERICOLACE) 8.6-50 MG per tablet 2 tablet (has no administration in time range)     And   polyethylene glycol (MIRALAX) packet 17 g (has no administration in time range)     And   bisacodyl (DULCOLAX) EC tablet 5 mg (has no administration in time range)     And   bisacodyl (DULCOLAX) suppository 10 mg (has no administration in time range)   ondansetron ODT (ZOFRAN-ODT) disintegrating tablet 4 mg (has no administration in time range)     Or   ondansetron (ZOFRAN) injection 4 mg (has no administration in time range)   nitroglycerin (NITROSTAT) SL tablet 0.4 mg (has no administration in time range)   melatonin tablet 5 mg (has no administration in time range)   acetaminophen (TYLENOL) tablet 650 mg (has no administration in time range)   albuterol (PROVENTIL) nebulizer solution 0.083% 2.5 mg/3mL (has no administration in time range)   aspirin chewable tablet 81 mg (has no administration in time range)   atorvastatin (LIPITOR) tablet 40 mg (has no  administration in time range)   clopidogrel (PLAVIX) tablet 75 mg (has no administration in time range)   escitalopram (LEXAPRO) tablet 20 mg (has no administration in time range)   insulin glargine (LANTUS, SEMGLEE) injection 20 Units (has no administration in time range)   lisinopril (PRINIVIL,ZESTRIL) tablet 5 mg (has no administration in time range)   metoprolol succinate XL (TOPROL-XL) 24 hr tablet 25 mg (has no administration in time range)   multivitamin (THERAGRAN) tablet 1 tablet (has no administration in time range)   vitamin D (ERGOCALCIFEROL) capsule 50,000 Units (has no administration in time range)   pantoprazole (PROTONIX) EC tablet 40 mg (has no administration in time range)   aspirin tablet 325 mg (325 mg Oral Given 1/27/24 1325)   ondansetron (ZOFRAN) injection 4 mg (4 mg Intravenous Given 1/27/24 1325)   morphine injection 2 mg (2 mg Intravenous Given 1/27/24 1325)   pantoprazole (PROTONIX) injection 40 mg (40 mg Intravenous Given 1/27/24 1441)   morphine injection 2 mg (2 mg Intravenous Given 1/27/24 1726)         ORDERS PLACED DURING THIS VISIT:  Orders Placed This Encounter   Procedures    XR Chest 1 View    [facility] Draw    Comprehensive Metabolic Panel    High Sensitivity Troponin T    Protime-INR    BNP    CBC Auto Differential    Lipase    High Sensitivity Troponin T 2Hr    CBC (No Diff)    Basic Metabolic Panel    High Sensitivity Troponin T    Diet: Cardiac Diets, Diabetic Diets; Healthy Heart (2-3 Na+); Consistent Carbohydrate; Texture: Regular Texture (IDDSI 7); Fluid Consistency: Thin (IDDSI 0)    Undress & Gown    Intake & Output    Weigh Patient    Oral Care    Place Sequential Compression Device    Maintain Sequential Compression Device    Telemetry - Maintain IV Access    Telemetry - Place Orders & Notify Provider of Results When Patient Experiences Acute Chest Pain, Dysrhythmia or Respiratory Distress    May Be Off Telemetry for Tests    Vital Signs    Up With Assistance    Notify  Provider (With Default Parameters)    Opioid Administration - Document EtCO2 and / or SpO2 With Each Set of Vitals & Any Change in Patient Status    Opioid Administration - Notify Provider Hypercapnic Monitoring    Opioid Administration - Continuous Pulse Oximetry (SpO2)    Code Status and Medical Interventions:    Inpatient Cardiology Consult    Inpatient Consult to Advance Care Planning    Oxygen Therapy- Nasal Cannula; Titrate 1-6 LPM Per SpO2; 90 - 95%    ECG 12 Lead Chest Pain    ECG 12 Lead Chest Pain    Insert Peripheral IV    Insert Peripheral IV    Initiate ED Observation Status    CBC & Differential    Green Top (Gel)    Lavender Top    Gold Top - SST    Light Blue Top         OUTPATIENT MEDICATION MANAGEMENT:  Current Facility-Administered Medications Ordered in Epic   Medication Dose Route Frequency Provider Last Rate Last Admin    acetaminophen (TYLENOL) tablet 650 mg  650 mg Oral Q4H PRN Jacquie Bauer PA-C        albuterol (PROVENTIL) nebulizer solution 0.083% 2.5 mg/3mL  2.5 mg Nebulization Q6H PRN Jacquie Bauer PA-C        aspirin chewable tablet 81 mg  81 mg Oral Daily Jacquie Bauer PA-C        atorvastatin (LIPITOR) tablet 40 mg  40 mg Oral Daily Jacquie aBuer PA-C        sennosides-docusate (PERICOLACE) 8.6-50 MG per tablet 2 tablet  2 tablet Oral BID Jacquie Bauer PA-C        And    polyethylene glycol (MIRALAX) packet 17 g  17 g Oral Daily PRN Jacquie Bauer PA-C        And    bisacodyl (DULCOLAX) EC tablet 5 mg  5 mg Oral Daily PRN Jacquie Bauer PA-C        And    bisacodyl (DULCOLAX) suppository 10 mg  10 mg Rectal Daily PRN Jacquie Bauer PA-C        [START ON 1/28/2024] clopidogrel (PLAVIX) tablet 75 mg  75 mg Oral Daily Jacquie Bauer PA-C        [START ON 1/28/2024] escitalopram (LEXAPRO) tablet 20 mg  20 mg Oral Daily Jacquie Bauer PA-C        insulin glargine (LANTUS, SEMGLEE) injection 20 Units  20 Units  Subcutaneous Nightly Jacquie Bauer PA-C        lisinopril (PRINIVIL,ZESTRIL) tablet 5 mg  5 mg Oral Daily Jacquie Bauer PA-C        melatonin tablet 5 mg  5 mg Oral Nightly PRN Jacquie Bauer PA-C        metoprolol succinate XL (TOPROL-XL) 24 hr tablet 25 mg  25 mg Oral Daily Jacquie Bauer PA-C        multivitamin (THERAGRAN) tablet 1 tablet  1 tablet Oral Daily Jacquie Bauer PA-C        nitroglycerin (NITROSTAT) SL tablet 0.4 mg  0.4 mg Sublingual Q5 Min PRN Jacquie Bauer PA-C        ondansetron ODT (ZOFRAN-ODT) disintegrating tablet 4 mg  4 mg Oral Q6H PRN Jacquie Bauer PA-C        Or    ondansetron (ZOFRAN) injection 4 mg  4 mg Intravenous Q6H PRN Jacquie Bauer PA-C        pantoprazole (PROTONIX) EC tablet 40 mg  40 mg Oral Daily Armaan Keane MD        sodium chloride 0.9 % flush 10 mL  10 mL Intravenous PRN Deanne Castillo MD        sodium chloride 0.9 % flush 10 mL  10 mL Intravenous Q12H Jacquie Bauer PA-C        sodium chloride 0.9 % flush 10 mL  10 mL Intravenous PRN Jacquie Bauer PA-JOHNSON        sodium chloride 0.9 % infusion 40 mL  40 mL Intravenous PRN Jacquie Bauer PA-C        vitamin D (ERGOCALCIFEROL) capsule 50,000 Units  50,000 Units Oral Weekly Jacquie Bauer PA-C         No current [facility]-ordered outpatient medications on file.               PROGRESS, DATA ANALYSIS, CONSULTS, AND MEDICAL DECISION MAKING  All labs have been independently interpreted by me.  All radiology studies have been reviewed by me. All EKG's have been independently viewed and interpreted by me.  Discussion below represents my analysis of pertinent findings related to patient's condition, differential diagnosis, treatment plan and final disposition.    Differential diagnosis includes but is not limited to   The differential diagnosis for this patient includes gastritis, PUD, H. Pylori infection, atypical ACS, pancreatitis,  cholecystitis/ biliary colic, esophagitis, esophageal spasm, appendicitis, sbo, pneumonia/pneumothorax, hepatitis, AAA, Aortic dissection, bowel perforation, and malignancy. Abdominal exam is without peritoneal signs. There is no evidence of acute abdomen at this time. The patient is well appearing. I have a low suspicion for acute hepatobiliary disease (including acute cholecystitis), acute pancreatitis, PUD (including perforation), acute infectious processes (pneumonia, hepatitis, pyelonephritis), acute appendicitis, vascular catastrophe, bowel obstruction or viscus perforation. Plan serum labs including serial troponin, urinalysis, pain control (ppi +/- gi cocktail), IMAGING chest x-ray, and serial reassessment..    Clinical Scores: HEART Score: 4                  ED Course as of 01/27/24 1932   Sat Jan 27, 2024   1406 EKG ER MD interpretation   Time: 12: 46  Rhythm and rate: Normal sinus rhythm at a rate of 67  Axis: Normal  P waves: Normal  QRS complexes: Normal except for RSR' in lead V1  ST segments: no elevation nor depressions  T waves: Inversion in V1  Unchanged from prior EKG done October 25, 2023 [AR]   1441 I discussed all results with patient and family at bedside.  Patient is very nervous given that she had stent placement and November.  Family and patient amenable to observation stay for serial troponins.    I discussed patient's case with Tish our provider in the observation unit who is amenable to admitting the patient for further care [AR]      ED Course User Index  [AR] Deanne Castillo MD             AS OF 19:32 EST VITALS:    BP - 124/68  HR - 68  TEMP - 97.9 °F (36.6 °C) (Oral)  O2 SATS - 95%      COMPLEXITY OF CARE  The patient requires admission.    DIAGNOSIS  Final diagnoses:   Chest pain, unspecified type   Epigastric pain         DISPOSITION  ED Disposition       ED Disposition   Decision to Admit    Condition   --    Comment   Observation unit                Please note that  portions of this document were completed with a voice recognition program.    Note Disclaimer: At [facility], we believe that sharing information builds trust and better relationships. You are receiving this note because you recently visited [facility]. It is possible you will see health information before a provider has talked with you about it. This kind of information can be easy to misunderstand. To help you fully understand what it means for your health, we urge you to discuss this note with your provider.         Deanne Castillo MD  01/27/24 1933

## 2024-01-28 ENCOUNTER — READMISSION MANAGEMENT (OUTPATIENT)
Dept: CALL CENTER | Facility: HOSPITAL | Age: 64
End: 2024-01-28
Payer: MEDICARE

## 2024-01-28 VITALS
HEART RATE: 65 BPM | HEIGHT: 67 IN | RESPIRATION RATE: 18 BRPM | OXYGEN SATURATION: 97 % | BODY MASS INDEX: 22.7 KG/M2 | SYSTOLIC BLOOD PRESSURE: 122 MMHG | WEIGHT: 144.6 LBS | TEMPERATURE: 97.3 F | DIASTOLIC BLOOD PRESSURE: 65 MMHG

## 2024-01-28 LAB
ANION GAP SERPL CALCULATED.3IONS-SCNC: 10 MMOL/L (ref 5–15)
BUN SERPL-MCNC: 10 MG/DL (ref 8–23)
BUN/CREAT SERPL: 21.3 (ref 7–25)
CALCIUM SPEC-SCNC: 9.3 MG/DL (ref 8.6–10.5)
CHLORIDE SERPL-SCNC: 103 MMOL/L (ref 98–107)
CO2 SERPL-SCNC: 27 MMOL/L (ref 22–29)
CREAT SERPL-MCNC: 0.47 MG/DL (ref 0.57–1)
D DIMER PPP FEU-MCNC: 0.34 MCGFEU/ML (ref 0–0.65)
DEPRECATED RDW RBC AUTO: 44.3 FL (ref 37–54)
EGFRCR SERPLBLD CKD-EPI 2021: 105.8 ML/MIN/1.73
ERYTHROCYTE [DISTWIDTH] IN BLOOD BY AUTOMATED COUNT: 13.6 % (ref 12.3–15.4)
GLUCOSE SERPL-MCNC: 110 MG/DL (ref 65–99)
HCT VFR BLD AUTO: 35.7 % (ref 34–46.6)
HGB BLD-MCNC: 11.4 G/DL (ref 12–15.9)
MCH RBC QN AUTO: 28.2 PG (ref 26.6–33)
MCHC RBC AUTO-ENTMCNC: 31.9 G/DL (ref 31.5–35.7)
MCV RBC AUTO: 88.4 FL (ref 79–97)
PLATELET # BLD AUTO: 204 10*3/MM3 (ref 140–450)
PMV BLD AUTO: 11.2 FL (ref 6–12)
POTASSIUM SERPL-SCNC: 3.9 MMOL/L (ref 3.5–5.2)
QT INTERVAL: 405 MS
QT INTERVAL: 435 MS
QTC INTERVAL: 422 MS
QTC INTERVAL: 424 MS
RBC # BLD AUTO: 4.04 10*6/MM3 (ref 3.77–5.28)
SODIUM SERPL-SCNC: 140 MMOL/L (ref 136–145)
WBC NRBC COR # BLD AUTO: 7.22 10*3/MM3 (ref 3.4–10.8)

## 2024-01-28 PROCEDURE — 85379 FIBRIN DEGRADATION QUANT: CPT | Performed by: INTERNAL MEDICINE

## 2024-01-28 PROCEDURE — G0378 HOSPITAL OBSERVATION PER HR: HCPCS

## 2024-01-28 PROCEDURE — 85027 COMPLETE CBC AUTOMATED: CPT | Performed by: STUDENT IN AN ORGANIZED HEALTH CARE EDUCATION/TRAINING PROGRAM

## 2024-01-28 PROCEDURE — 93010 ELECTROCARDIOGRAM REPORT: CPT | Performed by: INTERNAL MEDICINE

## 2024-01-28 PROCEDURE — 25010000002 MORPHINE PER 10 MG: Performed by: EMERGENCY MEDICINE

## 2024-01-28 PROCEDURE — 25810000003 SODIUM CHLORIDE 0.9 % SOLUTION: Performed by: STUDENT IN AN ORGANIZED HEALTH CARE EDUCATION/TRAINING PROGRAM

## 2024-01-28 PROCEDURE — 99214 OFFICE O/P EST MOD 30 MIN: CPT | Performed by: INTERNAL MEDICINE

## 2024-01-28 PROCEDURE — 80048 BASIC METABOLIC PNL TOTAL CA: CPT | Performed by: STUDENT IN AN ORGANIZED HEALTH CARE EDUCATION/TRAINING PROGRAM

## 2024-01-28 PROCEDURE — 93005 ELECTROCARDIOGRAM TRACING: CPT | Performed by: STUDENT IN AN ORGANIZED HEALTH CARE EDUCATION/TRAINING PROGRAM

## 2024-01-28 PROCEDURE — 96376 TX/PRO/DX INJ SAME DRUG ADON: CPT

## 2024-01-28 RX ORDER — PREDNISONE 10 MG/1
TABLET ORAL
Qty: 7 TABLET | Refills: 0 | Status: SHIPPED | OUTPATIENT
Start: 2024-01-28 | End: 2024-02-02

## 2024-01-28 RX ORDER — MORPHINE SULFATE 2 MG/ML
2 INJECTION, SOLUTION INTRAMUSCULAR; INTRAVENOUS ONCE
Status: COMPLETED | OUTPATIENT
Start: 2024-01-28 | End: 2024-01-28

## 2024-01-28 RX ADMIN — SODIUM CHLORIDE 500 ML: 9 INJECTION, SOLUTION INTRAVENOUS at 08:09

## 2024-01-28 RX ADMIN — CLOPIDOGREL BISULFATE 75 MG: 75 TABLET, FILM COATED ORAL at 09:14

## 2024-01-28 RX ADMIN — ACETAMINOPHEN 325MG 650 MG: 325 TABLET ORAL at 03:44

## 2024-01-28 RX ADMIN — MORPHINE SULFATE 2 MG: 2 INJECTION, SOLUTION INTRAMUSCULAR; INTRAVENOUS at 09:13

## 2024-01-28 RX ADMIN — PANTOPRAZOLE SODIUM 40 MG: 40 TABLET, DELAYED RELEASE ORAL at 09:16

## 2024-01-28 RX ADMIN — Medication 1 TABLET: at 09:13

## 2024-01-28 NOTE — PLAN OF CARE
Goal Outcome Evaluation:         Patient has discharge and voices understanding. Son is present and will drive. Patient has all belongings.

## 2024-01-28 NOTE — PROGRESS NOTES
JAMES GREENE Attestation Note    I supervised care provided by the midlevel provider.    The NAIMA and I have discussed this patient's history, physical exam, and treatment plan. I have reviewed the documentation and personally had a face to face interaction with the patient  I affirm the documentation and agree with the treatment and plan. I provided a substantive portion of the care of this patient.  I personally performed the physical exam, in its entirety.  My personal findings are documented in below:    History:  Patient presents for evaluation of chest pain for the past 4 to 5 days.  She describes it as a dull ache radiating to the back.  Denies any recent fevers or flulike symptoms.  Follows with Dr. Gallegos.    Physical Exam:  General: No acute distress.  HENT: NCAT, PERRL, Nares patent.  Eyes: no scleral icterus.  Neck: trachea midline, no ROM limitations.  CV: Pink warm and well-perfused throughout  Respiratory: No distress or increased work of breathing  Abdomen: soft, no focal tenderness or rigidity  Musculoskeletal: no deformity.  Neuro: alert, moves all extremities, follows commands.  Skin: warm, dry.,  No diaphoresis    Assessment and Plan:  Chest pain: Follow troponin trends.  Cardiology consult.  Monitor telemetry    Hypertension: Lisinopril and metoprolol    Type 2 diabetes: Accu-Cheks, sliding scale insulin and basal insulin

## 2024-01-28 NOTE — PLAN OF CARE
Goal Outcome Evaluation:      Patient admitted to the observation unit for treatment of chest pain. Vss. Pain controlled overnight with medications. Current pain level below 6. Cardiology consulting. Will continue to monitor for any changes

## 2024-01-28 NOTE — CONSULTS
Patient Name: Gely Kimble  :1960  63 y.o.    Date of Admission: 2024  Date of Consultation:  24  Encounter Provider: Lori Coffman MD  Place of Service: [facility] CARDIOLOGY  Referring Provider: Armaan Keane MD  Patient Care Team:  Sofi Fernandes APRN as PCP - General (Family Medicine)      Chief complaint: Chest pain    History of Present Illness:    This is a pleasant 63-year-old woman who usually sees Dr. Hayes.  She had atypical chest pain in the fall  which was persistent despite a normal stress test.  She then underwent cardiac catheterization and had severe mid LAD as well as proximal PDA disease, both of which were intervened upon with good result.  I reviewed the films personally.  He has been compliant with her medications.  She has felt well since then without angina.  Yesterday she developed a sharp stabbing chest pain over related constant dullness.  There is worsening with deep inspiration.  No reflux or heartburn.  No exertional component.  She doesn't do regular exercise. She is nonsmoker.   EKG is unremarkable, no ischemic changes, NSR. HS Troponin <10 x3.     Past Medical History:   Diagnosis Date    Abnormal nuclear stress test     Arthritis     COPD (chronic obstructive pulmonary disease)     Elevated cholesterol     Emphysema of lung     Gallbladder abscess     Herpes zoster 2023    History of positive hepatitis C     previously positive, never treated, negative RNA finding    Hypertension     Myocardial perfusion defect 2016    Type 2 diabetes mellitus        Past Surgical History:   Procedure Laterality Date    APPENDECTOMY      CARDIAC CATHETERIZATION N/A 10/25/2023    Procedure: Left Heart Cath;  Surgeon: Tasneem Hayes MD;  Location: [facility] CATH INVASIVE LOCATION;  Service: Cardiovascular;  Laterality: N/A;    CARDIAC CATHETERIZATION N/A 10/25/2023    Procedure: Left ventriculography;  Surgeon:  Tasneem Hayes MD;  Location:  [facility] CATH INVASIVE LOCATION;  Service: Cardiovascular;  Laterality: N/A;    CARDIAC CATHETERIZATION N/A 10/25/2023    Procedure: Stent LEYLA coronary;  Surgeon: Tasneem Hayes MD;  Location:  [facility] CATH INVASIVE LOCATION;  Service: Cardiovascular;  Laterality: N/A;    CARDIAC CATHETERIZATION N/A 10/25/2023    Procedure: Coronary angiography;  Surgeon: Tasneem Hayes MD;  Location:  [facility] CATH INVASIVE LOCATION;  Service: Cardiovascular;  Laterality: N/A;    CARDIAC CATHETERIZATION N/A 10/25/2023    Procedure: Percutaneous Coronary Intervention;  Surgeon: Tasneem Hayes MD;  Location:  [facility] CATH INVASIVE LOCATION;  Service: Cardiovascular;  Laterality: N/A;    CARDIAC CATHETERIZATION N/A 11/3/2023    Procedure: Percutaneous Coronary Intervention RCA;  Surgeon: Tasneem Hayes MD;  Location:  [facility] CATH INVASIVE LOCATION;  Service: Cardiovascular;  Laterality: N/A;    CARDIAC CATHETERIZATION N/A 11/3/2023    Procedure: Coronary angiography;  Surgeon: Tasneem Hayes MD;  Location:  [facility] CATH INVASIVE LOCATION;  Service: Cardiovascular;  Laterality: N/A;    CARDIAC CATHETERIZATION N/A 11/3/2023    Procedure: Stent LEYLA coronary;  Surgeon: Tasneem Hayes MD;  Location:  [facility] CATH INVASIVE LOCATION;  Service: Cardiovascular;  Laterality: N/A;    CHOLECYSTECTOMY      HYSTERECTOMY      TUBAL ABDOMINAL LIGATION           Prior to Admission medications    Medication Sig Start Date End Date Taking? Authorizing Provider   albuterol (PROVENTIL HFA;VENTOLIN HFA) 108 (90 BASE) MCG/ACT inhaler Inhale 2 puffs Every 4 (Four) Hours As Needed for Wheezing.   Yes Bruna Dunn MD   albuterol (PROVENTIL) (2.5 MG/3ML) 0.083% nebulizer solution Take 2.5 mg by nebulization Every 4 (Four) Hours As Needed for Wheezing.   Yes Bruna Dunn MD   aspirin 81 MG chewable tablet Chew 1 tablet Daily.   Yes Bruna Dunn MD   atorvastatin  "(LIPITOR) 40 MG tablet Take 1 tablet by mouth Daily. 11/29/23  Yes Misty Covington APRN   clopidogrel (PLAVIX) 75 MG tablet Take 1 tablet by mouth Daily. 11/29/23  Yes Misty Covington APRN   escitalopram (Lexapro) 20 MG tablet Take 1 tablet by mouth Daily. 11/14/23  Yes Sofi Fernandes APRN   Homeopathic Products (LEG CRAMP RELIEF PO) Take 1 tablet by mouth Daily.   Yes Bruna Dunn MD   insulin detemir (Levemir) 100 UNIT/ML injection Inject 20 Units under the skin into the appropriate area as directed Every Night. 10/18/23  Yes Sofi Fernandes APRN   Insulin Syringe 31G X 5/16\" 1 ML misc Use 1 syringe Daily. For use with Levemir vials 10/18/23  Yes Sofi Fernandes APRN   lisinopril (PRINIVIL,ZESTRIL) 10 MG tablet Take 0.5 tablets by mouth Daily. 8/2/23  Yes Sofi Fernandes APRN   LORATADINE PO Take 1 tablet by mouth Daily.   Yes ProviderBruna MD   metFORMIN ER (GLUCOPHAGE-XR) 500 MG 24 hr tablet Take 2 tablets by mouth 2 (Two) Times a Day With Meals. 11/5/23  Yes Tasneem Hayes MD   metoprolol succinate XL (TOPROL-XL) 25 MG 24 hr tablet Take 1 tablet by mouth Daily. 11/29/23  Yes Misty Covington APRN   Multiple Vitamin (MULTIVITAMIN) tablet Take 1 tablet by mouth.   Yes ProviderBruna MD   nitroglycerin (Nitrostat) 0.4 MG SL tablet Place 1 tablet under the tongue Every 5 (Five) Minutes As Needed for Chest Pain. Take no more than 3 doses in 15 minutes. 9/14/23  Yes Odette Bowen APRN   pantoprazole (Protonix) 20 MG EC tablet Take 1 tablet by mouth Daily. 1/10/24  Yes Sofi Fernandes APRN   vitamin D (ERGOCALCIFEROL) 1.25 MG (50967 UT) capsule capsule Take 1 capsule by mouth 1 (One) Time Per Week.   Yes Bruna Dunn MD   predniSONE (DELTASONE) 10 MG tablet Take 1 tablet by mouth 2 (Two) Times a Day for 2 days, THEN 1 tablet Daily for 2 days, THEN 0.5 tablets Daily for 1 day. 1/28/24 2/2/24  Jacquie Bauer, VAL       No Known Allergies    Social History "     Socioeconomic History    Marital status:    Tobacco Use    Smoking status: Former     Packs/day: 1.5     Types: Cigarettes     Quit date: 10/11/2017     Years since quittin.3    Tobacco comments:     Previously smoked about 1.5 packs per day for about 45 years   Vaping Use    Vaping Use: Never used   Substance and Sexual Activity    Alcohol use: No     Comment: rare margaritia 1-2 times per year    Drug use: No     Comment: in past; clean since     Sexual activity: Not Currently       Family History   Problem Relation Age of Onset    Diabetes Mother     Heart disease Mother     Diabetes Father     Diabetes Brother     Heart disease Maternal Grandmother     Cancer Paternal Grandfather        REVIEW OF SYSTEMS:   All systems reviewed.  Pertinent positives identified in HPI.  All other systems are negative.      Objective:     Vitals:    24 2300 24 0335 24 0739 24 0900   BP: 122/64 103/59 108/52 122/65   BP Location: Right arm Right arm Left arm    Patient Position: Lying Lying Lying    Pulse: 68 65     Resp: 18 18 18    Temp: 97.6 °F (36.4 °C) 97.8 °F (36.6 °C) 97.3 °F (36.3 °C)    TempSrc: Oral Oral Oral    SpO2: 92% 97%     Weight:       Height:         Body mass index is 22.65 kg/m².    General Appearance:    Alert, cooperative, in no acute distress   Head:    Normocephalic, without obvious abnormality, atraumatic   Eyes:            Lids and lashes normal, conjunctivae and sclerae normal, no icterus, no pallor, corneas clear, PERRLA   Ears:    Ears appear intact with no abnormalities noted   Throat:   No oral lesions, no thrush, oral mucosa moist   Neck:   No adenopathy, supple, trachea midline, no thyromegaly, no carotid bruit, no JVD   Back:     No kyphosis present, no scoliosis present, no skin lesions, erythema or scars, no tenderness to percussion or palpation, range of motion normal   Lungs:     Clear to auscultation, respirations regular, even and unlabored     Heart:    Regular rhythm and normal rate, normal S1 and S2, no murmur, no gallop, no rub, no click   Chest Wall:    No abnormalities observed   Abdomen:     Normal bowel sounds, no masses, no organomegaly, soft, nontender, nondistended, no guarding, no rebound  tenderness   Extremities:   Moves all extremities well, no edema, no cyanosis, no redness   Pulses:   Pulses palpable and equal bilaterally. Normal radial, carotid, femoral, dorsalis pedis and posterior tibial pulses bilaterally. Normal abdominal aorta   Skin:  Psychiatric:   No bleeding, bruising or rash    Alert and oriented x 3, normal mood and affect   Lab Review:     Results from last 7 days   Lab Units 01/28/24  0343 01/27/24  1259   SODIUM mmol/L 140 141   POTASSIUM mmol/L 3.9 4.5   CHLORIDE mmol/L 103 103   CO2 mmol/L 27.0 28.3   BUN mg/dL 10 7*   CREATININE mg/dL 0.47* 0.50*   CALCIUM mg/dL 9.3 9.7   BILIRUBIN mg/dL  --  0.3   ALK PHOS U/L  --  57   ALT (SGPT) U/L  --  15   AST (SGOT) U/L  --  12   GLUCOSE mg/dL 110* 151*     Results from last 7 days   Lab Units 01/27/24  2026 01/27/24  1456 01/27/24  1259   HSTROP T ng/L 7 8 8     Results from last 7 days   Lab Units 01/28/24  0343   WBC 10*3/mm3 7.22   HEMOGLOBIN g/dL 11.4*   HEMATOCRIT % 35.7   PLATELETS 10*3/mm3 204     Results from last 7 days   Lab Units 01/27/24  1259   INR  0.98                       I personally viewed and interpreted the patient's EKG/Telemetry data.        Assessment and Plan:       1.  Atypical chest pain: Her pain is reproduced when palpating the fourth rib where it connects to the sternum.  Most likely this is costochondritis.  She is on dual antiplatelet therapy for multivessel PCI in November 2023.  As a result she cannot take NSAIDs for this.  I have requested the ops unit nurse practitioner provide a short course of oral prednisone for anti-inflammatory.  The patient should avoid any heavy lifting.  She should see Dr. Hayes in follow-up in the next month.  2.  Known CAD. No ACS, normal EKG and troponin chest pain is atypical. Continue DAPT  Lori Coffman MD  01/28/24  15:15 EST

## 2024-01-28 NOTE — DISCHARGE SUMMARY
.ED OBSERVATION PROGRESS/DISCHARGE SUMMARY    Date of Admission: 1/27/2024   LOS: 0 days   PCP: Sofi Fernandes, APRN      Subjective   Patient had 2 episodes of left anterior chest pain that she describes as a dull ache overnight.  Last night pain was improved after sublingual nitro and morphine.  This morning she rates her pain after getting up to go to the bathroom is a 5 out of 10.  Denies any shortness of breath.  Denies lightheadedness dizziness.  Denies palpitations.  Pain has improved later this morning.    Hospital Outcome:   63-year-old female admitted to the observation unit for further evaluation of chest pain.  Serial high-sensitivity troponins have remained negative and EKG shows no acute ischemia.  A chest x-ray revealed mild central vascular prominence, no focal pulmonary consolidation.      Cardiology consulted, suspects costochondritis and recommends short steroid taper.  A D-dimer was added that was negative.  Patient's symptoms have improved.  Advised patient to follow-up with primary care doctor 1 to 2 weeks.  Patient is in agreement with the plan.    ROS:  General: no fevers, chills  Respiratory: no cough, dyspnea  Cardiovascular: no chest pain, palpitations  Abdomen: No abdominal pain, nausea, vomiting, or diarrhea  Neurologic: No focal weakness    Objective   Physical Exam:  I have reviewed the vital signs.  Temp:  [97.3 °F (36.3 °C)-98.3 °F (36.8 °C)] 97.3 °F (36.3 °C)  Heart Rate:  [65-75] 65  Resp:  [16-18] 18  BP: (103-124)/(52-74) 122/65  General Appearance:  63-year-old female in no acute distress on room air  Head:    Normocephalic, atraumatic  Eyes:    Sclerae anicteric  Neck:   Supple, no mass  Lungs: Clear to auscultation bilaterally, respirations unlabored  Heart: Regular rate and rhythm, S1 and S2 normal, no murmur, rub or gallop  Abdomen:  Soft, non-tender, bowel sounds active, nondistended  Extremities: No clubbing, cyanosis, or edema to lower extremities  Pulses:  2+ and  symmetric in distal lower extremities  Skin: No rashes   Neurologic: Oriented x3, Normal strength to extremities    Results Review:    I have reviewed the labs, radiology results and diagnostic studies.    Results from last 7 days   Lab Units 01/28/24  0343   WBC 10*3/mm3 7.22   HEMOGLOBIN g/dL 11.4*   HEMATOCRIT % 35.7   PLATELETS 10*3/mm3 204     Results from last 7 days   Lab Units 01/28/24  0343 01/27/24  1259   SODIUM mmol/L 140 141   POTASSIUM mmol/L 3.9 4.5   CHLORIDE mmol/L 103 103   CO2 mmol/L 27.0 28.3   BUN mg/dL 10 7*   CREATININE mg/dL 0.47* 0.50*   CALCIUM mg/dL 9.3 9.7   BILIRUBIN mg/dL  --  0.3   ALK PHOS U/L  --  57   ALT (SGPT) U/L  --  15   AST (SGOT) U/L  --  12   GLUCOSE mg/dL 110* 151*     Imaging Results (Last 24 Hours)       Procedure Component Value Units Date/Time    XR Chest 1 View [[id number]] Collected: 01/27/24 1341     Updated: 01/27/24 1346    Narrative:      XR CHEST 1 VW-     HISTORY: Female who is 63 years-old, chest pain     TECHNIQUE: Frontal view of the chest     COMPARISON: 9/13/2023     FINDINGS: The heart size is normal. Aorta is calcified. Pulmonary  vasculature shows mild central prominence. No focal pulmonary  consolidation, pleural effusion, or pneumothorax. No acute osseous  process.       Impression:      No focal pulmonary consolidation. Mild central vascular  prominence. Follow-up as clinical indications persist.     This report was finalized on 1/27/2024 1:43 PM by Dr. Donta Thompson M.D on Workstation: [id number]               I have reviewed the medications.  ---------------------------------------------------------------------------------------------  Assessment & Plan   Assessment/Problem List    Chest pain      Plan:  Chest pain  Coronary artery disease  -High-sensitivity troponin negative x 3  -EKG no acute ischemia  -Chest x-ray negative acute  -Cardiology consulted, suspect costochondritis.  Steroid taper at discharge  -Continue aspirin, Plavix and  statin     Hypertension  -Continue lisinopril and metoprolol  -Monitor vital signs every 4 hours     Type 2 diabetes  -Hold home metformin  -Continue basal insulin  -Sliding scale insulin and Accu-Cheks with meals and at bedtime     COPD  -No acute issues  -Albuterol as needed    Disposition: Home    Follow-up after Discharge: PCP, cardiology        Jacquie Bauer PA-C 01/28/24 11:01 EST    I have worn appropriate PPE during this patient encounter, sanitized my hands both with entering and exiting patient's room.      45 Minutes has been spent by [facility] providers in the care of this patient while under observation status

## 2024-01-28 NOTE — OUTREACH NOTE
Prep Survey      Flowsheet Row Responses   [facility] patient discharged from? [place]   Is LACE score < 7 ? Yes   Eligibility [facility]   Date of Admission 01/27/24   Date of Discharge 01/28/24   Discharge Disposition Home or Self Care   Discharge diagnosis Chest pain  Coronary artery disease   Does the patient have one of the following disease processes/diagnoses(primary or secondary)? Other   Does the patient have Home health ordered? No   Is there a DME ordered? No   Prep survey completed? Yes            PATRICIA BARRON - Registered Nurse

## 2024-01-28 NOTE — DISCHARGE INSTRUCTIONS
Start prednisone 10 mg tablets, take 1 tablet by mouth 2 times a day for 2 days, then 1 tablet daily for 2 days, then one half tab let daily for 1 day.  Follow-up with your primary care doctor 1 to 2 weeks.  Keep follow-up appointment with cardiology.    Return to the emergency department with worsening symptoms, uncontrolled pain, inability to tolerate oral liquids, fever greater than 101°F not controlled by Tylenol or as needed with emergent concerns.

## 2024-01-29 ENCOUNTER — TRANSITIONAL CARE MANAGEMENT TELEPHONE ENCOUNTER (OUTPATIENT)
Dept: CALL CENTER | Facility: HOSPITAL | Age: 64
End: 2024-01-29
Payer: MEDICARE

## 2024-01-29 NOTE — OUTREACH NOTE
Call Center TCM Note      Flowsheet Row Responses   [facility] patient discharged from? [place]   Does the patient have one of the following disease processes/diagnoses(primary or secondary)? Other   TCM attempt successful? Yes  [VR-Sister Nan or Bruce Dc]   Call start time 1117   Call end time 1119   Discharge diagnosis Chest pain  Coronary artery disease   Is patient permission given to speak with other caregiver? Yes   List who call center can speak with María Elena   Person spoke with today (if not patient) and relationship María Elena   Meds reviewed with patient/caregiver? Yes   Is the patient having any side effects they believe may be caused by any medication additions or changes? No   Does the patient have all medications ordered at discharge? Yes   Is the patient taking all medications as directed (includes completed medication regime)? Yes   Does the patient have an appointment with their PCP within 7-14 days of discharge? No   Nursing Interventions Patient declined scheduling/rescheduling appointment at this time, Routed TCM call to PCP office   Has home health visited the patient within 72 hours of discharge? N/A   Psychosocial issues? No   Did the patient receive a copy of their discharge instructions? Yes   Nursing interventions Reviewed instructions with patient   What is the patient's perception of their health status since discharge? Improving   Is the patient/caregiver able to teach back signs and symptoms related to disease process for when to call PCP? Yes   Is the patient/caregiver able to teach back signs and symptoms related to disease process for when to call 911? Yes   Is the patient/caregiver able to teach back the hierarchy of who to call/visit for symptoms/problems? PCP, Specialist, Home health nurse, Urgent Care, ED, 911 Yes   TCM call completed? Yes   Wrap up additional comments Son reports patient doing better, pain has improved.   Call end time 1119   Would this patient  benefit from a Referral to [facility] Social Work? No   Is the patient interested in additional calls from an ambulatory ? No            Pepper Chávez RN    1/29/2024, 11:19 EST

## 2024-02-02 DIAGNOSIS — E78.00 HYPERCHOLESTEROLEMIA: ICD-10-CM

## 2024-02-02 RX ORDER — PRAVASTATIN SODIUM 20 MG
20 TABLET ORAL NIGHTLY
Qty: 90 TABLET | Refills: 1 | OUTPATIENT
Start: 2024-02-02

## 2024-02-02 NOTE — TELEPHONE ENCOUNTER
LOV             1/10/2024   NOV            4/10/2024   Last RF         Protocol       not met     pravastatin (PRAVACHOL) 20 MG tablet     The original prescription was discontinued on 11/29/2023 by Misty Covington APRN. Renewing this prescription may not be appropriate.       Ann Hernandez, CHARLAA/LMR

## 2024-02-08 DIAGNOSIS — F41.1 GENERALIZED ANXIETY DISORDER: ICD-10-CM

## 2024-02-08 DIAGNOSIS — F33.1 MODERATE EPISODE OF RECURRENT MAJOR DEPRESSIVE DISORDER: ICD-10-CM

## 2024-02-08 RX ORDER — ESCITALOPRAM OXALATE 20 MG/1
20 TABLET ORAL DAILY
Qty: 30 TABLET | Refills: 2 | Status: SHIPPED | OUTPATIENT
Start: 2024-02-08

## 2024-02-08 NOTE — TELEPHONE ENCOUNTER
Rx Refill Note  Requested Prescriptions     Pending Prescriptions Disp Refills    escitalopram (LEXAPRO) 20 MG tablet [Pharmacy Med Name: ESCITALOPRAM 20 MG TABLET] 30 tablet 2     Sig: TAKE 1 TABLET BY MOUTH DAILY      Last office visit with prescribing clinician: 1/10/2024   Last telemedicine visit with prescribing clinician: Visit date not found   Next office visit with prescribing clinician: 4/10/2024                         Would you like a call back once the refill request has been completed: [] Yes [] No    If the office needs to give you a call back, can they leave a voicemail: [] Yes [] No    Peri Christiansen MA  02/08/24, 13:30 EST

## 2024-03-19 ENCOUNTER — OFFICE VISIT (OUTPATIENT)
Dept: FAMILY MEDICINE CLINIC | Facility: CLINIC | Age: 64
End: 2024-03-19
Payer: MEDICARE

## 2024-03-19 VITALS
HEART RATE: 70 BPM | OXYGEN SATURATION: 99 % | TEMPERATURE: 97 F | HEIGHT: 67 IN | WEIGHT: 145 LBS | SYSTOLIC BLOOD PRESSURE: 110 MMHG | DIASTOLIC BLOOD PRESSURE: 68 MMHG | BODY MASS INDEX: 22.76 KG/M2

## 2024-03-19 DIAGNOSIS — E78.00 HYPERCHOLESTEROLEMIA: ICD-10-CM

## 2024-03-19 DIAGNOSIS — I10 ESSENTIAL HYPERTENSION: ICD-10-CM

## 2024-03-19 DIAGNOSIS — E55.9 VITAMIN D DEFICIENCY: ICD-10-CM

## 2024-03-19 DIAGNOSIS — K21.9 GASTROESOPHAGEAL REFLUX DISEASE, UNSPECIFIED WHETHER ESOPHAGITIS PRESENT: ICD-10-CM

## 2024-03-19 DIAGNOSIS — J43.9 PULMONARY EMPHYSEMA, UNSPECIFIED EMPHYSEMA TYPE: ICD-10-CM

## 2024-03-19 DIAGNOSIS — F41.1 GENERALIZED ANXIETY DISORDER: ICD-10-CM

## 2024-03-19 DIAGNOSIS — E11.42 TYPE 2 DIABETES MELLITUS WITH DIABETIC POLYNEUROPATHY, WITH LONG-TERM CURRENT USE OF INSULIN: Primary | ICD-10-CM

## 2024-03-19 DIAGNOSIS — G25.81 RESTLESS LEG SYNDROME: ICD-10-CM

## 2024-03-19 DIAGNOSIS — I25.118 CORONARY ARTERY DISEASE OF NATIVE ARTERY OF NATIVE HEART WITH STABLE ANGINA PECTORIS: ICD-10-CM

## 2024-03-19 DIAGNOSIS — Z79.4 TYPE 2 DIABETES MELLITUS WITH DIABETIC POLYNEUROPATHY, WITH LONG-TERM CURRENT USE OF INSULIN: Primary | ICD-10-CM

## 2024-03-19 DIAGNOSIS — F33.1 MODERATE EPISODE OF RECURRENT MAJOR DEPRESSIVE DISORDER: ICD-10-CM

## 2024-03-19 DIAGNOSIS — M79.642 LEFT HAND PAIN: ICD-10-CM

## 2024-03-19 RX ORDER — ROPINIROLE 0.25 MG/1
0.25 TABLET, FILM COATED ORAL NIGHTLY
Qty: 30 TABLET | Refills: 2 | Status: SHIPPED | OUTPATIENT
Start: 2024-03-19

## 2024-03-19 RX ORDER — ACETAMINOPHEN 500 MG
500 TABLET ORAL EVERY 8 HOURS PRN
COMMUNITY

## 2024-03-19 NOTE — PATIENT INSTRUCTIONS
Call [phone] to reschedule bone density, mammogram, and CT chest low dose.  Continue to monitor your blood sugar once daily before a meal and record results.  Continue to monitor your blood pressure periodically and record results.  Continue to work on healthy diet and exercise.  Follow up pending lab results.  Follow up in 4 months, or sooner if problems or concerns.

## 2024-03-19 NOTE — PROGRESS NOTES
Subjective   Gely Kimble is a 63 y.o. female.     Chief Complaint   Patient presents with    Diabetes     Follow up       Leg Pain     RLS bilateral          History of Present Illness   Patient presents for follow up DM2: takes Levemir daily and Metformin twice daily; last A1c 6.8%; monitors blood sugar and has been running less 120-130s, none greater than 140; watches intake of carbs/sugars in diet some; regular exercise, walks most days; has some numbness in legs with RLS at night; last eye exam 8/2023 at [facility] at [facility].    F/U HTN: takes Lisinopril and Metoprolol daily; does not monitor BP; no headaches; no orthostasis; no swelling; also takes Plavix and aspirin daily; has follow up with cardiology next month.    F/U Hyperlipidemia: takes Atorvastatin daily; no myalgias; does not really watch saturated fats in diet, avoids ice cream; fasting today.    F/U YARELIS: takes Pantoprazole daily and works well.    F/U anxiety/depression: takes Escitalopram daily and works well; no SI/HI.    Also, c/o RLS; has been on Ropinirole in past; will have urge to move legs and cannot get legs to relax when trying to fall asleep; had been taking OTC med for leg cramps, but no longer helping; would like to resume Ropinirole nightly.    F/U left hand pain: never heard about referral to hand specialty; symptoms resolved at this point; can no longer take Naproxen for arthritis; takes Tylenol as needed and helps.     F/U rash on left arm: resolved.    Uses Combivent rarely as needed; has not needed to use Albuterol inhaler recently; had used when had COVID-19 virus 2 years ago; overall, breathing is much better since stopped smoking.    Recently had all teeth pulled, cannot wear dentures due to not enough gum tissue; cannot afford dental implants.    Had colonoscopy at [facility] in past; needs to call to reschedule bone density.      The following portions of the patient's history were reviewed and updated as  "appropriate: allergies, current medications, past family history, past medical history, past social history, past surgical history and problem list.    Current Outpatient Medications on File Prior to Visit   Medication Sig    acetaminophen (TYLENOL) 500 MG tablet Take 1 tablet by mouth Every 8 (Eight) Hours As Needed for Mild Pain or Moderate Pain.    albuterol (PROVENTIL HFA;VENTOLIN HFA) 108 (90 BASE) MCG/ACT inhaler Inhale 2 puffs Every 4 (Four) Hours As Needed for Wheezing.    albuterol (PROVENTIL) (2.5 MG/3ML) 0.083% nebulizer solution Take 2.5 mg by nebulization Every 4 (Four) Hours As Needed for Wheezing.    aspirin 81 MG chewable tablet Chew 1 tablet Daily.    atorvastatin (LIPITOR) 40 MG tablet Take 1 tablet by mouth Daily.    clopidogrel (PLAVIX) 75 MG tablet Take 1 tablet by mouth Daily.    escitalopram (LEXAPRO) 20 MG tablet TAKE 1 TABLET BY MOUTH DAILY    Homeopathic Products (LEG CRAMP RELIEF PO) Take 1 tablet by mouth Daily.    insulin detemir (Levemir) 100 UNIT/ML injection Inject 20 Units under the skin into the appropriate area as directed Every Night.    Insulin Syringe 31G X 5/16\" 1 ML misc Use 1 syringe Daily. For use with Levemir vials    lisinopril (PRINIVIL,ZESTRIL) 10 MG tablet Take 0.5 tablets by mouth Daily.    LORATADINE PO Take 1 tablet by mouth Daily.    metFORMIN ER (GLUCOPHAGE-XR) 500 MG 24 hr tablet Take 2 tablets by mouth 2 (Two) Times a Day With Meals.    metoprolol succinate XL (TOPROL-XL) 25 MG 24 hr tablet Take 1 tablet by mouth Daily.    Multiple Vitamin (MULTIVITAMIN) tablet Take 1 tablet by mouth.    nitroglycerin (Nitrostat) 0.4 MG SL tablet Place 1 tablet under the tongue Every 5 (Five) Minutes As Needed for Chest Pain. Take no more than 3 doses in 15 minutes.    pantoprazole (Protonix) 20 MG EC tablet Take 1 tablet by mouth Daily.    vitamin D (ERGOCALCIFEROL) 1.25 MG (75177 UT) capsule capsule Take 1 capsule by mouth 1 (One) Time Per Week.     No current " facility-administered medications on file prior to visit.        Past Medical History:   Diagnosis Date    Abnormal nuclear stress test     Arthritis     COPD (chronic obstructive pulmonary disease)     Elevated cholesterol     Emphysema of lung     Gallbladder abscess     Herpes zoster 08/03/2023    History of positive hepatitis C     previously positive, never treated, negative RNA finding    Hypertension     Myocardial perfusion defect 02/16/2016    Type 2 diabetes mellitus        Past Surgical History:   Procedure Laterality Date    APPENDECTOMY      CARDIAC CATHETERIZATION N/A 10/25/2023    Procedure: Left Heart Cath;  Surgeon: Tasneem Hayes MD;  [facility];  Service: Cardiovascular;  Laterality: N/A;    CARDIAC CATHETERIZATION N/A 10/25/2023    Procedure: Left ventriculography;  Surgeon: Tasneem Hayes MD;  [facility];  Service: Cardiovascular;  Laterality: N/A;    CARDIAC CATHETERIZATION N/A 10/25/2023    Procedure: Stent LEYLA coronary;  Surgeon: Tasneem Hayes MD;  Location: [facility] CATH INVASIVE LOCATION;  Service: Cardiovascular;  Laterality: N/A;    CARDIAC CATHETERIZATION N/A 10/25/2023    Procedure: Coronary angiography;  Surgeon: Tasneem Hayes MD;  [facility];  Service: Cardiovascular;  Laterality: N/A;    CARDIAC CATHETERIZATION N/A 10/25/2023    Procedure: Percutaneous Coronary Intervention;  Surgeon: Tasneem Hayes MD;  [facility];  Service: Cardiovascular;  Laterality: N/A;    CARDIAC CATHETERIZATION N/A 11/3/2023    Procedure: Percutaneous Coronary Intervention RCA;  Surgeon: Tasneem Hayes MD;  [facility];  Service: Cardiovascular;  Laterality: N/A;    CARDIAC CATHETERIZATION N/A 11/3/2023    Procedure: Coronary angiography;  Surgeon: Tasneem Hayes MD;  Location: [facility] CATH INVASIVE LOCATION;   Service: Cardiovascular;  Laterality: N/A;    CARDIAC CATHETERIZATION N/A 11/3/2023    Procedure: Stent LEYLA coronary;  Surgeon: Tasneem Hayes MD;  Location: [facility] INVASIVE LOCATION;  Service: Cardiovascular;  Laterality: N/A;    CHOLECYSTECTOMY      HYSTERECTOMY      TUBAL ABDOMINAL LIGATION         Family History   Problem Relation Age of Onset    Diabetes Mother     Heart disease Mother     Diabetes Father     Diabetes Brother     Heart disease Maternal Grandmother     Cancer Paternal Grandfather        Social History     Socioeconomic History    Marital status:    Tobacco Use    Smoking status: Former     Current packs/day: 0.00     Types: Cigarettes     Quit date: 10/11/2017     Years since quittin.4    Tobacco comments:     Previously smoked about 1.5 packs per day for about 45 years   Vaping Use    Vaping status: Never Used   Substance and Sexual Activity    Alcohol use: No     Comment: rare margaritia 1-2 times per year    Drug use: No     Comment: in past; clean since     Sexual activity: Not Currently       Review of Systems   Constitutional:  Positive for fatigue. Negative for appetite change, chills, fever, unexpected weight gain and unexpected weight loss.   HENT:  Negative for ear pain, sinus pressure, sore throat and trouble swallowing.    Eyes:  Negative for blurred vision.   Respiratory:  Negative for cough, chest tightness and shortness of breath.    Cardiovascular:  Negative for chest pain and palpitations.   Gastrointestinal:  Negative for abdominal pain, blood in stool, constipation and diarrhea.   Endocrine: Negative for polydipsia (drinks liquids all day). Cold intolerance: stays cold since has been on Plavix.  Genitourinary:  Negative for dysuria and frequency.   Skin:  Negative for rash.   Neurological:  Negative for syncope and weakness.       Objective   Vitals:    24 0809   BP: 110/68   BP Location: Left arm   Patient Position: Sitting   Cuff Size: Adult  "  Pulse: 70   Temp: 97 °F (36.1 °C)   SpO2: 99%   Weight: 65.8 kg (145 lb)   Height: 170.2 cm (67\")     Body mass index is 22.71 kg/m².    Physical Exam  Vitals and nursing note reviewed.   Constitutional:       General: She is not in acute distress.     Appearance: She is well-developed and well-groomed. She is not diaphoretic.      Comments: Pale color   HENT:      Head: Normocephalic.      Right Ear: External ear normal.      Left Ear: External ear normal.   Eyes:      Conjunctiva/sclera: Conjunctivae normal.   Neck:      Vascular: No carotid bruit.   Cardiovascular:      Rate and Rhythm: Normal rate and regular rhythm.      Pulses: Normal pulses.      Heart sounds: Normal heart sounds. No murmur heard.  Pulmonary:      Effort: Pulmonary effort is normal. No respiratory distress.      Breath sounds: Normal breath sounds.   Abdominal:      General: Bowel sounds are normal.      Palpations: Abdomen is soft. There is no hepatomegaly or splenomegaly.      Tenderness: There is no abdominal tenderness. There is no guarding.   Musculoskeletal:      Cervical back: Normal range of motion and neck supple.      Right lower leg: No edema.      Left lower leg: No edema.   Skin:     General: Skin is warm and dry.      Findings: No rash.   Neurological:      Mental Status: She is alert and oriented to person, place, and time.      Gait: Gait normal.   Psychiatric:         Mood and Affect: Mood normal.         Behavior: Behavior normal.         Thought Content: Thought content normal.         Cognition and Memory: Cognition normal.         Judgment: Judgment normal.         Lab Results   Component Value Date    TSH 0.327 09/13/2023 12/14/23 A1c 6.8%; lipid panel: , Trig 96, HDL 51, LDL 32  1/28/24 CBC WNL except Hgb 11.4; BMP WNL except glucose 110,     Assessment    Problem List Items Addressed This Visit       Type 2 diabetes mellitus - Primary    Current Assessment & Plan     Diabetes is stable.   Continue current " treatment regimen.  Regular aerobic exercise.  Reminded to get yearly retinal exam.  Diabetes will be reassessed  4 months  Continue Levemir daily and Metformin twice daily.         Relevant Orders    Ferritin (Completed)    Comprehensive Metabolic Panel (Completed)    Lipid Panel With LDL / HDL Ratio (Completed)    Hemoglobin A1c (Completed)    Pulmonary emphysema    Current Assessment & Plan     COPD is stable.  Continue Combivent as needed.         Hypercholesterolemia    Current Assessment & Plan     Continue Atorvastatin daily.  Continue to work on healthy diet and exercise.         Relevant Orders    Comprehensive Metabolic Panel (Completed)    Lipid Panel With LDL / HDL Ratio (Completed)    Essential hypertension    Current Assessment & Plan     Hypertension is stable and controlled  Continue current treatment regimen.  Ambulatory blood pressure monitoring.  Blood pressure will be reassessed  4 months .  Continue Lisinopril and Metoprolol daily.  Follow up as scheduled with cardiology.         Relevant Orders    Ferritin (Completed)    CBC & Differential (Completed)    Comprehensive Metabolic Panel (Completed)    Lipid Panel With LDL / HDL Ratio (Completed)    Restless leg syndrome    Current Assessment & Plan     Worsening.  Will check ferritin.  Resume Ropinirole nightly.         Relevant Medications    rOPINIRole (REQUIP) 0.25 MG tablet    Other Relevant Orders    Ferritin (Completed)    Vitamin D deficiency    Relevant Orders    Vitamin D,25-Hydroxy (Completed)    Moderate episode of recurrent major depressive disorder    Current Assessment & Plan     Patient's depression is a recurrent episode that is moderate without psychosis. Depression is in partial remission and stable.    Plan:   Continue current medication therapy   Continue Escitalopram daily.    Followup  4 months .          Generalized anxiety disorder    Current Assessment & Plan     Psychological condition is  stable .  Continue current  treatment regimen.  Psychological condition  will be reassessed in 3 months.  Continue Escitalopram daily.         Coronary artery disease of native artery of native heart with stable angina pectoris    Current Assessment & Plan     Continue aspirin and Plavix daily.  Follow up as scheduled with cardiology.         Gastroesophageal reflux disease    Current Assessment & Plan     Stable.  Continue Pantoprazole daily.         RESOLVED: Left hand pain        Return in about 4 months (around 7/19/2024) for Medicare Wellness, Recheck after 8/2/24.

## 2024-03-20 DIAGNOSIS — D64.9 ANEMIA, UNSPECIFIED TYPE: ICD-10-CM

## 2024-03-20 DIAGNOSIS — R19.5 DARK STOOLS: Primary | ICD-10-CM

## 2024-03-20 LAB
25(OH)D3+25(OH)D2 SERPL-MCNC: 39.7 NG/ML (ref 30–100)
ALBUMIN SERPL-MCNC: 4.4 G/DL (ref 3.5–5.2)
ALBUMIN/GLOB SERPL: 2 G/DL
ALP SERPL-CCNC: 56 U/L (ref 39–117)
ALT SERPL-CCNC: 19 U/L (ref 1–33)
AST SERPL-CCNC: 16 U/L (ref 1–32)
BASOPHILS # BLD AUTO: 0.09 10*3/MM3 (ref 0–0.2)
BASOPHILS NFR BLD AUTO: 1.3 % (ref 0–1.5)
BILIRUB SERPL-MCNC: 0.2 MG/DL (ref 0–1.2)
BUN SERPL-MCNC: 11 MG/DL (ref 8–23)
BUN/CREAT SERPL: 17.7 (ref 7–25)
CALCIUM SERPL-MCNC: 9.3 MG/DL (ref 8.6–10.5)
CHLORIDE SERPL-SCNC: 104 MMOL/L (ref 98–107)
CHOLEST SERPL-MCNC: 117 MG/DL (ref 0–200)
CO2 SERPL-SCNC: 26.4 MMOL/L (ref 22–29)
CREAT SERPL-MCNC: 0.62 MG/DL (ref 0.57–1)
EGFRCR SERPLBLD CKD-EPI 2021: 99 ML/MIN/1.73
EOSINOPHIL # BLD AUTO: 0.11 10*3/MM3 (ref 0–0.4)
EOSINOPHIL NFR BLD AUTO: 1.6 % (ref 0.3–6.2)
ERYTHROCYTE [DISTWIDTH] IN BLOOD BY AUTOMATED COUNT: 13 % (ref 12.3–15.4)
FERRITIN SERPL-MCNC: 8.76 NG/ML (ref 13–150)
GLOBULIN SER CALC-MCNC: 2.2 GM/DL
GLUCOSE SERPL-MCNC: 83 MG/DL (ref 65–99)
HBA1C MFR BLD: 6.9 % (ref 4.8–5.6)
HCT VFR BLD AUTO: 28.7 % (ref 34–46.6)
HDLC SERPL-MCNC: 56 MG/DL (ref 40–60)
HGB BLD-MCNC: 8.8 G/DL (ref 12–15.9)
IMM GRANULOCYTES # BLD AUTO: 0.03 10*3/MM3 (ref 0–0.05)
IMM GRANULOCYTES NFR BLD AUTO: 0.4 % (ref 0–0.5)
LDLC SERPL CALC-MCNC: 44 MG/DL (ref 0–100)
LDLC/HDLC SERPL: 0.79 {RATIO}
LYMPHOCYTES # BLD AUTO: 2.12 10*3/MM3 (ref 0.7–3.1)
LYMPHOCYTES NFR BLD AUTO: 30.1 % (ref 19.6–45.3)
MCH RBC QN AUTO: 25.8 PG (ref 26.6–33)
MCHC RBC AUTO-ENTMCNC: 30.7 G/DL (ref 31.5–35.7)
MCV RBC AUTO: 84.2 FL (ref 79–97)
MONOCYTES # BLD AUTO: 0.49 10*3/MM3 (ref 0.1–0.9)
MONOCYTES NFR BLD AUTO: 7 % (ref 5–12)
NEUTROPHILS # BLD AUTO: 4.21 10*3/MM3 (ref 1.7–7)
NEUTROPHILS NFR BLD AUTO: 59.6 % (ref 42.7–76)
NRBC BLD AUTO-RTO: 0 /100 WBC (ref 0–0.2)
PLATELET # BLD AUTO: 340 10*3/MM3 (ref 140–450)
POTASSIUM SERPL-SCNC: 4 MMOL/L (ref 3.5–5.2)
PROT SERPL-MCNC: 6.6 G/DL (ref 6–8.5)
RBC # BLD AUTO: 3.41 10*6/MM3 (ref 3.77–5.28)
SODIUM SERPL-SCNC: 144 MMOL/L (ref 136–145)
TRIGL SERPL-MCNC: 85 MG/DL (ref 0–150)
VLDLC SERPL CALC-MCNC: 17 MG/DL (ref 5–40)
WBC # BLD AUTO: 7.05 10*3/MM3 (ref 3.4–10.8)

## 2024-03-21 PROBLEM — M79.642 LEFT HAND PAIN: Status: RESOLVED | Noted: 2024-01-11 | Resolved: 2024-03-21

## 2024-03-21 PROBLEM — L98.9 SKIN LESION OF LEFT ARM: Status: RESOLVED | Noted: 2024-01-11 | Resolved: 2024-03-21

## 2024-03-21 NOTE — ASSESSMENT & PLAN NOTE
Psychological condition is  stable .  Continue current treatment regimen.  Psychological condition  will be reassessed in 3 months.  Continue Escitalopram daily.

## 2024-03-21 NOTE — ASSESSMENT & PLAN NOTE
Hypertension is stable and controlled  Continue current treatment regimen.  Ambulatory blood pressure monitoring.  Blood pressure will be reassessed  4 months .  Continue Lisinopril and Metoprolol daily.  Follow up as scheduled with cardiology.

## 2024-03-21 NOTE — ASSESSMENT & PLAN NOTE
Diabetes is stable.   Continue current treatment regimen.  Regular aerobic exercise.  Reminded to get yearly retinal exam.  Diabetes will be reassessed  4 months  Continue Levemir daily and Metformin twice daily.

## 2024-03-21 NOTE — ASSESSMENT & PLAN NOTE
Patient's depression is a recurrent episode that is moderate without psychosis. Depression is in partial remission and stable.    Plan:   Continue current medication therapy   Continue Escitalopram daily.    Followup  4 months .

## 2024-04-01 ENCOUNTER — HOSPITAL ENCOUNTER (EMERGENCY)
Facility: HOSPITAL | Age: 64
Discharge: HOME | End: 2024-04-01
Attending: EMERGENCY MEDICINE | Admitting: EMERGENCY MEDICINE
Payer: MEDICARE

## 2024-04-01 ENCOUNTER — APPOINTMENT (OUTPATIENT)
Dept: GENERAL RADIOLOGY | Facility: HOSPITAL | Age: 64
End: 2024-04-01
Payer: MEDICARE

## 2024-04-01 VITALS
SYSTOLIC BLOOD PRESSURE: 134 MMHG | RESPIRATION RATE: 16 BRPM | OXYGEN SATURATION: 95 % | DIASTOLIC BLOOD PRESSURE: 67 MMHG | HEART RATE: 72 BPM | TEMPERATURE: 99.2 F | BODY MASS INDEX: 23.35 KG/M2 | WEIGHT: 148.8 LBS | HEIGHT: 67 IN

## 2024-04-01 DIAGNOSIS — R07.9 CHEST PAIN, UNSPECIFIED TYPE: ICD-10-CM

## 2024-04-01 DIAGNOSIS — D64.9 ANEMIA, UNSPECIFIED TYPE: ICD-10-CM

## 2024-04-01 DIAGNOSIS — M79.89 LEFT LEG SWELLING: Primary | ICD-10-CM

## 2024-04-01 DIAGNOSIS — E11.65 UNCONTROLLED TYPE 2 DIABETES MELLITUS WITH HYPERGLYCEMIA: ICD-10-CM

## 2024-04-01 LAB
ALBUMIN SERPL-MCNC: 4.2 G/DL (ref 3.5–5.2)
ALBUMIN/GLOB SERPL: 1.8 G/DL
ALP SERPL-CCNC: 53 U/L (ref 39–117)
ALT SERPL W P-5'-P-CCNC: 15 U/L (ref 1–33)
ANION GAP SERPL CALCULATED.3IONS-SCNC: 11.6 MMOL/L (ref 5–15)
AST SERPL-CCNC: 15 U/L (ref 1–32)
BASOPHILS # BLD AUTO: 0.06 10*3/MM3 (ref 0–0.2)
BASOPHILS NFR BLD AUTO: 0.9 % (ref 0–1.5)
BILIRUB SERPL-MCNC: <0.2 MG/DL (ref 0–1.2)
BUN SERPL-MCNC: 10 MG/DL (ref 8–23)
BUN/CREAT SERPL: 20 (ref 7–25)
CALCIUM SPEC-SCNC: 9.2 MG/DL (ref 8.6–10.5)
CHLORIDE SERPL-SCNC: 103 MMOL/L (ref 98–107)
CO2 SERPL-SCNC: 25.4 MMOL/L (ref 22–29)
CREAT SERPL-MCNC: 0.5 MG/DL (ref 0.57–1)
D DIMER PPP FEU-MCNC: 0.39 MCGFEU/ML (ref 0–0.65)
DEPRECATED RDW RBC AUTO: 39.9 FL (ref 37–54)
EGFRCR SERPLBLD CKD-EPI 2021: 104.2 ML/MIN/1.73
EOSINOPHIL # BLD AUTO: 0.11 10*3/MM3 (ref 0–0.4)
EOSINOPHIL NFR BLD AUTO: 1.6 % (ref 0.3–6.2)
ERYTHROCYTE [DISTWIDTH] IN BLOOD BY AUTOMATED COUNT: 13.4 % (ref 12.3–15.4)
GLOBULIN UR ELPH-MCNC: 2.4 GM/DL
GLUCOSE SERPL-MCNC: 193 MG/DL (ref 65–99)
HCT VFR BLD AUTO: 25.7 % (ref 34–46.6)
HGB BLD-MCNC: 8 G/DL (ref 12–15.9)
HOLD SPECIMEN: NORMAL
HOLD SPECIMEN: NORMAL
IMM GRANULOCYTES # BLD AUTO: 0.02 10*3/MM3 (ref 0–0.05)
IMM GRANULOCYTES NFR BLD AUTO: 0.3 % (ref 0–0.5)
LYMPHOCYTES # BLD AUTO: 1.96 10*3/MM3 (ref 0.7–3.1)
LYMPHOCYTES NFR BLD AUTO: 29 % (ref 19.6–45.3)
MCH RBC QN AUTO: 25.7 PG (ref 26.6–33)
MCHC RBC AUTO-ENTMCNC: 31.1 G/DL (ref 31.5–35.7)
MCV RBC AUTO: 82.6 FL (ref 79–97)
MONOCYTES # BLD AUTO: 0.56 10*3/MM3 (ref 0.1–0.9)
MONOCYTES NFR BLD AUTO: 8.3 % (ref 5–12)
NEUTROPHILS NFR BLD AUTO: 4.05 10*3/MM3 (ref 1.7–7)
NEUTROPHILS NFR BLD AUTO: 59.9 % (ref 42.7–76)
NRBC BLD AUTO-RTO: 0 /100 WBC (ref 0–0.2)
NT-PROBNP SERPL-MCNC: 188 PG/ML (ref 0–900)
PLATELET # BLD AUTO: 238 10*3/MM3 (ref 140–450)
PMV BLD AUTO: 10.5 FL (ref 6–12)
POTASSIUM SERPL-SCNC: 4 MMOL/L (ref 3.5–5.2)
PROT SERPL-MCNC: 6.6 G/DL (ref 6–8.5)
RBC # BLD AUTO: 3.11 10*6/MM3 (ref 3.77–5.28)
SODIUM SERPL-SCNC: 140 MMOL/L (ref 136–145)
TROPONIN T SERPL HS-MCNC: 7 NG/L
WBC NRBC COR # BLD AUTO: 6.76 10*3/MM3 (ref 3.4–10.8)
WHOLE BLOOD HOLD COAG: NORMAL
WHOLE BLOOD HOLD SPECIMEN: NORMAL

## 2024-04-01 PROCEDURE — 36415 COLL VENOUS BLD VENIPUNCTURE: CPT

## 2024-04-01 PROCEDURE — 83880 ASSAY OF NATRIURETIC PEPTIDE: CPT | Performed by: EMERGENCY MEDICINE

## 2024-04-01 PROCEDURE — 84484 ASSAY OF TROPONIN QUANT: CPT

## 2024-04-01 PROCEDURE — 99284 EMERGENCY DEPT VISIT MOD MDM: CPT

## 2024-04-01 PROCEDURE — 85025 COMPLETE CBC W/AUTO DIFF WBC: CPT

## 2024-04-01 PROCEDURE — 93005 ELECTROCARDIOGRAM TRACING: CPT | Performed by: EMERGENCY MEDICINE

## 2024-04-01 PROCEDURE — 71045 X-RAY EXAM CHEST 1 VIEW: CPT

## 2024-04-01 PROCEDURE — 85379 FIBRIN DEGRADATION QUANT: CPT | Performed by: EMERGENCY MEDICINE

## 2024-04-01 PROCEDURE — 93005 ELECTROCARDIOGRAM TRACING: CPT

## 2024-04-01 PROCEDURE — 80053 COMPREHEN METABOLIC PANEL: CPT

## 2024-04-01 RX ORDER — ASPIRIN 325 MG
325 TABLET ORAL ONCE
Status: DISCONTINUED | OUTPATIENT
Start: 2024-04-01 | End: 2024-04-02 | Stop reason: HOSPADM

## 2024-04-01 RX ORDER — SODIUM CHLORIDE 0.9 % (FLUSH) 0.9 %
10 SYRINGE (ML) INJECTION AS NEEDED
Status: DISCONTINUED | OUTPATIENT
Start: 2024-04-01 | End: 2024-04-02 | Stop reason: HOSPADM

## 2024-04-01 NOTE — ED NOTES
Pt states this am she was started having cp.  She has gained 3 pounds since this am. She has lower  left leg swelling.  Hx stent placement in November 2023

## 2024-04-02 LAB
QT INTERVAL: 402 MS
QTC INTERVAL: 458 MS

## 2024-04-02 NOTE — DISCHARGE INSTRUCTIONS
The ultrasound department will call you to schedule appointment to come tomorrow for ultrasound.  If it is positive for blood clot they will be sent to the emergency room for further evaluation.  Return immediately for any worsening chest pain, shortness of breath, or passing out.  Follow-up with Dr. Gallegos for further evaluation.

## 2024-04-02 NOTE — ED PROVIDER NOTES
EMERGENCY DEPARTMENT ENCOUNTER  Room Number:  34/34  PCP: Sofi Fernandes APRN  Independent Historians: Patient      HPI:  Chief Complaint: had concerns including Leg Swelling and Shortness of Breath.     A complete HPI/ROS/PMH/PSH/SH/FH are unobtainable due to: None    Chronic or social conditions impacting patient care (Social Determinants of Health): None      Context: Gely Kimble is a 63 y.o. female with a medical history of diabetes, COPD, hypertension, hyperlipidemia, emphysema who presents to the ED c/o acute chest pain and weight gain.  The patient reports that this morning she had some chest pain on the way home from work.  She states that her chest felt tight and she has some shortness of breath.  She reports a history of cardiac stents.  She has not had any further chest pain throughout the day.  She reports huge weight 145 pounds this morning.  She states that she weighed 148 pounds this evening.  She states she feels like her left leg may be swollen.  She denies any pleuritic pain.  She denies syncope.  She states that family members have  of congestive heart failure and this made her son concerned with the weight loss and chest pain.      Review of prior external notes (non-ED) -and- Review of prior external test results outside of this encounter:  Cardiac cath dated 11/3/2023 where she is maintained on antiplatelet therapy.  She had 98 PDA stenosis reduced to 0% with stents    Prescription drug monitoring program review:         PAST MEDICAL HISTORY  Active Ambulatory Problems     Diagnosis Date Noted    Type 2 diabetes mellitus 2017    Pulmonary emphysema 2017    Arthritis 2017    Hypercholesterolemia 2017    Essential hypertension 2017    Abnormal liver function tests 2016    Impingement syndrome of shoulder region 2013    Neck pain 2013    Postmenopause 2023    Restless leg syndrome 2023    Urinary frequency 2023    Vitamin D  deficiency 08/03/2023    Fatigue 08/03/2023    Moderate episode of recurrent major depressive disorder 08/03/2023    Generalized anxiety disorder 08/03/2023    Insomnia 08/03/2023    Skin lesion of back 08/17/2023    Nausea 09/13/2023    Incomplete right bundle branch block (RBBB) 09/13/2023    Chest pain 09/13/2023    Coronary artery disease of native artery of native heart with stable angina pectoris 10/25/2023    Tendency to bleed 01/11/2024    Gastroesophageal reflux disease 01/11/2024     Resolved Ambulatory Problems     Diagnosis Date Noted    Obesity 06/03/2017    Myocardial perfusion defect 02/16/2016    Tobacco use 02/12/2016    Anxiety 08/03/2023    Herpes zoster 08/03/2023    Dizziness 08/03/2023    Decreased thyroid stimulating hormone (TSH) level 09/13/2023    Abnormal nuclear stress test 10/12/2023    Skin lesion of left arm 01/11/2024    Left hand pain 01/11/2024     Past Medical History:   Diagnosis Date    COPD (chronic obstructive pulmonary disease)     Elevated cholesterol     Emphysema of lung     Gallbladder abscess     History of positive hepatitis C     Hypertension          PAST SURGICAL HISTORY  Past Surgical History:   Procedure Laterality Date    APPENDECTOMY      CARDIAC CATHETERIZATION N/A 10/25/2023    Procedure: Left Heart Cath;  Surgeon: Tasneem Hayes MD;  Location: [facility] CATH INVASIVE LOCATION;  Service: Cardiovascular;  Laterality: N/A;    CARDIAC CATHETERIZATION N/A 10/25/2023    Procedure: Left ventriculography;  Surgeon: Tasneem Hayes MD;  Location: [facility] CATH INVASIVE LOCATION;  Service: Cardiovascular;  Laterality: N/A;    CARDIAC CATHETERIZATION N/A 10/25/2023    Procedure: Stent LEYLA coronary;  Surgeon: Tasneem Hayes MD;  Location: [facility] CATH INVASIVE LOCATION;  Service: Cardiovascular;  Laterality: N/A;    CARDIAC CATHETERIZATION N/A 10/25/2023    Procedure: Coronary angiography;  Surgeon: Tasneem Hayes MD;  Location: [facility] CATH  INVASIVE LOCATION;  Service: Cardiovascular;  Laterality: N/A;    CARDIAC CATHETERIZATION N/A 10/25/2023    Procedure: Percutaneous Coronary Intervention;  Surgeon: Tasneem Hayes MD;  Location:  [facility] CATH INVASIVE LOCATION;  Service: Cardiovascular;  Laterality: N/A;    CARDIAC CATHETERIZATION N/A 11/3/2023    Procedure: Percutaneous Coronary Intervention RCA;  Surgeon: Tasneem Hayes MD;  Location:  [facility] CATH INVASIVE LOCATION;  Service: Cardiovascular;  Laterality: N/A;    CARDIAC CATHETERIZATION N/A 11/3/2023    Procedure: Coronary angiography;  Surgeon: Tasneem Hayes MD;  Location:  [facility] CATH INVASIVE LOCATION;  Service: Cardiovascular;  Laterality: N/A;    CARDIAC CATHETERIZATION N/A 11/3/2023    Procedure: Stent LEYLA coronary;  Surgeon: Tasneem Hayes MD;  Location:  [facility] CATH INVASIVE LOCATION;  Service: Cardiovascular;  Laterality: N/A;    CHOLECYSTECTOMY      HYSTERECTOMY      TUBAL ABDOMINAL LIGATION           FAMILY HISTORY  Family History   Problem Relation Age of Onset    Diabetes Mother     Heart disease Mother     Diabetes Father     Diabetes Brother     Heart disease Maternal Grandmother     Cancer Paternal Grandfather          SOCIAL HISTORY  Social History     Socioeconomic History    Marital status:    Tobacco Use    Smoking status: Former     Current packs/day: 0.00     Types: Cigarettes     Quit date: 10/11/2017     Years since quittin.4    Tobacco comments:     Previously smoked about 1.5 packs per day for about 45 years   Vaping Use    Vaping status: Never Used   Substance and Sexual Activity    Alcohol use: No     Comment: rare margaritia 1-2 times per year    Drug use: No     Comment: in past; clean since     Sexual activity: Not Currently         ALLERGIES  Patient has no known allergies.      REVIEW OF SYSTEMS  Review of Systems  Included in HPI  All systems reviewed and negative except for those discussed in HPI.      PHYSICAL EXAM    I  have reviewed the triage vital signs and nursing notes.    ED Triage Vitals   Temp Heart Rate Resp BP SpO2   04/01/24 1808 04/01/24 1808 04/01/24 1808 04/01/24 1812 04/01/24 1808   99.2 °F (37.3 °C) 92 16 134/67 96 %      Temp src Heart Rate Source Patient Position BP Location FiO2 (%)   04/01/24 1808 04/01/24 1808 04/01/24 1812 04/01/24 1812 --   Tympanic Monitor Sitting Left arm        Physical Exam  GENERAL: Awake, alert, no acute distress  SKIN: Warm, dry  HENT: Normocephalic, atraumatic  EYES: no scleral icterus  CV: regular rhythm, regular rate  RESPIRATORY: normal effort, lungs clear  ABDOMEN: soft, nontender, nondistended  MUSCULOSKELETAL: no deformity.  No calf tenderness.  There is slight left leg enlargement compared to the right.  Intact pulses.  NEURO: alert, moves all extremities, follows commands            LAB RESULTS  Recent Results (from the past 24 hour(s))   ECG 12 Lead ED Triage Standing Order; Chest Pain    Collection Time: 04/01/24  6:15 PM   Result Value Ref Range    QT Interval 402 ms    QTC Interval 458 ms   Comprehensive Metabolic Panel    Collection Time: 04/01/24  6:22 PM    Specimen: Blood   Result Value Ref Range    Glucose 193 (H) 65 - 99 mg/dL    BUN 10 8 - 23 mg/dL    Creatinine 0.50 (L) 0.57 - 1.00 mg/dL    Sodium 140 136 - 145 mmol/L    Potassium 4.0 3.5 - 5.2 mmol/L    Chloride 103 98 - 107 mmol/L    CO2 25.4 22.0 - 29.0 mmol/L    Calcium 9.2 8.6 - 10.5 mg/dL    Total Protein 6.6 6.0 - 8.5 g/dL    Albumin 4.2 3.5 - 5.2 g/dL    ALT (SGPT) 15 1 - 33 U/L    AST (SGOT) 15 1 - 32 U/L    Alkaline Phosphatase 53 39 - 117 U/L    Total Bilirubin <0.2 0.0 - 1.2 mg/dL    Globulin 2.4 gm/dL    A/G Ratio 1.8 g/dL    BUN/Creatinine Ratio 20.0 7.0 - 25.0    Anion Gap 11.6 5.0 - 15.0 mmol/L    eGFR 104.2 >60.0 mL/min/1.73   High Sensitivity Troponin T    Collection Time: 04/01/24  6:22 PM    Specimen: Blood   Result Value Ref Range    HS Troponin T 7 <14 ng/L   Green Top (Gel)    Collection  Time: 04/01/24  6:22 PM   Result Value Ref Range    Extra Tube Hold for add-ons.    Lavender Top    Collection Time: 04/01/24  6:22 PM   Result Value Ref Range    Extra Tube hold for add-on    Gold Top - SST    Collection Time: 04/01/24  6:22 PM   Result Value Ref Range    Extra Tube Hold for add-ons.    Light Blue Top    Collection Time: 04/01/24  6:22 PM   Result Value Ref Range    Extra Tube Hold for add-ons.    CBC Auto Differential    Collection Time: 04/01/24  6:22 PM    Specimen: Blood   Result Value Ref Range    WBC 6.76 3.40 - 10.80 10*3/mm3    RBC 3.11 (L) 3.77 - 5.28 10*6/mm3    Hemoglobin 8.0 (L) 12.0 - 15.9 g/dL    Hematocrit 25.7 (L) 34.0 - 46.6 %    MCV 82.6 79.0 - 97.0 fL    MCH 25.7 (L) 26.6 - 33.0 pg    MCHC 31.1 (L) 31.5 - 35.7 g/dL    RDW 13.4 12.3 - 15.4 %    RDW-SD 39.9 37.0 - 54.0 fl    MPV 10.5 6.0 - 12.0 fL    Platelets 238 140 - 450 10*3/mm3    Neutrophil % 59.9 42.7 - 76.0 %    Lymphocyte % 29.0 19.6 - 45.3 %    Monocyte % 8.3 5.0 - 12.0 %    Eosinophil % 1.6 0.3 - 6.2 %    Basophil % 0.9 0.0 - 1.5 %    Immature Grans % 0.3 0.0 - 0.5 %    Neutrophils, Absolute 4.05 1.70 - 7.00 10*3/mm3    Lymphocytes, Absolute 1.96 0.70 - 3.10 10*3/mm3    Monocytes, Absolute 0.56 0.10 - 0.90 10*3/mm3    Eosinophils, Absolute 0.11 0.00 - 0.40 10*3/mm3    Basophils, Absolute 0.06 0.00 - 0.20 10*3/mm3    Immature Grans, Absolute 0.02 0.00 - 0.05 10*3/mm3    nRBC 0.0 0.0 - 0.2 /100 WBC   BNP    Collection Time: 04/01/24  6:22 PM    Specimen: Blood   Result Value Ref Range    proBNP 188.0 0.0 - 900.0 pg/mL   D-dimer, Quantitative    Collection Time: 04/01/24  6:22 PM    Specimen: Blood   Result Value Ref Range    D-Dimer, Quantitative 0.39 0.00 - 0.65 MCGFEU/mL         RADIOLOGY  XR Chest 1 View    Result Date: 4/1/2024  XR CHEST 1 VW-  Clinical: COPD, chest pain  COMPARISON examination 1/27/2024  FINDINGS: Cardiac size within normal limits. There is atherosclerotic calcification of the aorta. No pleural  effusion, vascular congestion or acute airspace disease has developed.  CONCLUSION: No active disease of the chest  This report was finalized on 4/1/2024 7:03 PM by Dr. Fernando Canales M.D on Workstation: [id number]         MEDICATIONS GIVEN IN ER  Medications   sodium chloride 0.9 % flush 10 mL (has no administration in time range)   aspirin tablet 325 mg (325 mg Oral Not Given 4/1/24 2125)         ORDERS PLACED DURING THIS VISIT:  Orders Placed This Encounter   Procedures    XR Chest 1 View    [place] Draw    Comprehensive Metabolic Panel    High Sensitivity Troponin T    CBC Auto Differential    BNP    D-dimer, Quantitative    NPO Diet NPO Type: Strict NPO    Undress & Gown    Continuous Pulse Oximetry    Oxygen Therapy- Nasal Cannula; Titrate 1-6 LPM Per SpO2; 90 - 95%    ECG 12 Lead ED Triage Standing Order; Chest Pain    ECG 12 Lead ED Triage Standing Order; Chest Pain    Insert Peripheral IV    CBC & Differential    Green Top (Gel)    Lavender Top    Gold Top - SST    Light Blue Top         OUTPATIENT MEDICATION MANAGEMENT:  Current Facility-Administered Medications Ordered in Epic   Medication Dose Route Frequency Provider Last Rate Last Admin    aspirin tablet 325 mg  325 mg Oral Once Skip Stanford MD        sodium chloride 0.9 % flush 10 mL  10 mL Intravenous PRN Skip Stanford MD         Current Outpatient Medications Ordered in Epic   Medication Sig Dispense Refill    acetaminophen (TYLENOL) 500 MG tablet Take 1 tablet by mouth Every 8 (Eight) Hours As Needed for Mild Pain or Moderate Pain.      albuterol (PROVENTIL HFA;VENTOLIN HFA) 108 (90 BASE) MCG/ACT inhaler Inhale 2 puffs Every 4 (Four) Hours As Needed for Wheezing.      albuterol (PROVENTIL) (2.5 MG/3ML) 0.083% nebulizer solution Take 2.5 mg by nebulization Every 4 (Four) Hours As Needed for Wheezing.      aspirin 81 MG chewable tablet Chew 1 tablet Daily.      atorvastatin (LIPITOR) 40 MG tablet Take 1 tablet by mouth Daily. 30 tablet 11     "clopidogrel (PLAVIX) 75 MG tablet Take 1 tablet by mouth Daily. 30 tablet 11    escitalopram (LEXAPRO) 20 MG tablet TAKE 1 TABLET BY MOUTH DAILY 30 tablet 2    Ferrous Sulfate  (45 Fe) MG tablet controlled-release Take 1 tablet by mouth Daily. 30 tablet 2    Homeopathic Products (LEG CRAMP RELIEF PO) Take 1 tablet by mouth Daily.      insulin detemir (Levemir) 100 UNIT/ML injection Inject 20 Units under the skin into the appropriate area as directed Every Night.      Insulin Syringe 31G X 5/16\" 1 ML misc Use 1 syringe Daily. For use with Levemir vials 100 each 2    lisinopril (PRINIVIL,ZESTRIL) 10 MG tablet Take 0.5 tablets by mouth Daily.      LORATADINE PO Take 1 tablet by mouth Daily.      metFORMIN ER (GLUCOPHAGE-XR) 500 MG 24 hr tablet Take 2 tablets by mouth 2 (Two) Times a Day With Meals.      metoprolol succinate XL (TOPROL-XL) 25 MG 24 hr tablet Take 1 tablet by mouth Daily. 30 tablet 5    Multiple Vitamin (MULTIVITAMIN) tablet Take 1 tablet by mouth.      nitroglycerin (Nitrostat) 0.4 MG SL tablet Place 1 tablet under the tongue Every 5 (Five) Minutes As Needed for Chest Pain. Take no more than 3 doses in 15 minutes. 100 tablet 12    pantoprazole (Protonix) 20 MG EC tablet Take 1 tablet by mouth Daily. 30 tablet 5    rOPINIRole (REQUIP) 0.25 MG tablet Take 1 tablet by mouth Every Night. Take 1 hour before bedtime. 30 tablet 2    vitamin D (ERGOCALCIFEROL) 1.25 MG (34271 UT) capsule capsule Take 1 capsule by mouth 1 (One) Time Per Week.           PROCEDURES  Procedures            PROGRESS, DATA ANALYSIS, CONSULTS, AND MEDICAL DECISION MAKING  All labs have been independently interpreted by me.  All radiology studies have been reviewed by me. All EKG's have been independently viewed and interpreted by me.  Discussion below represents my analysis of pertinent findings related to patient's condition, differential diagnosis, treatment plan and final disposition.    Differential diagnosis includes but is " "not limited to acute coronary syndrome, acute aortic syndrome, PE, pneumothorax, unstable angina.    Clinical Scores:                   ED Course as of 04/01/24 2145 Mon Apr 01, 2024 2018 XR Chest 1 View  My independent interpretation of the imaging study is no pneumothorax [TR]   2018 EKG          EKG time: 1850  Rhythm/Rate: Normal sinus, rate 78  P waves and HI: Normal P, normal HI  QRS, axis: Right bundle branch block, normal axis  ST and T waves: No acute    Independently Interpreted by me  Not significantly changed compared to prior 1/28/2024   [TR]   2021 CP this am on way home from work. 145 pounds this am. Left leg swelling today. \"Tight\" with soa. Cardiac stents [TR]   2138 Hemoglobin(!): 8.0  Similar to 2 weeks ago but down from 3 months ago. [TR]   2138 Glucose(!): 193 [TR]   2143 I reviewed the workup and findings with the patient at the bedside.  Answered all questions.  She has a negative D-dimer which rules out PE in this low risk patient.  It also should rule out DVT but she is concerned about her left leg swelling so I will order an ultrasound to be performed tomorrow.  The ultrasound technicians are not here at this time night.  She states that her primary care doctor has been managing her anemia and has her on iron pills.  She has not had any blood in her stool. [TR]      ED Course User Index  [TR] Skip Stanford MD             AS OF 21:45 EDT VITALS:    BP - 134/67  HR - 72  TEMP - 99.2 °F (37.3 °C) (Tympanic)  O2 SATS - 95%    COMPLEXITY OF CARE  Admission was considered but after careful review of the patient's presentation, physical examination, diagnostic results, and response to treatment the patient may be safely discharged with outpatient follow-up.      DIAGNOSIS  Final diagnoses:   Left leg swelling   Chest pain, unspecified type   Anemia, unspecified type   Uncontrolled type 2 diabetes mellitus with hyperglycemia         DISPOSITION  ED Disposition       ED Disposition "   Discharge    Condition   Stable    Comment   --                Please note that portions of this document were completed with a voice recognition program.    Note Disclaimer: At [facility], we believe that sharing information builds trust and better relationships. You are receiving this note because you recently visited [facility]. It is possible you will see health information before a provider has talked with you about it. This kind of information can be easy to misunderstand. To help you fully understand what it means for your health, we urge you to discuss this note with your provider.         Skip Stanford MD  04/01/24 [id number]

## 2024-04-08 ENCOUNTER — APPOINTMENT (OUTPATIENT)
Dept: OTHER | Facility: HOSPITAL | Age: 64
End: 2024-04-08
Payer: MEDICARE

## 2024-04-08 ENCOUNTER — HOSPITAL ENCOUNTER (OUTPATIENT)
Dept: CARDIOLOGY | Facility: HOSPITAL | Age: 64
Discharge: HOME | End: 2024-04-08
Payer: MEDICARE

## 2024-04-08 DIAGNOSIS — M79.89 LEFT LEG SWELLING: ICD-10-CM

## 2024-04-08 LAB
BH CV LOWER VASCULAR LEFT COMMON FEMORAL AUGMENT: NORMAL
BH CV LOWER VASCULAR LEFT COMMON FEMORAL COMPETENT: NORMAL
BH CV LOWER VASCULAR LEFT COMMON FEMORAL COMPRESS: NORMAL
BH CV LOWER VASCULAR LEFT COMMON FEMORAL PHASIC: NORMAL
BH CV LOWER VASCULAR LEFT COMMON FEMORAL SPONT: NORMAL
BH CV LOWER VASCULAR LEFT DISTAL FEMORAL COMPRESS: NORMAL
BH CV LOWER VASCULAR LEFT GASTRONEMIUS COMPRESS: NORMAL
BH CV LOWER VASCULAR LEFT GREATER SAPH AK COMPRESS: NORMAL
BH CV LOWER VASCULAR LEFT GREATER SAPH BK COMPRESS: NORMAL
BH CV LOWER VASCULAR LEFT LESSER SAPH COMPRESS: NORMAL
BH CV LOWER VASCULAR LEFT MID FEMORAL AUGMENT: NORMAL
BH CV LOWER VASCULAR LEFT MID FEMORAL COMPETENT: NORMAL
BH CV LOWER VASCULAR LEFT MID FEMORAL COMPRESS: NORMAL
BH CV LOWER VASCULAR LEFT MID FEMORAL PHASIC: NORMAL
BH CV LOWER VASCULAR LEFT MID FEMORAL SPONT: NORMAL
BH CV LOWER VASCULAR LEFT PERONEAL COMPRESS: NORMAL
BH CV LOWER VASCULAR LEFT POPLITEAL AUGMENT: NORMAL
BH CV LOWER VASCULAR LEFT POPLITEAL COMPETENT: NORMAL
BH CV LOWER VASCULAR LEFT POPLITEAL COMPRESS: NORMAL
BH CV LOWER VASCULAR LEFT POPLITEAL PHASIC: NORMAL
BH CV LOWER VASCULAR LEFT POPLITEAL SPONT: NORMAL
BH CV LOWER VASCULAR LEFT POSTERIOR TIBIAL COMPRESS: NORMAL
BH CV LOWER VASCULAR LEFT PROFUNDA FEMORAL COMPRESS: NORMAL
BH CV LOWER VASCULAR LEFT PROXIMAL FEMORAL COMPRESS: NORMAL
BH CV LOWER VASCULAR LEFT SAPHENOFEMORAL JUNCTION COMPRESS: NORMAL
BH CV LOWER VASCULAR RIGHT COMMON FEMORAL AUGMENT: NORMAL
BH CV LOWER VASCULAR RIGHT COMMON FEMORAL COMPETENT: NORMAL
BH CV LOWER VASCULAR RIGHT COMMON FEMORAL COMPRESS: NORMAL
BH CV LOWER VASCULAR RIGHT COMMON FEMORAL PHASIC: NORMAL
BH CV LOWER VASCULAR RIGHT COMMON FEMORAL SPONT: NORMAL

## 2024-04-08 PROCEDURE — 93971 EXTREMITY STUDY: CPT

## 2024-04-08 PROCEDURE — 93971 EXTREMITY STUDY: CPT | Performed by: SURGERY

## 2024-04-09 ENCOUNTER — TELEPHONE (OUTPATIENT)
Dept: GASTROENTEROLOGY | Facility: CLINIC | Age: 64
End: 2024-04-09
Payer: MEDICARE

## 2024-04-10 ENCOUNTER — HOSPITAL ENCOUNTER (OUTPATIENT)
Facility: HOSPITAL | Age: 64
Discharge: HOME | End: 2024-04-10
Admitting: NURSE PRACTITIONER
Payer: MEDICARE

## 2024-04-10 DIAGNOSIS — Z78.0 POSTMENOPAUSE: ICD-10-CM

## 2024-04-10 PROCEDURE — 77080 DXA BONE DENSITY AXIAL: CPT

## 2024-04-16 ENCOUNTER — HOSPITAL ENCOUNTER (OUTPATIENT)
Dept: MAMMOGRAPHY | Facility: HOSPITAL | Age: 64
Discharge: HOME | End: 2024-04-16
Payer: MEDICARE

## 2024-04-27 DIAGNOSIS — Z79.4 TYPE 2 DIABETES MELLITUS WITH DIABETIC POLYNEUROPATHY, WITH LONG-TERM CURRENT USE OF INSULIN: ICD-10-CM

## 2024-04-27 DIAGNOSIS — E11.42 TYPE 2 DIABETES MELLITUS WITH DIABETIC POLYNEUROPATHY, WITH LONG-TERM CURRENT USE OF INSULIN: ICD-10-CM

## 2024-04-29 RX ORDER — METFORMIN HYDROCHLORIDE 500 MG/1
TABLET, EXTENDED RELEASE ORAL
Qty: 120 TABLET | Refills: 3 | Status: SHIPPED | OUTPATIENT
Start: 2024-04-29

## 2024-05-06 DIAGNOSIS — F41.1 GENERALIZED ANXIETY DISORDER: ICD-10-CM

## 2024-05-06 DIAGNOSIS — F33.1 MODERATE EPISODE OF RECURRENT MAJOR DEPRESSIVE DISORDER: ICD-10-CM

## 2024-05-06 RX ORDER — ESCITALOPRAM OXALATE 20 MG/1
20 TABLET ORAL DAILY
Qty: 90 TABLET | Refills: 1 | Status: SHIPPED | OUTPATIENT
Start: 2024-05-06

## 2024-05-16 ENCOUNTER — OFFICE VISIT (OUTPATIENT)
Dept: CARDIOLOGY | Facility: CLINIC | Age: 64
End: 2024-05-16
Payer: MEDICARE

## 2024-05-16 ENCOUNTER — HOSPITAL ENCOUNTER (OUTPATIENT)
Facility: HOSPITAL | Age: 64
Setting detail: OBSERVATION
Discharge: HOME | End: 2024-05-18
Attending: INTERNAL MEDICINE | Admitting: INTERNAL MEDICINE
Payer: MEDICARE

## 2024-05-16 ENCOUNTER — HOSPITAL ENCOUNTER (OUTPATIENT)
Dept: CARDIOLOGY | Facility: HOSPITAL | Age: 64
Discharge: HOME | End: 2024-05-16
Payer: MEDICARE

## 2024-05-16 ENCOUNTER — HOSPITAL ENCOUNTER (OUTPATIENT)
Facility: HOSPITAL | Age: 64
Setting detail: HOSPITAL OUTPATIENT SURGERY
End: 2024-05-16
Attending: INTERNAL MEDICINE | Admitting: INTERNAL MEDICINE
Payer: MEDICARE

## 2024-05-16 VITALS
BODY MASS INDEX: 22.37 KG/M2 | OXYGEN SATURATION: 96 % | HEIGHT: 68 IN | HEART RATE: 65 BPM | WEIGHT: 147.6 LBS | SYSTOLIC BLOOD PRESSURE: 136 MMHG | DIASTOLIC BLOOD PRESSURE: 66 MMHG

## 2024-05-16 VITALS — HEART RATE: 83 BPM | DIASTOLIC BLOOD PRESSURE: 70 MMHG | SYSTOLIC BLOOD PRESSURE: 130 MMHG

## 2024-05-16 DIAGNOSIS — I25.118 CORONARY ARTERY DISEASE OF NATIVE ARTERY OF NATIVE HEART WITH STABLE ANGINA PECTORIS: ICD-10-CM

## 2024-05-16 DIAGNOSIS — M79.89 LEFT LEG SWELLING: ICD-10-CM

## 2024-05-16 DIAGNOSIS — R07.2 PRECORDIAL PAIN: Primary | ICD-10-CM

## 2024-05-16 DIAGNOSIS — E78.49 OTHER HYPERLIPIDEMIA: ICD-10-CM

## 2024-05-16 DIAGNOSIS — R07.82 INTERCOSTAL PAIN: Primary | ICD-10-CM

## 2024-05-16 DIAGNOSIS — I10 PRIMARY HYPERTENSION: ICD-10-CM

## 2024-05-16 LAB
ALBUMIN SERPL-MCNC: 4.2 G/DL (ref 3.5–5.2)
ALBUMIN/GLOB SERPL: 1.8 G/DL
ALP SERPL-CCNC: 58 U/L (ref 39–117)
ALT SERPL W P-5'-P-CCNC: 14 U/L (ref 1–33)
ANION GAP SERPL CALCULATED.3IONS-SCNC: 15.5 MMOL/L (ref 5–15)
APTT PPP: 26.6 SECONDS (ref 22.7–35.4)
AST SERPL-CCNC: 13 U/L (ref 1–32)
BASOPHILS # BLD AUTO: 0.09 10*3/MM3 (ref 0–0.2)
BASOPHILS NFR BLD AUTO: 1.3 % (ref 0–1.5)
BILIRUB SERPL-MCNC: <0.2 MG/DL (ref 0–1.2)
BUN SERPL-MCNC: 8 MG/DL (ref 8–23)
BUN/CREAT SERPL: 12.1 (ref 7–25)
CALCIUM SPEC-SCNC: 8.8 MG/DL (ref 8.6–10.5)
CHLORIDE SERPL-SCNC: 103 MMOL/L (ref 98–107)
CO2 SERPL-SCNC: 23.5 MMOL/L (ref 22–29)
CREAT SERPL-MCNC: 0.66 MG/DL (ref 0.57–1)
DEPRECATED RDW RBC AUTO: 44.4 FL (ref 37–54)
EGFRCR SERPLBLD CKD-EPI 2021: 97.5 ML/MIN/1.73
EOSINOPHIL # BLD AUTO: 0.09 10*3/MM3 (ref 0–0.4)
EOSINOPHIL NFR BLD AUTO: 1.3 % (ref 0.3–6.2)
ERYTHROCYTE [DISTWIDTH] IN BLOOD BY AUTOMATED COUNT: 15.6 % (ref 12.3–15.4)
GEN 5 2HR TROPONIN T REFLEX: 8 NG/L
GLOBULIN UR ELPH-MCNC: 2.3 GM/DL
GLUCOSE BLDC GLUCOMTR-MCNC: 140 MG/DL (ref 70–130)
GLUCOSE BLDC GLUCOMTR-MCNC: 185 MG/DL (ref 70–130)
GLUCOSE SERPL-MCNC: 155 MG/DL (ref 65–99)
HCT VFR BLD AUTO: 32 % (ref 34–46.6)
HGB BLD-MCNC: 9.5 G/DL (ref 12–15.9)
IMM GRANULOCYTES # BLD AUTO: 0.01 10*3/MM3 (ref 0–0.05)
IMM GRANULOCYTES NFR BLD AUTO: 0.1 % (ref 0–0.5)
INR PPP: 0.94 (ref 0.9–1.1)
LYMPHOCYTES # BLD AUTO: 2.29 10*3/MM3 (ref 0.7–3.1)
LYMPHOCYTES NFR BLD AUTO: 32.4 % (ref 19.6–45.3)
MAGNESIUM SERPL-MCNC: 1.4 MG/DL (ref 1.6–2.4)
MCH RBC QN AUTO: 23.5 PG (ref 26.6–33)
MCHC RBC AUTO-ENTMCNC: 29.7 G/DL (ref 31.5–35.7)
MCV RBC AUTO: 79.2 FL (ref 79–97)
MONOCYTES # BLD AUTO: 0.44 10*3/MM3 (ref 0.1–0.9)
MONOCYTES NFR BLD AUTO: 6.2 % (ref 5–12)
NEUTROPHILS NFR BLD AUTO: 4.15 10*3/MM3 (ref 1.7–7)
NEUTROPHILS NFR BLD AUTO: 58.7 % (ref 42.7–76)
NRBC BLD AUTO-RTO: 0 /100 WBC (ref 0–0.2)
NT-PROBNP SERPL-MCNC: 282 PG/ML (ref 0–900)
PLATELET # BLD AUTO: 288 10*3/MM3 (ref 140–450)
PMV BLD AUTO: 11.5 FL (ref 6–12)
POTASSIUM SERPL-SCNC: 4.1 MMOL/L (ref 3.5–5.2)
PROT SERPL-MCNC: 6.5 G/DL (ref 6–8.5)
PROTHROMBIN TIME: 12.8 SECONDS (ref 11.7–14.2)
RBC # BLD AUTO: 4.04 10*6/MM3 (ref 3.77–5.28)
SODIUM SERPL-SCNC: 142 MMOL/L (ref 136–145)
TROPONIN T DELTA: 1 NG/L
TROPONIN T SERPL HS-MCNC: 7 NG/L
WBC NRBC COR # BLD AUTO: 7.07 10*3/MM3 (ref 3.4–10.8)

## 2024-05-16 PROCEDURE — 93017 CV STRESS TEST TRACING ONLY: CPT

## 2024-05-16 PROCEDURE — 3075F SYST BP GE 130 - 139MM HG: CPT | Performed by: INTERNAL MEDICINE

## 2024-05-16 PROCEDURE — 99213 OFFICE O/P EST LOW 20 MIN: CPT | Performed by: INTERNAL MEDICINE

## 2024-05-16 PROCEDURE — 83880 ASSAY OF NATRIURETIC PEPTIDE: CPT

## 2024-05-16 PROCEDURE — 63710000001 INSULIN GLARGINE PER 5 UNITS

## 2024-05-16 PROCEDURE — G0378 HOSPITAL OBSERVATION PER HR: HCPCS

## 2024-05-16 PROCEDURE — 3078F DIAST BP <80 MM HG: CPT | Performed by: INTERNAL MEDICINE

## 2024-05-16 PROCEDURE — 85610 PROTHROMBIN TIME: CPT

## 2024-05-16 PROCEDURE — 85730 THROMBOPLASTIN TIME PARTIAL: CPT

## 2024-05-16 PROCEDURE — 82948 REAGENT STRIP/BLOOD GLUCOSE: CPT

## 2024-05-16 PROCEDURE — 80053 COMPREHEN METABOLIC PANEL: CPT

## 2024-05-16 PROCEDURE — 83735 ASSAY OF MAGNESIUM: CPT

## 2024-05-16 PROCEDURE — 84484 ASSAY OF TROPONIN QUANT: CPT

## 2024-05-16 PROCEDURE — 93010 ELECTROCARDIOGRAM REPORT: CPT | Performed by: INTERNAL MEDICINE

## 2024-05-16 PROCEDURE — 85025 COMPLETE CBC W/AUTO DIFF WBC: CPT

## 2024-05-16 PROCEDURE — G0379 DIRECT REFER HOSPITAL OBSERV: HCPCS

## 2024-05-16 PROCEDURE — 93005 ELECTROCARDIOGRAM TRACING: CPT

## 2024-05-16 RX ORDER — ERGOCALCIFEROL 1.25 MG/1
50000 CAPSULE ORAL WEEKLY
Status: DISCONTINUED | OUTPATIENT
Start: 2024-05-21 | End: 2024-05-18 | Stop reason: HOSPADM

## 2024-05-16 RX ORDER — ASPIRIN 81 MG/1
81 TABLET, CHEWABLE ORAL DAILY
Status: DISCONTINUED | OUTPATIENT
Start: 2024-05-16 | End: 2024-05-18 | Stop reason: HOSPADM

## 2024-05-16 RX ORDER — PANTOPRAZOLE SODIUM 20 MG/1
20 TABLET, DELAYED RELEASE ORAL DAILY
Status: DISCONTINUED | OUTPATIENT
Start: 2024-05-16 | End: 2024-05-16 | Stop reason: CLARIF

## 2024-05-16 RX ORDER — NICOTINE POLACRILEX 4 MG
15 LOZENGE BUCCAL
Status: DISCONTINUED | OUTPATIENT
Start: 2024-05-16 | End: 2024-05-18 | Stop reason: HOSPADM

## 2024-05-16 RX ORDER — ACETAMINOPHEN 500 MG
500 TABLET ORAL EVERY 8 HOURS PRN
Status: DISCONTINUED | OUTPATIENT
Start: 2024-05-16 | End: 2024-05-18 | Stop reason: HOSPADM

## 2024-05-16 RX ORDER — PANTOPRAZOLE SODIUM 40 MG/1
40 TABLET, DELAYED RELEASE ORAL
Status: DISCONTINUED | OUTPATIENT
Start: 2024-05-17 | End: 2024-05-18 | Stop reason: HOSPADM

## 2024-05-16 RX ORDER — CETIRIZINE HYDROCHLORIDE 10 MG/1
10 TABLET ORAL DAILY
Status: DISCONTINUED | OUTPATIENT
Start: 2024-05-17 | End: 2024-05-18 | Stop reason: HOSPADM

## 2024-05-16 RX ORDER — IBUPROFEN 600 MG/1
1 TABLET ORAL
Status: DISCONTINUED | OUTPATIENT
Start: 2024-05-16 | End: 2024-05-18 | Stop reason: HOSPADM

## 2024-05-16 RX ORDER — FERROUS SULFATE 325(65) MG
325 TABLET ORAL
Status: DISCONTINUED | OUTPATIENT
Start: 2024-05-17 | End: 2024-05-18 | Stop reason: HOSPADM

## 2024-05-16 RX ORDER — INSULIN LISPRO 100 [IU]/ML
2-7 INJECTION, SOLUTION INTRAVENOUS; SUBCUTANEOUS
Status: DISCONTINUED | OUTPATIENT
Start: 2024-05-16 | End: 2024-05-18 | Stop reason: HOSPADM

## 2024-05-16 RX ORDER — ALBUTEROL SULFATE 2.5 MG/3ML
2.5 SOLUTION RESPIRATORY (INHALATION) EVERY 6 HOURS PRN
Status: DISCONTINUED | OUTPATIENT
Start: 2024-05-16 | End: 2024-05-18 | Stop reason: HOSPADM

## 2024-05-16 RX ORDER — DIPHENOXYLATE HYDROCHLORIDE AND ATROPINE SULFATE 2.5; .025 MG/1; MG/1
1 TABLET ORAL DAILY
Status: DISCONTINUED | OUTPATIENT
Start: 2024-05-17 | End: 2024-05-18 | Stop reason: HOSPADM

## 2024-05-16 RX ORDER — DEXTROSE MONOHYDRATE 25 G/50ML
25 INJECTION, SOLUTION INTRAVENOUS
Status: DISCONTINUED | OUTPATIENT
Start: 2024-05-16 | End: 2024-05-18 | Stop reason: HOSPADM

## 2024-05-16 RX ORDER — CLOPIDOGREL BISULFATE 75 MG/1
75 TABLET ORAL DAILY
Status: DISCONTINUED | OUTPATIENT
Start: 2024-05-17 | End: 2024-05-18 | Stop reason: HOSPADM

## 2024-05-16 RX ORDER — NITROGLYCERIN 0.4 MG/1
0.4 TABLET SUBLINGUAL
Status: DISCONTINUED | OUTPATIENT
Start: 2024-05-16 | End: 2024-05-16

## 2024-05-16 RX ORDER — ESCITALOPRAM OXALATE 20 MG/1
20 TABLET ORAL DAILY
Status: DISCONTINUED | OUTPATIENT
Start: 2024-05-17 | End: 2024-05-18 | Stop reason: HOSPADM

## 2024-05-16 RX ORDER — SODIUM CHLORIDE 0.9 % (FLUSH) 0.9 %
10 SYRINGE (ML) INJECTION EVERY 12 HOURS SCHEDULED
Status: DISCONTINUED | OUTPATIENT
Start: 2024-05-16 | End: 2024-05-18

## 2024-05-16 RX ORDER — SODIUM CHLORIDE 0.9 % (FLUSH) 0.9 %
10 SYRINGE (ML) INJECTION AS NEEDED
Status: DISCONTINUED | OUTPATIENT
Start: 2024-05-16 | End: 2024-05-18 | Stop reason: HOSPADM

## 2024-05-16 RX ORDER — METOPROLOL SUCCINATE 25 MG/1
25 TABLET, EXTENDED RELEASE ORAL DAILY
Status: DISCONTINUED | OUTPATIENT
Start: 2024-05-17 | End: 2024-05-18 | Stop reason: HOSPADM

## 2024-05-16 RX ORDER — CLOPIDOGREL BISULFATE 75 MG/1
75 TABLET ORAL DAILY
Status: DISCONTINUED | OUTPATIENT
Start: 2024-05-16 | End: 2024-05-16

## 2024-05-16 RX ORDER — ROPINIROLE 0.5 MG/1
0.25 TABLET, FILM COATED ORAL NIGHTLY
Status: DISCONTINUED | OUTPATIENT
Start: 2024-05-16 | End: 2024-05-18 | Stop reason: HOSPADM

## 2024-05-16 RX ORDER — NITROGLYCERIN 0.4 MG/1
0.4 TABLET SUBLINGUAL
Status: DISCONTINUED | OUTPATIENT
Start: 2024-05-16 | End: 2024-05-18 | Stop reason: HOSPADM

## 2024-05-16 RX ORDER — LISINOPRIL 5 MG/1
5 TABLET ORAL DAILY
Status: DISCONTINUED | OUTPATIENT
Start: 2024-05-16 | End: 2024-05-18 | Stop reason: HOSPADM

## 2024-05-16 RX ORDER — SODIUM CHLORIDE 9 MG/ML
40 INJECTION, SOLUTION INTRAVENOUS AS NEEDED
Status: DISCONTINUED | OUTPATIENT
Start: 2024-05-16 | End: 2024-05-18 | Stop reason: HOSPADM

## 2024-05-16 RX ORDER — ATORVASTATIN CALCIUM 20 MG/1
40 TABLET, FILM COATED ORAL NIGHTLY
Status: DISCONTINUED | OUTPATIENT
Start: 2024-05-16 | End: 2024-05-18 | Stop reason: HOSPADM

## 2024-05-16 RX ADMIN — ATORVASTATIN CALCIUM 40 MG: 20 TABLET, FILM COATED ORAL at 21:52

## 2024-05-16 RX ADMIN — LISINOPRIL 5 MG: 5 TABLET ORAL at 18:00

## 2024-05-16 RX ADMIN — NITROGLYCERIN 0.4 MG: 0.4 TABLET SUBLINGUAL at 21:44

## 2024-05-16 RX ADMIN — ASPIRIN 81 MG: 81 TABLET, CHEWABLE ORAL at 18:00

## 2024-05-16 RX ADMIN — NITROGLYCERIN 1 INCH: 20 OINTMENT TOPICAL at 18:00

## 2024-05-16 RX ADMIN — ROPINIROLE HYDROCHLORIDE 0.25 MG: 0.5 TABLET, FILM COATED ORAL at 21:52

## 2024-05-16 RX ADMIN — Medication 10 ML: at 21:52

## 2024-05-16 RX ADMIN — INSULIN GLARGINE 20 UNITS: 100 INJECTION, SOLUTION SUBCUTANEOUS at 21:51

## 2024-05-16 RX ADMIN — NITROGLYCERIN 0.4 MG: 0.4 TABLET SUBLINGUAL at 21:52

## 2024-05-16 NOTE — Clinical Note
First balloon inflation max pressure = 16 temi. First balloon inflation duration = 6 seconds. Second inflation of balloon - Max pressure = 16 temi. 2nd Inflation of balloon - Duration = 6 seconds.

## 2024-05-16 NOTE — Clinical Note
First balloon inflation max pressure = 12 temi. First balloon inflation duration = 6 seconds. Second inflation of balloon - Max pressure = 12 temi. 2nd Inflation of balloon - Duration = 5 seconds. Third inflation of balloon - Max pressure = 12 temi. 3rd Inflation of balloon - Duration = 8 seconds.

## 2024-05-16 NOTE — Clinical Note
Hemostasis started on the right radial artery. R-Band was used in achieving hemostasis. Radial compression device applied to vessel. Hemostasis achieved successfully. Closure device additional comment: 14cc of air

## 2024-05-16 NOTE — H&P
[facility]  History & Physical Note                                                                                    Patient Identification:  Gely Kimble:   63 y.o.  female  1960  [id number]            No chief complaint on file.      Date of Admission:5/16/24    Admitting Physician:Dr Hayes    Reason for Admission:unstable angina, No chief complaint on file.      History of Present Illness:     This is a 63-year-old female who is well-known with our service with coronary artery disease (PCI to LAD and RCA), hypertension, hyperlipidemia, COPD, diabetes mellitus.  She presented to office today with complaints of chest pain with very minimal exertion just walking from parking lot to the office causing chest discomfort.  She underwent a treadmill test in office that was abnormal and was directly admitted for left heart cath tomorrow.    Cardiac catheterization October 2023 angioplasty and stent to the mid LAD bifurcation lesion with 80% LAD reduced to 0% with LEYLA.  Origin of the diagonal 80% reduced to 30% with balloon.  Normal left main, circumflex nondominant normal.  RCA dominant with PDA 80% stenosis.  She then underwent successful angioplasty and stent to the proximal PDA branch of the RCA 90% reduced to 0% with drug-eluting stent November 2023.    Echo 9/14/2023 EF 61 to 65%, grade 1 LV diastolic dysfunction.  Mild mitral annular calcification.    Cardiac Risk Factors:    Past Medical History:  Past Medical History:   Diagnosis Date    Abnormal nuclear stress test     Arthritis     COPD (chronic obstructive pulmonary disease)     Elevated cholesterol     Emphysema of lung     Gallbladder abscess     Herpes zoster 08/03/2023    History of positive hepatitis C     previously positive, never treated, negative RNA finding    Hypertension     Myocardial perfusion defect 02/16/2016    Type 2 diabetes mellitus     at least 3 stable    Past Surgical History:  Past Surgical History:    Procedure Laterality Date    APPENDECTOMY      CARDIAC CATHETERIZATION N/A 10/25/2023    Procedure: Left Heart Cath;  Surgeon: Tasneem Hayes MD;  Location:  [facility];  Service: Cardiovascular;  Laterality: N/A;    CARDIAC CATHETERIZATION N/A 10/25/2023    Procedure: Left ventriculography;  Surgeon: Tasneem Hayes MD;  Location:  [facility];  Service: Cardiovascular;  Laterality: N/A;    CARDIAC CATHETERIZATION N/A 10/25/2023    Procedure: Stent LEYLA coronary;  Surgeon: Tasneem Hayes MD;  Location:  [facility];  Service: Cardiovascular;  Laterality: N/A;    CARDIAC CATHETERIZATION N/A 10/25/2023    Procedure: Coronary angiography;  Surgeon: Tasneem Hayes MD;  Location:  [facility];  Service: Cardiovascular;  Laterality: N/A;    CARDIAC CATHETERIZATION N/A 10/25/2023    Procedure: Percutaneous Coronary Intervention;  Surgeon: Tasneem Hayes MD;  Location[facility] C[facility];  Service: Cardiovascular;  Laterality: N/A;    CARDIAC CATHETERIZATION N/A 11/3/2023    Procedure: Percutaneous Coronary Intervention RCA;  Surgeon: Tasneem Hayes MD;  Location:  [facility];  Service: Cardiovascular;  Laterality: N/A;    CARDIAC CATHETERIZATION N/A 11/3/2023    Procedure: Coronary angiography;  Surgeon: Tasneem Hayes MD;  [facility];  Service: Cardiovascular;  Laterality: N/A;    CARDIAC CATHETERIZATION N/A 11/3/2023    Procedure: Stent LEYLA coronary;  Surgeon: Tasneem Hayes MD;  Location: [facility] CATH INVASIVE LOCATION;  Service: Cardiovascular;  Laterality: N/A;    CHOLECYSTECTOMY      HYSTERECTOMY      TUBAL ABDOMINAL LIGATION          Social History:   Social History     Tobacco Use    Smoking status: Former     Current packs/day: 0.00     Types: Cigarettes     Quit date: 10/11/2017     Years since quittin.6     Passive  "exposure: Past    Smokeless tobacco: Not on file    Tobacco comments:     Previously smoked about 1.5 packs per day for about 45 years   Substance Use Topics    Alcohol use: No     Comment: rare shantanuarimoreno 1-2 times per year        Family History:  Family History   Problem Relation Age of Onset    Diabetes Mother     Heart disease Mother     Diabetes Father     Diabetes Brother     Heart disease Maternal Grandmother     Cancer Paternal Grandfather           Allergies:  No Known Allergies    Home Meds:  No current facility-administered medications on file prior to encounter.     Current Outpatient Medications on File Prior to Encounter   Medication Sig Dispense Refill    acetaminophen (TYLENOL) 500 MG tablet Take 1 tablet by mouth Every 8 (Eight) Hours As Needed for Mild Pain or Moderate Pain.      albuterol (PROVENTIL HFA;VENTOLIN HFA) 108 (90 BASE) MCG/ACT inhaler Inhale 2 puffs Every 4 (Four) Hours As Needed for Wheezing.      albuterol (PROVENTIL) (2.5 MG/3ML) 0.083% nebulizer solution Take 2.5 mg by nebulization Every 4 (Four) Hours As Needed for Wheezing.      aspirin 81 MG chewable tablet Chew 1 tablet Daily.      atorvastatin (LIPITOR) 40 MG tablet Take 1 tablet by mouth Daily. 30 tablet 11    clopidogrel (PLAVIX) 75 MG tablet Take 1 tablet by mouth Daily. 30 tablet 11    escitalopram (LEXAPRO) 20 MG tablet TAKE 1 TABLET BY MOUTH DAILY 90 tablet 1    Ferrous Sulfate  (45 Fe) MG tablet controlled-release Take 1 tablet by mouth Daily. 30 tablet 2    Homeopathic Products (LEG CRAMP RELIEF PO) Take 1 tablet by mouth Daily.      insulin detemir (Levemir) 100 UNIT/ML injection Inject 20 Units under the skin into the appropriate area as directed Every Night.      Insulin Syringe 31G X 5/16\" 1 ML misc Use 1 syringe Daily. For use with Levemir vials 100 each 2    lisinopril (PRINIVIL,ZESTRIL) 10 MG tablet Take 0.5 tablets by mouth Daily.      LORATADINE PO Take 1 tablet by mouth Daily.      metFORMIN ER " (GLUCOPHAGE-XR) 500 MG 24 hr tablet TAKE TWO TABLETS BY MOUTH TWICE A DAY WITH A MEAL 120 tablet 3    metoprolol succinate XL (TOPROL-XL) 25 MG 24 hr tablet Take 1 tablet by mouth Daily. 30 tablet 5    Multiple Vitamin (MULTIVITAMIN) tablet Take 1 tablet by mouth.      nitroglycerin (Nitrostat) 0.4 MG SL tablet Place 1 tablet under the tongue Every 5 (Five) Minutes As Needed for Chest Pain. Take no more than 3 doses in 15 minutes. 100 tablet 12    pantoprazole (Protonix) 20 MG EC tablet Take 1 tablet by mouth Daily. 30 tablet 5    rOPINIRole (REQUIP) 0.25 MG tablet Take 1 tablet by mouth Every Night. Take 1 hour before bedtime. 30 tablet 2    vitamin D (ERGOCALCIFEROL) 1.25 MG (12661 UT) capsule capsule Take 1 capsule by mouth 1 (One) Time Per Week.           Scheduled Meds:          INTAKE AND OUTPUT:  No intake or output data in the 24 hours ending 05/16/24 1422        Review of Systems:  Constitutional: No weakness,fatigue, fever, chills   Eyes: No eye pain, vision changes   ENT/oropharynx: No difficulty swallowing, no epistaxis, no changes in hearing   Cardiovascular: No chest pain, chest tightness, palpitations, paroxysmal nocturnal dyspnea, orthopnea, diaphoresis, dizziness / syncopal episode   Respiratory: No shortness of breath, dyspnea on exertion, cough, wheezing hemoptysis   Gastrointestinal: No abdominal pain, nausea, vomiting, diarrhea, bloody stools   Genitourinary: No hematuria, dysuria   Neurological: No headache, numbness, tingling, one-sided weakness    Musculoskeletal: No cramps, joint pain   Integument: No rash, edema       Constitutional:  Heart Rate:  [65-83] 83  BP: (130-136)/(66-70) 130/70    Physical Exam:                               Cardiographics    ECG:     Telemetry:      ECHO:  Interpretation Summary         Left ventricular systolic function is normal. Left ventricular ejection fraction appears to be 61 - 65%.    Left ventricular diastolic function is consistent with (grade I)  impaired relaxation.    Normal right ventricular cavity size and systolic function noted.    There is mild mitral annular calcification    Insufficient TR velocity profile to estimate the right ventricular systolic pressure.    There is no evidence of pericardial effusion.       RESULTS:  Initial aortic pressure was approximately 120/80, and the patient remained hemodynamically stable through the case.  Initial coronary arteriography showed 90% PDA stenosis reduced to 0% with 2.5/12 Xience stent.    GUZMAN FLOW PRE 2 POST 3    TYPE OF LESION B    RECOMMENDATIONS: The patient has had an excellent result from intervention. The patient will be maintained on antiplatelet therapy and follow up with me and his primary care physician,   Importance of taking dual antiplatelets for one year has been explained, risk of stent thrombosis leading to the acute MI, which carries high morbidity and mortality has been explained    Discontinuation or interruptions of these medications should be under the strict guidance of appropriate health professional  .      HEMODYNAMIC / ANGIOGRAPHIC DATA:   Left ventricular end diastolic pressure was 10 mmHg.  Left ventriculography revealed an EF around 60%.   The left main is normal left main.  The left anterior descending artery is , Left anterior descending midportion has 80% bifurcation lesion reduced to 0% with 3.5/18 Xience stent, ostial diagonal branch 80% reduced to 20 to 30% with 2.5/12 trek balloon, minimum irregularity of the rest of the LAD diagonal and  branches  The left circumflex is nondominant and normal.  The right coronary artery is dominant with the PDA 80% stenosis.  Successful angioplasty and stent to the mid LAD bifurcation lesion with 80% LAD reduced to 0% with 3.5/18 Xience stent, origin of the diagonal 80% reduced to 30% with 2.5/12 trek balloon    GUZMAN FLOW PRE...2.... POST.....3.    TYPE OF LESION.........C....    RECOMMENDATIONS: Post-procedure care will  "focus on prevention of any ischemic events and congestive complications. Aggressive risk factor modification will be carried out.  Importance of taking dual antiplatelets for one year has been explained, risk of stent thrombosis leading to the acute MI, which carries high morbidity and mortality has been explained    Discontinuation or interruptions of these medications should be under the strict guidance of appropriate health professional    Lab Review:              @[email or web address]@              CXR:       Assessment / Plan:  Coronary artery disease with unstable angina  Hypertension  Hyperlipidemia  Diabetes mellitus  COPD        Recommendations:  This is a 63-year-old female who is well-known with our service admitted for unstable angina.  Surveillance labs, troponin, ECG.  Nitropaste 1 inch.  Resume home meds.  Consult internal medicine.  N.p.o. after midnight for left heart cath tomorrow.    Procedures, risks, and options of cardiac cath explained to patient, including but not limited to MI, Stroke, death, infections, hemorrhage. Patient understands well and agrees, all questions answered.      Vero Parada, APRN  5/16/2024  14:22 EDT    EMR Dragon/Transcription:   \"Dictated utilizing Dragon dictation\".     "

## 2024-05-16 NOTE — PLAN OF CARE
Problem: Adult Inpatient Plan of Care  Goal: Plan of Care Review  5/16/2024 1635 by Jay Cai, RN  Outcome: Ongoing, Progressing  5/16/2024 1607 by Jay Cai, RN  Outcome: Ongoing, Progressing  5/16/2024 1559 by Jay Cai, RN  Outcome: Ongoing, Progressing   Goal Outcome Evaluation:

## 2024-05-16 NOTE — PROGRESS NOTES
Subjective:        Gely Kimble is a 63 y.o. female who here for follow up    CC  H/o cad, pci  lad and rt    Chest discomfort getting worse  HPI  63-year-old female with coronary artery disease hypertension hyperlipidemia s/p angioplasty here for the follow-up with no chest pains or tightness in the chest     Problems Addressed this Visit          Cardiac and Vasculature    Other hyperlipidemia    Primary hypertension    Chest pain - Primary    Relevant Orders    Treadmill Stress Test (Completed)    Coronary artery disease of native artery of native heart with stable angina pectoris       Musculoskeletal and Injuries    Left leg swelling     Diagnoses         Codes Comments    Precordial pain    -  Primary ICD-10-CM: R07.2  ICD-9-CM: 786.51     Left leg swelling     ICD-10-CM: M79.89  ICD-9-CM: 729.81     Coronary artery disease of native artery of native heart with stable angina pectoris     ICD-10-CM: I25.118  ICD-9-CM: 414.01, 413.9     Other hyperlipidemia     ICD-10-CM: E78.49  ICD-9-CM: 272.4     Primary hypertension     ICD-10-CM: I10  ICD-9-CM: 401.9           .    The following portions of the patient's history were reviewed and updated as appropriate: allergies, current medications, past family history, past medical history, past social history, past surgical history and problem list.    Past Medical History:   Diagnosis Date    Abnormal nuclear stress test     Arthritis     COPD (chronic obstructive pulmonary disease)     Elevated cholesterol     Emphysema of lung     Gallbladder abscess     Herpes zoster 08/03/2023    History of positive hepatitis C     previously positive, never treated, negative RNA finding    Hypertension     Myocardial perfusion defect 02/16/2016    Type 2 diabetes mellitus      reports that she quit smoking about 6 years ago. Her smoking use included cigarettes. She started smoking about 52 years ago. She has a 68.7 pack-year smoking history. She has been exposed to tobacco  "smoke. She has never used smokeless tobacco. She reports that she does not currently use alcohol. She reports that she does not use drugs.   Family History   Problem Relation Age of Onset    Diabetes Mother     Heart disease Mother     Diabetes Father     Diabetes Brother     Heart disease Maternal Grandmother     Cancer Paternal Grandfather        Review of Systems  Constitutional: No wt loss, fever, fatigue  Gastrointestinal: No nausea, abdominal pain  Behavioral/Psych: No insomnia or anxiety   Cardiovascular no chest pains or tightness in the chest  Objective:       Physical Exam           Physical Exam  /66 (BP Location: Left arm, Patient Position: Sitting, Cuff Size: Adult)   Pulse 65   Ht 171.5 cm (67.5\")   Wt 67 kg (147 lb 9.6 oz)   SpO2 96%   BMI 22.78 kg/m²     General appearance: No acute changes   Eyes: Sclerae conjunctivae normal, pupils reactive   HENT: Atraumatic; oropharynx clear with moist mucous membranes and no mucosal ulcerations;  Neck: Trachea midline; NECK, supple, no thyromegaly or lymphadenopathy   Lungs: Normal size and shape, normal breath sounds, equal distribution of air, no rales and rhonchi   CV: S1-S2 regular, no murmurs, no rub, no gallop   Abdomen: Soft, nontender; no masses , no abnormal abdominal sounds   Extremities: No deformity , normal color , no peripheral edema   Skin: Normal temperature, turgor and texture; no rash, ulcers  Psych: Appropriate affect, alert and oriented to person, place and time           Procedures      Echocardiogram:    Results for orders placed during the hospital encounter of 09/13/23    Adult Transthoracic Echo Complete w/ Color, Spectral and Contrast if necessary per protocol    Interpretation Summary    Left ventricular systolic function is normal. Left ventricular ejection fraction appears to be 61 - 65%.    Left ventricular diastolic function is consistent with (grade I) impaired relaxation.    Normal right ventricular cavity size and " "systolic function noted.    There is mild mitral annular calcification    Insufficient TR velocity profile to estimate the right ventricular systolic pressure.    There is no evidence of pericardial effusion.          Current Outpatient Medications:     acetaminophen (TYLENOL) 500 MG tablet, Take 1 tablet by mouth Every 8 (Eight) Hours As Needed for Mild Pain or Moderate Pain., Disp: , Rfl:     albuterol (PROVENTIL HFA;VENTOLIN HFA) 108 (90 BASE) MCG/ACT inhaler, Inhale 2 puffs Every 4 (Four) Hours As Needed for Wheezing. (Patient not taking: Reported on 5/28/2024), Disp: , Rfl:     albuterol (PROVENTIL) (2.5 MG/3ML) 0.083% nebulizer solution, Take 2.5 mg by nebulization Every 4 (Four) Hours As Needed for Wheezing. (Patient not taking: Reported on 5/28/2024), Disp: , Rfl:     aspirin 81 MG chewable tablet, Chew 1 tablet Daily., Disp: , Rfl:     atorvastatin (LIPITOR) 40 MG tablet, Take 1 tablet by mouth Daily., Disp: 30 tablet, Rfl: 11    clopidogrel (PLAVIX) 75 MG tablet, Take 1 tablet by mouth Daily., Disp: 30 tablet, Rfl: 11    escitalopram (LEXAPRO) 20 MG tablet, TAKE 1 TABLET BY MOUTH DAILY, Disp: 90 tablet, Rfl: 1    Ferrous Sulfate  (45 Fe) MG tablet controlled-release, Take 1 tablet by mouth Daily., Disp: 30 tablet, Rfl: 2    Homeopathic Products (LEG CRAMP RELIEF PO), Take 1 tablet by mouth Daily., Disp: , Rfl:     insulin detemir (Levemir) 100 UNIT/ML injection, Inject 20 Units under the skin into the appropriate area as directed Every Night., Disp: , Rfl:     Insulin Syringe 31G X 5/16\" 1 ML misc, Use 1 syringe Daily. For use with Levemir vials, Disp: 100 each, Rfl: 2    LORATADINE PO, Take 1 tablet by mouth Daily., Disp: , Rfl:     metFORMIN ER (GLUCOPHAGE-XR) 500 MG 24 hr tablet, TAKE TWO TABLETS BY MOUTH TWICE A DAY WITH A MEAL, Disp: 120 tablet, Rfl: 3    Multiple Vitamin (MULTIVITAMIN) tablet, Take 1 tablet by mouth., Disp: , Rfl:     nitroglycerin (Nitrostat) 0.4 MG SL tablet, Place 1 tablet " under the tongue Every 5 (Five) Minutes As Needed for Chest Pain. Take no more than 3 doses in 15 minutes., Disp: 100 tablet, Rfl: 12    pantoprazole (Protonix) 20 MG EC tablet, Take 1 tablet by mouth Daily., Disp: 30 tablet, Rfl: 5    rOPINIRole (REQUIP) 0.25 MG tablet, Take 1 tablet by mouth Every Night. Take 1 hour before bedtime., Disp: 30 tablet, Rfl: 2    vitamin D (ERGOCALCIFEROL) 1.25 MG (78811 UT) capsule capsule, Take 1 capsule by mouth 1 (One) Time Per Week., Disp: , Rfl:     lisinopril (PRINIVIL,ZESTRIL) 5 MG tablet, Take 1 tablet by mouth Daily., Disp: 90 tablet, Rfl: 1    metoprolol succinate XL (TOPROL-XL) 25 MG 24 hr tablet, Take 1 tablet by mouth Daily., Disp: 90 tablet, Rfl: 2   Assessment:                Plan:          ICD-10-CM ICD-9-CM   1. Precordial pain  R07.2 786.51   2. Left leg swelling  M79.89 729.81   3. Coronary artery disease of native artery of native heart with stable angina pectoris  I25.118 414.01     413.9   4. Other hyperlipidemia  E78.49 272.4   5. Primary hypertension  I10 401.9     1. Precordial pain  Considering the patient's symptoms as well as clinical situation and  EKG findings, along with cardiac risk factors, ischemic workup is necessary to rule out ischemic cardiomyopathy, stress induced arrhythmias, and functional capacity for diagnosis as well as prognostic consideration    - Treadmill Stress Test    2. Left leg swelling  Gely Kimble has been complaining of the leg swelling, appears dependent pedal edema, significantly contributed with a venous insufficiency.    Strong recommendations of elevating the legs as well as using support hoses, along with the control of fluids and salt restriction has been explained in details      3. Coronary artery disease of native artery of native heart with stable angina pectoris  No angina pectoris    4. Other hyperlipidemia  Continue current treatment    5. Primary hypertension  Controlled    COUNSELING:    Gely GLASS  LangevinCounseling was given to patient for the following topics: diagnostic results, risk factor reductions, impressions, risks and benefits of treatment options and importance of treatment compliance .       SMOKING COUNSELING:        Dictated using Dragon dictation

## 2024-05-16 NOTE — Clinical Note
The right PT pulse is +1. The right radial pulse is +2. The right ulnar pulse is +1. The right femoral pulse is +2.

## 2024-05-16 NOTE — PLAN OF CARE
Problem: Adult Inpatient Plan of Care  Goal: Plan of Care Review  5/16/2024 1607 by Jay Cai, RN  Outcome: Ongoing, Progressing  5/16/2024 1559 by Jay Cai, RN  Outcome: Ongoing, Progressing   Goal Outcome Evaluation:

## 2024-05-17 LAB
ALBUMIN SERPL-MCNC: 3.9 G/DL (ref 3.5–5.2)
ALBUMIN/GLOB SERPL: 2.3 G/DL
ALP SERPL-CCNC: 50 U/L (ref 39–117)
ALT SERPL W P-5'-P-CCNC: 12 U/L (ref 1–33)
ANION GAP SERPL CALCULATED.3IONS-SCNC: 10.5 MMOL/L (ref 5–15)
AST SERPL-CCNC: 14 U/L (ref 1–32)
BILIRUB SERPL-MCNC: <0.2 MG/DL (ref 0–1.2)
BUN SERPL-MCNC: 11 MG/DL (ref 8–23)
BUN/CREAT SERPL: 20 (ref 7–25)
CALCIUM SPEC-SCNC: 8.7 MG/DL (ref 8.6–10.5)
CHLORIDE SERPL-SCNC: 105 MMOL/L (ref 98–107)
CHOLEST SERPL-MCNC: 105 MG/DL (ref 0–200)
CO2 SERPL-SCNC: 27.5 MMOL/L (ref 22–29)
CREAT SERPL-MCNC: 0.55 MG/DL (ref 0.57–1)
EGFRCR SERPLBLD CKD-EPI 2021: 101.9 ML/MIN/1.73
GLOBULIN UR ELPH-MCNC: 1.7 GM/DL
GLUCOSE BLDC GLUCOMTR-MCNC: 117 MG/DL (ref 70–130)
GLUCOSE BLDC GLUCOMTR-MCNC: 121 MG/DL (ref 70–130)
GLUCOSE BLDC GLUCOMTR-MCNC: 150 MG/DL (ref 70–130)
GLUCOSE BLDC GLUCOMTR-MCNC: 188 MG/DL (ref 70–130)
GLUCOSE SERPL-MCNC: 125 MG/DL (ref 65–99)
HDLC SERPL-MCNC: 53 MG/DL (ref 40–60)
LDLC SERPL CALC-MCNC: 35 MG/DL (ref 0–100)
LDLC/HDLC SERPL: 0.65 {RATIO}
POTASSIUM SERPL-SCNC: 3.8 MMOL/L (ref 3.5–5.2)
PROT SERPL-MCNC: 5.6 G/DL (ref 6–8.5)
QT INTERVAL: 428 MS
QTC INTERVAL: 456 MS
SODIUM SERPL-SCNC: 143 MMOL/L (ref 136–145)
TRIGL SERPL-MCNC: 89 MG/DL (ref 0–150)
VLDLC SERPL-MCNC: 17 MG/DL (ref 5–40)

## 2024-05-17 PROCEDURE — 92928 PRQ TCAT PLMT NTRAC ST 1 LES: CPT | Performed by: INTERNAL MEDICINE

## 2024-05-17 PROCEDURE — G0378 HOSPITAL OBSERVATION PER HR: HCPCS

## 2024-05-17 PROCEDURE — 93458 L HRT ARTERY/VENTRICLE ANGIO: CPT | Performed by: INTERNAL MEDICINE

## 2024-05-17 PROCEDURE — 93005 ELECTROCARDIOGRAM TRACING: CPT

## 2024-05-17 PROCEDURE — 63710000001 INSULIN LISPRO (HUMAN) PER 5 UNITS: Performed by: INTERNAL MEDICINE

## 2024-05-17 PROCEDURE — C1725 CATH, TRANSLUMIN NON-LASER: HCPCS | Performed by: INTERNAL MEDICINE

## 2024-05-17 PROCEDURE — 80053 COMPREHEN METABOLIC PANEL: CPT

## 2024-05-17 PROCEDURE — 63710000001 INSULIN GLARGINE PER 5 UNITS: Performed by: INTERNAL MEDICINE

## 2024-05-17 PROCEDURE — 80061 LIPID PANEL: CPT

## 2024-05-17 PROCEDURE — 25810000003 SODIUM CHLORIDE 0.9 % SOLUTION: Performed by: INTERNAL MEDICINE

## 2024-05-17 PROCEDURE — 25010000002 HEPARIN (PORCINE) PER 1000 UNITS: Performed by: INTERNAL MEDICINE

## 2024-05-17 PROCEDURE — C1769 GUIDE WIRE: HCPCS | Performed by: INTERNAL MEDICINE

## 2024-05-17 PROCEDURE — C1894 INTRO/SHEATH, NON-LASER: HCPCS | Performed by: INTERNAL MEDICINE

## 2024-05-17 PROCEDURE — C9600 PERC DRUG-EL COR STENT SING: HCPCS | Performed by: INTERNAL MEDICINE

## 2024-05-17 PROCEDURE — C1887 CATHETER, GUIDING: HCPCS | Performed by: INTERNAL MEDICINE

## 2024-05-17 PROCEDURE — 82948 REAGENT STRIP/BLOOD GLUCOSE: CPT

## 2024-05-17 PROCEDURE — 85347 COAGULATION TIME ACTIVATED: CPT

## 2024-05-17 PROCEDURE — 25510000001 IOPAMIDOL PER 1 ML: Performed by: INTERNAL MEDICINE

## 2024-05-17 PROCEDURE — 25010000002 MIDAZOLAM PER 1 MG: Performed by: INTERNAL MEDICINE

## 2024-05-17 PROCEDURE — 25010000002 FENTANYL CITRATE (PF) 50 MCG/ML SOLUTION: Performed by: INTERNAL MEDICINE

## 2024-05-17 RX ORDER — FENTANYL CITRATE 50 UG/ML
INJECTION, SOLUTION INTRAMUSCULAR; INTRAVENOUS
Status: DISCONTINUED | OUTPATIENT
Start: 2024-05-17 | End: 2024-05-17 | Stop reason: HOSPADM

## 2024-05-17 RX ORDER — VERAPAMIL HYDROCHLORIDE 2.5 MG/ML
INJECTION, SOLUTION INTRAVENOUS
Status: DISCONTINUED | OUTPATIENT
Start: 2024-05-17 | End: 2024-05-17 | Stop reason: HOSPADM

## 2024-05-17 RX ORDER — ACETAMINOPHEN 325 MG/1
650 TABLET ORAL EVERY 4 HOURS PRN
Status: DISCONTINUED | OUTPATIENT
Start: 2024-05-17 | End: 2024-05-18 | Stop reason: HOSPADM

## 2024-05-17 RX ORDER — CLOPIDOGREL BISULFATE 75 MG/1
TABLET ORAL
Status: DISCONTINUED | OUTPATIENT
Start: 2024-05-17 | End: 2024-05-17 | Stop reason: HOSPADM

## 2024-05-17 RX ORDER — HEPARIN SODIUM 1000 [USP'U]/ML
INJECTION, SOLUTION INTRAVENOUS; SUBCUTANEOUS
Status: DISCONTINUED | OUTPATIENT
Start: 2024-05-17 | End: 2024-05-17 | Stop reason: HOSPADM

## 2024-05-17 RX ORDER — ASPIRIN 325 MG
TABLET ORAL
Status: DISCONTINUED | OUTPATIENT
Start: 2024-05-17 | End: 2024-05-17 | Stop reason: HOSPADM

## 2024-05-17 RX ORDER — CHOLECALCIFEROL (VITAMIN D3) 125 MCG
5 CAPSULE ORAL NIGHTLY PRN
Status: DISCONTINUED | OUTPATIENT
Start: 2024-05-17 | End: 2024-05-18 | Stop reason: HOSPADM

## 2024-05-17 RX ORDER — NITROGLYCERIN 0.4 MG/1
0.4 TABLET SUBLINGUAL
Status: DISCONTINUED | OUTPATIENT
Start: 2024-05-17 | End: 2024-05-18 | Stop reason: HOSPADM

## 2024-05-17 RX ORDER — MIDAZOLAM HYDROCHLORIDE 1 MG/ML
INJECTION INTRAMUSCULAR; INTRAVENOUS
Status: DISCONTINUED | OUTPATIENT
Start: 2024-05-17 | End: 2024-05-17 | Stop reason: HOSPADM

## 2024-05-17 RX ORDER — LIDOCAINE HYDROCHLORIDE 20 MG/ML
INJECTION, SOLUTION INFILTRATION; PERINEURAL
Status: DISCONTINUED | OUTPATIENT
Start: 2024-05-17 | End: 2024-05-17 | Stop reason: HOSPADM

## 2024-05-17 RX ORDER — SODIUM CHLORIDE 9 MG/ML
75 INJECTION, SOLUTION INTRAVENOUS CONTINUOUS
Status: DISCONTINUED | OUTPATIENT
Start: 2024-05-17 | End: 2024-05-17

## 2024-05-17 RX ADMIN — ACETAMINOPHEN 500 MG: 500 TABLET ORAL at 02:50

## 2024-05-17 RX ADMIN — Medication 10 ML: at 21:00

## 2024-05-17 RX ADMIN — Medication 1 TABLET: at 10:55

## 2024-05-17 RX ADMIN — ROPINIROLE HYDROCHLORIDE 0.25 MG: 0.5 TABLET, FILM COATED ORAL at 20:37

## 2024-05-17 RX ADMIN — LISINOPRIL 5 MG: 5 TABLET ORAL at 10:55

## 2024-05-17 RX ADMIN — PANTOPRAZOLE SODIUM 40 MG: 40 TABLET, DELAYED RELEASE ORAL at 06:35

## 2024-05-17 RX ADMIN — Medication 5 MG: at 23:32

## 2024-05-17 RX ADMIN — ESCITALOPRAM 20 MG: 20 TABLET, FILM COATED ORAL at 10:55

## 2024-05-17 RX ADMIN — METOPROLOL SUCCINATE 25 MG: 25 TABLET, EXTENDED RELEASE ORAL at 10:55

## 2024-05-17 RX ADMIN — CETIRIZINE HYDROCHLORIDE 10 MG: 10 TABLET ORAL at 10:55

## 2024-05-17 RX ADMIN — INSULIN LISPRO 2 UNITS: 100 INJECTION, SOLUTION INTRAVENOUS; SUBCUTANEOUS at 20:37

## 2024-05-17 RX ADMIN — ATORVASTATIN CALCIUM 40 MG: 20 TABLET, FILM COATED ORAL at 20:37

## 2024-05-17 RX ADMIN — INSULIN GLARGINE 20 UNITS: 100 INJECTION, SOLUTION SUBCUTANEOUS at 20:37

## 2024-05-17 RX ADMIN — FERROUS SULFATE TAB 325 MG (65 MG ELEMENTAL FE) 325 MG: 325 (65 FE) TAB at 10:55

## 2024-05-17 RX ADMIN — SODIUM CHLORIDE 75 ML/HR: 9 INJECTION, SOLUTION INTRAVENOUS at 09:14

## 2024-05-17 NOTE — CONSULTS
Internal medicine consult    Referring physician  Dr. ORNELAS    Chief complaint  Chest pain    Reason for consult  Follow medical problems    History of present illness  63-year-old white female with history of COPD hypertension hyperlipidemia hepatitis C directly admitted to cardiology with chest pain with positive stress Cardiolite in the office.  I am asked to see patient for medical problem.  At the time of examination patient is chest pain-free and also denies any fever cough shortness of breath palpitation.  Patient going for cardiac catheterization this morning.  Patient given a detailed history and sister also contributed.    PAST MEDICAL HISTORY   COPD (chronic obstructive pulmonary disease)      Elevated cholesterol      Diabetes mellitus      Coronary artery disease      Hepatitis C      Hypertension        PAST SURGICAL HISTORY              Procedure Laterality Date    APPENDECTOMY        CARDIAC CATHETERIZATION N/A 10/25/2023     Procedure: Left Heart Cath;  Surgeon: Tasneem Hayes MD;  Location: [facility] CATH INVASIVE LOCATION;  Service: Cardiovascular;  Laterality: N/A;    CARDIAC CATHETERIZATION N/A 10/25/2023     Procedure: Left ventriculography;  Surgeon: Tasneem Hayes MD;  Location: [facility] CATH INVASIVE LOCATION;  Service: Cardiovascular;  Laterality: N/A;    CARDIAC CATHETERIZATION N/A 10/25/2023     Procedure: Stent LEYLA coronary;  Surgeon: Tasneem Hayes MD;  Location: [facility] CATH INVASIVE LOCATION;  Service: Cardiovascular;  Laterality: N/A;    CARDIAC CATHETERIZATION N/A 10/25/2023     Procedure: Coronary angiography;  Surgeon: Tasneem Hayes MD;  Location: [facility] CATH INVASIVE LOCATION;  Service: Cardiovascular;  Laterality: N/A;    CARDIAC CATHETERIZATION N/A 10/25/2023     Procedure: Percutaneous Coronary Intervention;  Surgeon: Tasneem Hayes MD;  Location: [facility] CATH INVASIVE LOCATION;  Service: Cardiovascular;  Laterality: N/A;    CARDIAC  "CATHETERIZATION N/A 11/3/2023     Procedure: Percutaneous Coronary Intervention RCA;  Surgeon: Tasneem Hayes MD;  Location:  [facility] CATH INVASIVE LOCATION;  Service: Cardiovascular;  Laterality: N/A;    CARDIAC CATHETERIZATION N/A 11/3/2023     Procedure: Coronary angiography;  Surgeon: Tasneem Hayes MD;  Location:  [facility] CATH INVASIVE LOCATION;  Service: Cardiovascular;  Laterality: N/A;    CARDIAC CATHETERIZATION N/A 11/3/2023     Procedure: Stent LEYLA coronary;  Surgeon: Tasneem Hayes MD;  Location:  [facility] CATH INVASIVE LOCATION;  Service: Cardiovascular;  Laterality: N/A;    CHOLECYSTECTOMY        HYSTERECTOMY        TUBAL ABDOMINAL LIGATION             FAMILY HISTORY           Problem Relation Age of Onset    Diabetes Mother      Heart disease Mother      Diabetes Father      Diabetes Brother      Heart disease Maternal Grandmother      Cancer Paternal Grandfather        SOCIAL HISTORY                 Socioeconomic History    Marital status:    Tobacco Use    Smoking status: Former       Current packs/day: 0.00       Types: Cigarettes       Quit date: 10/11/2017       Years since quittin.4    Tobacco comments:       Previously smoked about 1.5 packs per day for about 45 years   Vaping Use    Vaping status: Never Used   Substance and Sexual Activity    Alcohol use: No       Comment: rare margaritia 1-2 times per year    Drug use: No       Comment: in past; clean since     Sexual activity: Not Currently         ALLERGIES  Patient has no known allergies.  Home medications reviewed     REVIEW OF SYSTEMS  All systems reviewed and negative except for those discussed in HPI.     PHYSICAL EXAM   Blood pressure 137/60, pulse 69, temperature 97.6 °F (36.4 °C), temperature source Oral, resp. rate 18, height 171.5 cm (67.5\"), weight 67.1 kg (148 lb), SpO2 96%.    GENERAL: Awake, alert, no acute distress  SKIN: Warm, dry  HENT: Normocephalic, atraumatic  EYES: no scleral icterus  CV: " regular rhythm, regular rate  RESPIRATORY: normal effort, lungs clear  ABDOMEN: soft, nontender, nondistended bowel sounds positive  MUSCULOSKELETAL: no deformity.  No calf tenderness.  There is slight left leg enlargement compared to the right.  Intact pulses.  NEURO: alert, moves all extremities, follows commands     LAB RESULTS  Lab Results (last 24 hours)       Procedure Component Value Units Date/Time    POC Glucose Once [[id number]]  (Normal) Collected: 05/17/24 1102    Specimen: Blood Updated: 05/17/24 1103     Glucose 117 mg/dL     POC Glucose Once [[id number]]  (Normal) Collected: 05/17/24 0713    Specimen: Blood Updated: 05/17/24 0716     Glucose 121 mg/dL     Comprehensive Metabolic Panel [[id number]]  (Abnormal) Collected: 05/17/24 0409    Specimen: Blood Updated: 05/17/24 0513     Glucose 125 mg/dL      BUN 11 mg/dL      Creatinine 0.55 mg/dL      Sodium 143 mmol/L      Potassium 3.8 mmol/L      Chloride 105 mmol/L      CO2 27.5 mmol/L      Calcium 8.7 mg/dL      Total Protein 5.6 g/dL      Albumin 3.9 g/dL      ALT (SGPT) 12 U/L      AST (SGOT) 14 U/L      Alkaline Phosphatase 50 U/L      Total Bilirubin <0.2 mg/dL      Globulin 1.7 gm/dL      A/G Ratio 2.3 g/dL      BUN/Creatinine Ratio 20.0     Anion Gap 10.5 mmol/L      eGFR 101.9 mL/min/1.73     Narrative:      GFR Normal >60  Chronic Kidney Disease <60  Kidney Failure <15      Lipid Panel [[id number]] Collected: 05/17/24 0409    Specimen: Blood Updated: 05/17/24 0513     Total Cholesterol 105 mg/dL      Triglycerides 89 mg/dL      HDL Cholesterol 53 mg/dL      LDL Cholesterol  35 mg/dL      VLDL Cholesterol 17 mg/dL      LDL/HDL Ratio 0.65    Narrative:      Cholesterol Reference Ranges  (U.S. Department of Health and Human Services ATP III Classifications)    Desirable          <200 mg/dL  Borderline High    200-239 mg/dL  High Risk          >240 mg/dL      Triglyceride Reference Ranges  (U.S. Department of Health and Human Services ATP III  Classifications)    Normal           <150 mg/dL  Borderline High  150-199 mg/dL  High             200-499 mg/dL  Very High        >500 mg/dL    HDL Reference Ranges  (U.S. Department of Health and Human Services ATP III Classifications)    Low     <40 mg/dl (major risk factor for CHD)  High    >60 mg/dl ('negative' risk factor for CHD)        LDL Reference Ranges  (U.S. Department of Health and Human Services ATP III Classifications)    Optimal          <100 mg/dL  Near Optimal     100-129 mg/dL  Borderline High  130-159 mg/dL  High             160-189 mg/dL  Very High        >189 mg/dL    POC Glucose Once [[id number]]  (Abnormal) Collected: 05/16/24 2028    Specimen: Blood Updated: 05/16/24 2032     Glucose 185 mg/dL     High Sensitivity Troponin T 2Hr [[id number]]  (Normal) Collected: 05/16/24 1758    Specimen: Blood Updated: 05/16/24 1830     HS Troponin T 8 ng/L      Troponin T Delta 1 ng/L     Narrative:      High Sensitive Troponin T Reference Range:  <14.0 ng/L- Negative Female for AMI  <22.0 ng/L- Negative Male for AMI  >=14 - Abnormal Female indicating possible myocardial injury.  >=22 - Abnormal Male indicating possible myocardial injury.   Clinicians would have to utilize clinical acumen, EKG, Troponin, and serial changes to determine if it is an Acute Myocardial Infarction or myocardial injury due to an underlying chronic condition.         POC Glucose Once [[id number]]  (Abnormal) Collected: 05/16/24 1643    Specimen: Blood Updated: 05/16/24 1645     Glucose 140 mg/dL     Magnesium [[id number]]  (Abnormal) Collected: 05/16/24 1604    Specimen: Blood Updated: 05/16/24 1639     Magnesium 1.4 mg/dL     Comprehensive Metabolic Panel [[id number]]  (Abnormal) Collected: 05/16/24 1604    Specimen: Blood Updated: 05/16/24 1639     Glucose 155 mg/dL      BUN 8 mg/dL      Creatinine 0.66 mg/dL      Sodium 142 mmol/L      Potassium 4.1 mmol/L      Chloride 103 mmol/L      CO2 23.5 mmol/L      Calcium 8.8 mg/dL       Total Protein 6.5 g/dL      Albumin 4.2 g/dL      ALT (SGPT) 14 U/L      AST (SGOT) 13 U/L      Alkaline Phosphatase 58 U/L      Total Bilirubin <0.2 mg/dL      Globulin 2.3 gm/dL      A/G Ratio 1.8 g/dL      BUN/Creatinine Ratio 12.1     Anion Gap 15.5 mmol/L      eGFR 97.5 mL/min/1.73     Narrative:      GFR Normal >60  Chronic Kidney Disease <60  Kidney Failure <15      BNP [[id number]]  (Normal) Collected: 05/16/24 1604    Specimen: Blood Updated: 05/16/24 1639     proBNP 282.0 pg/mL     Narrative:      This assay is used as an aid in the diagnosis of individuals suspected of having heart failure. It can be used as an aid in the diagnosis of acute decompensated heart failure (ADHF) in patients presenting with signs and symptoms of ADHF to the emergency department (ED). In addition, NT-proBNP of <300 pg/mL indicates ADHF is not likely.    Age Range Result Interpretation  NT-proBNP Concentration (pg/mL:      <50             Positive            >450                   Gray                 300-450                    Negative             <300    50-75           Positive            >900                  Gray                300-900                  Negative            <300      >75             Positive            >1800                  Gray                300-1800                  Negative            <300    High Sensitivity Troponin T [[id number]]  (Normal) Collected: 05/16/24 1604    Specimen: Blood Updated: 05/16/24 1639     HS Troponin T 7 ng/L     Narrative:      High Sensitive Troponin T Reference Range:  <14.0 ng/L- Negative Female for AMI  <22.0 ng/L- Negative Male for AMI  >=14 - Abnormal Female indicating possible myocardial injury.  >=22 - Abnormal Male indicating possible myocardial injury.   Clinicians would have to utilize clinical acumen, EKG, Troponin, and serial changes to determine if it is an Acute Myocardial Infarction or myocardial injury due to an underlying chronic condition.         Protime-INR  [[id number]]  (Normal) Collected: 05/16/24 1604    Specimen: Blood Updated: 05/16/24 1629     Protime 12.8 Seconds      INR 0.94    aPTT [[id number]]  (Normal) Collected: 05/16/24 1604    Specimen: Blood Updated: 05/16/24 1629     PTT 26.6 seconds     CBC Auto Differential [[id number]]  (Abnormal) Collected: 05/16/24 1604    Specimen: Blood Updated: 05/16/24 1619     WBC 7.07 10*3/mm3      RBC 4.04 10*6/mm3      Hemoglobin 9.5 g/dL      Hematocrit 32.0 %      MCV 79.2 fL      MCH 23.5 pg      MCHC 29.7 g/dL      RDW 15.6 %      RDW-SD 44.4 fl      MPV 11.5 fL      Platelets 288 10*3/mm3      Neutrophil % 58.7 %      Lymphocyte % 32.4 %      Monocyte % 6.2 %      Eosinophil % 1.3 %      Basophil % 1.3 %      Immature Grans % 0.1 %      Neutrophils, Absolute 4.15 10*3/mm3      Lymphocytes, Absolute 2.29 10*3/mm3      Monocytes, Absolute 0.44 10*3/mm3      Eosinophils, Absolute 0.09 10*3/mm3      Basophils, Absolute 0.09 10*3/mm3      Immature Grans, Absolute 0.01 10*3/mm3      nRBC 0.0 /100 WBC           Imaging Results (Last 24 Hours)       ** No results found for the last 24 hours. **          Results  Scan on 5/17/2024 0609 by [facility], [place]: ECG 12-LEAD         Author: -- Service: -- Author Type: --   Filed: Date of Service: Creation Time:   Status: (Other)   HEART RATE= 68  bpm  RR Interval= 882  ms  WA Interval= 157  ms  P Horizontal Axis= -8  deg  P Front Axis= 71  deg  QRSD Interval= 120  ms  QT Interval= 428  ms  QTcB= 456  ms  QRS Axis= 67  deg  T Wave Axis= 35  deg  - ABNORMAL ECG -  Sinus rhythm  IVCD, consider atypical RBBB          Current Facility-Administered Medications:     acetaminophen (TYLENOL) tablet 500 mg, 500 mg, Oral, Q8H PRN, Tasneem Hayes MD, 500 mg at 05/17/24 0250    acetaminophen (TYLENOL) tablet 650 mg, 650 mg, Oral, Q4H PRN, Tasneem Hayes MD    albuterol (PROVENTIL) nebulizer solution 0.083% 2.5 mg/3mL, 2.5 mg, Nebulization, Q6H PRN, Tasneem Hayes MD     aspirin chewable tablet 81 mg, 81 mg, Oral, Daily, Tasneem Hayes MD, 81 mg at 05/16/24 1800    atorvastatin (LIPITOR) tablet 40 mg, 40 mg, Oral, Nightly, Tasneem Hayes MD, 40 mg at 05/16/24 2152    cetirizine (zyrTEC) tablet 10 mg, 10 mg, Oral, Daily, Tasneem Hayes MD, 10 mg at 05/17/24 1055    clopidogrel (PLAVIX) tablet 75 mg, 75 mg, Oral, Daily, Tasneem Hayes MD    dextrose (D50W) (25 g/50 mL) IV injection 25 g, 25 g, Intravenous, Q15 Min PRN, Tasneem Hayes MD    dextrose (GLUTOSE) oral gel 15 g, 15 g, Oral, Q15 Min PRN, Tasneem Hayes MD    escitalopram (LEXAPRO) tablet 20 mg, 20 mg, Oral, Daily, Tasneem Hayes MD, 20 mg at 05/17/24 1055    ferrous sulfate tablet 325 mg, 325 mg, Oral, Daily With Breakfast, Tasneem Hayes MD, 325 mg at 05/17/24 1055    glucagon (GLUCAGEN) injection 1 mg, 1 mg, Intramuscular, Q15 Min PRN, Tasneem Hayes MD    insulin glargine (LANTUS, SEMGLEE) injection 20 Units, 20 Units, Subcutaneous, Nightly, Tasneem Hayes MD, 20 Units at 05/16/24 2151    insulin lispro (HUMALOG/ADMELOG) injection 2-7 Units, 2-7 Units, Subcutaneous, 4x Daily AC & at Bedtime, Tasneem Hayes MD    lisinopril (PRINIVIL,ZESTRIL) tablet 5 mg, 5 mg, Oral, Daily, Tasneem Hayes MD, 5 mg at 05/17/24 1055    metoprolol succinate XL (TOPROL-XL) 24 hr tablet 25 mg, 25 mg, Oral, Daily, Tasneem Hayes MD, 25 mg at 05/17/24 1055    multivitamin (THERAGRAN) tablet 1 tablet, 1 tablet, Oral, Daily, Tasneem Hayes MD, 1 tablet at 05/17/24 1055    nitroglycerin (NITROSTAT) ointment 1 inch, 1 inch, Topical, TID - Nitrates, Tasneem Hayes MD, 1 inch at 05/16/24 1800    nitroglycerin (NITROSTAT) SL tablet 0.4 mg, 0.4 mg, Sublingual, Q5 Min PRN, Tasneem Hayes MD, 0.4 mg at 05/16/24 2152    nitroglycerin (NITROSTAT) SL tablet 0.4 mg, 0.4 mg, Sublingual, Q5 Min PRN, Tasneem Hayes MD     pantoprazole (PROTONIX) EC tablet 40 mg, 40 mg, Oral, Q AM, Tasneem Hayes MD, 40 mg at 05/17/24 0635    rOPINIRole (REQUIP) tablet 0.25 mg, 0.25 mg, Oral, Nightly, Tasneem Hayes MD, 0.25 mg at 05/16/24 2152    sodium chloride 0.9 % flush 10 mL, 10 mL, Intravenous, Q12H, Tasneem Hayes MD, 10 mL at 05/16/24 2152    sodium chloride 0.9 % flush 10 mL, 10 mL, Intravenous, PRN, Tasneem Hayes MD    sodium chloride 0.9 % infusion 40 mL, 40 mL, Intravenous, PRN, Tasneem Hayes MD    [START ON 5/21/2024] vitamin D (ERGOCALCIFEROL) capsule 50,000 Units, 50,000 Units, Oral, Weekly, Tasneem Hayes MD     ASSESSMENT  Chest pain with known coronary artery disease and positive stress Cardiolite admitted for cardiac catheterization  COPD  Insulin-dependent diabetes mellitus  Hypertension  Hyperlipidemia  History of hepatitis C  Chronic anemia  Depression  Gastroesophageal reflux disease    PLAN  Agree with current care  Serial cardiac enzymes and EKG  Accu-Cheks sliding scale insulin  Cardiac catheterization today  Continue home medications  Stress ulcer DVT prophylaxis  Supportive care  Patient is full code  Discussed with nursing staff and family  Will follow-up with Dr. ORNELAS and further recommendation according to hospital course    RONEY BROWNE MD

## 2024-05-18 ENCOUNTER — READMISSION MANAGEMENT (OUTPATIENT)
Dept: CALL CENTER | Facility: HOSPITAL | Age: 64
End: 2024-05-18
Payer: MEDICARE

## 2024-05-18 VITALS
BODY MASS INDEX: 22.43 KG/M2 | DIASTOLIC BLOOD PRESSURE: 57 MMHG | TEMPERATURE: 97.9 F | SYSTOLIC BLOOD PRESSURE: 103 MMHG | WEIGHT: 148 LBS | HEART RATE: 74 BPM | OXYGEN SATURATION: 97 % | RESPIRATION RATE: 18 BRPM | HEIGHT: 68 IN

## 2024-05-18 LAB
ACT BLD: 282 SECONDS (ref 82–152)
ALBUMIN SERPL-MCNC: 4.1 G/DL (ref 3.5–5.2)
ALBUMIN/GLOB SERPL: 2 G/DL
ALP SERPL-CCNC: 57 U/L (ref 39–117)
ALT SERPL W P-5'-P-CCNC: 15 U/L (ref 1–33)
ANION GAP SERPL CALCULATED.3IONS-SCNC: 8.8 MMOL/L (ref 5–15)
AST SERPL-CCNC: 12 U/L (ref 1–32)
BASOPHILS # BLD AUTO: 0.07 10*3/MM3 (ref 0–0.2)
BASOPHILS NFR BLD AUTO: 0.9 % (ref 0–1.5)
BH CV STRESS DURATION MIN STAGE 1: 1
BH CV STRESS DURATION SEC STAGE 1: 0
BH CV STRESS GRADE STAGE 1: 10
BH CV STRESS HR STAGE 1: 107
BH CV STRESS METS STAGE 1: 4
BH CV STRESS PROTOCOL 1: NORMAL
BH CV STRESS RECOVERY BP: NORMAL MMHG
BH CV STRESS RECOVERY HR: 84 BPM
BH CV STRESS SPEED STAGE 1: 1.7
BH CV STRESS STAGE 1: 1
BILIRUB SERPL-MCNC: <0.2 MG/DL (ref 0–1.2)
BUN SERPL-MCNC: 12 MG/DL (ref 8–23)
BUN/CREAT SERPL: 18.5 (ref 7–25)
CALCIUM SPEC-SCNC: 9.5 MG/DL (ref 8.6–10.5)
CHLORIDE SERPL-SCNC: 105 MMOL/L (ref 98–107)
CHOLEST SERPL-MCNC: 115 MG/DL (ref 0–200)
CO2 SERPL-SCNC: 28.2 MMOL/L (ref 22–29)
CREAT SERPL-MCNC: 0.65 MG/DL (ref 0.57–1)
DEPRECATED RDW RBC AUTO: 44.3 FL (ref 37–54)
EGFRCR SERPLBLD CKD-EPI 2021: 97.8 ML/MIN/1.73
EOSINOPHIL # BLD AUTO: 0.12 10*3/MM3 (ref 0–0.4)
EOSINOPHIL NFR BLD AUTO: 1.5 % (ref 0.3–6.2)
ERYTHROCYTE [DISTWIDTH] IN BLOOD BY AUTOMATED COUNT: 15.5 % (ref 12.3–15.4)
GLOBULIN UR ELPH-MCNC: 2.1 GM/DL
GLUCOSE BLDC GLUCOMTR-MCNC: 129 MG/DL (ref 70–130)
GLUCOSE BLDC GLUCOMTR-MCNC: 169 MG/DL (ref 70–130)
GLUCOSE SERPL-MCNC: 199 MG/DL (ref 65–99)
HBA1C MFR BLD: 6.8 % (ref 4.8–5.6)
HCT VFR BLD AUTO: 32.2 % (ref 34–46.6)
HDLC SERPL-MCNC: 56 MG/DL (ref 40–60)
HGB BLD-MCNC: 9.5 G/DL (ref 12–15.9)
IMM GRANULOCYTES # BLD AUTO: 0.02 10*3/MM3 (ref 0–0.05)
IMM GRANULOCYTES NFR BLD AUTO: 0.3 % (ref 0–0.5)
LDLC SERPL CALC-MCNC: 43 MG/DL (ref 0–100)
LDLC/HDLC SERPL: 0.76 {RATIO}
LYMPHOCYTES # BLD AUTO: 2.23 10*3/MM3 (ref 0.7–3.1)
LYMPHOCYTES NFR BLD AUTO: 28.4 % (ref 19.6–45.3)
MAXIMAL PREDICTED HEART RATE: 155 BPM
MCH RBC QN AUTO: 23.3 PG (ref 26.6–33)
MCHC RBC AUTO-ENTMCNC: 29.5 G/DL (ref 31.5–35.7)
MCV RBC AUTO: 78.9 FL (ref 79–97)
MONOCYTES # BLD AUTO: 0.54 10*3/MM3 (ref 0.1–0.9)
MONOCYTES NFR BLD AUTO: 6.9 % (ref 5–12)
NEUTROPHILS NFR BLD AUTO: 4.87 10*3/MM3 (ref 1.7–7)
NEUTROPHILS NFR BLD AUTO: 62 % (ref 42.7–76)
NRBC BLD AUTO-RTO: 0 /100 WBC (ref 0–0.2)
NT-PROBNP SERPL-MCNC: 355 PG/ML (ref 0–900)
PERCENT MAX PREDICTED HR: 69.03 %
PLATELET # BLD AUTO: 264 10*3/MM3 (ref 140–450)
PMV BLD AUTO: 11.4 FL (ref 6–12)
POTASSIUM SERPL-SCNC: 4.3 MMOL/L (ref 3.5–5.2)
PROT SERPL-MCNC: 6.2 G/DL (ref 6–8.5)
QT INTERVAL: 420 MS
QTC INTERVAL: 454 MS
RBC # BLD AUTO: 4.08 10*6/MM3 (ref 3.77–5.28)
SODIUM SERPL-SCNC: 142 MMOL/L (ref 136–145)
STRESS BASELINE BP: NORMAL MMHG
STRESS BASELINE HR: 82 BPM
STRESS PERCENT HR: 81 %
STRESS POST ESTIMATED WORKLOAD: 4.1 METS
STRESS POST EXERCISE DUR MIN: 1 MIN
STRESS POST EXERCISE DUR SEC: 0 SEC
STRESS POST PEAK BP: NORMAL MMHG
STRESS POST PEAK HR: 107 BPM
STRESS TARGET HR: 132 BPM
TRIGL SERPL-MCNC: 81 MG/DL (ref 0–150)
TSH SERPL DL<=0.05 MIU/L-ACNC: 0.69 UIU/ML (ref 0.27–4.2)
VLDLC SERPL-MCNC: 16 MG/DL (ref 5–40)
WBC NRBC COR # BLD AUTO: 7.85 10*3/MM3 (ref 3.4–10.8)

## 2024-05-18 PROCEDURE — G0378 HOSPITAL OBSERVATION PER HR: HCPCS

## 2024-05-18 PROCEDURE — 82948 REAGENT STRIP/BLOOD GLUCOSE: CPT

## 2024-05-18 PROCEDURE — 83880 ASSAY OF NATRIURETIC PEPTIDE: CPT | Performed by: HOSPITALIST

## 2024-05-18 PROCEDURE — 80053 COMPREHEN METABOLIC PANEL: CPT | Performed by: HOSPITALIST

## 2024-05-18 PROCEDURE — 84443 ASSAY THYROID STIM HORMONE: CPT | Performed by: HOSPITALIST

## 2024-05-18 PROCEDURE — 63710000001 INSULIN LISPRO (HUMAN) PER 5 UNITS: Performed by: INTERNAL MEDICINE

## 2024-05-18 PROCEDURE — 85025 COMPLETE CBC W/AUTO DIFF WBC: CPT | Performed by: HOSPITALIST

## 2024-05-18 PROCEDURE — 83036 HEMOGLOBIN GLYCOSYLATED A1C: CPT | Performed by: HOSPITALIST

## 2024-05-18 PROCEDURE — 80061 LIPID PANEL: CPT | Performed by: HOSPITALIST

## 2024-05-18 RX ORDER — ISOSORBIDE MONONITRATE 30 MG/1
30 TABLET, EXTENDED RELEASE ORAL
Status: DISCONTINUED | OUTPATIENT
Start: 2024-05-18 | End: 2024-05-18 | Stop reason: HOSPADM

## 2024-05-18 RX ADMIN — ISOSORBIDE MONONITRATE 30 MG: 30 TABLET, EXTENDED RELEASE ORAL at 13:54

## 2024-05-18 RX ADMIN — ASPIRIN 81 MG: 81 TABLET, CHEWABLE ORAL at 08:50

## 2024-05-18 RX ADMIN — INSULIN LISPRO 2 UNITS: 100 INJECTION, SOLUTION INTRAVENOUS; SUBCUTANEOUS at 11:31

## 2024-05-18 RX ADMIN — PANTOPRAZOLE SODIUM 40 MG: 40 TABLET, DELAYED RELEASE ORAL at 06:52

## 2024-05-18 RX ADMIN — FERROUS SULFATE TAB 325 MG (65 MG ELEMENTAL FE) 325 MG: 325 (65 FE) TAB at 08:49

## 2024-05-18 RX ADMIN — Medication 10 ML: at 08:50

## 2024-05-18 RX ADMIN — ESCITALOPRAM 20 MG: 20 TABLET, FILM COATED ORAL at 08:49

## 2024-05-18 RX ADMIN — Medication 1 TABLET: at 08:50

## 2024-05-18 RX ADMIN — CETIRIZINE HYDROCHLORIDE 10 MG: 10 TABLET ORAL at 08:49

## 2024-05-18 RX ADMIN — CLOPIDOGREL BISULFATE 75 MG: 75 TABLET, FILM COATED ORAL at 08:50

## 2024-05-18 NOTE — OUTREACH NOTE
Prep Survey      Flowsheet Row Responses   [facility] patient discharged from? [place]   Is LACE score < 7 ? Yes   Eligibility [facility]   Date of Admission 05/16/24   Date of Discharge 05/18/24   Discharge Disposition Home or Self Care   Discharge diagnosis unstable angina,   Does the patient have one of the following disease processes/diagnoses(primary or secondary)? Other   Does the patient have Home health ordered? No   Is there a DME ordered? No   Prep survey completed? Yes            PATRICIA BARRON - Registered Nurse

## 2024-05-18 NOTE — PROGRESS NOTES
"Daily progress note    Referring physician  Dr. ORNELAS    Subjective  Awake and alert and feeling better with no new complaints and denies any chest pain shortness of breath palpitation    History of present illness  63-year-old white female with history of COPD hypertension hyperlipidemia hepatitis C directly admitted to cardiology with chest pain with positive stress Cardiolite in the office.  I am asked to see patient for medical problem.  At the time of examination patient is chest pain-free and also denies any fever cough shortness of breath palpitation.  Patient going for cardiac catheterization this morning.  Patient given a detailed history and sister also contributed.     REVIEW OF SYSTEMS  All systems reviewed and negative except for those discussed in HPI.     PHYSICAL EXAM   Blood pressure 103/57, pulse 74, temperature 97.9 °F (36.6 °C), temperature source Oral, resp. rate 18, height 171.5 cm (67.5\"), weight 67.1 kg (148 lb), SpO2 97%.    GENERAL: Awake, alert, no acute distress  SKIN: Warm, dry  HENT: Normocephalic, atraumatic  EYES: no scleral icterus  CV: regular rhythm, regular rate  RESPIRATORY: normal effort, lungs clear  ABDOMEN: soft, nontender, nondistended bowel sounds positive  MUSCULOSKELETAL: no deformity.  No calf tenderness.  There is slight left leg enlargement compared to the right.  Intact pulses.  NEURO: alert, moves all extremities, follows commands     LAB RESULTS  Lab Results (last 24 hours)       Procedure Component Value Units Date/Time    POC Glucose Once [[id number]]  (Abnormal) Collected: 05/18/24 1105    Specimen: Blood Updated: 05/18/24 1108     Glucose 169 mg/dL     POC Glucose Once [[id number]]  (Normal) Collected: 05/18/24 0638    Specimen: Blood Updated: 05/18/24 0640     Glucose 129 mg/dL     BNP [[id number]]  (Normal) Collected: 05/18/24 0349    Specimen: Blood Updated: 05/18/24 0436     proBNP 355.0 pg/mL     Narrative:      This assay is used as an aid in the diagnosis of " individuals suspected of having heart failure. It can be used as an aid in the diagnosis of acute decompensated heart failure (ADHF) in patients presenting with signs and symptoms of ADHF to the emergency department (ED). In addition, NT-proBNP of <300 pg/mL indicates ADHF is not likely.    Age Range Result Interpretation  NT-proBNP Concentration (pg/mL:      <50             Positive            >450                   Gray                 300-450                    Negative             <300    50-75           Positive            >900                  Gray                300-900                  Negative            <300      >75             Positive            >1800                  Gray                300-1800                  Negative            <300    TSH [[id number]]  (Normal) Collected: 05/18/24 0349    Specimen: Blood Updated: 05/18/24 0436     TSH 0.693 uIU/mL     Comprehensive Metabolic Panel [[id number]]  (Abnormal) Collected: 05/18/24 0349    Specimen: Blood Updated: 05/18/24 0431     Glucose 199 mg/dL      BUN 12 mg/dL      Creatinine 0.65 mg/dL      Sodium 142 mmol/L      Potassium 4.3 mmol/L      Chloride 105 mmol/L      CO2 28.2 mmol/L      Calcium 9.5 mg/dL      Total Protein 6.2 g/dL      Albumin 4.1 g/dL      ALT (SGPT) 15 U/L      AST (SGOT) 12 U/L      Alkaline Phosphatase 57 U/L      Total Bilirubin <0.2 mg/dL      Globulin 2.1 gm/dL      A/G Ratio 2.0 g/dL      BUN/Creatinine Ratio 18.5     Anion Gap 8.8 mmol/L      eGFR 97.8 mL/min/1.73     Narrative:      GFR Normal >60  Chronic Kidney Disease <60  Kidney Failure <15      Lipid Panel [[id number]] Collected: 05/18/24 0349    Specimen: Blood Updated: 05/18/24 0431     Total Cholesterol 115 mg/dL      Triglycerides 81 mg/dL      HDL Cholesterol 56 mg/dL      LDL Cholesterol  43 mg/dL      VLDL Cholesterol 16 mg/dL      LDL/HDL Ratio 0.76    Narrative:      Cholesterol Reference Ranges  (U.S. Department of Health and Human Services ATP III  Classifications)    Desirable          <200 mg/dL  Borderline High    200-239 mg/dL  High Risk          >240 mg/dL      Triglyceride Reference Ranges  (U.S. Department of Health and Human Services ATP III Classifications)    Normal           <150 mg/dL  Borderline High  150-199 mg/dL  High             200-499 mg/dL  Very High        >500 mg/dL    HDL Reference Ranges  (U.S. Department of Health and Human Services ATP III Classifications)    Low     <40 mg/dl (major risk factor for CHD)  High    >60 mg/dl ('negative' risk factor for CHD)        LDL Reference Ranges  (U.S. Department of Health and Human Services ATP III Classifications)    Optimal          <100 mg/dL  Near Optimal     100-129 mg/dL  Borderline High  130-159 mg/dL  High             160-189 mg/dL  Very High        >189 mg/dL    Hemoglobin A1c [[id number]]  (Abnormal) Collected: 05/18/24 0349    Specimen: Blood Updated: 05/18/24 0424     Hemoglobin A1C 6.80 %     Narrative:      Hemoglobin A1C Ranges:    Increased Risk for Diabetes  5.7% to 6.4%  Diabetes                     >= 6.5%  Diabetic Goal                < 7.0%    CBC & Differential [[id number]]  (Abnormal) Collected: 05/18/24 0349    Specimen: Blood Updated: 05/18/24 0415    Narrative:      The following orders were created for panel order CBC & Differential.  Procedure                               Abnormality         Status                     ---------                               -----------         ------                     CBC Auto Differential[[id number]]        Abnormal            Final result                 Please view results for these tests on the individual orders.    CBC Auto Differential [[id number]]  (Abnormal) Collected: 05/18/24 0349    Specimen: Blood Updated: 05/18/24 0415     WBC 7.85 10*3/mm3      RBC 4.08 10*6/mm3      Hemoglobin 9.5 g/dL      Hematocrit 32.2 %      MCV 78.9 fL      MCH 23.3 pg      MCHC 29.5 g/dL      RDW 15.5 %      RDW-SD 44.3 fl      MPV 11.4 fL       Platelets 264 10*3/mm3      Neutrophil % 62.0 %      Lymphocyte % 28.4 %      Monocyte % 6.9 %      Eosinophil % 1.5 %      Basophil % 0.9 %      Immature Grans % 0.3 %      Neutrophils, Absolute 4.87 10*3/mm3      Lymphocytes, Absolute 2.23 10*3/mm3      Monocytes, Absolute 0.54 10*3/mm3      Eosinophils, Absolute 0.12 10*3/mm3      Basophils, Absolute 0.07 10*3/mm3      Immature Grans, Absolute 0.02 10*3/mm3      nRBC 0.0 /100 WBC     POC Glucose Once [[id number]]  (Abnormal) Collected: 05/17/24 2102    Specimen: Blood Updated: 05/17/24 2104     Glucose 188 mg/dL     POC Glucose Once [[id number]]  (Abnormal) Collected: 05/17/24 1541    Specimen: Blood Updated: 05/17/24 1542     Glucose 150 mg/dL           Imaging Results (Last 24 Hours)       ** No results found for the last 24 hours. **          Results  [facility] 5[facility]17[facility]2024 [facility]         Author: -- Service: -- Author Type: --   Filed: Date of Service: Creation Time:   Status: (Other)   HEART RATE= 68  bpm  RR Interval= 882  ms  RI Interval= 157  ms  P Horizontal Axis= -8  deg  P Front Axis= 71  deg  QRSD Interval= 120  ms  QT Interval= 428  ms  QTcB= 456  ms  QRS Axis= 67  deg  T Wave Axis= 35  deg  - ABNORMAL ECG -  Sinus rhythm  IVCD, consider atypical RBBB          Current Facility-Administered Medications:     acetaminophen (TYLENOL) tablet 500 mg, 500 mg, Oral, Q8H PRN, Tasneem Hayes MD, 500 mg at 05/17/24 0250    acetaminophen (TYLENOL) tablet 650 mg, 650 mg, Oral, Q4H PRN, Tasneem Hayes MD    albuterol (PROVENTIL) nebulizer solution 0.083% 2.5 mg/3mL, 2.5 mg, Nebulization, Q6H PRN, Tasneem Hayes MD    aspirin chewable tablet 81 mg, 81 mg, Oral, Daily, Tasneem Hayes MD, 81 mg at 05/18/24 0850    atorvastatin (LIPITOR) tablet 40 mg, 40 mg, Oral, Nightly, Tasneem Hayes MD, 40 mg at 05/17/24 2037    cetirizine (zyrTEC) tablet 10 mg, 10 mg, Oral, Daily, Tasneem Hayes MD, 10 mg  at 05/18/24 0849    clopidogrel (PLAVIX) tablet 75 mg, 75 mg, Oral, Daily, Tasneem Hayes MD, 75 mg at 05/18/24 0850    dextrose (D50W) (25 g/50 mL) IV injection 25 g, 25 g, Intravenous, Q15 Min PRN, Tasneem Hayes MD    dextrose (GLUTOSE) oral gel 15 g, 15 g, Oral, Q15 Min PRN, Tasneem Hayes MD    escitalopram (LEXAPRO) tablet 20 mg, 20 mg, Oral, Daily, Tasneem Hayes MD, 20 mg at 05/18/24 0849    ferrous sulfate tablet 325 mg, 325 mg, Oral, Daily With Breakfast, Tasneem Hayes MD, 325 mg at 05/18/24 0849    glucagon (GLUCAGEN) injection 1 mg, 1 mg, Intramuscular, Q15 Min PRN, Tasneem Hayes MD    insulin glargine (LANTUS, SEMGLEE) injection 20 Units, 20 Units, Subcutaneous, Nightly, Tasneem Hayes MD, 20 Units at 05/17/24 2037    insulin lispro (HUMALOG/ADMELOG) injection 2-7 Units, 2-7 Units, Subcutaneous, 4x Daily AC & at Bedtime, Tasneem Hayes MD, 2 Units at 05/18/24 1131    lisinopril (PRINIVIL,ZESTRIL) tablet 5 mg, 5 mg, Oral, Daily, Tasneem Hayes MD, 5 mg at 05/17/24 1055    melatonin tablet 5 mg, 5 mg, Oral, Nightly PRN, Jackelin Nam APRN, 5 mg at 05/17/24 2332    metoprolol succinate XL (TOPROL-XL) 24 hr tablet 25 mg, 25 mg, Oral, Daily, Tasneem Hayes MD, 25 mg at 05/17/24 1055    multivitamin (THERAGRAN) tablet 1 tablet, 1 tablet, Oral, Daily, Tasneem Hayes MD, 1 tablet at 05/18/24 0850    nitroglycerin (NITROSTAT) ointment 1 inch, 1 inch, Topical, TID - Nitrates, Tasneem Hayes MD, 1 inch at 05/16/24 1800    nitroglycerin (NITROSTAT) SL tablet 0.4 mg, 0.4 mg, Sublingual, Q5 Min PRN, Tasneem Hayes MD, 0.4 mg at 05/16/24 2152    nitroglycerin (NITROSTAT) SL tablet 0.4 mg, 0.4 mg, Sublingual, Q5 Min PRN, Tasneem Hayes MD    pantoprazole (PROTONIX) EC tablet 40 mg, 40 mg, Oral, Q AM, Tasneem Hayes MD, 40 mg at 05/18/24 0652    rOPINIRole (REQUIP) tablet 0.25 mg, 0.25 mg,  Oral, Nightly, Tasneem Hayes MD, 0.25 mg at 05/17/24 2037    sodium chloride 0.9 % flush 10 mL, 10 mL, Intravenous, Q12H, Tasneem Hayes MD, 10 mL at 05/18/24 0850    sodium chloride 0.9 % flush 10 mL, 10 mL, Intravenous, PRN, Tasneem Hayes MD    sodium chloride 0.9 % infusion 40 mL, 40 mL, Intravenous, PRN, Tasneem Hayes MD    [START ON 5/21/2024] vitamin D (ERGOCALCIFEROL) capsule 50,000 Units, 50,000 Units, Oral, Weekly, Tasneem Hayes MD     ASSESSMENT  Coronary disease s/p angioplasty  COPD  Insulin-dependent diabetes mellitus  Hypertension  Hyperlipidemia  History of hepatitis C  Chronic anemia  Depression  Gastroesophageal reflux disease    PLAN  CPM  Serial cardiac enzymes and EKG  Accu-Cheks sliding scale insulin  Continue home medications  Stress ulcer DVT prophylaxis  Supportive care  Discussed with nursing staff and family  Will follow-up with Dr. ORNELAS and further recommendation according to hospital course    RONEY BROWNE MD    Copied text in this note has been reviewed and is accurate as of 05/18/24

## 2024-05-19 NOTE — CASE MANAGEMENT/SOCIAL WORK
Case Management Discharge Note      Final Note: home         Selected Continued Care - Discharged on 5/18/2024 Admission date: 5/16/2024 - Discharge disposition: Home or Self Care      Destination    No services have been selected for the patient.                Durable Medical Equipment    No services have been selected for the patient.                Dialysis/Infusion    No services have been selected for the patient.                Home Medical Care    No services have been selected for the patient.                Therapy    No services have been selected for the patient.                Community Resources    No services have been selected for the patient.                Community & DME    No services have been selected for the patient.                    Transportation Services  Private: Car    Final Discharge Disposition Code: 01 - home or self-care

## 2024-05-20 ENCOUNTER — TRANSITIONAL CARE MANAGEMENT TELEPHONE ENCOUNTER (OUTPATIENT)
Dept: CALL CENTER | Facility: HOSPITAL | Age: 64
End: 2024-05-20
Payer: MEDICARE

## 2024-05-20 NOTE — OUTREACH NOTE
Call Center TCM Note      Flowsheet Row Responses   [facility], [place], operated by [facility] patient discharged from? [place]   Does the patient have one of the following disease processes/diagnoses(primary or secondary)? Other   TCM attempt successful? Yes   Call start time 1223   Call end time 1229   Discharge diagnosis Coronary artery disease with unstable angina, Heart cath   Person spoke with today (if not patient) and relationship Patient   Meds reviewed with patient/caregiver? Yes  [resumed usual home meds.]   Does the patient have all medications ordered at discharge? N/A  [No new meds ordered at discharge.]   Is the patient taking all medications as directed (includes completed medication regime)? Yes   Comments PCP Sofi FARMER. Hospital follow up scheduled for 5/28  915am   Does the patient have an appointment with their PCP within 7-14 days of discharge? No   Nursing Interventions Assisted patient with making appointment per protocol, Routed TCM call to PCP office   Has home health visited the patient within 72 hours of discharge? N/A   Psychosocial issues? No   Did the patient receive a copy of their discharge instructions? Yes   Nursing interventions Reviewed instructions with patient   What is the patient's perception of their health status since discharge? Improving  [Patient denies any further chest pain. She reports heart cath site to wrist is bruised but ok.]   Is the patient/caregiver able to teach back signs and symptoms related to disease process for when to call PCP? Yes   Is the patient/caregiver able to teach back signs and symptoms related to disease process for when to call 911? Yes   Is the patient/caregiver able to teach back the hierarchy of who to call/visit for symptoms/problems? PCP, Specialist, Home health nurse, Urgent Care, ED, 911 Yes   If the patient is a current smoker, are they able to teach back resources for cessation? Not a smoker   TCM call completed? Yes   Wrap up additional comments  Cardiology HFU 6/4  2pm   Call end time 1229   Would this patient benefit from a Referral to [facility] Social Work? No   Is the patient interested in additional calls from an ambulatory ? No            Ember Perdomo RN    5/20/2024, 12:32 EDT

## 2024-05-22 ENCOUNTER — TELEPHONE (OUTPATIENT)
Dept: CARDIOLOGY | Facility: CLINIC | Age: 64
End: 2024-05-22

## 2024-05-22 NOTE — TELEPHONE ENCOUNTER
Caller: JEROMY    Relationship: SELF    Best call back number: [phone]    What is the best time to reach you: ANY      What was the call regarding: PT WOULD LIKE TO KNOW IF SHE CAN RETURN TO WORK THIS SATURDAY AS SHE IS FEELING GREAT AFTER THE CATH.  SHE STATED SHE SITS AT DESK AND JUST ANSWERS PHONES.  HER WORK IS OKAY WITH HER RETURNING WITH A NOTE WITH ANY LISTED RESTRICTIONS.

## 2024-05-24 ENCOUNTER — OFFICE VISIT (OUTPATIENT)
Dept: CARDIOLOGY | Facility: CLINIC | Age: 64
End: 2024-05-24
Payer: MEDICARE

## 2024-05-24 VITALS
WEIGHT: 145 LBS | SYSTOLIC BLOOD PRESSURE: 101 MMHG | HEIGHT: 67 IN | BODY MASS INDEX: 22.76 KG/M2 | DIASTOLIC BLOOD PRESSURE: 64 MMHG | HEART RATE: 71 BPM

## 2024-05-24 DIAGNOSIS — Z09 HOSPITAL DISCHARGE FOLLOW-UP: ICD-10-CM

## 2024-05-24 DIAGNOSIS — I25.118 CORONARY ARTERY DISEASE OF NATIVE ARTERY OF NATIVE HEART WITH STABLE ANGINA PECTORIS: Primary | ICD-10-CM

## 2024-05-24 DIAGNOSIS — I25.10 CORONARY ARTERY DISEASE INVOLVING NATIVE CORONARY ARTERY OF NATIVE HEART WITHOUT ANGINA PECTORIS: ICD-10-CM

## 2024-05-24 DIAGNOSIS — I10 PRIMARY HYPERTENSION: ICD-10-CM

## 2024-05-24 RX ORDER — METOPROLOL SUCCINATE 25 MG/1
25 TABLET, EXTENDED RELEASE ORAL DAILY
Qty: 30 TABLET | Refills: 5 | Status: SHIPPED | OUTPATIENT
Start: 2024-05-24

## 2024-05-24 NOTE — PATIENT INSTRUCTIONS
Monitor blood pressure 1 hour and a half after taking blood pressure medications and  write the reading down along with heart rate.  Please bring these readings with you on your follow up.

## 2024-05-24 NOTE — PROGRESS NOTES
Subjective:        Gely Kimble is a 63 y.o. female who here for follow up    Chief Complaint   Patient presents with    Follow-up     PCI FOLLOW UP- CLEARANCE TO RETURN TO WORK       HPI    This is a 63-year-old female who is current with this provider.  She has a history of CAD with PCI to LAD and RCA, hypertension, hyperlipidemia, COPD, and diabetes mellitus.  She recently seen in the office and sent to the hospital due to chest discomfort.  She underwent a left heart cath which RCA dominant with 20 to 30% proximal and distal stenosis.  Circumflex artery was nondominant and normal.  LAD showed previous stent widely patent with a jailed second diagonal branch with a medium sized vessel with 99% reduced disease 20 to 30%.  Successful angioplasty of the ostial medium size second diagonal branch 99% reduced to 20 to 30%.  Echo in 2023 revealed EF 61 to 65%, LV diastolic function is consistent with grade 1 impaired relaxation, normal RV see size and systolic function noted.  Mild MV annular calcification, insufficient TR velocity profile to estimate the RV systolic pressure.    The following portions of the patient's history were reviewed and updated as appropriate: allergies, current medications, past family history, past medical history, past social history, past surgical history and problem list.    Past Medical History:   Diagnosis Date    Abnormal nuclear stress test     Arthritis     COPD (chronic obstructive pulmonary disease)     Elevated cholesterol     Emphysema of lung     Gallbladder abscess     Herpes zoster 08/03/2023    History of positive hepatitis C     previously positive, never treated, negative RNA finding    Hypertension     Myocardial perfusion defect 02/16/2016    Type 2 diabetes mellitus          reports that she quit smoking about 6 years ago. Her smoking use included cigarettes. She has been exposed to tobacco smoke. She does not have any smokeless tobacco history on file. She reports  that she does not currently use alcohol. She reports that she does not use drugs.     Family History   Problem Relation Age of Onset    Diabetes Mother     Heart disease Mother     Diabetes Father     Diabetes Brother     Heart disease Maternal Grandmother     Cancer Paternal Grandfather        ROS     Review of Systems  Constitutional: No wt loss, fever, fatigue  Gastrointestinal: No nausea, abdominal pain  Behavioral/Psych: No insomnia or anxiety  Cardiovascular: no cp or shob      Objective:           Constitutional:       Appearance: Well-developed.   Neck:      Vascular: No JVD.      Trachea: No tracheal deviation.   Pulmonary:      Effort: Pulmonary effort is normal. No respiratory distress.      Breath sounds: Normal breath sounds. No stridor. No decreased breath sounds. No wheezing. No rhonchi. No rales.   Chest:      Chest wall: Not tender to palpatation.   Cardiovascular:      Normal rate. Regular rhythm.      No gallop.       Comments: Right radial site shows no s/s of infection or hematoma.  Pulses:     Intact distal pulses.   Edema:     Peripheral edema absent.   Abdominal:      General: Bowel sounds are normal. There is no distension.      Palpations: Abdomen is soft.      Tenderness: There is no abdominal tenderness.   Skin:     General: Skin is warm.   Neurological:      Mental Status: Alert and oriented to person, place, and time.      Deep Tendon Reflexes: Reflexes are normal and symmetric.         Procedures      HEMODYNAMIC / ANGIOGRAPHIC DATA:   Left ventricular end diastolic pressure was 10 mmHg.  Left ventriculography revealed an EF around 60%.   The left main is normal left main.  The left anterior descending artery is Left anterior descending shows previous stent widely patent with a jailed second diagonal branch with a medium sized vessel with 99% reduced to 20 to 30% with 2.25/12 NC balloon, minimum irregularity of the rest of the LAD diagonal and  branches  The left circumflex  is nondominant and normal.  The right coronary artery is , Right coronary artery dominant with 20 to 30% proximal and the distal stenosis  Successful angioplasty of the ostial medium size second diagonal branch 99% reduced to 20 to 30% with 2.25/12 NC balloon    GUZMAN FLOW PRE.2...... POST...3...    TYPE OF LESION...B.........    RECOMMENDATIONS: Post-procedure care will focus on prevention of any ischemic events and congestive complications. Aggressive risk factor modification will be carried out.    Conclusion         Successful angioplasty and stent to the proximal PDA branch of the RCA 90% reduced to 0% with 2.5/12 Xience stent   Interpretation Summary         Findings consistent with a normal ECG stress test.    Left ventricular ejection fraction is normal (Calculated EF = 60%).    Myocardial perfusion imaging indicates a normal myocardial perfusion study with no evidence of ischemia.    Impressions are consistent with a low risk study.    Diaphragmatic attenuation artifact is present.    Inferior wall artifact    2023       Left ventricular systolic function is normal. Left ventricular ejection fraction appears to be 61 - 65%.    Left ventricular diastolic function is consistent with (grade I) impaired relaxation.    Normal right ventricular cavity size and systolic function noted.    There is mild mitral annular calcification    Insufficient TR velocity profile to estimate the right ventricular systolic pressure.    There is no evidence of pericardial effusion.       Current Outpatient Medications:     acetaminophen (TYLENOL) 500 MG tablet, Take 1 tablet by mouth Every 8 (Eight) Hours As Needed for Mild Pain or Moderate Pain., Disp: , Rfl:     albuterol (PROVENTIL HFA;VENTOLIN HFA) 108 (90 BASE) MCG/ACT inhaler, Inhale 2 puffs Every 4 (Four) Hours As Needed for Wheezing., Disp: , Rfl:     albuterol (PROVENTIL) (2.5 MG/3ML) 0.083% nebulizer solution, Take 2.5 mg by nebulization Every 4 (Four) Hours As Needed  "for Wheezing., Disp: , Rfl:     aspirin 81 MG chewable tablet, Chew 1 tablet Daily., Disp: , Rfl:     atorvastatin (LIPITOR) 40 MG tablet, Take 1 tablet by mouth Daily., Disp: 30 tablet, Rfl: 11    clopidogrel (PLAVIX) 75 MG tablet, Take 1 tablet by mouth Daily., Disp: 30 tablet, Rfl: 11    escitalopram (LEXAPRO) 20 MG tablet, TAKE 1 TABLET BY MOUTH DAILY, Disp: 90 tablet, Rfl: 1    Ferrous Sulfate  (45 Fe) MG tablet controlled-release, Take 1 tablet by mouth Daily., Disp: 30 tablet, Rfl: 2    Homeopathic Products (LEG CRAMP RELIEF PO), Take 1 tablet by mouth Daily., Disp: , Rfl:     insulin detemir (Levemir) 100 UNIT/ML injection, Inject 20 Units under the skin into the appropriate area as directed Every Night., Disp: , Rfl:     Insulin Syringe 31G X 5/16\" 1 ML misc, Use 1 syringe Daily. For use with Levemir vials, Disp: 100 each, Rfl: 2    lisinopril (PRINIVIL,ZESTRIL) 10 MG tablet, Take 0.5 tablets by mouth Daily., Disp: , Rfl:     LORATADINE PO, Take 1 tablet by mouth Daily., Disp: , Rfl:     metFORMIN ER (GLUCOPHAGE-XR) 500 MG 24 hr tablet, TAKE TWO TABLETS BY MOUTH TWICE A DAY WITH A MEAL, Disp: 120 tablet, Rfl: 3    metoprolol succinate XL (TOPROL-XL) 25 MG 24 hr tablet, Take 1 tablet by mouth Daily., Disp: 30 tablet, Rfl: 5    Multiple Vitamin (MULTIVITAMIN) tablet, Take 1 tablet by mouth., Disp: , Rfl:     nitroglycerin (Nitrostat) 0.4 MG SL tablet, Place 1 tablet under the tongue Every 5 (Five) Minutes As Needed for Chest Pain. Take no more than 3 doses in 15 minutes., Disp: 100 tablet, Rfl: 12    pantoprazole (Protonix) 20 MG EC tablet, Take 1 tablet by mouth Daily., Disp: 30 tablet, Rfl: 5    rOPINIRole (REQUIP) 0.25 MG tablet, Take 1 tablet by mouth Every Night. Take 1 hour before bedtime., Disp: 30 tablet, Rfl: 2    vitamin D (ERGOCALCIFEROL) 1.25 MG (92812 UT) capsule capsule, Take 1 capsule by mouth 1 (One) Time Per Week., Disp: , Rfl:      Assessment:        Patient Active Problem List "   Diagnosis    Type 2 diabetes mellitus    Pulmonary emphysema    Arthritis    Hypercholesterolemia    Essential hypertension    Abnormal liver function tests    Impingement syndrome of shoulder region    Neck pain    Postmenopause    Restless leg syndrome    Urinary frequency    Vitamin D deficiency    Fatigue    Moderate episode of recurrent major depressive disorder    Generalized anxiety disorder    Insomnia    Skin lesion of back    Nausea    Incomplete right bundle branch block (RBBB)    Chest pain    Coronary artery disease of native artery of native heart with stable angina pectoris    Tendency to bleed    Gastroesophageal reflux disease               Plan:   1.  Hospital follow-up for CAD status post stent placement: As above.  Continue statin, aspirin, beta-blockade and Plavix.    Risk reduction for the coronary artery disease, controlling the blood pressure, blood sugar management, cholesterol management, exercise, stress management, and proper compliance with medications and follow-up has been discussed    2.  Incomplete right bundle branch block     3.  Hypertension: blood pressure but stable. She will do blood pressure diary and call with any concerns. Controlled      Educated patient on exercising for at least 30 minutes a day for 2 to 3 days a week. Importance of controlling hypertension and blood pressure checkup on the regular basis has been explained. Hypertension as a silent killer has been discussed. Risk reduction of the weight and regular exercises to control the hypertension has been explained.    4.  Hyperlipidemia: Her primary care manages her cholesterol labs.  Risk of the hyperlipidemia, importance of the treatment has been explained. Pros and cons of the statins has been explained. Regular blood workup as well as side effects including the liver failure, myelopathy death has been explained.    .           No diagnosis found.    There are no diagnoses linked to this  encounter.    COUNSELING:kevan Murguiaeling was given to patient for the following topics: diagnostic results, risk factor reductions, impressions, risks and benefits of treatment options and importance of treatment compliance .       SMOKING COUNSELING: history. Quit in 2017     She declines cardiac rehab and functional stress test.   She will f/u in 6 months with blood pressure diary or sooner if needed    Sincerely,   DARIAN Murphy  [facility]  05/24/24  10:39 EDT    EMR Dragon/Transcription disclaimer: Dictated utilizing Dragon Dictation

## 2024-05-28 ENCOUNTER — OFFICE VISIT (OUTPATIENT)
Dept: FAMILY MEDICINE CLINIC | Facility: CLINIC | Age: 64
End: 2024-05-28
Payer: MEDICARE

## 2024-05-28 VITALS
SYSTOLIC BLOOD PRESSURE: 102 MMHG | DIASTOLIC BLOOD PRESSURE: 68 MMHG | BODY MASS INDEX: 22.76 KG/M2 | HEART RATE: 63 BPM | WEIGHT: 145 LBS | HEIGHT: 67 IN | OXYGEN SATURATION: 97 %

## 2024-05-28 DIAGNOSIS — R79.0 LOW MAGNESIUM LEVEL: ICD-10-CM

## 2024-05-28 DIAGNOSIS — D64.9 ANEMIA, UNSPECIFIED TYPE: ICD-10-CM

## 2024-05-28 DIAGNOSIS — Z09 HOSPITAL DISCHARGE FOLLOW-UP: Primary | ICD-10-CM

## 2024-05-28 DIAGNOSIS — G25.81 RESTLESS LEG SYNDROME: ICD-10-CM

## 2024-05-28 DIAGNOSIS — J43.9 PULMONARY EMPHYSEMA, UNSPECIFIED EMPHYSEMA TYPE: ICD-10-CM

## 2024-05-28 DIAGNOSIS — Z12.31 SCREENING MAMMOGRAM FOR BREAST CANCER: ICD-10-CM

## 2024-05-28 DIAGNOSIS — F33.1 MODERATE EPISODE OF RECURRENT MAJOR DEPRESSIVE DISORDER: ICD-10-CM

## 2024-05-28 DIAGNOSIS — E11.42 TYPE 2 DIABETES MELLITUS WITH DIABETIC POLYNEUROPATHY, WITH LONG-TERM CURRENT USE OF INSULIN: ICD-10-CM

## 2024-05-28 DIAGNOSIS — I10 ESSENTIAL HYPERTENSION: ICD-10-CM

## 2024-05-28 DIAGNOSIS — F41.1 GENERALIZED ANXIETY DISORDER: ICD-10-CM

## 2024-05-28 DIAGNOSIS — I25.118 CORONARY ARTERY DISEASE OF NATIVE ARTERY OF NATIVE HEART WITH STABLE ANGINA PECTORIS: ICD-10-CM

## 2024-05-28 DIAGNOSIS — Z79.4 TYPE 2 DIABETES MELLITUS WITH DIABETIC POLYNEUROPATHY, WITH LONG-TERM CURRENT USE OF INSULIN: ICD-10-CM

## 2024-05-28 DIAGNOSIS — K21.9 GASTROESOPHAGEAL REFLUX DISEASE, UNSPECIFIED WHETHER ESOPHAGITIS PRESENT: ICD-10-CM

## 2024-05-28 DIAGNOSIS — Z87.891 FORMER SMOKER: ICD-10-CM

## 2024-05-28 PROBLEM — E78.49 OTHER HYPERLIPIDEMIA: Status: ACTIVE | Noted: 2017-06-03

## 2024-05-28 PROBLEM — M79.89 LEFT LEG SWELLING: Status: ACTIVE | Noted: 2024-05-28

## 2024-05-28 PROCEDURE — 3044F HG A1C LEVEL LT 7.0%: CPT | Performed by: NURSE PRACTITIONER

## 2024-05-28 PROCEDURE — 99495 TRANSJ CARE MGMT MOD F2F 14D: CPT | Performed by: NURSE PRACTITIONER

## 2024-05-28 PROCEDURE — 3078F DIAST BP <80 MM HG: CPT | Performed by: NURSE PRACTITIONER

## 2024-05-28 PROCEDURE — 1111F DSCHRG MED/CURRENT MED MERGE: CPT | Performed by: NURSE PRACTITIONER

## 2024-05-28 PROCEDURE — 1160F RVW MEDS BY RX/DR IN RCRD: CPT | Performed by: NURSE PRACTITIONER

## 2024-05-28 PROCEDURE — 1159F MED LIST DOCD IN RCRD: CPT | Performed by: NURSE PRACTITIONER

## 2024-05-28 PROCEDURE — 3074F SYST BP LT 130 MM HG: CPT | Performed by: NURSE PRACTITIONER

## 2024-05-28 RX ORDER — METOPROLOL SUCCINATE 25 MG/1
25 TABLET, EXTENDED RELEASE ORAL DAILY
Qty: 90 TABLET | Refills: 2 | Status: SHIPPED | OUTPATIENT
Start: 2024-05-28

## 2024-05-28 RX ORDER — LISINOPRIL 5 MG/1
5 TABLET ORAL DAILY
Qty: 90 TABLET | Refills: 1 | Status: SHIPPED | OUTPATIENT
Start: 2024-05-28

## 2024-05-28 NOTE — PROGRESS NOTES
Subjective   Gely Kimble is a 63 y.o. female.     Chief Complaint   Patient presents with    Hospital Follow Up Visit     Coronary artery disease         History of Present Illness  Patient presents for hospital follow up; patient went to [facility] on 5/16/24 with c/o chest pain; had been having chest pain with minimal exertion when seen at cardiology office; had treadmill test in office and was abnormal; directly admitted to hospital for left heart cath on 5/17/24; had stent placed; discharged home on 5/18/24; to continue statin, aspirin, beta-blockade and Plavix; chest pain resolved with stent; had follow up with cardiology on 5/24/24.    F/U DM2: takes Levemir daily and Metformin twice daily; last A1c 6.8%; monitors blood sugar and typically runs 120s; watches sugars in diet; not much exercise recently; plans to get back to gym; no numbness/tingling; last eye exam 8/2023 at [facility].     F/U HTN: takes Lisinopril and Metoprolol daily; monitors BP, typically runs 100s/60-70s; no headaches; some orthostasis, has to stand still for period of time after standing up and helps; has been taking Lisinopril whole tablet daily since last refill; orthostasis had previously resolved with half tablet of Lisinopril daily; mild swelling in ankles at end of the day; does not sit with feet dependent, typically sits with legs elevated; also takes Plavix and aspirin daily.     F/U Hyperlipidemia: takes Atorvastatin daily; no myalgias; watches saturated fats in diet; recent labs good.    F/U anemia: takes daily iron supplement and has been feeling better; recent labs had improved; no constipation; had referral to GI, but provider had appointment with left practice and needs referral to alterntative     F/U YARELIS: takes Pantoprazole daily and works well.     F/U anxiety/depression: takes Escitalopram daily and helps some, does not seem to be helping as much as did since recently loss of oldest brother  about 1.5 months ago; declines seeing counselor or therapist; has trouble falling asleep and staying asleep, no change; no SI/HI; typical grief, coping well; has good support, lives with sister and son.    F/U RLS: resumed Ropinirole nightly and has helped.     Uses Combivent rarely as needed; has not needed to use Albuterol inhaler recently;  overall, breathing is much better since stopped smoking.       Within 48 business hours after discharge our office contacted her via telephone to coordinate her care and needs.        9/14/2023     6:02 PM 1/28/2024     3:07 PM 5/18/2024     4:16 PM   Date of TCM Phone Call   [facility]   Date of Admission 9/13/2023 1/27/2024 5/16/2024   Date of Discharge 9/14/2023 1/28/2024 5/18/2024   Discharge Disposition Home or Self Care Home or Self Care Home or Self Care     Risk for Readmission (LACE) Score: 7 (5/18/2024  6:00 AM)      Patient was admitted to [facility]   ALL records were obtained and reviewed.  Date of admission 5/16/24  Date of discharge 5/18/24  Diagnosis: Chest pain; CAD  Medications upon discharge: reviewed and reconciled  Condition stable    Current outpatient and discharge medications have been reconciled for the patient.    I reviewed and requested records labs and diagnostics from the hospital with the patient and family.  The patient is to follow-up with specialist as discussed and directed.    If any problems arise or further questions develop patient is to call or to contact us for any needs.     The following portions of the patient's history were reviewed and updated as appropriate: allergies, current medications, past family history, past medical history, past social history, past surgical history and problem list.    Current Outpatient Medications   Medication Sig Dispense Refill    acetaminophen (TYLENOL) 500 MG tablet Take 1 tablet by mouth Every 8 (Eight) Hours As Needed for  "Mild Pain or Moderate Pain.      aspirin 81 MG chewable tablet Chew 1 tablet Daily.      atorvastatin (LIPITOR) 40 MG tablet Take 1 tablet by mouth Daily. 30 tablet 11    clopidogrel (PLAVIX) 75 MG tablet Take 1 tablet by mouth Daily. 30 tablet 11    escitalopram (LEXAPRO) 20 MG tablet TAKE 1 TABLET BY MOUTH DAILY 90 tablet 1    Ferrous Sulfate  (45 Fe) MG tablet controlled-release Take 1 tablet by mouth Daily. 30 tablet 2    Homeopathic Products (LEG CRAMP RELIEF PO) Take 1 tablet by mouth Daily.      insulin detemir (Levemir) 100 UNIT/ML injection Inject 20 Units under the skin into the appropriate area as directed Every Night.      Insulin Syringe 31G X 5/16\" 1 ML misc Use 1 syringe Daily. For use with Levemir vials 100 each 2    LORATADINE PO Take 1 tablet by mouth Daily.      metFORMIN ER (GLUCOPHAGE-XR) 500 MG 24 hr tablet TAKE TWO TABLETS BY MOUTH TWICE A DAY WITH A MEAL 120 tablet 3    metoprolol succinate XL (TOPROL-XL) 25 MG 24 hr tablet Take 1 tablet by mouth Daily. 90 tablet 2    Multiple Vitamin (MULTIVITAMIN) tablet Take 1 tablet by mouth.      nitroglycerin (Nitrostat) 0.4 MG SL tablet Place 1 tablet under the tongue Every 5 (Five) Minutes As Needed for Chest Pain. Take no more than 3 doses in 15 minutes. 100 tablet 12    pantoprazole (Protonix) 20 MG EC tablet Take 1 tablet by mouth Daily. 30 tablet 5    rOPINIRole (REQUIP) 0.25 MG tablet Take 1 tablet by mouth Every Night. Take 1 hour before bedtime. 30 tablet 2    vitamin D (ERGOCALCIFEROL) 1.25 MG (94934 UT) capsule capsule Take 1 capsule by mouth 1 (One) Time Per Week.      albuterol (PROVENTIL HFA;VENTOLIN HFA) 108 (90 BASE) MCG/ACT inhaler Inhale 2 puffs Every 4 (Four) Hours As Needed for Wheezing. (Patient not taking: Reported on 5/28/2024)      albuterol (PROVENTIL) (2.5 MG/3ML) 0.083% nebulizer solution Take 2.5 mg by nebulization Every 4 (Four) Hours As Needed for Wheezing. (Patient not taking: Reported on 5/28/2024)      lisinopril " "(PRINIVIL,ZESTRIL) 5 MG tablet Take 1 tablet by mouth Daily. 90 tablet 1     No current facility-administered medications for this visit.       Current Outpatient Medications on File Prior to Visit   Medication Sig    acetaminophen (TYLENOL) 500 MG tablet Take 1 tablet by mouth Every 8 (Eight) Hours As Needed for Mild Pain or Moderate Pain.    aspirin 81 MG chewable tablet Chew 1 tablet Daily.    atorvastatin (LIPITOR) 40 MG tablet Take 1 tablet by mouth Daily.    clopidogrel (PLAVIX) 75 MG tablet Take 1 tablet by mouth Daily.    escitalopram (LEXAPRO) 20 MG tablet TAKE 1 TABLET BY MOUTH DAILY    Ferrous Sulfate  (45 Fe) MG tablet controlled-release Take 1 tablet by mouth Daily.    Homeopathic Products (LEG CRAMP RELIEF PO) Take 1 tablet by mouth Daily.    insulin detemir (Levemir) 100 UNIT/ML injection Inject 20 Units under the skin into the appropriate area as directed Every Night.    Insulin Syringe 31G X 5/16\" 1 ML misc Use 1 syringe Daily. For use with Levemir vials    LORATADINE PO Take 1 tablet by mouth Daily.    metFORMIN ER (GLUCOPHAGE-XR) 500 MG 24 hr tablet TAKE TWO TABLETS BY MOUTH TWICE A DAY WITH A MEAL    Multiple Vitamin (MULTIVITAMIN) tablet Take 1 tablet by mouth.    nitroglycerin (Nitrostat) 0.4 MG SL tablet Place 1 tablet under the tongue Every 5 (Five) Minutes As Needed for Chest Pain. Take no more than 3 doses in 15 minutes.    pantoprazole (Protonix) 20 MG EC tablet Take 1 tablet by mouth Daily.    rOPINIRole (REQUIP) 0.25 MG tablet Take 1 tablet by mouth Every Night. Take 1 hour before bedtime.    vitamin D (ERGOCALCIFEROL) 1.25 MG (68749 UT) capsule capsule Take 1 capsule by mouth 1 (One) Time Per Week.    albuterol (PROVENTIL HFA;VENTOLIN HFA) 108 (90 BASE) MCG/ACT inhaler Inhale 2 puffs Every 4 (Four) Hours As Needed for Wheezing. (Patient not taking: Reported on 5/28/2024)    albuterol (PROVENTIL) (2.5 MG/3ML) 0.083% nebulizer solution Take 2.5 mg by nebulization Every 4 (Four) " Hours As Needed for Wheezing. (Patient not taking: Reported on 5/28/2024)     No current facility-administered medications on file prior to visit.       Past Medical History:   Diagnosis Date    Abnormal nuclear stress test     Arthritis     COPD (chronic obstructive pulmonary disease)     Elevated cholesterol     Emphysema of lung     Gallbladder abscess     Herpes zoster 08/03/2023    History of positive hepatitis C     previously positive, never treated, negative RNA finding    Hypertension     Myocardial perfusion defect 02/16/2016    Type 2 diabetes mellitus        Past Surgical History:   Procedure Laterality Date    APPENDECTOMY      CARDIAC CATHETERIZATION N/A 10/25/2023    Procedure: Left Heart Cath;  Surgeon: Tasneem Hayes MD;  [facility];  Service: Cardiovascular;  Laterality: N/A;    CARDIAC CATHETERIZATION N/A 10/25/2023    Procedure: Left ventriculography;  Surgeon: Tasneem Hayes MD;  [facility];  Service: Cardiovascular;  Laterality: N/A;    CARDIAC CATHETERIZATION N/A 10/25/2023    Procedure: Stent LEYLA coronary;  Surgeon: Tasneem Hayes MD;  [facility];  Service: Cardiovascular;  Laterality: N/A;    CARDIAC CATHETERIZATION N/A 10/25/2023    Procedure: Coronary angiography;  Surgeon: Tasneem Hayes MD;  [facility];  Service: Cardiovascular;  Laterality: N/A;    CARDIAC CATHETERIZATION N/A 10/25/2023    Procedure: Percutaneous Coronary Intervention;  Surgeon: Tasneem Hayes MD;  Location: [facility] CATH INVASIVE LOCATION;  Service: Cardiovascular;  Laterality: N/A;    CARDIAC CATHETERIZATION N/A 11/3/2023    Procedure: Percutaneous Coronary Intervention RCA;  Surgeon: Tasneem Hayes MD;  Location: [facility] CATH INVASIVE LOCATION;  Service: Cardiovascular;  Laterality: N/A;    CARDIAC CATHETERIZATION N/A 11/3/2023    Procedure: Coronary  angiography;  Surgeon: Tasneem Hayes MD;  Location:  [facility] CATH INVASIVE LOCATION;  Service: Cardiovascular;  Laterality: N/A;    CARDIAC CATHETERIZATION N/A 11/3/2023    Procedure: Stent LEYLA coronary;  Surgeon: Tasneem Hayes MD;  Location:  [facility] CATH INVASIVE LOCATION;  Service: Cardiovascular;  Laterality: N/A;    CARDIAC CATHETERIZATION N/A 2024    Procedure: Left Heart Cath;  Surgeon: Tasneem Hayes MD;  Location:  [facility] CATH INVASIVE LOCATION;  Service: Cardiovascular;  Laterality: N/A;    CARDIAC CATHETERIZATION N/A 2024    Procedure: Percutaneous Coronary Intervention;  Surgeon: Tasneem Hayes MD;  Location:  [facility] CATH INVASIVE LOCATION;  Service: Cardiovascular;  Laterality: N/A;    CARDIAC CATHETERIZATION N/A 2024    Procedure: Coronary angiography;  Surgeon: Tasneem Hayes MD;  Location:  [facility] CATH INVASIVE LOCATION;  Service: Cardiovascular;  Laterality: N/A;    CARDIAC CATHETERIZATION N/A 2024    Procedure: Left ventriculography;  Surgeon: Tasneem Hayes MD;  Location:  [facility] CATH INVASIVE LOCATION;  Service: Cardiovascular;  Laterality: N/A;    CHOLECYSTECTOMY      HYSTERECTOMY      TUBAL ABDOMINAL LIGATION         Family History   Problem Relation Age of Onset    Diabetes Mother     Heart disease Mother     Diabetes Father     Diabetes Brother     Heart disease Maternal Grandmother     Cancer Paternal Grandfather        Social History     Socioeconomic History    Marital status:    Tobacco Use    Smoking status: Former     Current packs/day: 0.00     Average packs/day: 1.5 packs/day for 45.8 years (68.7 ttl pk-yrs)     Types: Cigarettes     Start date:      Quit date: 10/11/2017     Years since quittin.6     Passive exposure: Past    Smokeless tobacco: Never    Tobacco comments:     Previously smoked about 1.5 packs per day for about 45 years   Vaping Use    Vaping status: Never Used   Substance and Sexual Activity  "   Alcohol use: Not Currently     Comment: rare shantanuaritia 1-2 times per year    Drug use: No     Comment: in past; clean since 2007    Sexual activity: Not Currently       Review of Systems   Constitutional:  Positive for fatigue. Negative for appetite change, chills, fever, unexpected weight gain and unexpected weight loss.   HENT:  Negative for ear pain, sinus pressure, sore throat and trouble swallowing.    Eyes:  Blurred vision: some, needs new glasses.   Respiratory:  Negative for cough, chest tightness and shortness of breath.    Cardiovascular:  Negative for chest pain and palpitations.   Gastrointestinal:  Positive for diarrhea (some every 3-4 days; takes OTC anti-diarrhea med as needed and helps). Negative for abdominal pain, blood in stool and constipation.   Endocrine: Positive for cold intolerance and polydipsia. Negative for heat intolerance.   Genitourinary:  Positive for frequency (no change, drinks a lot of liquids). Negative for dysuria.   Skin:  Negative for rash.   Neurological:  Negative for syncope and weakness.       Objective   Vitals:    05/28/24 0909   BP: 102/68   Pulse: 63   SpO2: 97%   Weight: 65.8 kg (145 lb)   Height: 170.2 cm (67\")     Body mass index is 22.71 kg/m².    Physical Exam  Vitals and nursing note reviewed.   Constitutional:       General: She is not in acute distress.     Appearance: She is well-developed and well-groomed. She is not diaphoretic.   HENT:      Head: Normocephalic.      Right Ear: Tympanic membrane and external ear normal. No decreased hearing noted.      Left Ear: Tympanic membrane and external ear normal. No decreased hearing noted.      Nose: Nose normal.      Right Sinus: No maxillary sinus tenderness or frontal sinus tenderness.      Left Sinus: No maxillary sinus tenderness or frontal sinus tenderness.      Mouth/Throat:      Mouth: Mucous membranes are moist.      Pharynx: No oropharyngeal exudate or posterior oropharyngeal erythema.   Eyes:      " Conjunctiva/sclera: Conjunctivae normal.   Neck:      Vascular: No carotid bruit.   Cardiovascular:      Rate and Rhythm: Normal rate and regular rhythm.      Pulses: Normal pulses.      Heart sounds: Normal heart sounds. No murmur heard.  Pulmonary:      Effort: Pulmonary effort is normal. No respiratory distress.      Breath sounds: Normal breath sounds. No wheezing or rales. Decreased breath sounds: some in bilateral bases.  Abdominal:      General: Bowel sounds are normal.      Palpations: Abdomen is soft. There is no hepatomegaly or splenomegaly.      Tenderness: There is no abdominal tenderness. There is no guarding.   Musculoskeletal:      Cervical back: Normal range of motion and neck supple.      Right lower leg: No edema.      Left lower leg: No edema.   Lymphadenopathy:      Cervical: No cervical adenopathy.   Skin:     General: Skin is warm and dry.      Findings: No rash.   Neurological:      Mental Status: She is alert and oriented to person, place, and time.      Gait: Abnormal gait: guarded gait.   Psychiatric:         Mood and Affect: Mood normal.         Behavior: Behavior normal.         Thought Content: Thought content normal.         Cognition and Memory: Cognition normal.         Judgment: Judgment normal.     5/18/24 CBC WNL except Hgb 9.5, Hct 32.2, MCV 78.9, RDW 15.5%    Lab Results   Component Value Date    GLUCOSE 199 (H) 05/18/2024    BUN 12 05/18/2024    CREATININE 0.65 05/18/2024    EGFRIFNONA 108 03/20/2020    EGFRIFAFRI >60 10/28/2020    BCR 18.5 05/18/2024    K 4.3 05/18/2024    CO2 28.2 05/18/2024    CALCIUM 9.5 05/18/2024    PROTENTOTREF 6.6 03/19/2024    ALBUMIN 4.1 05/18/2024    LABIL2 2.0 03/19/2024    AST 12 05/18/2024    ALT 15 05/18/2024      Lab Results   Component Value Date    CHLPL 117 03/19/2024    TRIG 81 05/18/2024    HDL 56 05/18/2024    VLDL 16 05/18/2024    LDL 43 05/18/2024     Lab Results   Component Value Date    TSH 0.693 05/18/2024     Lab Results   Component  Value Date    HGBA1C 6.80 (H) 05/18/2024     Records reviewed from [facility] from 5/16/24--5/18/24.      Assessment & Plan .  Problem List Items Addressed This Visit       Type 2 diabetes mellitus    Current Assessment & Plan     Diabetes is stable.   Continue current treatment regimen.  Regular aerobic exercise.  Reminded to get yearly retinal exam.  Diabetes will be reassessed in 3 months  Continue Levemir daily and Metformin twice daily.         Pulmonary emphysema    Current Assessment & Plan     Stable.  Continue Combivent as needed.         Primary hypertension    Current Assessment & Plan     Hypertension is possibly over treated.  Medication changes per orders.  Ambulatory blood pressure monitoring.  Blood pressure will be reassessed in 3 months.  Continue Metoprolol daily.  Decrease Lisinopril back to 5 mg daily.  Continue to change positions slowly.         Relevant Medications    lisinopril (PRINIVIL,ZESTRIL) 5 MG tablet    Restless leg syndrome    Current Assessment & Plan     Improving with treatment.  Continue Ropinirole nightly.         Moderate episode of recurrent major depressive disorder    Current Assessment & Plan     Patient's depression is a recurrent episode that is moderate without psychosis. Depression is active and stable.    Plan:   Continue current medication therapy   Continue Escitalopram daily.  Followup in 3 months.          Generalized anxiety disorder    Current Assessment & Plan     Psychological condition is stable.  Continue current treatment regimen.  Psychological condition  will be reassessed in 3 months.  Continue Escitalopram daily.         Coronary artery disease of native artery of native heart with stable angina pectoris    Current Assessment & Plan     Continue Atorvastatin, aspirin, Plavix, and Metoprolol daily.  Follow up as scheduled with cardiology.         Gastroesophageal reflux disease    Current Assessment & Plan     Stable.  Continue Pantoprazole  daily.         Anemia    Relevant Orders    Iron (Completed)    Ferritin (Completed)    CBC & Differential (Completed)    Ambulatory Referral to Gastroenterology    Former smoker    Relevant Orders    CT Chest Low Dose Wo     Other Visit Diagnoses       Hospital discharge follow-up    -  Primary    Relevant Orders    Magnesium (Completed)    CBC & Differential (Completed)    Low magnesium level        Relevant Orders    Magnesium (Completed)    Screening mammogram for breast cancer        Relevant Orders    Mammo Screening Digital Tomosynthesis Bilateral With CAD            Return in about 3 months (around 8/28/2024) for Medicare Wellness, Recheck.or sooner if symptoms persist or worsen.  Patient has started to have dizziness with position changes again since has been taking Lisinopril 10 mg daily; BP low normal today; will decrease Lisinopril back to 5 mg daily and see if helps symptoms.         COVID-19 Precautions - Patient was compliant in wearing a mask. When I saw the patient, I used appropriate personal protective equipment (PPE) including mask, gloves, and eye shield (standard procedure).  Hand hygiene was completed before and after seeing the patient.

## 2024-05-29 LAB
BASOPHILS # BLD AUTO: 0.09 10*3/MM3 (ref 0–0.2)
BASOPHILS NFR BLD AUTO: 1.2 % (ref 0–1.5)
EOSINOPHIL # BLD AUTO: 0.09 10*3/MM3 (ref 0–0.4)
EOSINOPHIL NFR BLD AUTO: 1.2 % (ref 0.3–6.2)
ERYTHROCYTE [DISTWIDTH] IN BLOOD BY AUTOMATED COUNT: 16.5 % (ref 12.3–15.4)
FERRITIN SERPL-MCNC: 12 NG/ML (ref 13–150)
HCT VFR BLD AUTO: 37.6 % (ref 34–46.6)
HGB BLD-MCNC: 10.9 G/DL (ref 12–15.9)
IMM GRANULOCYTES # BLD AUTO: 0.01 10*3/MM3 (ref 0–0.05)
IMM GRANULOCYTES NFR BLD AUTO: 0.1 % (ref 0–0.5)
IRON SERPL-MCNC: 27 MCG/DL (ref 37–145)
LYMPHOCYTES # BLD AUTO: 2.3 10*3/MM3 (ref 0.7–3.1)
LYMPHOCYTES NFR BLD AUTO: 29.6 % (ref 19.6–45.3)
MAGNESIUM SERPL-MCNC: 1.4 MG/DL (ref 1.6–2.4)
MCH RBC QN AUTO: 22.9 PG (ref 26.6–33)
MCHC RBC AUTO-ENTMCNC: 29 G/DL (ref 31.5–35.7)
MCV RBC AUTO: 79 FL (ref 79–97)
MONOCYTES # BLD AUTO: 0.43 10*3/MM3 (ref 0.1–0.9)
MONOCYTES NFR BLD AUTO: 5.5 % (ref 5–12)
NEUTROPHILS # BLD AUTO: 4.86 10*3/MM3 (ref 1.7–7)
NEUTROPHILS NFR BLD AUTO: 62.4 % (ref 42.7–76)
NRBC BLD AUTO-RTO: 0 /100 WBC (ref 0–0.2)
PLATELET # BLD AUTO: 302 10*3/MM3 (ref 140–450)
QT INTERVAL: 428 MS
QTC INTERVAL: 456 MS
RBC # BLD AUTO: 4.76 10*6/MM3 (ref 3.77–5.28)
WBC # BLD AUTO: 7.78 10*3/MM3 (ref 3.4–10.8)

## 2024-05-30 PROBLEM — Z87.891 FORMER SMOKER: Status: ACTIVE | Noted: 2024-05-30

## 2024-05-30 PROBLEM — D64.9 ANEMIA: Status: ACTIVE | Noted: 2024-05-30

## 2024-05-31 DIAGNOSIS — R79.0 LOW MAGNESIUM LEVEL: Primary | ICD-10-CM

## 2024-05-31 DIAGNOSIS — D64.9 ANEMIA, UNSPECIFIED TYPE: ICD-10-CM

## 2024-05-31 RX ORDER — MAGNESIUM OXIDE 400 MG/1
400 TABLET ORAL DAILY
Qty: 30 TABLET | Refills: 0 | Status: SHIPPED | OUTPATIENT
Start: 2024-05-31

## 2024-05-31 NOTE — ASSESSMENT & PLAN NOTE
Psychological condition is stable.  Continue current treatment regimen.  Psychological condition  will be reassessed in 3 months.  Continue Escitalopram daily.

## 2024-05-31 NOTE — ASSESSMENT & PLAN NOTE
Continue Atorvastatin, aspirin, Plavix, and Metoprolol daily.  Follow up as scheduled with cardiology.

## 2024-05-31 NOTE — ASSESSMENT & PLAN NOTE
Hypertension is possibly over treated.  Medication changes per orders.  Ambulatory blood pressure monitoring.  Blood pressure will be reassessed in 3 months.  Continue Metoprolol daily.  Decrease Lisinopril back to 5 mg daily.  Continue to change positions slowly.

## 2024-05-31 NOTE — ASSESSMENT & PLAN NOTE
Patient's depression is a recurrent episode that is moderate without psychosis. Depression is active and stable.    Plan:   Continue current medication therapy   Continue Escitalopram daily.  Followup in 3 months.

## 2024-05-31 NOTE — ASSESSMENT & PLAN NOTE
Diabetes is stable.   Continue current treatment regimen.  Regular aerobic exercise.  Reminded to get yearly retinal exam.  Diabetes will be reassessed in 3 months  Continue Levemir daily and Metformin twice daily.

## 2024-06-16 DIAGNOSIS — G25.81 RESTLESS LEG SYNDROME: ICD-10-CM

## 2024-06-17 RX ORDER — ROPINIROLE 0.25 MG/1
TABLET, FILM COATED ORAL
Qty: 30 TABLET | Refills: 2 | Status: SHIPPED | OUTPATIENT
Start: 2024-06-17

## 2024-06-21 ENCOUNTER — HOSPITAL ENCOUNTER (OUTPATIENT)
Facility: HOSPITAL | Age: 64
Setting detail: OBSERVATION
Discharge: HOME | End: 2024-06-22
Attending: EMERGENCY MEDICINE | Admitting: EMERGENCY MEDICINE
Payer: MEDICARE

## 2024-06-21 ENCOUNTER — APPOINTMENT (OUTPATIENT)
Dept: CARDIOLOGY | Facility: HOSPITAL | Age: 64
End: 2024-06-21
Payer: MEDICARE

## 2024-06-21 ENCOUNTER — APPOINTMENT (OUTPATIENT)
Dept: GENERAL RADIOLOGY | Facility: HOSPITAL | Age: 64
End: 2024-06-21
Payer: MEDICARE

## 2024-06-21 DIAGNOSIS — R55 NEAR SYNCOPE: Primary | ICD-10-CM

## 2024-06-21 DIAGNOSIS — R07.9 CHEST PAIN, UNSPECIFIED TYPE: ICD-10-CM

## 2024-06-21 LAB
ALBUMIN SERPL-MCNC: 4.5 G/DL (ref 3.5–5.2)
ALBUMIN/GLOB SERPL: 2.1 G/DL
ALP SERPL-CCNC: 53 U/L (ref 39–117)
ALT SERPL W P-5'-P-CCNC: 14 U/L (ref 1–33)
ANION GAP SERPL CALCULATED.3IONS-SCNC: 12.8 MMOL/L (ref 5–15)
AORTIC DIMENSIONLESS INDEX: 0.7 (DI)
AST SERPL-CCNC: 14 U/L (ref 1–32)
BASOPHILS # BLD AUTO: 0.08 10*3/MM3 (ref 0–0.2)
BASOPHILS NFR BLD AUTO: 1.2 % (ref 0–1.5)
BH CV ECHO MEAS - ACS: 2.13 CM
BH CV ECHO MEAS - AO MAX PG: 9 MMHG
BH CV ECHO MEAS - AO MEAN PG: 4.9 MMHG
BH CV ECHO MEAS - AO ROOT DIAM: 2.9 CM
BH CV ECHO MEAS - AO V2 MAX: 149.7 CM/SEC
BH CV ECHO MEAS - AO V2 VTI: 31.9 CM
BH CV ECHO MEAS - AVA(I,D): 2.01 CM2
BH CV ECHO MEAS - EDV(CUBED): 75.2 ML
BH CV ECHO MEAS - EDV(MOD-SP2): 62 ML
BH CV ECHO MEAS - EDV(MOD-SP4): 105 ML
BH CV ECHO MEAS - EF(MOD-BP): 66.5 %
BH CV ECHO MEAS - EF(MOD-SP2): 66.1 %
BH CV ECHO MEAS - EF(MOD-SP4): 65.7 %
BH CV ECHO MEAS - ESV(CUBED): 16.3 ML
BH CV ECHO MEAS - ESV(MOD-SP2): 21 ML
BH CV ECHO MEAS - ESV(MOD-SP4): 36 ML
BH CV ECHO MEAS - FS: 39.9 %
BH CV ECHO MEAS - IVS/LVPW: 0.95 CM
BH CV ECHO MEAS - IVSD: 1 CM
BH CV ECHO MEAS - LAT PEAK E' VEL: 11.6 CM/SEC
BH CV ECHO MEAS - LV MASS(C)D: 143.6 GRAMS
BH CV ECHO MEAS - LV MAX PG: 5.8 MMHG
BH CV ECHO MEAS - LV MEAN PG: 3 MMHG
BH CV ECHO MEAS - LV V1 MAX: 120 CM/SEC
BH CV ECHO MEAS - LV V1 VTI: 23.3 CM
BH CV ECHO MEAS - LVIDD: 4.2 CM
BH CV ECHO MEAS - LVIDS: 2.5 CM
BH CV ECHO MEAS - LVOT AREA: 2.8 CM2
BH CV ECHO MEAS - LVOT DIAM: 1.87 CM
BH CV ECHO MEAS - LVPWD: 1.05 CM
BH CV ECHO MEAS - MED PEAK E' VEL: 7.9 CM/SEC
BH CV ECHO MEAS - MV A DUR: 0.12 SEC
BH CV ECHO MEAS - MV A MAX VEL: 91.7 CM/SEC
BH CV ECHO MEAS - MV DEC SLOPE: 470.5 CM/SEC2
BH CV ECHO MEAS - MV DEC TIME: 0.27 SEC
BH CV ECHO MEAS - MV E MAX VEL: 91.2 CM/SEC
BH CV ECHO MEAS - MV E/A: 0.99
BH CV ECHO MEAS - MV MAX PG: 5.1 MMHG
BH CV ECHO MEAS - MV MEAN PG: 2.04 MMHG
BH CV ECHO MEAS - MV P1/2T: 77.2 MSEC
BH CV ECHO MEAS - MV V2 VTI: 29.1 CM
BH CV ECHO MEAS - MVA(P1/2T): 2.8 CM2
BH CV ECHO MEAS - MVA(VTI): 2.2 CM2
BH CV ECHO MEAS - PA ACC TIME: 0.13 SEC
BH CV ECHO MEAS - PA V2 MAX: 108.4 CM/SEC
BH CV ECHO MEAS - PULM A REVS DUR: 0.12 SEC
BH CV ECHO MEAS - PULM A REVS VEL: 30.6 CM/SEC
BH CV ECHO MEAS - PULM DIAS VEL: 42.4 CM/SEC
BH CV ECHO MEAS - PULM S/D: 1.22
BH CV ECHO MEAS - PULM SYS VEL: 51.5 CM/SEC
BH CV ECHO MEAS - RV MAX PG: 2.34 MMHG
BH CV ECHO MEAS - RV V1 MAX: 76.5 CM/SEC
BH CV ECHO MEAS - RV V1 VTI: 15.7 CM
BH CV ECHO MEAS - SV(LVOT): 64.1 ML
BH CV ECHO MEAS - SV(MOD-SP2): 41 ML
BH CV ECHO MEAS - SV(MOD-SP4): 69 ML
BH CV ECHO MEAS - TAPSE (>1.6): 2 CM
BH CV ECHO MEASUREMENTS AVERAGE E/E' RATIO: 9.35
BH CV XLRA - TDI S': 9.8 CM/SEC
BILIRUB SERPL-MCNC: 0.3 MG/DL (ref 0–1.2)
BUN SERPL-MCNC: 8 MG/DL (ref 8–23)
BUN/CREAT SERPL: 12.7 (ref 7–25)
CALCIUM SPEC-SCNC: 9.3 MG/DL (ref 8.6–10.5)
CHLORIDE SERPL-SCNC: 105 MMOL/L (ref 98–107)
CHOLEST SERPL-MCNC: 98 MG/DL (ref 0–200)
CO2 SERPL-SCNC: 26.2 MMOL/L (ref 22–29)
CREAT SERPL-MCNC: 0.63 MG/DL (ref 0.57–1)
DEPRECATED RDW RBC AUTO: 54.1 FL (ref 37–54)
EGFRCR SERPLBLD CKD-EPI 2021: 98.6 ML/MIN/1.73
EOSINOPHIL # BLD AUTO: 0.08 10*3/MM3 (ref 0–0.4)
EOSINOPHIL NFR BLD AUTO: 1.2 % (ref 0.3–6.2)
ERYTHROCYTE [DISTWIDTH] IN BLOOD BY AUTOMATED COUNT: 18.8 % (ref 12.3–15.4)
GEN 5 2HR TROPONIN T REFLEX: 9 NG/L
GLOBULIN UR ELPH-MCNC: 2.1 GM/DL
GLUCOSE BLDC GLUCOMTR-MCNC: 224 MG/DL (ref 70–130)
GLUCOSE BLDC GLUCOMTR-MCNC: 94 MG/DL (ref 70–130)
GLUCOSE SERPL-MCNC: 164 MG/DL (ref 65–99)
HCT VFR BLD AUTO: 36.7 % (ref 34–46.6)
HDLC SERPL-MCNC: 51 MG/DL (ref 40–60)
HGB BLD-MCNC: 10.8 G/DL (ref 12–15.9)
HOLD SPECIMEN: NORMAL
HOLD SPECIMEN: NORMAL
IMM GRANULOCYTES # BLD AUTO: 0.01 10*3/MM3 (ref 0–0.05)
IMM GRANULOCYTES NFR BLD AUTO: 0.1 % (ref 0–0.5)
LDLC SERPL CALC-MCNC: 27 MG/DL (ref 0–100)
LDLC/HDLC SERPL: 0.51 {RATIO}
LEFT ATRIUM VOLUME INDEX: 14.2 ML/M2
LYMPHOCYTES # BLD AUTO: 2.07 10*3/MM3 (ref 0.7–3.1)
LYMPHOCYTES NFR BLD AUTO: 30.4 % (ref 19.6–45.3)
MCH RBC QN AUTO: 23.3 PG (ref 26.6–33)
MCHC RBC AUTO-ENTMCNC: 29.4 G/DL (ref 31.5–35.7)
MCV RBC AUTO: 79.1 FL (ref 79–97)
MONOCYTES # BLD AUTO: 0.39 10*3/MM3 (ref 0.1–0.9)
MONOCYTES NFR BLD AUTO: 5.7 % (ref 5–12)
NEUTROPHILS NFR BLD AUTO: 4.17 10*3/MM3 (ref 1.7–7)
NEUTROPHILS NFR BLD AUTO: 61.4 % (ref 42.7–76)
NRBC BLD AUTO-RTO: 0 /100 WBC (ref 0–0.2)
PLATELET # BLD AUTO: 257 10*3/MM3 (ref 140–450)
PMV BLD AUTO: 10.8 FL (ref 6–12)
POTASSIUM SERPL-SCNC: 4.4 MMOL/L (ref 3.5–5.2)
PROT SERPL-MCNC: 6.6 G/DL (ref 6–8.5)
QT INTERVAL: 407 MS
QTC INTERVAL: 470 MS
RBC # BLD AUTO: 4.64 10*6/MM3 (ref 3.77–5.28)
SINUS: 2.6 CM
SODIUM SERPL-SCNC: 144 MMOL/L (ref 136–145)
TRIGL SERPL-MCNC: 106 MG/DL (ref 0–150)
TROPONIN T DELTA: -3 NG/L
TROPONIN T SERPL HS-MCNC: 12 NG/L
TROPONIN T SERPL HS-MCNC: 7 NG/L
VLDLC SERPL-MCNC: 20 MG/DL (ref 5–40)
WBC NRBC COR # BLD AUTO: 6.8 10*3/MM3 (ref 3.4–10.8)
WHOLE BLOOD HOLD COAG: NORMAL
WHOLE BLOOD HOLD SPECIMEN: NORMAL

## 2024-06-21 PROCEDURE — 93005 ELECTROCARDIOGRAM TRACING: CPT

## 2024-06-21 PROCEDURE — 36415 COLL VENOUS BLD VENIPUNCTURE: CPT

## 2024-06-21 PROCEDURE — 85025 COMPLETE CBC W/AUTO DIFF WBC: CPT

## 2024-06-21 PROCEDURE — 84484 ASSAY OF TROPONIN QUANT: CPT

## 2024-06-21 PROCEDURE — 80053 COMPREHEN METABOLIC PANEL: CPT

## 2024-06-21 PROCEDURE — 93306 TTE W/DOPPLER COMPLETE: CPT | Performed by: INTERNAL MEDICINE

## 2024-06-21 PROCEDURE — G0378 HOSPITAL OBSERVATION PER HR: HCPCS

## 2024-06-21 PROCEDURE — 93306 TTE W/DOPPLER COMPLETE: CPT

## 2024-06-21 PROCEDURE — 25510000001 PERFLUTREN (DEFINITY) 8.476 MG IN SODIUM CHLORIDE (PF) 0.9 % 10 ML INJECTION: Performed by: EMERGENCY MEDICINE

## 2024-06-21 PROCEDURE — 99213 OFFICE O/P EST LOW 20 MIN: CPT | Performed by: INTERNAL MEDICINE

## 2024-06-21 PROCEDURE — 25010000002 ONDANSETRON PER 1 MG: Performed by: NURSE PRACTITIONER

## 2024-06-21 PROCEDURE — 63710000001 INSULIN LISPRO (HUMAN) PER 5 UNITS: Performed by: PHYSICIAN ASSISTANT

## 2024-06-21 PROCEDURE — 80061 LIPID PANEL: CPT | Performed by: EMERGENCY MEDICINE

## 2024-06-21 PROCEDURE — 93010 ELECTROCARDIOGRAM REPORT: CPT | Performed by: INTERNAL MEDICINE

## 2024-06-21 PROCEDURE — 71045 X-RAY EXAM CHEST 1 VIEW: CPT

## 2024-06-21 PROCEDURE — 82948 REAGENT STRIP/BLOOD GLUCOSE: CPT

## 2024-06-21 PROCEDURE — 93005 ELECTROCARDIOGRAM TRACING: CPT | Performed by: EMERGENCY MEDICINE

## 2024-06-21 PROCEDURE — 96374 THER/PROPH/DIAG INJ IV PUSH: CPT

## 2024-06-21 PROCEDURE — 63710000001 INSULIN GLARGINE PER 5 UNITS: Performed by: PHYSICIAN ASSISTANT

## 2024-06-21 PROCEDURE — 99285 EMERGENCY DEPT VISIT HI MDM: CPT

## 2024-06-21 PROCEDURE — 84484 ASSAY OF TROPONIN QUANT: CPT | Performed by: PHYSICIAN ASSISTANT

## 2024-06-21 RX ORDER — DEXTROSE MONOHYDRATE 25 G/50ML
25 INJECTION, SOLUTION INTRAVENOUS
Status: DISCONTINUED | OUTPATIENT
Start: 2024-06-21 | End: 2024-06-22 | Stop reason: HOSPADM

## 2024-06-21 RX ORDER — NITROGLYCERIN 0.4 MG/1
0.4 TABLET SUBLINGUAL
Status: DISCONTINUED | OUTPATIENT
Start: 2024-06-21 | End: 2024-06-22 | Stop reason: HOSPADM

## 2024-06-21 RX ORDER — LISINOPRIL 10 MG/1
5 TABLET ORAL NIGHTLY
Status: DISCONTINUED | OUTPATIENT
Start: 2024-06-21 | End: 2024-06-22 | Stop reason: HOSPADM

## 2024-06-21 RX ORDER — FERROUS SULFATE 325(65) MG
325 TABLET ORAL 2 TIMES DAILY WITH MEALS
Status: DISCONTINUED | OUTPATIENT
Start: 2024-06-21 | End: 2024-06-22 | Stop reason: HOSPADM

## 2024-06-21 RX ORDER — ACETAMINOPHEN 500 MG
500 TABLET ORAL EVERY 8 HOURS PRN
Status: DISCONTINUED | OUTPATIENT
Start: 2024-06-21 | End: 2024-06-22 | Stop reason: HOSPADM

## 2024-06-21 RX ORDER — ASPIRIN 325 MG
325 TABLET ORAL ONCE
Status: COMPLETED | OUTPATIENT
Start: 2024-06-21 | End: 2024-06-21

## 2024-06-21 RX ORDER — IBUPROFEN 600 MG/1
1 TABLET ORAL
Status: DISCONTINUED | OUTPATIENT
Start: 2024-06-21 | End: 2024-06-22 | Stop reason: HOSPADM

## 2024-06-21 RX ORDER — PANTOPRAZOLE SODIUM 40 MG/1
40 TABLET, DELAYED RELEASE ORAL NIGHTLY
Status: DISCONTINUED | OUTPATIENT
Start: 2024-06-21 | End: 2024-06-22 | Stop reason: HOSPADM

## 2024-06-21 RX ORDER — ONDANSETRON 2 MG/ML
4 INJECTION INTRAMUSCULAR; INTRAVENOUS ONCE
Status: COMPLETED | OUTPATIENT
Start: 2024-06-21 | End: 2024-06-21

## 2024-06-21 RX ORDER — SODIUM CHLORIDE 0.9 % (FLUSH) 0.9 %
10 SYRINGE (ML) INJECTION EVERY 12 HOURS SCHEDULED
Status: DISCONTINUED | OUTPATIENT
Start: 2024-06-21 | End: 2024-06-22 | Stop reason: HOSPADM

## 2024-06-21 RX ORDER — BISACODYL 5 MG/1
5 TABLET, DELAYED RELEASE ORAL DAILY PRN
Status: DISCONTINUED | OUTPATIENT
Start: 2024-06-21 | End: 2024-06-22 | Stop reason: HOSPADM

## 2024-06-21 RX ORDER — METOPROLOL SUCCINATE 25 MG/1
25 TABLET, EXTENDED RELEASE ORAL DAILY
Status: DISCONTINUED | OUTPATIENT
Start: 2024-06-22 | End: 2024-06-22 | Stop reason: HOSPADM

## 2024-06-21 RX ORDER — POLYETHYLENE GLYCOL 3350 17 G/17G
17 POWDER, FOR SOLUTION ORAL DAILY PRN
Status: DISCONTINUED | OUTPATIENT
Start: 2024-06-21 | End: 2024-06-22 | Stop reason: HOSPADM

## 2024-06-21 RX ORDER — AMOXICILLIN 250 MG
2 CAPSULE ORAL 2 TIMES DAILY
Status: DISCONTINUED | OUTPATIENT
Start: 2024-06-21 | End: 2024-06-22 | Stop reason: HOSPADM

## 2024-06-21 RX ORDER — CLOPIDOGREL BISULFATE 75 MG/1
75 TABLET ORAL DAILY
Status: DISCONTINUED | OUTPATIENT
Start: 2024-06-22 | End: 2024-06-22 | Stop reason: HOSPADM

## 2024-06-21 RX ORDER — ROPINIROLE 0.25 MG/1
0.25 TABLET, FILM COATED ORAL NIGHTLY
Status: DISCONTINUED | OUTPATIENT
Start: 2024-06-21 | End: 2024-06-22 | Stop reason: HOSPADM

## 2024-06-21 RX ORDER — SODIUM CHLORIDE 9 MG/ML
40 INJECTION, SOLUTION INTRAVENOUS AS NEEDED
Status: DISCONTINUED | OUTPATIENT
Start: 2024-06-21 | End: 2024-06-22 | Stop reason: HOSPADM

## 2024-06-21 RX ORDER — ASPIRIN 81 MG/1
81 TABLET, CHEWABLE ORAL NIGHTLY
Status: DISCONTINUED | OUTPATIENT
Start: 2024-06-21 | End: 2024-06-22 | Stop reason: HOSPADM

## 2024-06-21 RX ORDER — BISACODYL 10 MG
10 SUPPOSITORY, RECTAL RECTAL DAILY PRN
Status: DISCONTINUED | OUTPATIENT
Start: 2024-06-21 | End: 2024-06-22 | Stop reason: HOSPADM

## 2024-06-21 RX ORDER — ACETAMINOPHEN 500 MG
1000 TABLET ORAL ONCE
Status: COMPLETED | OUTPATIENT
Start: 2024-06-21 | End: 2024-06-21

## 2024-06-21 RX ORDER — SODIUM CHLORIDE 0.9 % (FLUSH) 0.9 %
10 SYRINGE (ML) INJECTION AS NEEDED
Status: DISCONTINUED | OUTPATIENT
Start: 2024-06-21 | End: 2024-06-22 | Stop reason: HOSPADM

## 2024-06-21 RX ORDER — ESCITALOPRAM OXALATE 20 MG/1
20 TABLET ORAL NIGHTLY
Status: DISCONTINUED | OUTPATIENT
Start: 2024-06-21 | End: 2024-06-22 | Stop reason: HOSPADM

## 2024-06-21 RX ORDER — INSULIN LISPRO 100 [IU]/ML
2-7 INJECTION, SOLUTION INTRAVENOUS; SUBCUTANEOUS
Status: DISCONTINUED | OUTPATIENT
Start: 2024-06-21 | End: 2024-06-22 | Stop reason: HOSPADM

## 2024-06-21 RX ORDER — NICOTINE POLACRILEX 4 MG
15 LOZENGE BUCCAL
Status: DISCONTINUED | OUTPATIENT
Start: 2024-06-21 | End: 2024-06-22 | Stop reason: HOSPADM

## 2024-06-21 RX ORDER — ATORVASTATIN CALCIUM 20 MG/1
40 TABLET, FILM COATED ORAL NIGHTLY
Status: DISCONTINUED | OUTPATIENT
Start: 2024-06-21 | End: 2024-06-22 | Stop reason: HOSPADM

## 2024-06-21 RX ADMIN — PANTOPRAZOLE SODIUM 40 MG: 40 TABLET, DELAYED RELEASE ORAL at 21:47

## 2024-06-21 RX ADMIN — ATORVASTATIN CALCIUM 40 MG: 20 TABLET, FILM COATED ORAL at 21:47

## 2024-06-21 RX ADMIN — LISINOPRIL 5 MG: 10 TABLET ORAL at 21:47

## 2024-06-21 RX ADMIN — ESCITALOPRAM 20 MG: 20 TABLET, FILM COATED ORAL at 22:21

## 2024-06-21 RX ADMIN — NITROGLYCERIN 0.4 MG: 0.4 TABLET SUBLINGUAL at 11:21

## 2024-06-21 RX ADMIN — INSULIN LISPRO 3 UNITS: 100 INJECTION, SOLUTION INTRAVENOUS; SUBCUTANEOUS at 18:28

## 2024-06-21 RX ADMIN — ONDANSETRON 4 MG: 2 INJECTION INTRAMUSCULAR; INTRAVENOUS at 11:06

## 2024-06-21 RX ADMIN — ASPIRIN 81 MG: 81 TABLET, CHEWABLE ORAL at 21:47

## 2024-06-21 RX ADMIN — ACETAMINOPHEN 1000 MG: 500 TABLET ORAL at 11:08

## 2024-06-21 RX ADMIN — Medication 10 ML: at 20:36

## 2024-06-21 RX ADMIN — FERROUS SULFATE TAB 325 MG (65 MG ELEMENTAL FE) 325 MG: 325 (65 FE) TAB at 21:47

## 2024-06-21 RX ADMIN — INSULIN GLARGINE 20 UNITS: 100 INJECTION, SOLUTION SUBCUTANEOUS at 21:47

## 2024-06-21 RX ADMIN — ROPINIROLE HYDROCHLORIDE 0.25 MG: 0.25 TABLET, FILM COATED ORAL at 22:21

## 2024-06-21 RX ADMIN — MAGNESIUM OXIDE 400 MG (241.3 MG MAGNESIUM) TABLET 400 MG: TABLET at 21:47

## 2024-06-21 RX ADMIN — Medication 10 ML: at 16:08

## 2024-06-21 RX ADMIN — PERFLUTREN 2 ML: 6.52 INJECTION, SUSPENSION INTRAVENOUS at 13:09

## 2024-06-21 RX ADMIN — ASPIRIN 325 MG: 325 TABLET ORAL at 11:08

## 2024-06-21 NOTE — CONSULTS
Pt alert, welcomed Chp visit. Chp reviewed Advance Directive with Pt and answered questions. Chp gave copy to discuss with family at another time. Pt requested prayer for her health. Chp provided prayer support. Pt indicated no further pastoral care needs at this time.    Chaplains remain available.

## 2024-06-21 NOTE — PLAN OF CARE
Goal Outcome Evaluation: pt was admitted for chest pain that radiated down the left arm. Pt. Had a near syncope episode but has not had any light headedness since being admitted to the observation unit. Pt has had intermittent dizziness with activity. Pt. Has complained of chest pressure as a 5 out of 10 but has not requested any pain medication. Cardiology consulted. BP is now stable after episode of hypotension from the nitroglycerin in the ER. Pt is A/O x4. Call light is within reach.    Problem: Adult Inpatient Plan of Care  Goal: Plan of Care Review  Outcome: Ongoing, Progressing  Goal: Patient-Specific Goal (Individualized)  Outcome: Ongoing, Progressing  Goal: Absence of Hospital-Acquired Illness or Injury  Outcome: Ongoing, Progressing  Intervention: Identify and Manage Fall Risk  Recent Flowsheet Documentation  Taken 6/21/2024 1800 by Shira Macias RN  Safety Promotion/Fall Prevention:   room organization consistent   safety round/check completed  Taken 6/21/2024 1349 by Shira Macias RN  Safety Promotion/Fall Prevention:   room organization consistent   safety round/check completed  Intervention: Prevent Skin Injury  Recent Flowsheet Documentation  Taken 6/21/2024 1800 by Shira Macias RN  Body Position: position changed independently  Taken 6/21/2024 1616 by Shira Macias RN  Body Position: position changed independently  Taken 6/21/2024 1349 by Shira Macias RN  Body Position: position changed independently  Intervention: Prevent and Manage VTE (Venous Thromboembolism) Risk  Recent Flowsheet Documentation  Taken 6/21/2024 1800 by Shira Macias RN  Activity Management: up ad destin  Taken 6/21/2024 1616 by Shira Macias RN  Activity Management:   ambulated to bathroom   back to bed  Taken 6/21/2024 1349 by Shira Macias RN  Activity Management: up ad destin  Intervention: Prevent Infection  Recent Flowsheet Documentation  Taken 6/21/2024 1800 by Shira Macias RN  Infection  Prevention: single patient room provided  Taken 6/21/2024 1616 by Shira Macias, RN  Infection Prevention: single patient room provided  Taken 6/21/2024 1349 by Shira Macias, RN  Infection Prevention: single patient room provided  Goal: Optimal Comfort and Wellbeing  Outcome: Ongoing, Progressing  Intervention: Provide Person-Centered Care  Recent Flowsheet Documentation  Taken 6/21/2024 1349 by Shira Macias RN  Trust Relationship/Rapport:   care explained   empathic listening provided  Goal: Readiness for Transition of Care  Outcome: Ongoing, Progressing  Intervention: Mutually Develop Transition Plan  Recent Flowsheet Documentation  Taken 6/21/2024 1341 by Shira Macias RN  Transportation Anticipated: family or friend will provide  Patient/Family Anticipated Services at Transition: none  Patient/Family Anticipates Transition to: home with family  Taken 6/21/2024 1336 by Shira Macias, RN  Equipment Currently Used at Home: none     Problem: Pain Acute  Goal: Acceptable Pain Control and Functional Ability  Outcome: Ongoing, Progressing     Problem: Nausea and Vomiting  Goal: Fluid and Electrolyte Balance  Outcome: Ongoing, Progressing     Problem: Asthma Comorbidity  Goal: Maintenance of Asthma Control  Outcome: Ongoing, Progressing     Problem: Behavioral Health Comorbidity  Goal: Maintenance of Behavioral Health Symptom Control  Outcome: Ongoing, Progressing     Problem: COPD (Chronic Obstructive Pulmonary Disease) Comorbidity  Goal: Maintenance of COPD Symptom Control  Outcome: Ongoing, Progressing     Problem: Diabetes Comorbidity  Goal: Blood Glucose Level Within Targeted Range  Outcome: Ongoing, Progressing     Problem: Heart Failure Comorbidity  Goal: Maintenance of Heart Failure Symptom Control  Outcome: Ongoing, Progressing     Problem: Hypertension Comorbidity  Goal: Blood Pressure in Desired Range  Outcome: Ongoing, Progressing     Problem: Obstructive Sleep Apnea Risk or Actual  Comorbidity Management  Goal: Unobstructed Breathing During Sleep  Outcome: Ongoing, Progressing     Problem: Osteoarthritis Comorbidity  Goal: Maintenance of Osteoarthritis Symptom Control  Outcome: Ongoing, Progressing  Intervention: Maintain Osteoarthritis Symptom Control  Recent Flowsheet Documentation  Taken 6/21/2024 1800 by Shira Macias, RN  Activity Management: up ad destin  Taken 6/21/2024 1616 by Shira Macias, RN  Activity Management:   ambulated to bathroom   back to bed  Taken 6/21/2024 1349 by Shira Macias, RN  Activity Management: up ad destin     Problem: Pain Chronic (Persistent) (Comorbidity Management)  Goal: Acceptable Pain Control and Functional Ability  Outcome: Ongoing, Progressing     Problem: Seizure Disorder Comorbidity  Goal: Maintenance of Seizure Control  Outcome: Ongoing, Progressing

## 2024-06-21 NOTE — H&P
[facility]   HISTORY AND PHYSICAL    Patient Name: Gely Kimble  : 1960  MRN: [id number]  Primary Care Physician:  Sofi Fernandes APRN  Date of admission: 2024    Subjective   Subjective     Chief Complaint:   Chief Complaint   Patient presents with    Chest Pain         HPI:    Gely Kimble is a 63 y.o. female with significant past medical history of diabetes mellitus, hyperlipidemia, hypertension, and coronary artery disease who presented to the emergency department this morning complaining of acute chest pain.  Patient reports she developed the chest pressure late yesterday evening while at rest.  Exertion is said to exacerbate her symptoms.  She states the pain feels very similar to pain she was having in May 2024 that eventually led to PCI/stenting of the second diagonal branch of the RCA.  No fever, chills, nausea, vomiting.  Patient is not on anticoagulant therapy but is taking Plavix.    ED workup revealed an ECG of normal sinus rhythm with a rate of 80, normal P waves and NM interval, normal axis and normal QRS morphology, no acute ST changes.  Nonspecific T wave changes.  Chest x-ray showed no acute process.  Initial troponin 12, sodium 144, potassium 4.4, CO2 26.2, anion gap 12.8, creatinine 0.63, GFR 98.6, glucose 164.  LDL 27, HDL 51, total cholesterol 98, WBC 6.8, hemoglobin 10.8, platelets 257    Review of Systems   All systems were reviewed and negative except for: That listed above    5:01 PM.  Patient has been evaluated by Dr. Hayes.  Patient reporting pain is similar to previous pain prior to her recent PCI.  Initial troponin negative, ECG unchanged from previous.  Trend troponin and EKG.  Check echo.  Probable discharge home in the morning.    Personal History     Past Medical History:   Diagnosis Date    Abnormal nuclear stress test     Arthritis     COPD (chronic obstructive pulmonary disease)     Elevated cholesterol     Emphysema of lung     Gallbladder  abscess     Herpes zoster 08/03/2023    History of positive hepatitis C     previously positive, never treated, negative RNA finding    Hypertension     Myocardial perfusion defect 02/16/2016    Type 2 diabetes mellitus        Past Surgical History:   Procedure Laterality Date    APPENDECTOMY      CARDIAC CATHETERIZATION N/A 10/25/2023    Procedure: Left Heart Cath;  Surgeon: Tasneem Hayes MD;  Location:  [facility];  Service: Cardiovascular;  Laterality: N/A;    CARDIAC CATHETERIZATION N/A 10/25/2023    Procedure: Left ventriculography;  Surgeon: Tasneem Hayes MD;  Location:  [facility];  Service: Cardiovascular;  Laterality: N/A;    CARDIAC CATHETERIZATION N/A 10/25/2023    Procedure: Stent LEYLA coronary;  Surgeon: Tasneem Hayes MD;  Location:  [facility];  Service: Cardiovascular;  Laterality: N/A;    CARDIAC CATHETERIZATION N/A 10/25/2023    Procedure: Coronary angiography;  Surgeon: Tasneem Hayes MD;  Location:  [facility];  Service: Cardiovascular;  Laterality: N/A;    CARDIAC CATHETERIZATION N/A 10/25/2023    Procedure: Percutaneous Coronary Intervention;  Surgeon: Tasneem Hayes MD;  Location:  [facility];  Service: Cardiovascular;  Laterality: N/A;    CARDIAC CATHETERIZATION N/A 11/3/2023    Procedure: Percutaneous Coronary Intervention RCA;  Surgeon: Tasneem Hayes MD;  Location:  [facility];  Service: Cardiovascular;  Laterality: N/A;    CARDIAC CATHETERIZATION N/A 11/3/2023    Procedure: Coronary angiography;  Surgeon: Tasneem Hayes MD;  Location:  [facility];  Service: Cardiovascular;  Laterality: N/A;    CARDIAC CATHETERIZATION N/A 11/3/2023    Procedure: Stent LEYLA coronary;  Surgeon: Tasneem Hayes MD;  Location:  [facility];  Service: Cardiovascular;  Laterality: N/A;    CARDIAC  CATHETERIZATION N/A 5/17/2024    Procedure: Left Heart Cath;  Surgeon: Tasneem Hayes MD;  Location:  [facility];  Service: Cardiovascular;  Laterality: N/A;    CARDIAC CATHETERIZATION N/A 5/17/2024    Procedure: Percutaneous Coronary Intervention;  Surgeon: Tasneem Hayes MD;  Location:  [facility];  Service: Cardiovascular;  Laterality: N/A;    CARDIAC CATHETERIZATION N/A 5/17/2024    Procedure: Coronary angiography;  Surgeon: Tasneem Hayes MD;  Location:  [facility];  Service: Cardiovascular;  Laterality: N/A;    CARDIAC CATHETERIZATION N/A 5/17/2024    Procedure: Left ventriculography;  Surgeon: Tasneem Hayes MD;  Location:  [facility];  Service: Cardiovascular;  Laterality: N/A;    CHOLECYSTECTOMY      HYSTERECTOMY      TUBAL ABDOMINAL LIGATION         Family History: family history includes Cancer in her paternal grandfather; Diabetes in her brother, father, and mother; Heart disease in her maternal grandmother and mother. Otherwise pertinent FHx was reviewed and not pertinent to current issue.    Social History:  reports that she quit smoking about 6 years ago. Her smoking use included cigarettes. She started smoking about 52 years ago. She has a 68.7 pack-year smoking history. She has been exposed to tobacco smoke. She has never used smokeless tobacco. She reports that she does not currently use alcohol. She reports that she does not use drugs.    Home Medications:  Ferrous Sulfate ER, Homeopathic Products, Insulin Syringe, Loratadine, acetaminophen, albuterol, albuterol sulfate HFA, aspirin, atorvastatin, clopidogrel, escitalopram, insulin detemir, lisinopril, magnesium oxide, metFORMIN ER, metoprolol succinate XL, multivitamin, nitroglycerin, pantoprazole, rOPINIRole, and vitamin D    Allergies:  No Known Allergies    Objective   Objective     Vitals:   Temp:  [97.5 °F (36.4 °C)-98.4 °F (36.9 °C)] 98.4  °F (36.9 °C)  Heart Rate:  [70-79] 73  Resp:  [16-18] 16  BP: (120-148)/(54-75) 125/62  Physical Exam    Constitutional: Awake, alert   Eyes: PERRLA, sclerae anicteric, no conjunctival injection   HENT: NCAT, mucous membranes moist   Neck: Supple, no thyromegaly, no lymphadenopathy, trachea midline   Respiratory: Clear to auscultation bilaterally, nonlabored respirations    Cardiovascular: RRR, no murmurs, rubs, or gallops, palpable pedal pulses bilaterally   Gastrointestinal: Positive bowel sounds, soft, nontender, nondistended   Musculoskeletal: No bilateral ankle edema, no clubbing or cyanosis to extremities   Psychiatric: Appropriate affect, cooperative   Neurologic: Oriented x 3, strength symmetric in all extremities, Cranial Nerves grossly intact to confrontation, speech clear   Skin: No rashes     Result Review    Result Review:  I have personally reviewed the results from the time of this admission to 6/21/2024 17:02 EDT and agree with these findings:  [x]  Laboratory list / accordion  []  Microbiology  [x]  Radiology  [x]  EKG/Telemetry   []  Cardiology/Vascular   []  Pathology  []  Old records  []  Other:        Assessment & Plan   Assessment / Plan     Brief Patient Summary:  Gely Kimble is a 63 y.o. female who presented to the ED complaining of chest pain that began yesterday evening at rest was said to be worse with exertion.  Patient reported the pain felt very similar to chest pain she was having in May that led to stenting/PCI of the second diagonal branch of the RCA    Active Hospital Problems:  Active Hospital Problems    Diagnosis     **Chest pain      Plan:     Chest pain  -TTE echo to rule out decrease EF, valvular disease, pericardium disease as cause of pain  -Continuous cardiac monitoring  -Nitroglycerin drop patient's blood pressure in ED.  IV morphine as needed pain  -IV Zofran as needed nausea and vomiting  -Cardiology following.  Plan as above.      VTE Prophylaxis:  No VTE  prophylaxis order currently exists.        CODE STATUS:    Level Of Support Discussed With: Patient  Code Status (Patient has no pulse and is not breathing): No CPR (Do Not Attempt to Resuscitate)  Medical Interventions (Patient has pulse or is breathing): Comfort Measures    Admission Status:  I believe this patient meets observation status.    Electronically signed by Ramon Garsia III, PA, 06/21/24, 12:54 PM EDT.        75 minutes has been spent by [facility] providers in the care of this patient while under observation status      I have worn appropriate PPE during this patient encounter, sanitized my hands both with entering and exiting patient's room.    I have discussed plan of care with patient including advance care plan and/or surrogate decision maker.  Patient advises that their sister/Nan Christie will be their primary surrogate decision maker

## 2024-06-21 NOTE — LETTER
June 22, 2024     Patient: Gely Kimble   YOB: 1960   Date of Visit: 6/21/2024       To Whom It May Concern:    It is my medical opinion that Gely Kimble may return to work on 6/23/2024 .           Sincerely,        Ramon elizabeth PA-C  CC: No Recipients

## 2024-06-21 NOTE — ED NOTES
"Nursing report ED to floor  Gely Kimble  63 y.o.  female    HPI :  HPI (Adult)  Stated Reason for Visit: chest pain tinglingdown left arm  History Obtained From: patient, family    Chief Complaint  Chief Complaint   Patient presents with    Chest Pain       Admitting doctor:   Srinivas Anders MD    Admitting diagnosis:   The primary encounter diagnosis was Near syncope. A diagnosis of Chest pain, unspecified type was also pertinent to this visit.    Code status:   Current Code Status       Date Active Code Status Order ID Comments User Context       Prior            Allergies:   Patient has no known allergies.    Isolation:   No active isolations    Intake and Output  No intake or output data in the 24 hours ending 06/21/24 1219    Weight:       06/21/24  1055   Weight: 65.8 kg (145 lb 1 oz)       Most recent vitals:   Vitals:    06/21/24 1055 06/21/24 1056 06/21/24 1112 06/21/24 1121   BP:  148/75  120/61   Pulse: 79   75   Resp: 18      Temp:   97.5 °F (36.4 °C)    TempSrc:   Tympanic    SpO2: 96%      Weight: 65.8 kg (145 lb 1 oz)      Height: 170.2 cm (67\")          Active LDAs/IV Access:   Lines, Drains & Airways       Active LDAs       Name Placement date Placement time Site Days    Peripheral IV 06/21/24 1100 Right Antecubital 06/21/24  1100  Antecubital  less than 1                    Labs (abnormal labs have a star):   Labs Reviewed   COMPREHENSIVE METABOLIC PANEL - Abnormal; Notable for the following components:       Result Value    Glucose 164 (*)     All other components within normal limits    Narrative:     GFR Normal >60  Chronic Kidney Disease <60  Kidney Failure <15     CBC WITH AUTO DIFFERENTIAL - Abnormal; Notable for the following components:    Hemoglobin 10.8 (*)     MCH 23.3 (*)     MCHC 29.4 (*)     RDW 18.8 (*)     RDW-SD 54.1 (*)     All other components within normal limits   TROPONIN - Normal    Narrative:     High Sensitive Troponin T Reference Range:  <14.0 ng/L- Negative Female for " AMI  <22.0 ng/L- Negative Male for AMI  >=14 - Abnormal Female indicating possible myocardial injury.  >=22 - Abnormal Male indicating possible myocardial injury.   Clinicians would have to utilize clinical acumen, EKG, Troponin, and serial changes to determine if it is an Acute Myocardial Infarction or myocardial injury due to an underlying chronic condition.        RAINBOW DRAW    Narrative:     The following orders were created for panel order [facility] Draw.  Procedure                               Abnormality         Status                     ---------                               -----------         ------                     Green Top (Gel)[[id number]]                                  Final result               Lavender Top[[id number]]                                     Final result               Gold Top - SST[[id number]]                                   Final result               Light Blue Top[[id number]]                                   Final result                 Please view results for these tests on the individual orders.   HIGH SENSITIVITIY TROPONIN T 2HR   LIPID PANEL   CBC AND DIFFERENTIAL    Narrative:     The following orders were created for panel order CBC & Differential.  Procedure                               Abnormality         Status                     ---------                               -----------         ------                     CBC Auto Differential[[id number]]        Abnormal            Final result                 Please view results for these tests on the individual orders.   GREEN TOP   LAVENDER TOP   GOLD TOP - SST   LIGHT BLUE TOP       EKG:   ECG 12 Lead ED Triage Standing Order; Chest Pain   Preliminary Result   HEART RATE= 80  bpm   RR Interval= 750  ms   KS Interval= 144  ms   P Horizontal Axis= -7  deg   P Front Axis= 69  deg   QRSD Interval= 114  ms   QT Interval= 407  ms   QTcB= 470  ms   QRS Axis= 70  deg   T Wave Axis= 27  deg   - ABNORMAL ECG -   Sinus rhythm    Incomplete right bundle branch block   Low voltage, precordial leads   Electronically Signed By:    Date and Time of Study: 2024 10:53:06      ECG 12 Lead Chest Pain    (Results Pending)   ECG 12 Lead Chest Pain    (Results Pending)   ECG 12 Lead Chest Pain    (Results Pending)       Meds given in ED:   Medications   sodium chloride 0.9 % flush 10 mL (has no administration in time range)   nitroglycerin (NITROSTAT) SL tablet 0.4 mg (0.4 mg Sublingual Given 24 1121)   nitroglycerin (NITROSTAT) SL tablet 0.4 mg (has no administration in time range)   sodium chloride 0.9 % flush 10 mL (has no administration in time range)   sodium chloride 0.9 % flush 10 mL (has no administration in time range)   sodium chloride 0.9 % infusion 40 mL (has no administration in time range)   sennosides-docusate (PERICOLACE) 8.6-50 MG per tablet 2 tablet (has no administration in time range)     And   polyethylene glycol (MIRALAX) packet 17 g (has no administration in time range)     And   bisacodyl (DULCOLAX) EC tablet 5 mg (has no administration in time range)     And   bisacodyl (DULCOLAX) suppository 10 mg (has no administration in time range)   aspirin tablet 325 mg (325 mg Oral Given 24 1108)   ondansetron (ZOFRAN) injection 4 mg (4 mg Intravenous Given 24 1106)   acetaminophen (TYLENOL) tablet 1,000 mg (1,000 mg Oral Given 24 1108)       Imaging results:  XR Chest 1 View    Result Date: 2024  1. No acute process.   This report was finalized on 2024 11:13 AM by Dr. Chris Tolentino M.D on Workstation: [id number]       Ambulatory status:   - assistx1    Social issues:   Social History     Socioeconomic History    Marital status:    Tobacco Use    Smoking status: Former     Current packs/day: 0.00     Average packs/day: 1.5 packs/day for 45.8 years (68.7 ttl pk-yrs)     Types: Cigarettes     Start date:      Quit date: 10/11/2017     Years since quittin.6     Passive exposure: Past     Smokeless tobacco: Never    Tobacco comments:     Previously smoked about 1.5 packs per day for about 45 years   Vaping Use    Vaping status: Never Used   Substance and Sexual Activity    Alcohol use: Not Currently     Comment: rare margaritia 1-2 times per year    Drug use: No     Comment: in past; clean since 2007    Sexual activity: Not Currently       Peripheral Neurovascular  Peripheral Neurovascular (Adult)  Peripheral Neurovascular WDL: WDL    Neuro Cognitive  Neuro Cognitive (Adult)  Cognitive/Neuro/Behavioral WDL: WDL    Learning  Learning Assessment (Adult)  Learning Readiness and Ability: no barriers identified  Education Provided  Person Taught: patient, family member/friend  Teaching Method: verbal instruction    Respiratory  Respiratory (Adult)  Airway WDL: WDL  Respiratory WDL  Respiratory WDL: WDL    Abdominal Pain       Pain Assessments  Pain (Adult)  (0-10) Pain Rating: Rest: 5  (0-10) Pain Rating: Activity: 9  Pain Location: chest  Response to Pain Interventions: interventions effective per patient    NIH Stroke Scale       Margareth Gottlieb RN  06/21/24 12:19 EDT

## 2024-06-21 NOTE — CONSULTS
[facility]  Cardiology Consult Note    Patient Identification:  Name: Gely Kimble  Age: 63 y.o.  Sex: female  :  1960  MRN: [id number]             Requesting Physician: Ramon Garsia III, PA     Reason for Consultation / Chief Complaint: chest pain    History of Present Illness:     This is a 63 year old female who is well known with our service, presenting to [facility] ER with chest pressure while at rest associated with nausea and weakness similar to how she felt prior to her recent PCI. She has current medical diagnosis to include coronary artery disease for which she just had PCI with angioplasty to the ostial second diagonal branch 99% reduced to 20-30% 24 with hx of stent to LAD, hypertension, hyperlipidemia, COPD, Diabetes mellitus. Work up in ER with HS troponin negative x1, CMP relatively unremarkable. Hgb 10.8. ECG SR with irbbb, unchanged from previous. She is admitted for further management    Cardiac cath 24 normal left main, LAD shows previous stent widely patent with jailed second diagonal branch with medium sized vessel with 99% reduced to 20-30% with balloon, minimum irregularity of the rest of the LAD diagonal and  branches. Circumflex artery nondominant and normal. RCA dominant with 20-30% proximal and distal stenosis. Normal LV gram. Echo 23 EF 61-65% grade I LV diastolic dysfunction, mild mitral annular calcification.       Comorbid cardiac risk factors: cad, hypertension, hyperlipidemia, DM    Past Medical History:  Past Medical History:   Diagnosis Date    Abnormal nuclear stress test     Arthritis     COPD (chronic obstructive pulmonary disease)     Elevated cholesterol     Emphysema of lung     Gallbladder abscess     Herpes zoster 2023    History of positive hepatitis C     previously positive, never treated, negative RNA finding    Hypertension     Myocardial perfusion defect 2016    Type 2 diabetes  mellitus      Past Surgical History:  Past Surgical History:   Procedure Laterality Date    APPENDECTOMY      CARDIAC CATHETERIZATION N/A 10/25/2023    Procedure: Left Heart Cath;  Surgeon: Tasneem Hayes MD;  Location: [facility] CATH INVASIVE LOCATION;  Service: Cardiovascular;  Laterality: N/A;    CARDIAC CATHETERIZATION N/A 10/25/2023    Procedure: Left ventriculography;  Surgeon: Tasneem Hayes MD;  Location:  [facility] CATH INVASIVE LOCATION;  Service: Cardiovascular;  Laterality: N/A;    CARDIAC CATHETERIZATION N/A 10/25/2023    Procedure: Stent LEYLA coronary;  Surgeon: Tasneem Hayes MD;  Location:  [facility] CATH INVASIVE LOCATION;  Service: Cardiovascular;  Laterality: N/A;    CARDIAC CATHETERIZATION N/A 10/25/2023    Procedure: Coronary angiography;  Surgeon: Tasneem Hayes MD;  Location: [facility] CATH INVASIVE LOCATION;  Service: Cardiovascular;  Laterality: N/A;    CARDIAC CATHETERIZATION N/A 10/25/2023    Procedure: Percutaneous Coronary Intervention;  Surgeon: Tasneem Hayes MD;  Location: [facility] CATH INVASIVE LOCATION;  Service: Cardiovascular;  Laterality: N/A;    CARDIAC CATHETERIZATION N/A 11/3/2023    Procedure: Percutaneous Coronary Intervention RCA;  Surgeon: Tasneem Hayes MD;  Location: [facility] CATH INVASIVE LOCATION;  Service: Cardiovascular;  Laterality: N/A;    CARDIAC CATHETERIZATION N/A 11/3/2023    Procedure: Coronary angiography;  Surgeon: Tasneem Hayes MD;  Location: [facility] CATH INVASIVE LOCATION;  Service: Cardiovascular;  Laterality: N/A;    CARDIAC CATHETERIZATION N/A 11/3/2023    Procedure: Stent LEYLA coronary;  Surgeon: Tasneem Hayes MD;  Location: [facility] CATH INVASIVE LOCATION;  Service: Cardiovascular;  Laterality: N/A;    CARDIAC CATHETERIZATION N/A 5/17/2024    Procedure: Left Heart Cath;  Surgeon: Tasneem Hayes MD;  Location: [facility] CATH [facility]VASIVE LOCATION;  Service: Cardiovascular;  Laterality: N/A;    CARDIAC  CATHETERIZATION N/A 5/17/2024    Procedure: Percutaneous Coronary Intervention;  Surgeon: Tasneem Hayes MD;  Location:  [facility] CATH INVASIVE LOCATION;  Service: Cardiovascular;  Laterality: N/A;    CARDIAC CATHETERIZATION N/A 5/17/2024    Procedure: Coronary angiography;  Surgeon: Tasneem Hayes MD;  Location:  [facility] CATH INVASIVE LOCATION;  Service: Cardiovascular;  Laterality: N/A;    CARDIAC CATHETERIZATION N/A 5/17/2024    Procedure: Left ventriculography;  Surgeon: Tasneem Hayes MD;  Location:  [facility] CATH INVASIVE LOCATION;  Service: Cardiovascular;  Laterality: N/A;    CHOLECYSTECTOMY      HYSTERECTOMY      TUBAL ABDOMINAL LIGATION        Allergies:  No Known Allergies  Home Meds:  (Not in a hospital admission)    Current Meds:   Current Facility-Administered Medications   Medication Dose Route Frequency Provider Last Rate Last Admin    sennosides-docusate (PERICOLACE) 8.6-50 MG per tablet 2 tablet  2 tablet Oral BID Srinivas Anders MD        And    polyethylene glycol (MIRALAX) packet 17 g  17 g Oral Daily PRN Srinivas Anders MD        And    bisacodyl (DULCOLAX) EC tablet 5 mg  5 mg Oral Daily PRN Srinivas Anders MD        And    bisacodyl (DULCOLAX) suppository 10 mg  10 mg Rectal Daily PRN Srinivas Anders MD        nitroglycerin (NITROSTAT) SL tablet 0.4 mg  0.4 mg Sublingual Q5 Min PRN Odette Bowen APRN   0.4 mg at 06/21/24 1121    nitroglycerin (NITROSTAT) SL tablet 0.4 mg  0.4 mg Sublingual Q5 Min PRN Srinivas Anders MD        sodium chloride 0.9 % flush 10 mL  10 mL Intravenous PRN Srinivas Anders MD        sodium chloride 0.9 % flush 10 mL  10 mL Intravenous Q12H Srinivas Anders MD        sodium chloride 0.9 % flush 10 mL  10 mL Intravenous PRN Srinivas Anders MD        sodium chloride 0.9 % infusion 40 mL  40 mL Intravenous PRN Srinivas Anders MD           Social History:   Social History     Tobacco Use    Smoking status: Former     Current packs/day: 0.00     Average packs/day: 1.5  "packs/day for 45.8 years (68.7 ttl pk-yrs)     Types: Cigarettes     Start date:      Quit date: 10/11/2017     Years since quittin.6     Passive exposure: Past    Smokeless tobacco: Never    Tobacco comments:     Previously smoked about 1.5 packs per day for about 45 years   Substance Use Topics    Alcohol use: Not Currently     Comment: rare margaritia 1-2 times per year      Family History:  Family History   Problem Relation Age of Onset    Diabetes Mother     Heart disease Mother     Diabetes Father     Diabetes Brother     Heart disease Maternal Grandmother     Cancer Paternal Grandfather         Review of Systems  Constitutional: No wt loss, fever   Gastrointestinal: No nausea , abdominal pain  Behavioral/Psych: No insomnia or anxiety   Cardiovascular ----positive for chest pain. All other systems reviewed and are negative            /62 (BP Location: Left arm, Patient Position: Lying)   Pulse 73   Temp 98.4 °F (36.9 °C) (Oral)   Resp 16   Ht 170.2 cm (67\")   Wt 65.8 kg (145 lb 1 oz)   SpO2 99%   BMI 22.72 kg/m²   General appearance: No acute changes   Neck: Trachea midline; NECK, supple, no thyromegaly or lymphadenopathy   Lungs: Normal size and shape, normal breath sounds, equal distribution of air, no rales and rhonchi   CV: S1-S2 regular, no murmurs, no rub, no gallop   Abdomen: Soft, nontender; no masses , no abnormal abdominal sounds   Extremities: No deformity , normal color , no peripheral edema   Skin: Normal temperature, turgor and texture; no rash, ulcers              Cardiographics  ECG:       Echocardiogram:   Interpretation Summary         Left ventricular systolic function is normal. Left ventricular ejection fraction appears to be 61 - 65%.    Left ventricular diastolic function is consistent with (grade I) impaired relaxation.    Normal right ventricular cavity size and systolic function noted.    There is mild mitral annular calcification    Insufficient TR velocity " profile to estimate the right ventricular systolic pressure.    There is no evidence of pericardial effusion.    Cath with PCI 5/17/24  HEMODYNAMIC / ANGIOGRAPHIC DATA:   Left ventricular end diastolic pressure was 10 mmHg.  Left ventriculography revealed an EF around 60%.   The left main is normal left main.  The left anterior descending artery is Left anterior descending shows previous stent widely patent with a jailed second diagonal branch with a medium sized vessel with 99% reduced to 20 to 30% with 2.25/12 NC balloon, minimum irregularity of the rest of the LAD diagonal and  branches  The left circumflex is nondominant and normal.  The right coronary artery is , Right coronary artery dominant with 20 to 30% proximal and the distal stenosis  Successful angioplasty of the ostial medium size second diagonal branch 99% reduced to 20 to 30% with 2.25/12 NC balloon    GUZMAN FLOW PRE.2...... POST...3...    TYPE OF LESION...B.........    RECOMMENDATIONS: Post-procedure care will focus on prevention of any ischemic events and congestive complications. Aggressive risk factor modification will be carried out.      Imaging  Chest X-ray:   HISTORY: Chest pain.     Heart size is within normal limits. Lungs appear free of acute  infiltrates. Bones and soft tissues are unremarkable.     IMPRESSION:  1. No acute process.        This report was finalized on 6/21/2024 11:13 AM by Dr. Chris Tolentino M.D on Workstation: [id number]     Lab Review   Results from last 7 days   Lab Units 06/21/24  1101   HSTROP T ng/L 12         Results from last 7 days   Lab Units 06/21/24  1101   SODIUM mmol/L 144   POTASSIUM mmol/L 4.4   BUN mg/dL 8   CREATININE mg/dL 0.63   CALCIUM mg/dL 9.3     @[id number]@  Results from last 7 days   Lab Units 06/21/24  1101   WBC 10*3/mm3 6.80   HEMOGLOBIN g/dL 10.8*   HEMATOCRIT % 36.7   PLATELETS 10*3/mm3 257             Assessment:  Chest pain  Coronary artery disease s/p PCI  5/17/24  Hypertension  Hyperlipidemia  Diabetes mellitus    Recommendations / Plan:   This is a 63 year female who is known to this service admitted for chest pain. She recently had PCI of the ostial second diagonal branch with a medium sized vessel with 99% reduced to 20 to 30% with 2.25/12 NC balloon. She reports pain similar to her pain she had prior to her recent PCI. Initial HS troponin negative, ECG SR unchanged from previous . Trend troponin, and ECG. Checking echo. Probably ok for discharge home in am.       Vero Parada, APRN  6/21/2024, 13:16 EDT  63-year-old female with atypical chest pain in April patient underwent angioplasty and stent without any problems or complications will have the repeat EKGs as well as cardiac enzymes pains are highly noncardiac in nature no other further workup will be needed at this point if EKG and troponins are normal  MD ALIA Martinez Dragon/Transcription:   Dictated utilizing Dragon dictation

## 2024-06-21 NOTE — ED PROVIDER NOTES
SHARED VISIT: This visit was performed by BOTH a physician and an APC. The substantive portion of the medical decision making was performed by this attesting physician who made or approved the management plan and takes responsibility for patient management. All studies in the APC note (if performed) were independently interpreted by me.      The NAIMA and I have discussed this patient's history, physical exam, and treatment plan.  I have reviewed the documentation and personally had a face to face interaction with the patient. I affirm the documentation and agree with the treatment and plan.  The attached note describes my personal findings.       I provided a substantive portion of the care of the patient.  I personally performed the physical exam in its entirety, and below are my findings.        History  63-year-old female with history of multiple medical problems including diabetes, hypertension and hyperlipidemia presents with chest pressure worsened with exertion.  Patient had similar symptoms related to coronary occlusions.    Physical Exam  Vital Signs reviewed  GENERAL: Alert pleasant female no obvious distress.  Triage vitals reviewed and are relatively benign.  HENT: nares patent  EYES: no scleral icterus  CV: regular rhythm, regular rate-no murmur  RESPIRATORY: normal effort, clear to auscultation bilaterally  ABDOMEN: soft, nontender to palpation  MUSCULOSKELETAL: no deformity  NEURO: Strength sensation and coordination are grossly intact.  Speech and mentation are unremarkable  SKIN: warm, dry      Assessment and Data Review    ED Course as of 06/21/24 1624   Fri Jun 21, 2024   1056 EKG independently interpreted by me    Time 10:53 AM  Sinus 80  Normal P waves and NJ intervals  QRS-normal axis, normal QRS  ST, T waves-nonspecific changes  Not significant change compared to 5/17/2024. [DB]   1113 Chest x-ray independently interpreted by me shows no obvious acute disease. [DB]   1137 HS Troponin T: 12 [AH]    1137 Hemoglobin(!): 10.8  10.9 3 weeks ago []   1144 Phone call with dr de leon.  Discussed the patient, relevant history, exam, diagnostics, ED findings/progress, and concerns. They agree to consult, ok with ed obs   []   1145 Phone call with terell elizabeth.  Discussed the patient, relevant history, exam, diagnostics, ED findings/progress, and concerns.  They agree to admit to the ED observation unit on behalf of dr. Srinivas aguilar   []      ED Course User Index  [] Odette Bowen APRN  [DB] Wander Coker MD       I discussed treatment and evaluation of this patient with PARISA Bowen.  I did independently interpret ED testing to include x-ray, EKG and labs.  Patient with elevated ED HEART SCORE.    Chest x-ray unremarkable.  Labs notable for normal high-sensitivity troponin x 2.  Given patient's significant cardiac risk factors and cardiac history will admit to observation unit for serial troponins and cardiology consultation.     Wander Coker MD  06/21/24 8056

## 2024-06-21 NOTE — ED PROVIDER NOTES
EMERGENCY DEPARTMENT ENCOUNTER  Room Number:  02/02  PCP: Sofi Fernandes APRN  Independent Historians: Patient and Family      HPI:  Chief Complaint: had concerns including Chest Pain.     A complete HPI/ROS/PMH/PSH/SH/FH are unobtainable due to:     Chronic or social conditions impacting patient care (Social Determinants of Health):       Context: Gely Kimble is a 63 y.o. female with a medical history of coronary artery disease who presents to the ED c/o acute chest pain    Patient states she developed chest pressure last evening while at rest, states exertion makes it feel worse.  States it feels similar to when she was admitted and underwent PCI.  This morning she reports that she developed nausea after eating and weakness as though she was going to pass out.  She denies any fevers or chills.  States she takes her blood pressure medication in the evening, took it last night.  Has had the rest of her home medications today    Cardiologist dr. de leon      Review of prior external notes (non-ED) -and- Review of prior external test results outside of this encounter:  5/4/24 jazmin martin seen for PCI follow up   office note as follows : This is a 63-year-old female who is current with this provider.  She has a history of CAD with PCI to LAD and RCA, hypertension, hyperlipidemia, COPD, and diabetes mellitus.  She recently seen in the office and sent to the hospital due to chest discomfort.  She underwent a left heart cath which RCA dominant with 20 to 30% proximal and distal stenosis.  Circumflex artery was nondominant and normal.  LAD showed previous stent widely patent with a jailed second diagonal branch with a medium sized vessel with 99% reduced disease 20 to 30%.  Successful angioplasty of the ostial medium size second diagonal branch 99% reduced to 20 to 30%.  Echo in 2023 revealed EF 61 to 65%, LV diastolic function is consistent with grade 1 impaired relaxation, normal RV see size and  systolic function noted.  Mild MV annular calcification, insufficient TR velocity profile to estimate the RV systolic pressure.     1.  Hospital follow-up for CAD status post stent placement: As above.  Continue statin, aspirin, beta-blockade and Plavix.     Risk reduction for the coronary artery disease, controlling the blood pressure, blood sugar management, cholesterol management, exercise, stress management, and proper compliance with medications and follow-up has been discussed     2.  Incomplete right bundle branch block      3.  Hypertension: blood pressure but stable. She will do blood pressure diary and call with any concerns. Controlled    Prescription drug monitoring program review:         PAST MEDICAL HISTORY  Active Ambulatory Problems     Diagnosis Date Noted    Type 2 diabetes mellitus 06/03/2017    Pulmonary emphysema 06/03/2017    Arthritis 06/03/2017    Other hyperlipidemia 06/03/2017    Primary hypertension 06/03/2017    Abnormal liver function tests 02/16/2016    Impingement syndrome of shoulder region 07/03/2013    Neck pain 05/08/2013    Postmenopause 08/03/2023    Restless leg syndrome 08/03/2023    Urinary frequency 08/03/2023    Vitamin D deficiency 08/03/2023    Fatigue 08/03/2023    Moderate episode of recurrent major depressive disorder 08/03/2023    Generalized anxiety disorder 08/03/2023    Insomnia 08/03/2023    Skin lesion of back 08/17/2023    Nausea 09/13/2023    Incomplete right bundle branch block (RBBB) 09/13/2023    Chest pain 09/13/2023    Coronary artery disease of native artery of native heart with stable angina pectoris 10/25/2023    Tendency to bleed 01/11/2024    Gastroesophageal reflux disease 01/11/2024    Left leg swelling 05/28/2024    Anemia 05/30/2024    Former smoker 05/30/2024     Resolved Ambulatory Problems     Diagnosis Date Noted    Obesity 06/03/2017    Myocardial perfusion defect 02/16/2016    Tobacco use 02/12/2016    Anxiety 08/03/2023    Herpes zoster  08/03/2023    Dizziness 08/03/2023    Decreased thyroid stimulating hormone (TSH) level 09/13/2023    Abnormal nuclear stress test 10/12/2023    Skin lesion of left arm 01/11/2024    Left hand pain 01/11/2024     Past Medical History:   Diagnosis Date    COPD (chronic obstructive pulmonary disease)     Elevated cholesterol     Emphysema of lung     Gallbladder abscess     History of positive hepatitis C     Hypertension          PAST SURGICAL HISTORY  Past Surgical History:   Procedure Laterality Date    APPENDECTOMY      CARDIAC CATHETERIZATION N/A 10/25/2023    Procedure: Left Heart Cath;  Surgeon: Tasneem Hayes MD;  Location: [facility] CATH INVASIVE LOCATION;  Service: Cardiovascular;  Laterality: N/A;    CARDIAC CATHETERIZATION N/A 10/25/2023    Procedure: Left ventriculography;  Surgeon: Tasneem Hayes MD;  Location:  [facility] CATH INVASIVE LOCATION;  Service: Cardiovascular;  Laterality: N/A;    CARDIAC CATHETERIZATION N/A 10/25/2023    Procedure: Stent LEYLA coronary;  Surgeon: Tasneem Hayes MD;  Location: [facility] CATH INVASIVE LOCATION;  Service: Cardiovascular;  Laterality: N/A;    CARDIAC CATHETERIZATION N/A 10/25/2023    Procedure: Coronary angiography;  Surgeon: Tasneem Hayes MD;  Location: [facility] CATH INVASIVE LOCATION;  Service: Cardiovascular;  Laterality: N/A;    CARDIAC CATHETERIZATION N/A 10/25/2023    Procedure: Percutaneous Coronary Intervention;  Surgeon: Tasneem Hayes MD;  Location: [facility] CATH INVASIVE LOCATION;  Service: Cardiovascular;  Laterality: N/A;    CARDIAC CATHETERIZATION N/A 11/3/2023    Procedure: Percutaneous Coronary Intervention RCA;  Surgeon: Tasneem Hayes MD;  Location: [facility] CATH INVASIVE LOCATION;  Service: Cardiovascular;  Laterality: N/A;    CARDIAC CATHETERIZATION N/A 11/3/2023    Procedure: Coronary angiography;  Surgeon: Tasneem Hayes MD;  Location: [facility] CATH INVASIVE LOCATION;  Service: Cardiovascular;   Laterality: N/A;    CARDIAC CATHETERIZATION N/A 11/3/2023    Procedure: Stent LEYLA coronary;  Surgeon: Tasneem Hayes MD;  Location:  [facility] CATH INVASIVE LOCATION;  Service: Cardiovascular;  Laterality: N/A;    CARDIAC CATHETERIZATION N/A 2024    Procedure: Left Heart Cath;  Surgeon: Tasneem Hayes MD;  Location:  [facility] CATH INVASIVE LOCATION;  Service: Cardiovascular;  Laterality: N/A;    CARDIAC CATHETERIZATION N/A 2024    Procedure: Percutaneous Coronary Intervention;  Surgeon: Tasneem Hayes MD;  Location:  [facility] CATH INVASIVE LOCATION;  Service: Cardiovascular;  Laterality: N/A;    CARDIAC CATHETERIZATION N/A 2024    Procedure: Coronary angiography;  Surgeon: Tasneem Hayes MD;  Location:  [facility] CATH INVASIVE LOCATION;  Service: Cardiovascular;  Laterality: N/A;    CARDIAC CATHETERIZATION N/A 2024    Procedure: Left ventriculography;  Surgeon: Tasneem Hayes MD;  Location:  [facility] CATH INVASIVE LOCATION;  Service: Cardiovascular;  Laterality: N/A;    CHOLECYSTECTOMY      HYSTERECTOMY      TUBAL ABDOMINAL LIGATION           FAMILY HISTORY  Family History   Problem Relation Age of Onset    Diabetes Mother     Heart disease Mother     Diabetes Father     Diabetes Brother     Heart disease Maternal Grandmother     Cancer Paternal Grandfather          SOCIAL HISTORY  Social History     Socioeconomic History    Marital status:    Tobacco Use    Smoking status: Former     Current packs/day: 0.00     Average packs/day: 1.5 packs/day for 45.8 years (68.7 ttl pk-yrs)     Types: Cigarettes     Start date:      Quit date: 10/11/2017     Years since quittin.6     Passive exposure: Past    Smokeless tobacco: Never    Tobacco comments:     Previously smoked about 1.5 packs per day for about 45 years   Vaping Use    Vaping status: Never Used   Substance and Sexual Activity    Alcohol use: Not Currently     Comment: rare margaritia 1-2 times per year     Drug use: No     Comment: in past; clean since 2007    Sexual activity: Not Currently         ALLERGIES  Patient has no known allergies.      REVIEW OF SYSTEMS  Review of Systems  Included in HPI  All systems reviewed and negative except for those discussed in HPI.      PHYSICAL EXAM    I have reviewed the triage vital signs and nursing notes.    ED Triage Vitals   Temp Pulse Resp BP SpO2   -- -- -- -- --      Temp src Heart Rate Source Patient Position BP Location FiO2 (%)   -- -- -- -- --       Physical Exam  GENERAL: alert, no acute distress, chronically ill appearing/appears older than stated age  SKIN: Warm, dry and intact, mild generalized pallow  HENT: Normocephalic, atraumatic  EYES: no scleral icterus. No injection  CV: regular rhythm, regular rate, no murmur, no peripheral edema  RESPIRATORY: normal effort, lungs clear  ABDOMEN: soft, nontender, nondistended  MUSCULOSKELETAL: no deformity, normal active ROM to all extremities  NEURO: alert, moves all extremities, follows commands            LAB RESULTS  Recent Results (from the past 24 hour(s))   ECG 12 Lead ED Triage Standing Order; Chest Pain    Collection Time: 06/21/24 10:53 AM   Result Value Ref Range    QT Interval 407 ms    QTC Interval 470 ms   Comprehensive Metabolic Panel    Collection Time: 06/21/24 11:01 AM    Specimen: Arm, Right; Blood   Result Value Ref Range    Glucose 164 (H) 65 - 99 mg/dL    BUN 8 8 - 23 mg/dL    Creatinine 0.63 0.57 - 1.00 mg/dL    Sodium 144 136 - 145 mmol/L    Potassium 4.4 3.5 - 5.2 mmol/L    Chloride 105 98 - 107 mmol/L    CO2 26.2 22.0 - 29.0 mmol/L    Calcium 9.3 8.6 - 10.5 mg/dL    Total Protein 6.6 6.0 - 8.5 g/dL    Albumin 4.5 3.5 - 5.2 g/dL    ALT (SGPT) 14 1 - 33 U/L    AST (SGOT) 14 1 - 32 U/L    Alkaline Phosphatase 53 39 - 117 U/L    Total Bilirubin 0.3 0.0 - 1.2 mg/dL    Globulin 2.1 gm/dL    A/G Ratio 2.1 g/dL    BUN/Creatinine Ratio 12.7 7.0 - 25.0    Anion Gap 12.8 5.0 - 15.0 mmol/L    eGFR 98.6 >60.0  mL/min/1.73   High Sensitivity Troponin T    Collection Time: 06/21/24 11:01 AM    Specimen: Arm, Right; Blood   Result Value Ref Range    HS Troponin T 12 <14 ng/L   Green Top (Gel)    Collection Time: 06/21/24 11:01 AM   Result Value Ref Range    Extra Tube Hold for add-ons.    Lavender Top    Collection Time: 06/21/24 11:01 AM   Result Value Ref Range    Extra Tube hold for add-on    Gold Top - SST    Collection Time: 06/21/24 11:01 AM   Result Value Ref Range    Extra Tube Hold for add-ons.    Light Blue Top    Collection Time: 06/21/24 11:01 AM   Result Value Ref Range    Extra Tube Hold for add-ons.    CBC Auto Differential    Collection Time: 06/21/24 11:01 AM    Specimen: Arm, Right; Blood   Result Value Ref Range    WBC 6.80 3.40 - 10.80 10*3/mm3    RBC 4.64 3.77 - 5.28 10*6/mm3    Hemoglobin 10.8 (L) 12.0 - 15.9 g/dL    Hematocrit 36.7 34.0 - 46.6 %    MCV 79.1 79.0 - 97.0 fL    MCH 23.3 (L) 26.6 - 33.0 pg    MCHC 29.4 (L) 31.5 - 35.7 g/dL    RDW 18.8 (H) 12.3 - 15.4 %    RDW-SD 54.1 (H) 37.0 - 54.0 fl    MPV 10.8 6.0 - 12.0 fL    Platelets 257 140 - 450 10*3/mm3    Neutrophil % 61.4 42.7 - 76.0 %    Lymphocyte % 30.4 19.6 - 45.3 %    Monocyte % 5.7 5.0 - 12.0 %    Eosinophil % 1.2 0.3 - 6.2 %    Basophil % 1.2 0.0 - 1.5 %    Immature Grans % 0.1 0.0 - 0.5 %    Neutrophils, Absolute 4.17 1.70 - 7.00 10*3/mm3    Lymphocytes, Absolute 2.07 0.70 - 3.10 10*3/mm3    Monocytes, Absolute 0.39 0.10 - 0.90 10*3/mm3    Eosinophils, Absolute 0.08 0.00 - 0.40 10*3/mm3    Basophils, Absolute 0.08 0.00 - 0.20 10*3/mm3    Immature Grans, Absolute 0.01 0.00 - 0.05 10*3/mm3    nRBC 0.0 0.0 - 0.2 /100 WBC         RADIOLOGY  XR Chest 1 View    Result Date: 6/21/2024  XR CHEST 1 VW-6/21/2024  HISTORY: Chest pain.  Heart size is within normal limits. Lungs appear free of acute infiltrates. Bones and soft tissues are unremarkable.      1. No acute process.   This report was finalized on 6/21/2024 11:13 AM by Dr. Perez  MADAI Tolentino on Workstation: [id number]         MEDICATIONS GIVEN IN ER  Medications   sodium chloride 0.9 % flush 10 mL (has no administration in time range)   nitroglycerin (NITROSTAT) SL tablet 0.4 mg (0.4 mg Sublingual Given 6/21/24 1121)   nitroglycerin (NITROSTAT) SL tablet 0.4 mg (has no administration in time range)   sodium chloride 0.9 % flush 10 mL (has no administration in time range)   sodium chloride 0.9 % flush 10 mL (has no administration in time range)   sodium chloride 0.9 % infusion 40 mL (has no administration in time range)   sennosides-docusate (PERICOLACE) 8.6-50 MG per tablet 2 tablet (has no administration in time range)     And   polyethylene glycol (MIRALAX) packet 17 g (has no administration in time range)     And   bisacodyl (DULCOLAX) EC tablet 5 mg (has no administration in time range)     And   bisacodyl (DULCOLAX) suppository 10 mg (has no administration in time range)   aspirin tablet 325 mg (325 mg Oral Given 6/21/24 1108)   ondansetron (ZOFRAN) injection 4 mg (4 mg Intravenous Given 6/21/24 1106)   acetaminophen (TYLENOL) tablet 1,000 mg (1,000 mg Oral Given 6/21/24 1108)         ORDERS PLACED DURING THIS VISIT:  Orders Placed This Encounter   Procedures    XR Chest 1 View    [place] Draw    Comprehensive Metabolic Panel    High Sensitivity Troponin T    CBC Auto Differential    High Sensitivity Troponin T 2Hr    Basic Metabolic Panel    CBC (No Diff)    Lipid Panel    Magnesium    NPO Diet NPO Type: Strict NPO    Undress & Gown    Continuous Pulse Oximetry    Vital Signs Every 15 Minutes Until Stable, Then Every 4 Hours    Telemetry - Place Orders & Notify Provider of Results When Patient Experiences Acute Chest Pain, Dysrhythmia or Respiratory Distress    May Be Off Telemetry for Tests    Notify Physician For Unrelieved Chest Pain    Intake & Output    Weigh Patient    Nurse to Order Troponin As Needed For Unrelieved or New Chest Pain    Saline Lock & Maintain IV Access     Inpatient Cardiology Consult    Oxygen Therapy- Nasal Cannula; Titrate 1-6 LPM Per SpO2; 90 - 95%    ECG 12 Lead ED Triage Standing Order; Chest Pain    ECG 12 Lead ED Triage Standing Order; Chest Pain    ECG 12 Lead Chest Pain    ECG 12 Lead Chest Pain    Adult Transthoracic Echo Complete W/ Cont if Necessary Per Protocol    Insert Peripheral IV    Insert Peripheral IV    Initiate ED Observation Status    CBC & Differential    Green Top (Gel)    Lavender Top    Gold Top - SST    Light Blue Top         OUTPATIENT MEDICATION MANAGEMENT:  Current Facility-Administered Medications Ordered in Epic   Medication Dose Route Frequency Provider Last Rate Last Admin    sennosides-docusate (PERICOLACE) 8.6-50 MG per tablet 2 tablet  2 tablet Oral BID Srinivas Anders MD        And    polyethylene glycol (MIRALAX) packet 17 g  17 g Oral Daily PRN Srinivas Anders MD        And    bisacodyl (DULCOLAX) EC tablet 5 mg  5 mg Oral Daily PRN Srinivas Anders MD        And    bisacodyl (DULCOLAX) suppository 10 mg  10 mg Rectal Daily PRN Srinivas Anders MD        nitroglycerin (NITROSTAT) SL tablet 0.4 mg  0.4 mg Sublingual Q5 Min PRN Odette Bowen APRN   0.4 mg at 06/21/24 1121    nitroglycerin (NITROSTAT) SL tablet 0.4 mg  0.4 mg Sublingual Q5 Min PRN Srinivas Anders MD        sodium chloride 0.9 % flush 10 mL  10 mL Intravenous PRN Emergency, Triage Protocol, MD        sodium chloride 0.9 % flush 10 mL  10 mL Intravenous Q12H Srinivas Anders MD        sodium chloride 0.9 % flush 10 mL  10 mL Intravenous PRN Srinivas Anders MD        sodium chloride 0.9 % infusion 40 mL  40 mL Intravenous PRN Srinivas Anders MD         Current Outpatient Medications Ordered in Epic   Medication Sig Dispense Refill    acetaminophen (TYLENOL) 500 MG tablet Take 1 tablet by mouth Every 8 (Eight) Hours As Needed for Mild Pain or Moderate Pain.      aspirin 81 MG chewable tablet Chew 1 tablet Every Night.      atorvastatin (LIPITOR) 40 MG tablet Take 1 tablet by  mouth Daily. (Patient taking differently: Take 1 tablet by mouth Every Night.) 30 tablet 11    clopidogrel (PLAVIX) 75 MG tablet Take 1 tablet by mouth Daily. 30 tablet 11    escitalopram (LEXAPRO) 20 MG tablet TAKE 1 TABLET BY MOUTH DAILY (Patient taking differently: Take 1 tablet by mouth Every Night.) 90 tablet 1    Ferrous Sulfate  (45 Fe) MG tablet controlled-release Take 1 tablet by mouth Daily. (Patient taking differently: Take 1 tablet by mouth 2 (Two) Times a Day.) 30 tablet 2    insulin detemir (Levemir) 100 UNIT/ML injection Inject 20 Units under the skin into the appropriate area as directed Every Night.      lisinopril (PRINIVIL,ZESTRIL) 5 MG tablet Take 1 tablet by mouth Daily. (Patient taking differently: Take 1 tablet by mouth Every Night.) 90 tablet 1    LORATADINE PO Take 1 tablet by mouth Daily.      magnesium oxide (MAG-OX) 400 MG tablet Take 1 tablet by mouth Daily. (Patient taking differently: Take 1 tablet by mouth Every Night.) 30 tablet 0    metFORMIN ER (GLUCOPHAGE-XR) 500 MG 24 hr tablet TAKE TWO TABLETS BY MOUTH TWICE A DAY WITH A MEAL (Patient taking differently: Take 2 tablets by mouth 2 (Two) Times a Day.) 120 tablet 3    metoprolol succinate XL (TOPROL-XL) 25 MG 24 hr tablet Take 1 tablet by mouth Daily. 90 tablet 2    Multiple Vitamin (MULTIVITAMIN) tablet Take 1 tablet by mouth Daily.      pantoprazole (Protonix) 20 MG EC tablet Take 1 tablet by mouth Daily. (Patient taking differently: Take 1 tablet by mouth Every Night.) 30 tablet 5    rOPINIRole (REQUIP) 0.25 MG tablet TAKE ONE TABLET BY MOUTH ONCE NIGHTLYONE HOUR BEFORE BEDTIME. (Patient taking differently: Take 1 tablet by mouth Every Night.) 30 tablet 2    vitamin D (ERGOCALCIFEROL) 1.25 MG (78118 UT) capsule capsule Take 1 capsule by mouth 1 (One) Time Per Week.      albuterol (PROVENTIL HFA;VENTOLIN HFA) 108 (90 BASE) MCG/ACT inhaler Inhale 2 puffs Every 4 (Four) Hours As Needed for Wheezing. (Patient not taking:  "Reported on 5/28/2024)      albuterol (PROVENTIL) (2.5 MG/3ML) 0.083% nebulizer solution Take 2.5 mg by nebulization Every 4 (Four) Hours As Needed for Wheezing. (Patient not taking: Reported on 5/28/2024)      Homeopathic Products (LEG CRAMP RELIEF PO) Take 1 tablet by mouth Daily.      Insulin Syringe 31G X 5/16\" 1 ML misc Use 1 syringe Daily. For use with Levemir vials 100 each 2    nitroglycerin (Nitrostat) 0.4 MG SL tablet Place 1 tablet under the tongue Every 5 (Five) Minutes As Needed for Chest Pain. Take no more than 3 doses in 15 minutes. 100 tablet 12         PROCEDURES  Procedures            PROGRESS, DATA ANALYSIS, CONSULTS, AND MEDICAL DECISION MAKING  All labs have been independently interpreted by me.  All radiology studies have been reviewed by me. All EKG's have been independently viewed and interpreted by me.  Discussion below represents my analysis of pertinent findings related to patient's condition, differential diagnosis, treatment plan and final disposition.    Differential diagnosis includes but is not limited to ACS, esophageal spasm, GERD, anxiety, hypertensive urgency.    Clinical Scores: HEART SCORE:    History  Highly suspicious              2    Moderately suspicious             1    Slightly or non-suspicious             0    ECG  Significant ST depression              2    Nonspecific repol disturbance            1    Normal                           0    Age  > or = 65                          2     46-65                           1    < or = 45                          0    Risk factors (hypercholesterolemia, HTN, DM, smoking, pos fam hx, obesity)                            > or = to 3 RF for atherosclerotic dx   2    1 or 2                 1    No risk factors                0    Troponin > or = 3x normal limit               2    1-3x normal limit    1    < or = Normal limit    0        HEART Score Key:  Scores 0-3: 0.9-1.7% risk of adverse cardiac event. In the HEART Score study, " these patients were discharged (0.99% in the retrospective study, 1.7% in the prospective study)  Scores 4-6: 12-16.6% risk of adverse cardiac event. In the HEART Score study, these patients were admitted to the hospital. (11.6% retrospective, 16.6% prospective)  Scores ?7: 50-65% risk of adverse cardiac event. In the HEART Score study, these patients were candidates for early invasive measures. (65.2% retrospective, 50.1% prospective)      This patient's HEART score is 4                  ED Course as of 06/21/24 1224   Fri Jun 21, 2024   1056 EKG independently interpreted by me    Time 10:53 AM  Sinus 80  Normal P waves and ND intervals  QRS-normal axis, normal QRS  ST, T waves-nonspecific changes  Not significant change compared to 5/17/2024. [DB]   1113 Chest x-ray independently interpreted by me shows no obvious acute disease. [DB]   1137 HS Troponin T: 12 []   1137 Hemoglobin(!): 10.8  10.9 3 weeks ago []   1144 Phone call with dr de leon.  Discussed the patient, relevant history, exam, diagnostics, ED findings/progress, and concerns. They agree to consult, ok with ed obs   [AH]   1145 Phone call with terell elizabeth.  Discussed the patient, relevant history, exam, diagnostics, ED findings/progress, and concerns.  They agree to admit to the ED observation unit on behalf of dr. Srinivas aguilar   []      ED Course User Index  [AH] Odette Bowen APRN  [DB] Wander Coker MD             AS OF 12:24 EDT VITALS:    BP - 120/61  HR - 75  TEMP - 97.5 °F (36.4 °C) (Tympanic)  O2 SATS - 96%    COMPLEXITY OF CARE  The patient requires admission.      DIAGNOSIS  Final diagnoses:   Near syncope   Chest pain, unspecified type         DISPOSITION  ED Disposition       ED Disposition   Intended Admit    Condition   --    Comment   --                Please note that portions of this document were completed with a voice recognition program.    Note Disclaimer: [facility], we believe that sharing information builds  trust and better relationships. You are receiving this note because you recently visited [facility]. It is possible you will see health information before a provider has talked with you about it. This kind of information can be easy to misunderstand. To help you fully understand what it means for your health, we urge you to discuss this note with your provider.         Odette Bowen, DARIAN  06/21/24 [id number]

## 2024-06-21 NOTE — PROGRESS NOTES
Clinical Pharmacy Services: Medication History    Gely Kimble is a 63 y.o. female presenting to [facility] for   Chief Complaint   Patient presents with    Chest Pain       She  has a past medical history of Abnormal nuclear stress test, Arthritis, COPD (chronic obstructive pulmonary disease), Elevated cholesterol, Emphysema of lung, Gallbladder abscess, Herpes zoster (08/03/2023), History of positive hepatitis C, Hypertension, Myocardial perfusion defect (02/16/2016), and Type 2 diabetes mellitus.    Allergies as of 06/21/2024    (No Known Allergies)       Medication information was obtained from: Patient   Pharmacy and Phone Number:     Prior to Admission Medications       Prescriptions Last Dose Informant Patient Reported? Taking?    acetaminophen (TYLENOL) 500 MG tablet  Self Yes Yes    Take 1 tablet by mouth Every 8 (Eight) Hours As Needed for Mild Pain or Moderate Pain.    aspirin 81 MG chewable tablet 6/20/2024 Self Yes Yes    Chew 1 tablet Every Night.    atorvastatin (LIPITOR) 40 MG tablet 6/20/2024 Self No Yes    Take 1 tablet by mouth Daily.    Patient taking differently:  Take 1 tablet by mouth Every Night.    clopidogrel (PLAVIX) 75 MG tablet 6/21/2024 Self No Yes    Take 1 tablet by mouth Daily.    escitalopram (LEXAPRO) 20 MG tablet 6/20/2024 Self No Yes    TAKE 1 TABLET BY MOUTH DAILY    Patient taking differently:  Take 1 tablet by mouth Every Night.    Ferrous Sulfate  (45 Fe) MG tablet controlled-release 6/21/2024 Self No Yes    Take 1 tablet by mouth Daily.    Patient taking differently:  Take 1 tablet by mouth 2 (Two) Times a Day.    insulin detemir (Levemir) 100 UNIT/ML injection 6/20/2024 Self No Yes    Inject 20 Units under the skin into the appropriate area as directed Every Night.    lisinopril (PRINIVIL,ZESTRIL) 5 MG tablet 6/20/2024 Self No Yes    Take 1 tablet by mouth Daily.    Patient taking differently:  Take 1 tablet by mouth Every Night.    LORATADINE PO  "6/21/2024 Self Yes Yes    Take 1 tablet by mouth Daily.    magnesium oxide (MAG-OX) 400 MG tablet 6/20/2024 Self No Yes    Take 1 tablet by mouth Daily.    Patient taking differently:  Take 1 tablet by mouth Every Night.    metFORMIN ER (GLUCOPHAGE-XR) 500 MG 24 hr tablet 6/21/2024 Self No Yes    TAKE TWO TABLETS BY MOUTH TWICE A DAY WITH A MEAL    Patient taking differently:  Take 2 tablets by mouth 2 (Two) Times a Day.    metoprolol succinate XL (TOPROL-XL) 25 MG 24 hr tablet 6/21/2024 Self No Yes    Take 1 tablet by mouth Daily.    Multiple Vitamin (MULTIVITAMIN) tablet 6/21/2024 Self Yes Yes    Take 1 tablet by mouth Daily.    pantoprazole (Protonix) 20 MG EC tablet 6/20/2024 Self No Yes    Take 1 tablet by mouth Daily.    Patient taking differently:  Take 1 tablet by mouth Every Night.    rOPINIRole (REQUIP) 0.25 MG tablet 6/20/2024 Self No Yes    TAKE ONE TABLET BY MOUTH ONCE NIGHTLYONE HOUR BEFORE BEDTIME.    Patient taking differently:  Take 1 tablet by mouth Every Night.    vitamin D (ERGOCALCIFEROL) 1.25 MG (47386 UT) capsule capsule  Self Yes Yes    Take 1 capsule by mouth 1 (One) Time Per Week.    albuterol (PROVENTIL HFA;VENTOLIN HFA) 108 (90 BASE) MCG/ACT inhaler   Yes No    Inhale 2 puffs Every 4 (Four) Hours As Needed for Wheezing.    Patient not taking:  Reported on 5/28/2024    albuterol (PROVENTIL) (2.5 MG/3ML) 0.083% nebulizer solution   Yes No    Take 2.5 mg by nebulization Every 4 (Four) Hours As Needed for Wheezing.    Patient not taking:  Reported on 5/28/2024    Homeopathic Products (LEG CRAMP RELIEF PO)   Yes No    Take 1 tablet by mouth Daily.    Insulin Syringe 31G X 5/16\" 1 ML misc   No No    Use 1 syringe Daily. For use with Levemir vials    nitroglycerin (Nitrostat) 0.4 MG SL tablet   No No    Place 1 tablet under the tongue Every 5 (Five) Minutes As Needed for Chest Pain. Take no more than 3 doses in 15 minutes.              Medication notes:     This medication list is complete to " the best of my knowledge as of 6/21/2024    Please call if questions.    Elin Emery  Medication History Technician   [phone]    6/21/2024 11:20 EDT

## 2024-06-22 ENCOUNTER — READMISSION MANAGEMENT (OUTPATIENT)
Dept: CALL CENTER | Facility: HOSPITAL | Age: 64
End: 2024-06-22
Payer: MEDICARE

## 2024-06-22 VITALS
OXYGEN SATURATION: 94 % | HEART RATE: 61 BPM | RESPIRATION RATE: 18 BRPM | SYSTOLIC BLOOD PRESSURE: 122 MMHG | BODY MASS INDEX: 22.77 KG/M2 | HEIGHT: 67 IN | WEIGHT: 145.06 LBS | TEMPERATURE: 97.8 F | DIASTOLIC BLOOD PRESSURE: 62 MMHG

## 2024-06-22 LAB
ANION GAP SERPL CALCULATED.3IONS-SCNC: 8.4 MMOL/L (ref 5–15)
BUN SERPL-MCNC: 10 MG/DL (ref 8–23)
BUN/CREAT SERPL: 14.3 (ref 7–25)
CALCIUM SPEC-SCNC: 9.3 MG/DL (ref 8.6–10.5)
CHLORIDE SERPL-SCNC: 104 MMOL/L (ref 98–107)
CO2 SERPL-SCNC: 30.6 MMOL/L (ref 22–29)
CREAT SERPL-MCNC: 0.7 MG/DL (ref 0.57–1)
DEPRECATED RDW RBC AUTO: 51.6 FL (ref 37–54)
EGFRCR SERPLBLD CKD-EPI 2021: 96.1 ML/MIN/1.73
ERYTHROCYTE [DISTWIDTH] IN BLOOD BY AUTOMATED COUNT: 18.3 % (ref 12.3–15.4)
GLUCOSE BLDC GLUCOMTR-MCNC: 128 MG/DL (ref 70–130)
GLUCOSE BLDC GLUCOMTR-MCNC: 187 MG/DL (ref 70–130)
GLUCOSE SERPL-MCNC: 119 MG/DL (ref 65–99)
HCT VFR BLD AUTO: 36.4 % (ref 34–46.6)
HGB BLD-MCNC: 10.9 G/DL (ref 12–15.9)
MAGNESIUM SERPL-MCNC: 1.8 MG/DL (ref 1.6–2.4)
MCH RBC QN AUTO: 23.4 PG (ref 26.6–33)
MCHC RBC AUTO-ENTMCNC: 29.9 G/DL (ref 31.5–35.7)
MCV RBC AUTO: 78.1 FL (ref 79–97)
PLATELET # BLD AUTO: 236 10*3/MM3 (ref 140–450)
PMV BLD AUTO: 11.1 FL (ref 6–12)
POTASSIUM SERPL-SCNC: 4.1 MMOL/L (ref 3.5–5.2)
RBC # BLD AUTO: 4.66 10*6/MM3 (ref 3.77–5.28)
SODIUM SERPL-SCNC: 143 MMOL/L (ref 136–145)
TROPONIN T SERPL HS-MCNC: 10 NG/L
WBC NRBC COR # BLD AUTO: 6.2 10*3/MM3 (ref 3.4–10.8)

## 2024-06-22 PROCEDURE — 93010 ELECTROCARDIOGRAM REPORT: CPT | Performed by: INTERNAL MEDICINE

## 2024-06-22 PROCEDURE — G0378 HOSPITAL OBSERVATION PER HR: HCPCS

## 2024-06-22 PROCEDURE — 99213 OFFICE O/P EST LOW 20 MIN: CPT | Performed by: INTERNAL MEDICINE

## 2024-06-22 PROCEDURE — 63710000001 INSULIN LISPRO (HUMAN) PER 5 UNITS: Performed by: PHYSICIAN ASSISTANT

## 2024-06-22 PROCEDURE — 84484 ASSAY OF TROPONIN QUANT: CPT | Performed by: PHYSICIAN ASSISTANT

## 2024-06-22 PROCEDURE — 82948 REAGENT STRIP/BLOOD GLUCOSE: CPT

## 2024-06-22 PROCEDURE — 85027 COMPLETE CBC AUTOMATED: CPT | Performed by: EMERGENCY MEDICINE

## 2024-06-22 PROCEDURE — 83735 ASSAY OF MAGNESIUM: CPT | Performed by: EMERGENCY MEDICINE

## 2024-06-22 PROCEDURE — 80048 BASIC METABOLIC PNL TOTAL CA: CPT | Performed by: EMERGENCY MEDICINE

## 2024-06-22 PROCEDURE — 93005 ELECTROCARDIOGRAM TRACING: CPT | Performed by: PHYSICIAN ASSISTANT

## 2024-06-22 RX ADMIN — Medication 10 ML: at 08:18

## 2024-06-22 RX ADMIN — ACETAMINOPHEN 500 MG: 500 TABLET ORAL at 08:18

## 2024-06-22 RX ADMIN — FERROUS SULFATE TAB 325 MG (65 MG ELEMENTAL FE) 325 MG: 325 (65 FE) TAB at 08:18

## 2024-06-22 RX ADMIN — METOPROLOL SUCCINATE 25 MG: 25 TABLET, EXTENDED RELEASE ORAL at 08:18

## 2024-06-22 RX ADMIN — INSULIN LISPRO 2 UNITS: 100 INJECTION, SOLUTION INTRAVENOUS; SUBCUTANEOUS at 11:19

## 2024-06-22 RX ADMIN — CLOPIDOGREL BISULFATE 75 MG: 75 TABLET, FILM COATED ORAL at 08:18

## 2024-06-22 NOTE — PLAN OF CARE
Goal Outcome Evaluation: pt is cleared for discharge and will follow up with cardiology in two weeks. Pt is A/O x4, agreeable to the plan of care and verbalizes understanding.       Problem: Adult Inpatient Plan of Care  Goal: Plan of Care Review  6/22/2024 1323 by Shira Macias RN  Outcome: Met  6/22/2024 1322 by Shira Macias RN  Outcome: Ongoing, Progressing  Goal: Patient-Specific Goal (Individualized)  6/22/2024 1323 by Shira Macias RN  Outcome: Met  6/22/2024 1322 by Shira Macias RN  Outcome: Ongoing, Progressing  Goal: Absence of Hospital-Acquired Illness or Injury  6/22/2024 1323 by Shira Macias RN  Outcome: Met  6/22/2024 1322 by Shira Macias RN  Outcome: Ongoing, Progressing  Intervention: Identify and Manage Fall Risk  Recent Flowsheet Documentation  Taken 6/22/2024 1153 by Shira Macias RN  Safety Promotion/Fall Prevention:   room organization consistent   safety round/check completed  Taken 6/22/2024 1000 by Shira Macias RN  Safety Promotion/Fall Prevention:   room organization consistent   safety round/check completed  Taken 6/22/2024 0818 by Shira Macias RN  Safety Promotion/Fall Prevention:   room organization consistent   safety round/check completed  Intervention: Prevent Skin Injury  Recent Flowsheet Documentation  Taken 6/22/2024 1153 by Shira Macias RN  Body Position: position changed independently  Taken 6/22/2024 1000 by Shira Macias RN  Body Position: position changed independently  Taken 6/22/2024 0818 by Shira Macias RN  Body Position: position changed independently  Intervention: Prevent Infection  Recent Flowsheet Documentation  Taken 6/22/2024 1153 by Shira Macias RN  Infection Prevention: single patient room provided  Taken 6/22/2024 1000 by Shira Macias RN  Infection Prevention: single patient room provided  Taken 6/22/2024 0818 by Shira Macias RN  Infection Prevention: single patient room provided  Goal: Optimal  Comfort and Wellbeing  6/22/2024 1323 by Shira Macias RN  Outcome: Met  6/22/2024 1322 by Shira Macias RN  Outcome: Ongoing, Progressing  Intervention: Provide Person-Centered Care  Recent Flowsheet Documentation  Taken 6/22/2024 0818 by Shira Macias RN  Trust Relationship/Rapport: choices provided  Goal: Readiness for Transition of Care  6/22/2024 1323 by Shira Macias RN  Outcome: Met  6/22/2024 1322 by Shira Macias RN  Outcome: Ongoing, Progressing     Problem: Pain Acute  Goal: Acceptable Pain Control and Functional Ability  6/22/2024 1323 by Shira Macias RN  Outcome: Met  6/22/2024 1322 by Shira Macias RN  Outcome: Ongoing, Progressing     Problem: Nausea and Vomiting  Goal: Fluid and Electrolyte Balance  6/22/2024 1323 by Shira Macias RN  Outcome: Met  6/22/2024 1322 by Shira Macias RN  Outcome: Ongoing, Progressing     Problem: Asthma Comorbidity  Goal: Maintenance of Asthma Control  6/22/2024 1323 by Shira Macias RN  Outcome: Met  6/22/2024 1322 by Shira Macias RN  Outcome: Ongoing, Progressing     Problem: Behavioral Health Comorbidity  Goal: Maintenance of Behavioral Health Symptom Control  6/22/2024 1323 by Shira Macias RN  Outcome: Met  6/22/2024 1322 by Shira Macias RN  Outcome: Ongoing, Progressing     Problem: COPD (Chronic Obstructive Pulmonary Disease) Comorbidity  Goal: Maintenance of COPD Symptom Control  6/22/2024 1323 by Shira Macias RN  Outcome: Met  6/22/2024 1322 by Shira Macias RN  Outcome: Ongoing, Progressing     Problem: Diabetes Comorbidity  Goal: Blood Glucose Level Within Targeted Range  6/22/2024 1323 by Shira Macias RN  Outcome: Met  6/22/2024 1322 by Shira Macias RN  Outcome: Ongoing, Progressing     Problem: Heart Failure Comorbidity  Goal: Maintenance of Heart Failure Symptom Control  6/22/2024 1323 by Shira Macias RN  Outcome: Met  6/22/2024 1322 by Shira Macias RN  Outcome: Ongoing,  Progressing     Problem: Hypertension Comorbidity  Goal: Blood Pressure in Desired Range  6/22/2024 1323 by Shira Macias RN  Outcome: Met  6/22/2024 1322 by Shira Macias RN  Outcome: Ongoing, Progressing     Problem: Obstructive Sleep Apnea Risk or Actual Comorbidity Management  Goal: Unobstructed Breathing During Sleep  6/22/2024 1323 by Shira Macias RN  Outcome: Met  6/22/2024 1322 by Shira Macias RN  Outcome: Ongoing, Progressing     Problem: Osteoarthritis Comorbidity  Goal: Maintenance of Osteoarthritis Symptom Control  6/22/2024 1323 by Shira Macias RN  Outcome: Met  6/22/2024 1322 by Shira Macias RN  Outcome: Ongoing, Progressing     Problem: Pain Chronic (Persistent) (Comorbidity Management)  Goal: Acceptable Pain Control and Functional Ability  6/22/2024 1323 by Shira Macias RN  Outcome: Met  6/22/2024 1322 by Shira Macias RN  Outcome: Ongoing, Progressing     Problem: Seizure Disorder Comorbidity  Goal: Maintenance of Seizure Control  6/22/2024 1323 by Shira Macias RN  Outcome: Met  6/22/2024 1322 by Shira Macias RN  Outcome: Ongoing, Progressing     Problem: Fall Injury Risk  Goal: Absence of Fall and Fall-Related Injury  6/22/2024 1323 by Shira Macias RN  Outcome: Met  6/22/2024 1322 by Shira Macias RN  Outcome: Ongoing, Progressing  Intervention: Promote Injury-Free Environment  Recent Flowsheet Documentation  Taken 6/22/2024 1153 by Shira Macias RN  Safety Promotion/Fall Prevention:   room organization consistent   safety round/check completed  Taken 6/22/2024 1000 by Shira Macias RN  Safety Promotion/Fall Prevention:   room organization consistent   safety round/check completed  Taken 6/22/2024 0818 by Shira Macias RN  Safety Promotion/Fall Prevention:   room organization consistent   safety round/check completed

## 2024-06-22 NOTE — PROGRESS NOTES
"JAMES GREENE Attestation Note    I supervised care provided by the midlevel provider.    The NAIMA and I have discussed this patient's history, physical exam, and treatment plan. I have reviewed the documentation and personally had a face to face interaction with the patient  I affirm the documentation and agree with the treatment and plan. I provided a substantive portion of the care of this patient.  I personally performed the physical exam, in its entirety.  My personal findings are documented in below:    History:  Patient with history of CAD status post recent PCI in May 2024 presented for complaint of persistent chest pain.  This morning she says her pain is a 3 out of 10 which is \"much better than before.\"  Denies dyspnea.    Physical Exam:  General: No acute distress.  HENT: NCAT, PERRL, Nares patent.  Eyes: no scleral icterus.  Neck: trachea midline, no ROM limitations.  CV: Pink warm and well-perfused throughout  Respiratory: No distress or increased work of breathing  Abdomen: soft, no focal tenderness or rigidity  Musculoskeletal: no deformity.  Neuro: alert, moves all extremities, follows commands.  Skin: warm, dry.    Assessment and Plan:  Chest pain: Troponins remain in normal range.  Echocardiogram is completed with a normal ejection fraction.  Test results seem to be favorable and patient's clinical condition is improving.  Awaiting final cardiology consult and likely discharge home today.    "

## 2024-06-22 NOTE — PROGRESS NOTES
JAMES GREENE Attestation Note    I supervised care provided by the midlevel provider.    The NAIMA and I have discussed this patient's history, physical exam, and treatment plan. I have reviewed the documentation and personally had a face to face interaction with the patient  I affirm the documentation and agree with the treatment and plan. I provided a substantive portion of the care of this patient.  I personally performed the physical exam, in its entirety.  My personal findings are documented in below:    History:  63-year-old smoker with history of CAD returns with chest discomfort.  Patient feeling rather comfortable at this time.    Physical Exam:  General: No acute distress.  HENT: NCAT, PERRL, Nares patent.  Eyes: no scleral icterus.  Neck: trachea midline, no ROM limitations.  CV: Pink warm and well-perfused throughout  Respiratory: No distress or increased work of breathing  Musculoskeletal: no deformity.  Neuro: alert, moves all extremities, follows commands.  Skin: warm, dry.    Assessment and Plan:  Chest discomfort: Recent catheterization with intervention.  Troponins flat and remain negative x 2.  Patient seen by cardiology.  If echocardiogram and troponins unremarkable, likely charge in AM.

## 2024-06-22 NOTE — PROGRESS NOTES
ED OBSERVATION PROGRESS/DISCHARGE SUMMARY    Date of Admission: 6/21/2024   LOS: 0 days   PCP: Sofi Fernandes, DARIAN    Final Diagnosis chest pain      Subjective     Hospital Outcome: Discharge    Gely Kimble is a 63 y.o. female with significant past medical history of diabetes mellitus, hyperlipidemia, hypertension, and coronary artery disease who presented to the emergency department this morning complaining of acute chest pain.  Patient reports she developed the chest pressure late yesterday evening while at rest.  Exertion is said to exacerbate her symptoms.  She states the pain feels very similar to pain she was having in May 2024 that eventually led to PCI/stenting of the second diagonal branch of the RCA.  No fever, chills, nausea, vomiting.  Patient is not on anticoagulant therapy but is taking Plavix.     ED workup revealed an ECG of normal sinus rhythm with a rate of 80, normal P waves and SD interval, normal axis and normal QRS morphology, no acute ST changes.  Nonspecific T wave changes.  Chest x-ray showed no acute process.  Initial troponin 12, sodium 144, potassium 4.4, CO2 26.2, anion gap 12.8, creatinine 0.63, GFR 98.6, glucose 164.  LDL 27, HDL 51, total cholesterol 98, WBC 6.8, hemoglobin 10.8, platelets 257    ROS:  General: no fevers, chills  Respiratory: no cough, dyspnea  Cardiovascular: no chest pain, palpitations  Abdomen: No abdominal pain, nausea, vomiting, or diarrhea  Neurologic: No focal weakness    6/22/2024    12:53 PM.  Patient has been evaluated by Dr. Hayes/cardiology.  Workup and labs remain unremarkable.  Patient has remained pain-free.  Patient has been cleared for discharge.  Patient is to follow-up with Dr. Hayes in 2 weeks.      Objective   Physical Exam:  I have reviewed the vital signs.  Temp:  [97.8 °F (36.6 °C)-98.4 °F (36.9 °C)] 97.8 °F (36.6 °C)  Heart Rate:  [61-73] 61  Resp:  [16-18] 18  BP: (113-125)/(54-70) 122/62  General Appearance:    Alert,  cooperative, no distress  Head:    Normocephalic, atraumatic  Eyes:    Sclerae anicteric  Neck:   Supple, no mass  Lungs: Clear to auscultation bilaterally, respirations unlabored  Heart: Regular rate and rhythm, S1 and S2 normal, no murmur, rub or gallop  Abdomen:  Soft, non-tender, bowel sounds active, nondistended  Extremities: No clubbing, cyanosis, or edema to lower extremities  Pulses:  2+ and symmetric in distal lower extremities  Skin: No rashes   Neurologic: Oriented x3, Normal strength to extremities    Results Review:    I have reviewed the labs, radiology results and diagnostic studies.    Results from last 7 days   Lab Units 06/22/24  0324   WBC 10*3/mm3 6.20   HEMOGLOBIN g/dL 10.9*   HEMATOCRIT % 36.4   PLATELETS 10*3/mm3 236     Results from last 7 days   Lab Units 06/22/24  0324 06/21/24  1101   SODIUM mmol/L 143 144   POTASSIUM mmol/L 4.1 4.4   CHLORIDE mmol/L 104 105   CO2 mmol/L 30.6* 26.2   BUN mg/dL 10 8   CREATININE mg/dL 0.70 0.63   CALCIUM mg/dL 9.3 9.3   BILIRUBIN mg/dL  --  0.3   ALK PHOS U/L  --  53   ALT (SGPT) U/L  --  14   AST (SGOT) U/L  --  14   GLUCOSE mg/dL 119* 164*     Imaging Results (Last 24 Hours)       ** No results found for the last 24 hours. **            I have reviewed the medications.  ---------------------------------------------------------------------------------------------  Assessment & Plan   Assessment/Problem List    Chest pain      Plan:    Chest pain  -TTE echo to rule out decrease EF, valvular disease, pericardium disease as cause of pain  -Continuous cardiac monitoring  -Nitroglycerin drop patient's blood pressure in ED.  IV morphine as needed pain  -IV Zofran as needed nausea and vomiting  -Patient's labs, troponins, EKG remained stable.  Patient has had repeat evaluation this morning by Dr. Hayes.  Patient has remained pain-free for the last several hours.  She is to be discharged and to follow-up with Dr. Hayes on an outpatient basis in 2  weeks        VTE Prophylaxis:  No VTE prophylaxis order currently exists.    Disposition: Discharge    Follow-up after Discharge: Dr. Hayes in 2 weeks    This note will serve as a progress note and discharge note     Ramon Garsia III, PA 06/22/24 12:54 EDT    I have worn appropriate PPE during this patient encounter, sanitized my hands both with entering and exiting patient's room.      31 minutes has been spent by [facility] providers in the care of this patient while under observation status

## 2024-06-22 NOTE — PLAN OF CARE
Goal Outcome Evaluation:               Patient admitted to ED observation for evaluation of Chest pain/pressure radiating down left arm. Patient know to cardiology services having 3 stents placed prior. 2 on in Oct of 2023 and 1 in May of 2024. Patient states she is only feeling pressure now rating it at a 5 on 0-10 scale, she states pressure increases with exertion to a 7 on 0-10 scale. She denies SOA and is able to ambulate independently to bathroom and back. BG on check this PM is 94. Patient is aware of current plan of care and agreeable to the plan.

## 2024-06-22 NOTE — PROGRESS NOTES
[facility]  Cardiology Progress Note    Patient Identification:  Name: Gely Kimble  Age: 63 y.o.  Sex: female  :  1960  MRN: [id number]                 Follow Up / Chief Complaint: Coronary artery disease and chest pain    Interval History:  Patient chest pain is very atypical no more since yesterday     Subjective:  No chest pain    Objective:    Past Medical History:  Past Medical History:   Diagnosis Date    Abnormal nuclear stress test     Arthritis     COPD (chronic obstructive pulmonary disease)     Elevated cholesterol     Emphysema of lung     Gallbladder abscess     Herpes zoster 2023    History of positive hepatitis C     previously positive, never treated, negative RNA finding    Hypertension     Myocardial perfusion defect 2016    Type 2 diabetes mellitus      Past Surgical History:  Past Surgical History:   Procedure Laterality Date    APPENDECTOMY      CARDIAC CATHETERIZATION N/A 10/25/2023    Procedure: Left Heart Cath;  Surgeon: Tasneem Hayes MD;  Location: [facility] CATH INVASIVE LOCATION;  Service: Cardiovascular;  Laterality: N/A;    CARDIAC CATHETERIZATION N/A 10/25/2023    Procedure: Left ventriculography;  Surgeon: Tasneem Hayes MD;  Location: [facility] CATH INVASIVE LOCATION;  Service: Cardiovascular;  Laterality: N/A;    CARDIAC CATHETERIZATION N/A 10/25/2023    Procedure: Stent LEYLA coronary;  Surgeon: Tasneem Hayes MD;  [facility] C[facility]AS[facility] LOC[facility];  Service: Cardiovascular;  Laterality: N/A;    CARDIAC CATHETERIZATION N/A 10/25/2023    Procedure: Coronary angiography;  Surgeon: Tasneem Hayes MD;  [facility] C[facility] LOC[facility];  Service: Cardiovascular;  Laterality: N/A;    CARDIAC CATHETERIZATION N/A 10/25/2023    Procedure: Percutaneous Coronary Intervention;  Surgeon: Tasneem Hayes MD;  [facility]: [facility] C[facility]AS[facility] LOC[facility];  Service: Cardiovascular;  Laterality: N/A;    CARDIAC  CATHETERIZATION N/A 11/3/2023    Procedure: Percutaneous Coronary Intervention RCA;  Surgeon: Tasneem Hayes MD;  Location:  [facility] CATH INVASIVE LOCATION;  Service: Cardiovascular;  Laterality: N/A;    CARDIAC CATHETERIZATION N/A 11/3/2023    Procedure: Coronary angiography;  Surgeon: Tasneem Hayes MD;  Location:  [facility] CATH INVASIVE LOCATION;  Service: Cardiovascular;  Laterality: N/A;    CARDIAC CATHETERIZATION N/A 11/3/2023    Procedure: Stent LEYLA coronary;  Surgeon: Tasneem Hayes MD;  Location:  [facility] CATH INVASIVE LOCATION;  Service: Cardiovascular;  Laterality: N/A;    CARDIAC CATHETERIZATION N/A 2024    Procedure: Left Heart Cath;  Surgeon: Tasneem Hayes MD;  Location:  [facility] CATH INVASIVE LOCATION;  Service: Cardiovascular;  Laterality: N/A;    CARDIAC CATHETERIZATION N/A 2024    Procedure: Percutaneous Coronary Intervention;  Surgeon: Tasneem Hayes MD;  Location:  [facility] CATH INVASIVE LOCATION;  Service: Cardiovascular;  Laterality: N/A;    CARDIAC CATHETERIZATION N/A 2024    Procedure: Coronary angiography;  Surgeon: Tasneem Hayes MD;  Location:  [facility] CATH INVASIVE LOCATION;  Service: Cardiovascular;  Laterality: N/A;    CARDIAC CATHETERIZATION N/A 2024    Procedure: Left ventriculography;  Surgeon: Tasneem Hayes MD;  Location:  [facility] CATH INVASIVE LOCATION;  Service: Cardiovascular;  Laterality: N/A;    CHOLECYSTECTOMY      HYSTERECTOMY      TUBAL ABDOMINAL LIGATION          Social History:   Social History     Tobacco Use    Smoking status: Former     Current packs/day: 0.00     Average packs/day: 1.5 packs/day for 45.8 years (68.7 ttl pk-yrs)     Types: Cigarettes     Start date:      Quit date: 10/11/2017     Years since quittin.7     Passive exposure: Past    Smokeless tobacco: Never    Tobacco comments:     Previously smoked about 1.5 packs per day for about 45 years   Substance Use Topics    Alcohol use: Not  Currently     Comment: rare pan 1-2 times per year      Family History:  Family History   Problem Relation Age of Onset    Diabetes Mother     Heart disease Mother     Diabetes Father     Diabetes Brother     Heart disease Maternal Grandmother     Cancer Paternal Grandfather           Allergies:  No Known Allergies  Scheduled Meds:  aspirin, 81 mg, Nightly  atorvastatin, 40 mg, Nightly  clopidogrel, 75 mg, Daily  escitalopram, 20 mg, Nightly  ferrous sulfate, 325 mg, BID With Meals  insulin glargine, 20 Units, Nightly  insulin lispro, 2-7 Units, 4x Daily AC & at Bedtime  lisinopril, 5 mg, Nightly  magnesium oxide, 400 mg, Nightly  metoprolol succinate XL, 25 mg, Daily  pantoprazole, 40 mg, Nightly  rOPINIRole, 0.25 mg, Nightly  senna-docusate sodium, 2 tablet, BID  sodium chloride, 10 mL, Q12H            INTAKE AND OUTPUT:    Intake/Output Summary (Last 24 hours) at 6/22/2024 1231  Last data filed at 6/21/2024 2100  Gross per 24 hour   Intake 300 ml   Output --   Net 300 ml       Review of Systems:   GI:  Cardiac: No chest pain  Pulmonary:    Constitutional:  Temp:  [97.8 °F (36.6 °C)-98.4 °F (36.9 °C)] 97.8 °F (36.6 °C)  Heart Rate:  [61-73] 61  Resp:  [16-18] 18  BP: (113-125)/(54-70) 122/62    Physical Exam:  General:  Appears in no acute distress  Eyes: PERTL,  HEENT:  No JVD. Thyroid not visibly enlarged. No mucosal pallor or cyanosis  Respiratory: Respirations regular and unlabored at rest. BBS with good air entry in all fields. No crackles, rubs or wheezes auscultated  Cardiovascular: S1S2 Regular rate and rhythm. No murmur, rub or gallop. No carotid bruits. DP/PT pulses     . No pretibial pitting edema  Gastrointestinal: Abdomen soft, flat, non tender. Bowel sounds present. No hepatosplenomegaly. No ascites  Musculoskeletal: HERRERA x4. No abnormal movements  Extremities: No digital clubbing or cyanosis  Skin: Color pink. Skin warm and dry to touch. No rashes    Neuro: AAO x3 CN II-XII grossly  "intact  Psych: Mood and affect normal, pleasant and cooperative          Cardiographics  Telemetry:     ECG:     Echocardiogram:     Lab Review   Results from last 7 days   Lab Units 06/22/24  0324 06/21/24 2033 06/21/24  1305   HSTROP T ng/L 10 7 9     Results from last 7 days   Lab Units 06/22/24  0324   MAGNESIUM mg/dL 1.8     Results from last 7 days   Lab Units 06/22/24  0324   SODIUM mmol/L 143   POTASSIUM mmol/L 4.1   BUN mg/dL 10   CREATININE mg/dL 0.70   CALCIUM mg/dL 9.3     @LABRCNTIPbnp@  Results from last 7 days   Lab Units 06/22/24  0324 06/21/24  1101   WBC 10*3/mm3 6.20 6.80   HEMOGLOBIN g/dL 10.9* 10.8*   HEMATOCRIT % 36.4 36.7   PLATELETS 10*3/mm3 236 257             Assessment:  Atypical chest pain  Recent coronary angioplasty stent      Plan:    Cardiovascular remains stable  Vital signs are stable  No further workup needed at this point patient can be discharged and follow-up with us in 2 weeks      )6/22/2024  Tasneem Hayes MD      [facility] Dragon/Transcription:   \"Dictated utilizing Dragon dictation\".     "

## 2024-06-22 NOTE — OUTREACH NOTE
Prep Survey      Flowsheet Row Responses   [facility] patient discharged from? [place]   Is LACE score < 7 ? Yes   Eligibility [facility]   Date of Admission 06/21/24   Date of Discharge 06/22/24   Discharge Disposition Home or Self Care   Discharge diagnosis Chest pain   Does the patient have one of the following disease processes/diagnoses(primary or secondary)? Other   Prep survey completed? Yes            Lesvia ROSARIO - Registered Nurse

## 2024-06-22 NOTE — DISCHARGE INSTRUCTIONS
Recommend return to the ER with recurrent chest pain, exertional chest pain, or should you have any further concerns.  Follow-up with Dr. Hayes in 2 weeks time in the outpatient setting for repeat evaluation.  Continue with all home medications

## 2024-06-23 LAB
QT INTERVAL: 449 MS
QTC INTERVAL: 456 MS

## 2024-06-24 ENCOUNTER — TRANSITIONAL CARE MANAGEMENT TELEPHONE ENCOUNTER (OUTPATIENT)
Dept: CALL CENTER | Facility: HOSPITAL | Age: 64
End: 2024-06-24
Payer: MEDICARE

## 2024-06-24 ENCOUNTER — TELEPHONE (OUTPATIENT)
Dept: CARDIOLOGY | Facility: CLINIC | Age: 64
End: 2024-06-24

## 2024-06-24 ENCOUNTER — TELEPHONE (OUTPATIENT)
Dept: FAMILY MEDICINE CLINIC | Facility: CLINIC | Age: 64
End: 2024-06-24
Payer: MEDICARE

## 2024-06-24 LAB
QT INTERVAL: 422 MS
QTC INTERVAL: 449 MS

## 2024-06-24 NOTE — TELEPHONE ENCOUNTER
Sofi had the patient taking 2 iron pills daily but when she went into the hospital, they told her to only take one. She is saying that her iron is still low and is wanting to know if she should continue the once daily or go back to the twice daily. Please advise.

## 2024-06-24 NOTE — OUTREACH NOTE
Call Center TCM Note      Flowsheet Row Responses   [facility] patient discharged from? [place]   Does the patient have one of the following disease processes/diagnoses(primary or secondary)? Other   TCM attempt successful? Yes   Call start time 0859   Call end time 0907   Meds reviewed with patient/caregiver? Yes   Is the patient having any side effects they believe may be caused by any medication additions or changes? No   Does the patient have all medications ordered at discharge? N/A   Is the patient taking all medications as directed (includes completed medication regime)? Yes   Comments No appts available within 2 weeks with PCP-will route to office for review. States will make 2 week appt with cardiologist.   Does the patient have an appointment with their PCP within 7-14 days of discharge? No appointments available   Nursing Interventions Routed TCM call to PCP office, PCP office requested to make appointment - message sent   Has home health visited the patient within 72 hours of discharge? N/A   Psychosocial issues? No   Did the patient receive a copy of their discharge instructions? Yes   Nursing interventions Reviewed instructions with patient   What is the patient's perception of their health status since discharge? Improving   Is the patient/caregiver able to teach back signs and symptoms related to disease process for when to call PCP? Yes   Is the patient/caregiver able to teach back signs and symptoms related to disease process for when to call 911? Yes   Is the patient/caregiver able to teach back the hierarchy of who to call/visit for symptoms/problems? PCP, Specialist, Home health nurse, Urgent Care, ED, 911 Yes   If the patient is a current smoker, are they able to teach back resources for cessation? Not a smoker   Additional teach back comments Monitors BP and BG daily.   TCM call completed? Yes   Wrap up additional comments Patient states is improving. Denies any further cardiac s/s.  [facility] has not checked BP yet today. Works night shift. [facility] will need to discuss frequency of daily ferrous sulfate with PCP. Denies any needs or concerns today. TCM complete.   Call end time 0907   Would this patient benefit from a Referral to [facility] Social Work? No   Is the patient interested in additional calls from an ambulatory ? No            Rosa Isela Cintron RN    6/24/2024, 09:08 EDT

## 2024-06-24 NOTE — TELEPHONE ENCOUNTER
Caller: Gely Kimble    Relationship to patient: Self    Best call back number: [phone]    Patient is needing: HUB SCHEDULED FIRST AVAILABLE WITH DR. JEAN ON 07.15.24 FOR 2 [facility] HOSPITAL FOLLOW UP. PATIENT WAS DISCHARGED FROM [facility] ON 06.22.24 FOR NEAR SYNCOPE AND CHEST PAIN, UNSPECIFIED TYPE. PLEASE CONTACT PATIENT IF PATIENT NEEDS TO BE SEEN SOONER. THANK YOU.

## 2024-07-06 DIAGNOSIS — K21.9 GASTROESOPHAGEAL REFLUX DISEASE, UNSPECIFIED WHETHER ESOPHAGITIS PRESENT: ICD-10-CM

## 2024-07-08 RX ORDER — PANTOPRAZOLE SODIUM 20 MG/1
20 TABLET, DELAYED RELEASE ORAL DAILY
Qty: 30 TABLET | Refills: 2 | Status: SHIPPED | OUTPATIENT
Start: 2024-07-08

## 2024-07-08 NOTE — TELEPHONE ENCOUNTER
LOV                  5/28/2024  NOV                  7/19/2024  Last RF             1/10/24       PROTOCOL      Met    ULISES Allan/RAQUEL

## 2024-07-11 ENCOUNTER — HOSPITAL ENCOUNTER (OUTPATIENT)
Dept: MAMMOGRAPHY | Facility: HOSPITAL | Age: 64
Discharge: HOME | End: 2024-07-11
Payer: MEDICARE

## 2024-07-11 ENCOUNTER — HOSPITAL ENCOUNTER (OUTPATIENT)
Dept: CT IMAGING | Facility: HOSPITAL | Age: 64
Discharge: HOME | End: 2024-07-11
Payer: MEDICARE

## 2024-07-11 DIAGNOSIS — Z87.891 FORMER SMOKER: ICD-10-CM

## 2024-07-11 DIAGNOSIS — Z12.31 SCREENING MAMMOGRAM FOR BREAST CANCER: ICD-10-CM

## 2024-07-11 PROCEDURE — 71271 CT THORAX LUNG CANCER SCR C-: CPT

## 2024-07-11 PROCEDURE — 77063 BREAST TOMOSYNTHESIS BI: CPT

## 2024-07-11 PROCEDURE — 77067 SCR MAMMO BI INCL CAD: CPT

## 2024-07-12 DIAGNOSIS — Z12.31 ENCOUNTER FOR SCREENING MAMMOGRAM FOR MALIGNANT NEOPLASM OF BREAST: Primary | ICD-10-CM

## 2024-07-15 ENCOUNTER — OFFICE VISIT (OUTPATIENT)
Dept: CARDIOLOGY | Facility: CLINIC | Age: 64
End: 2024-07-15
Payer: MEDICARE

## 2024-07-15 VITALS
DIASTOLIC BLOOD PRESSURE: 70 MMHG | WEIGHT: 152 LBS | BODY MASS INDEX: 23.86 KG/M2 | HEIGHT: 67 IN | HEART RATE: 76 BPM | SYSTOLIC BLOOD PRESSURE: 128 MMHG

## 2024-07-15 DIAGNOSIS — I25.118 CORONARY ARTERY DISEASE OF NATIVE ARTERY OF NATIVE HEART WITH STABLE ANGINA PECTORIS: Primary | ICD-10-CM

## 2024-07-15 DIAGNOSIS — I10 PRIMARY HYPERTENSION: ICD-10-CM

## 2024-07-15 DIAGNOSIS — Z79.02 LONG TERM (CURRENT) USE OF ANTITHROMBOTICS/ANTIPLATELETS: ICD-10-CM

## 2024-07-15 DIAGNOSIS — E78.49 OTHER HYPERLIPIDEMIA: ICD-10-CM

## 2024-07-15 NOTE — PROGRESS NOTES
Subjective:        Gely Kimble is a 63 y.o. female who here for follow up    CC  Post pci diagnol branch  HPI  63-year-old female here for the follow-up after the recent angioplasty of the diagonal branch     Problems Addressed this Visit          Cardiac and Vasculature    Other hyperlipidemia    Primary hypertension    Coronary artery disease of native artery of native heart with stable angina pectoris - Primary       Coag and Thromboembolic    Long term (current) use of antithrombotics/antiplatelets     Diagnoses         Codes Comments    Coronary artery disease of native artery of native heart with stable angina pectoris    -  Primary ICD-10-CM: I25.118  ICD-9-CM: 414.01, 413.9     Primary hypertension     ICD-10-CM: I10  ICD-9-CM: 401.9     Other hyperlipidemia     ICD-10-CM: E78.49  ICD-9-CM: 272.4     Long term (current) use of antithrombotics/antiplatelets     ICD-10-CM: Z79.02  ICD-9-CM: V58.63           .    The following portions of the patient's history were reviewed and updated as appropriate: allergies, current medications, past family history, past medical history, past social history, past surgical history and problem list.    Past Medical History:   Diagnosis Date    Abnormal nuclear stress test     Arthritis     COPD (chronic obstructive pulmonary disease)     Elevated cholesterol     Emphysema of lung     Gallbladder abscess     Herpes zoster 08/03/2023    History of positive hepatitis C     previously positive, never treated, negative RNA finding    Hypertension     Myocardial perfusion defect 02/16/2016    Type 2 diabetes mellitus      reports that she quit smoking about 6 years ago. Her smoking use included cigarettes. She started smoking about 52 years ago. She has a 68.7 pack-year smoking history. She has been exposed to tobacco smoke. She has never used smokeless tobacco. She reports that she does not currently use alcohol. She reports that she does not use drugs.   Family History  "  Problem Relation Age of Onset    Diabetes Mother     Heart disease Mother     Diabetes Father     Diabetes Brother     Heart disease Maternal Grandmother     Cancer Paternal Grandfather        Review of Systems  Constitutional: No wt loss, fever, fatigue  Gastrointestinal: No nausea, abdominal pain  Behavioral/Psych: No insomnia or anxiety   Cardiovascular no chest pains or tightness in the chest  Objective:       Physical Exam  /70 (BP Location: Left arm)   Pulse 76   Ht 170.2 cm (67\")   Wt 68.9 kg (152 lb)   BMI 23.81 kg/m²   General appearance: No acute changes   Neck: Trachea midline; NECK, supple, no thyromegaly or lymphadenopathy   Lungs: Normal size and shape, normal breath sounds, equal distribution of air, no rales and rhonchi   CV: S1-S2 regular, no murmurs, no rub, no gallop   Abdomen: Soft, nontender; no masses , no abnormal abdominal sounds   Extremities: No deformity , normal color , no peripheral edema   Skin: Normal temperature, turgor and texture; no rash, ulcers          Procedures      Echocardiogram:    Results for orders placed during the hospital encounter of 06/21/24    Adult Transthoracic Echo Complete W/ Cont if Necessary Per Protocol    Interpretation Summary    Left ventricular ejection fraction appears to be 66 - 70%.    Left ventricular diastolic function was normal.          Current Outpatient Medications:     acetaminophen (TYLENOL) 500 MG tablet, Take 1 tablet by mouth Every 8 (Eight) Hours As Needed for Mild Pain or Moderate Pain., Disp: , Rfl:     aspirin 81 MG chewable tablet, Chew 1 tablet Every Night., Disp: , Rfl:     atorvastatin (LIPITOR) 40 MG tablet, Take 1 tablet by mouth Daily. (Patient taking differently: Take 1 tablet by mouth Every Night.), Disp: 30 tablet, Rfl: 11    clopidogrel (PLAVIX) 75 MG tablet, Take 1 tablet by mouth Daily., Disp: 30 tablet, Rfl: 11    escitalopram (LEXAPRO) 20 MG tablet, TAKE 1 TABLET BY MOUTH DAILY (Patient taking differently: Take " "1 tablet by mouth Every Night.), Disp: 90 tablet, Rfl: 1    Ferrous Sulfate  (45 Fe) MG tablet controlled-release, Take 1 tablet by mouth Daily., Disp: 30 tablet, Rfl: 2    Homeopathic Products (LEG CRAMP RELIEF PO), Take 1 tablet by mouth Daily., Disp: , Rfl:     insulin detemir (Levemir) 100 UNIT/ML injection, Inject 20 Units under the skin into the appropriate area as directed Every Night., Disp: , Rfl:     Insulin Syringe 31G X 5/16\" 1 ML misc, Use 1 syringe Daily. For use with Levemir vials, Disp: 100 each, Rfl: 2    lisinopril (PRINIVIL,ZESTRIL) 5 MG tablet, Take 1 tablet by mouth Daily. (Patient taking differently: Take 1 tablet by mouth Every Night.), Disp: 90 tablet, Rfl: 1    LORATADINE PO, Take 1 tablet by mouth Daily., Disp: , Rfl:     magnesium oxide (MAG-OX) 400 MG tablet, Take 1 tablet by mouth Daily. (Patient taking differently: Take 1 tablet by mouth Every Night.), Disp: 30 tablet, Rfl: 0    metFORMIN ER (GLUCOPHAGE-XR) 500 MG 24 hr tablet, TAKE TWO TABLETS BY MOUTH TWICE A DAY WITH A MEAL (Patient taking differently: Take 2 tablets by mouth 2 (Two) Times a Day.), Disp: 120 tablet, Rfl: 3    metoprolol succinate XL (TOPROL-XL) 25 MG 24 hr tablet, Take 1 tablet by mouth Daily., Disp: 90 tablet, Rfl: 2    Multiple Vitamin (MULTIVITAMIN) tablet, Take 1 tablet by mouth Daily., Disp: , Rfl:     nitroglycerin (Nitrostat) 0.4 MG SL tablet, Place 1 tablet under the tongue Every 5 (Five) Minutes As Needed for Chest Pain. Take no more than 3 doses in 15 minutes., Disp: 100 tablet, Rfl: 12    pantoprazole (PROTONIX) 20 MG EC tablet, TAKE 1 TABLET BY MOUTH DAILY, Disp: 30 tablet, Rfl: 2    rOPINIRole (REQUIP) 0.25 MG tablet, TAKE ONE TABLET BY MOUTH ONCE NIGHTLYONE HOUR BEFORE BEDTIME. (Patient taking differently: Take 1 tablet by mouth Every Night.), Disp: 30 tablet, Rfl: 2    vitamin D (ERGOCALCIFEROL) 1.25 MG (61763 UT) capsule capsule, Take 1 capsule by mouth 1 (One) Time Per Week., Disp: , Rfl:    " Assessment:                Plan:          ICD-10-CM ICD-9-CM   1. Coronary artery disease of native artery of native heart with stable angina pectoris  I25.118 414.01     413.9   2. Primary hypertension  I10 401.9   3. Other hyperlipidemia  E78.49 272.4   4. Long term (current) use of antithrombotics/antiplatelets  Z79.02 V58.63     1. Coronary artery disease of native artery of native heart with stable angina pectoris  No angina pectoris    Continue aspirin as well as a Plavix post angioplasty    2. Primary hypertension  Patient understands importance of blood pressure check at home which patient does regularly and the blood pressures are well under control to the level of less than 140/90      3. Other hyperlipidemia  Continue current treatment      Post pci stable    See in 6 months  COUNSELING:    Gely Ash was given to patient for the following topics: diagnostic results, risk factor reductions, impressions, risks and benefits of treatment options and importance of treatment compliance .       SMOKING COUNSELING:        Dictated using Dragon dictation

## 2024-07-16 PROBLEM — Z79.02 LONG TERM (CURRENT) USE OF ANTITHROMBOTICS/ANTIPLATELETS: Status: ACTIVE | Noted: 2024-07-16

## 2024-07-19 ENCOUNTER — OFFICE VISIT (OUTPATIENT)
Dept: FAMILY MEDICINE CLINIC | Facility: CLINIC | Age: 64
End: 2024-07-19
Payer: MEDICARE

## 2024-07-19 DIAGNOSIS — M79.642 PAIN OF LEFT HAND: ICD-10-CM

## 2024-07-19 DIAGNOSIS — I25.118 CORONARY ARTERY DISEASE OF NATIVE ARTERY OF NATIVE HEART WITH STABLE ANGINA PECTORIS: ICD-10-CM

## 2024-07-19 DIAGNOSIS — F33.1 MODERATE EPISODE OF RECURRENT MAJOR DEPRESSIVE DISORDER: ICD-10-CM

## 2024-07-19 DIAGNOSIS — Z09 HOSPITAL DISCHARGE FOLLOW-UP: Primary | ICD-10-CM

## 2024-07-19 DIAGNOSIS — F41.1 GENERALIZED ANXIETY DISORDER: ICD-10-CM

## 2024-07-19 DIAGNOSIS — E11.42 TYPE 2 DIABETES MELLITUS WITH DIABETIC POLYNEUROPATHY, WITH LONG-TERM CURRENT USE OF INSULIN: ICD-10-CM

## 2024-07-19 DIAGNOSIS — K21.9 GASTROESOPHAGEAL REFLUX DISEASE, UNSPECIFIED WHETHER ESOPHAGITIS PRESENT: ICD-10-CM

## 2024-07-19 DIAGNOSIS — I10 PRIMARY HYPERTENSION: ICD-10-CM

## 2024-07-19 DIAGNOSIS — R07.2 PRECORDIAL PAIN: ICD-10-CM

## 2024-07-19 DIAGNOSIS — G25.81 RESTLESS LEG SYNDROME: ICD-10-CM

## 2024-07-19 DIAGNOSIS — Z79.4 TYPE 2 DIABETES MELLITUS WITH DIABETIC POLYNEUROPATHY, WITH LONG-TERM CURRENT USE OF INSULIN: ICD-10-CM

## 2024-07-19 DIAGNOSIS — D64.9 ANEMIA, UNSPECIFIED TYPE: ICD-10-CM

## 2024-07-19 DIAGNOSIS — R79.0 LOW MAGNESIUM LEVEL: ICD-10-CM

## 2024-07-19 DIAGNOSIS — E78.49 OTHER HYPERLIPIDEMIA: ICD-10-CM

## 2024-07-19 PROCEDURE — 1160F RVW MEDS BY RX/DR IN RCRD: CPT | Performed by: NURSE PRACTITIONER

## 2024-07-19 PROCEDURE — 99214 OFFICE O/P EST MOD 30 MIN: CPT | Performed by: NURSE PRACTITIONER

## 2024-07-19 PROCEDURE — 3074F SYST BP LT 130 MM HG: CPT | Performed by: NURSE PRACTITIONER

## 2024-07-19 PROCEDURE — 3044F HG A1C LEVEL LT 7.0%: CPT | Performed by: NURSE PRACTITIONER

## 2024-07-19 PROCEDURE — 3078F DIAST BP <80 MM HG: CPT | Performed by: NURSE PRACTITIONER

## 2024-07-19 PROCEDURE — 1159F MED LIST DOCD IN RCRD: CPT | Performed by: NURSE PRACTITIONER

## 2024-07-19 NOTE — PROGRESS NOTES
Subjective   Gely Kimble is a 63 y.o. female.     Chief Complaint   Patient presents with    Hospital discharge follow up     Chest pain       History of Present Illness  Patient went to [facility] ER on 6/21/24 with c/o chest pain; symptoms worse with exertion and pain was similar to when had pain 5/2024 and needed PCI/stenting; pt admitted for observation; had Echo and work up and labs unremarkable; was seen by cardiology and cleared for discharge next day; to follow up with cardiology in 2 weeks.    Had follow up with cardiology 7/15/24 and no changes; instructed to follow up in 6 months.    F/U DM2: takes Levemir daily and Metformin twice daily; last A1c 6.8%; monitors blood sugar and typically runs 110-120s; watches carbs/sugars in diet; some exercise, but has had fatigue, feels tired all the time; no numbness or tingling; last eye exam 8/2023 at [facility]; has upcoming eye exam.     F/U HTN: takes Lisinopril and Metoprolol daily; increased Lisinopril to 10 mg daily; monitors BP, BP has been good; no headaches; no orthostasis; has to change position slowly; mild swelling later in the day, resolves with elevation; also takes Plavix and aspirin daily.     F/U Hyperlipidemia: takes Atorvastatin daily; no myalgias.     F/U anemia: takes daily iron supplement and has been feeling better; recent labs had improved; cardio decreased iron supplement to once daily; has upcoming appointment with GI next month.    F/U YARELIS: takes Pantoprazole daily and works well.     F/U anxiety/depression: takes Escitalopram daily and works well; no change in sleep; has some trouble falling asleep and staying asleep; no SI/HI; has good support, lives with sister and son.     F/U RLS: takes Ropinirole nightly and works well; no leg cramps.    Also, c/o discomfort in left 1st finger; takes Tylenol arthritis as needed and helps; would like referral to hand specialty.     Needs refill of magnesium        Within  48 business hours after discharge our office contacted her via telephone to coordinate her care and needs.        9/14/2023     6:02 PM 1/28/2024     3:07 PM 5/18/2024     4:16 PM 6/22/2024     2:22 PM   Date of TCM Phone Call   [facility], [place], operated by [facility]   Date of Admission 9/13/2023 1/27/2024 5/16/2024 6/21/2024   Date of Discharge 9/14/2023 1/28/2024 5/18/2024 6/22/2024   Discharge Disposition Home or Self Care Home or Self Care Home or Self Care Home or Self Care     Risk for Readmission (LACE) No data recorded    Patient was admitted to [facility]   ALL records were obtained and reviewed.  Date of admission 6/21/24  Date of discharge 6/22/24  Diagnosis:  chest pain  Medications upon discharge: Reviewed and reconciled  Condition stable    Current outpatient and discharge medications have been reconciled for the patient.    I reviewed and requested records labs and diagnostics from the hospital with the patient and family.  The patient is to follow-up with specialist as discussed and directed.  If any problems arise or further questions develop patient is to call or to contact us for any needs.     The following portions of the patient's history were reviewed and updated as appropriate: allergies, current medications, past family history, past medical history, past social history, past surgical history and problem list.    Current Outpatient Medications   Medication Sig Dispense Refill    acetaminophen (TYLENOL) 500 MG tablet Take 1 tablet by mouth Every 8 (Eight) Hours As Needed for Mild Pain or Moderate Pain.      aspirin 81 MG chewable tablet Chew 1 tablet Every Night.      atorvastatin (LIPITOR) 40 MG tablet Take 1 tablet by mouth Daily. (Patient taking differently: Take 1 tablet by mouth Every Night.) 30 tablet 11    clopidogrel (PLAVIX) 75 MG tablet Take 1 tablet by mouth Daily. 30 tablet 11    escitalopram (LEXAPRO) 20 MG  "tablet TAKE 1 TABLET BY MOUTH DAILY (Patient taking differently: Take 1 tablet by mouth Every Night.) 90 tablet 1    Ferrous Sulfate  (45 Fe) MG tablet controlled-release Take 1 tablet by mouth Daily. 30 tablet 2    Homeopathic Products (LEG CRAMP RELIEF PO) Take 1 tablet by mouth Daily.      insulin detemir (Levemir) 100 UNIT/ML injection Inject 20 Units under the skin into the appropriate area as directed Every Night.      Insulin Syringe 31G X 5/16\" 1 ML misc Use 1 syringe Daily. For use with Levemir vials 100 each 2    lisinopril (PRINIVIL,ZESTRIL) 5 MG tablet Take 1 tablet by mouth Daily. (Patient taking differently: Take 2 tablets by mouth Every Night.) 90 tablet 1    LORATADINE PO Take 1 tablet by mouth Daily.      magnesium oxide (MAG-OX) 400 MG tablet Take 1 tablet by mouth Every Night. 90 tablet 1    metFORMIN ER (GLUCOPHAGE-XR) 500 MG 24 hr tablet TAKE TWO TABLETS BY MOUTH TWICE A DAY WITH A MEAL (Patient taking differently: Take 2 tablets by mouth 2 (Two) Times a Day.) 120 tablet 3    metoprolol succinate XL (TOPROL-XL) 25 MG 24 hr tablet Take 1 tablet by mouth Daily. 90 tablet 2    Multiple Vitamin (MULTIVITAMIN) tablet Take 1 tablet by mouth Daily.      nitroglycerin (Nitrostat) 0.4 MG SL tablet Place 1 tablet under the tongue Every 5 (Five) Minutes As Needed for Chest Pain. Take no more than 3 doses in 15 minutes. 100 tablet 12    pantoprazole (PROTONIX) 20 MG EC tablet TAKE 1 TABLET BY MOUTH DAILY 30 tablet 2    rOPINIRole (REQUIP) 0.25 MG tablet TAKE ONE TABLET BY MOUTH ONCE NIGHTLYONE HOUR BEFORE BEDTIME. (Patient taking differently: Take 1 tablet by mouth Every Night.) 30 tablet 2    vitamin D (ERGOCALCIFEROL) 1.25 MG (64373 UT) capsule capsule Take 1 capsule by mouth 1 (One) Time Per Week.       No current facility-administered medications for this visit.       Current Outpatient Medications on File Prior to Visit   Medication Sig    acetaminophen (TYLENOL) 500 MG tablet Take 1 tablet by " "mouth Every 8 (Eight) Hours As Needed for Mild Pain or Moderate Pain.    aspirin 81 MG chewable tablet Chew 1 tablet Every Night.    atorvastatin (LIPITOR) 40 MG tablet Take 1 tablet by mouth Daily. (Patient taking differently: Take 1 tablet by mouth Every Night.)    clopidogrel (PLAVIX) 75 MG tablet Take 1 tablet by mouth Daily.    escitalopram (LEXAPRO) 20 MG tablet TAKE 1 TABLET BY MOUTH DAILY (Patient taking differently: Take 1 tablet by mouth Every Night.)    Ferrous Sulfate  (45 Fe) MG tablet controlled-release Take 1 tablet by mouth Daily.    Homeopathic Products (LEG CRAMP RELIEF PO) Take 1 tablet by mouth Daily.    insulin detemir (Levemir) 100 UNIT/ML injection Inject 20 Units under the skin into the appropriate area as directed Every Night.    Insulin Syringe 31G X 5/16\" 1 ML misc Use 1 syringe Daily. For use with Levemir vials    lisinopril (PRINIVIL,ZESTRIL) 5 MG tablet Take 1 tablet by mouth Daily. (Patient taking differently: Take 2 tablets by mouth Every Night.)    LORATADINE PO Take 1 tablet by mouth Daily.    metFORMIN ER (GLUCOPHAGE-XR) 500 MG 24 hr tablet TAKE TWO TABLETS BY MOUTH TWICE A DAY WITH A MEAL (Patient taking differently: Take 2 tablets by mouth 2 (Two) Times a Day.)    metoprolol succinate XL (TOPROL-XL) 25 MG 24 hr tablet Take 1 tablet by mouth Daily.    Multiple Vitamin (MULTIVITAMIN) tablet Take 1 tablet by mouth Daily.    nitroglycerin (Nitrostat) 0.4 MG SL tablet Place 1 tablet under the tongue Every 5 (Five) Minutes As Needed for Chest Pain. Take no more than 3 doses in 15 minutes.    pantoprazole (PROTONIX) 20 MG EC tablet TAKE 1 TABLET BY MOUTH DAILY    rOPINIRole (REQUIP) 0.25 MG tablet TAKE ONE TABLET BY MOUTH ONCE NIGHTLYONE HOUR BEFORE BEDTIME. (Patient taking differently: Take 1 tablet by mouth Every Night.)    vitamin D (ERGOCALCIFEROL) 1.25 MG (91314 UT) capsule capsule Take 1 capsule by mouth 1 (One) Time Per Week.     No current facility-administered medications " on file prior to visit.       Past Medical History:   Diagnosis Date    Abnormal nuclear stress test     Arthritis     COPD (chronic obstructive pulmonary disease)     Elevated cholesterol     Emphysema of lung     Gallbladder abscess     Herpes zoster 08/03/2023    History of positive hepatitis C     previously positive, never treated, negative RNA finding    Hypertension     Myocardial perfusion defect 02/16/2016    Type 2 diabetes mellitus        Past Surgical History:   Procedure Laterality Date    APPENDECTOMY      CARDIAC CATHETERIZATION N/A 10/25/2023    Procedure: Left Heart Cath;  Surgeon: Tasneem Hayes MD;  Location:  [facility];  Service: Cardiovascular;  Laterality: N/A;    CARDIAC CATHETERIZATION N/A 10/25/2023    Procedure: Left ventriculography;  Surgeon: Tasneem Hayes MD;  Location:  [facility];  Service: Cardiovascular;  Laterality: N/A;    CARDIAC CATHETERIZATION N/A 10/25/2023    Procedure: Stent LEYLA coronary;  Surgeon: Tasneem Hayes MD;  Location:  [facility];  Service: Cardiovascular;  Laterality: N/A;    CARDIAC CATHETERIZATION N/A 10/25/2023    Procedure: Coronary angiography;  Surgeon: Tasneem Hayes MD;  Location:  [facility];  Service: Cardiovascular;  Laterality: N/A;    CARDIAC CATHETERIZATION N/A 10/25/2023    Procedure: Percutaneous Coronary Intervention;  Surgeon: Tasneem Hayes MD;  Location:  [facility];  Service: Cardiovascular;  Laterality: N/A;    CARDIAC CATHETERIZATION N/A 11/3/2023    Procedure: Percutaneous Coronary Intervention RCA;  Surgeon: Tasneem Hayes MD;  [facility];  Service: Cardiovascular;  Laterality: N/A;    CARDIAC CATHETERIZATION N/A 11/3/2023    Procedure: Coronary angiography;  Surgeon: Tasneem Hayes MD;  Location:  [facility];  Service: Cardiovascular;  Laterality:  N/A;    CARDIAC CATHETERIZATION N/A 11/3/2023    Procedure: Stent LEYLA coronary;  Surgeon: Tasneem Hayes MD;  Location:  [facility];  Service: Cardiovascular;  Laterality: N/A;    CARDIAC CATHETERIZATION N/A 2024    Procedure: Left Heart Cath;  Surgeon: Tasneem Hayes MD;  Location:  [facility];  Service: Cardiovascular;  Laterality: N/A;    CARDIAC CATHETERIZATION N/A 2024    Procedure: Percutaneous Coronary Intervention;  Surgeon: Tasneem Hayes MD;  Location:  [facility];  Service: Cardiovascular;  Laterality: N/A;    CARDIAC CATHETERIZATION N/A 2024    Procedure: Coronary angiography;  Surgeon: Tasneem Hayes MD;  Location:  [facility];  Service: Cardiovascular;  Laterality: N/A;    CARDIAC CATHETERIZATION N/A 2024    Procedure: Left ventriculography;  Surgeon: Tasneem Hayes MD;  Location:  [facility];  Service: Cardiovascular;  Laterality: N/A;    CHOLECYSTECTOMY      HYSTERECTOMY      TUBAL ABDOMINAL LIGATION         Family History   Problem Relation Age of Onset    Diabetes Mother     Heart disease Mother     Diabetes Father     Diabetes Brother     Heart disease Maternal Grandmother     Cancer Paternal Grandfather        Social History     Socioeconomic History    Marital status:    Tobacco Use    Smoking status: Former     Current packs/day: 0.00     Average packs/day: 1.5 packs/day for 45.8 years (68.7 ttl pk-yrs)     Types: Cigarettes     Start date:      Quit date: 10/11/2017     Years since quittin.7     Passive exposure: Past    Smokeless tobacco: Never    Tobacco comments:     Previously smoked about 1.5 packs per day for about 45 years   Vaping Use    Vaping status: Never Used   Substance and Sexual Activity    Alcohol use: Not Currently     Comment: rare margaritia 1-2 times per year    Drug use: No     Comment: in past; clean since  "2007    Sexual activity: Not Currently       Review of Systems   Constitutional:  Positive for fatigue. Negative for chills, fever and unexpected weight loss. Appetite change: has had good appetite. Weight gain: weight has increased with improved appetite.  HENT:  Negative for ear pain, sinus pressure, sore throat and trouble swallowing.    Respiratory:  Negative for cough, chest tightness and shortness of breath.    Cardiovascular:  Negative for chest pain and palpitations.   Gastrointestinal:  Negative for abdominal pain, blood in stool and constipation. Diarrhea: has had some loose BMs.  Genitourinary:  Negative for dysuria and frequency.   Musculoskeletal:  Positive for arthralgias. Negative for back pain.   Skin:  Negative for rash.   Neurological:  Negative for dizziness, syncope, light-headedness and headache.       Objective   Vitals:    07/22/24 2254   BP: 110/70   BP Location: Left arm   Patient Position: Sitting   Pulse: 73   SpO2: 95%   Weight: 66.7 kg (147 lb)   Height: 170.2 cm (67\")     Body mass index is 23.02 kg/m².    Physical Exam  Vitals and nursing note reviewed.   Constitutional:       General: She is not in acute distress.     Appearance: She is well-developed and well-groomed. She is not diaphoretic.   HENT:      Head: Normocephalic.      Right Ear: Tympanic membrane and external ear normal.      Left Ear: External ear normal. Left ear middle ear effusion: TM dull. Tympanic membrane is not erythematous. Left ear retracted TM: mild.     Nose: Nose normal.      Right Sinus: No maxillary sinus tenderness or frontal sinus tenderness.      Left Sinus: No maxillary sinus tenderness or frontal sinus tenderness.      Mouth/Throat:      Mouth: Mucous membranes are moist.      Pharynx: No oropharyngeal exudate or posterior oropharyngeal erythema.   Eyes:      Conjunctiva/sclera: Conjunctivae normal.   Neck:      Vascular: No carotid bruit.   Cardiovascular:      Rate and Rhythm: Normal rate and regular " rhythm.      Pulses: Normal pulses.      Heart sounds: Normal heart sounds. No murmur heard.  Pulmonary:      Effort: Pulmonary effort is normal. No respiratory distress.      Breath sounds: Normal breath sounds.   Abdominal:      General: Bowel sounds are normal.      Palpations: Abdomen is soft. There is no hepatomegaly or splenomegaly.      Tenderness: There is no abdominal tenderness. There is no guarding.   Musculoskeletal:      Cervical back: Normal range of motion and neck supple.      Right lower leg: No edema.      Left lower leg: No edema.   Lymphadenopathy:      Cervical: No cervical adenopathy.   Skin:     General: Skin is warm and dry.      Findings: No rash.   Neurological:      Mental Status: She is alert and oriented to person, place, and time.      Gait: Gait normal.   Psychiatric:         Mood and Affect: Mood normal.         Behavior: Behavior normal.         Thought Content: Thought content normal.         Cognition and Memory: Cognition normal.         Judgment: Judgment normal.         Lab Results   Component Value Date    WBC 6.20 06/22/2024    RBC 4.66 06/22/2024    HGB 10.9 (L) 06/22/2024    HCT 36.4 06/22/2024    MCV 78.1 (L) 06/22/2024    MCH 23.4 (L) 06/22/2024    MCHC 29.9 (L) 06/22/2024    RDW 18.3 (H) 06/22/2024    RDWSD 51.6 06/22/2024    MPV 11.1 06/22/2024     06/22/2024    NEUTRORELPCT 61.4 06/21/2024    LYMPHORELPCT 30.4 06/21/2024    MONORELPCT 5.7 06/21/2024    EOSRELPCT 1.2 06/21/2024    BASORELPCT 1.2 06/21/2024    AUTOIGPER 0.1 06/21/2024    NEUTROABS 4.17 06/21/2024    LYMPHSABS 2.07 06/21/2024    MONOSABS 0.39 06/21/2024    EOSABS 0.08 06/21/2024    BASOSABS 0.08 06/21/2024    AUTOIGNUM 0.01 06/21/2024    NRBC 0.0 06/21/2024     Lab Results   Component Value Date    GLUCOSE 119 (H) 06/22/2024    BUN 10 06/22/2024    CREATININE 0.70 06/22/2024    EGFRIFNONA 108 03/20/2020    EGFRIFAFRI >60 10/28/2020    BCR 14.3 06/22/2024    K 4.1 06/22/2024    CO2 30.6 (H)  06/22/2024    CALCIUM 9.3 06/22/2024    PROTENTOTREF 6.6 03/19/2024    ALBUMIN 4.5 06/21/2024    LABIL2 2.0 03/19/2024    AST 14 06/21/2024    ALT 14 06/21/2024      Lab Results   Component Value Date    CHLPL 117 03/19/2024    TRIG 106 06/21/2024    HDL 51 06/21/2024    VLDL 20 06/21/2024    LDL 27 06/21/2024     Lab Results   Component Value Date    TSH 0.693 05/18/2024     Lab Results   Component Value Date    HGBA1C 6.80 (H) 05/18/2024         Assessment & Plan .  Problem List Items Addressed This Visit       Type 2 diabetes mellitus    Current Assessment & Plan     Diabetes is stable per home blood sugar readings.   Continue current treatment regimen.  Regular aerobic exercise.  Reminded to get yearly retinal exam.  Diabetes will be reassessed in 1 month  Continue Levemir daily and Metformin twice daily.         Other hyperlipidemia    Current Assessment & Plan     Continue Atorvastatin daily.  Continue to work on healthy diet and exercise.         Primary hypertension    Current Assessment & Plan     Hypertension is stable and controlled  Continue current treatment regimen.  Ambulatory blood pressure monitoring.  Blood pressure will be reassessed in 1 month.  Continue Lisinopril and Metoprolol daily.         Restless leg syndrome    Current Assessment & Plan     Stable.  Continue Ropinirole nightly.         Moderate episode of recurrent major depressive disorder    Current Assessment & Plan     Patient's depression is a recurrent episode that is moderate without psychosis. Depression is active and stable.    Plan:   Continue current medication therapy   Continue Escitalopram daily.  Followup at the next regular appointment.          Generalized anxiety disorder    Current Assessment & Plan     Psychological condition is stable.  Continue current treatment regimen.  Psychological condition  will be reassessed at the next regular appointment.  Continue Escitalopram daily.         RESOLVED: Chest pain    Coronary  artery disease of native artery of native heart with stable angina pectoris    Current Assessment & Plan     Continue aspirin and Plavix daily.  Follow up as scheduled with cardiology.         Gastroesophageal reflux disease    Current Assessment & Plan     Stable.  Continue Pantoprazole daily.         Relevant Medications    magnesium oxide (MAG-OX) 400 MG tablet    Pain of left hand    Relevant Orders    Ambulatory Referral to Hand Surgery    Anemia    Current Assessment & Plan     Continue daily iron supplement.         Relevant Orders    POC Occult Blood Stool    Low magnesium level    Relevant Medications    magnesium oxide (MAG-OX) 400 MG tablet     Other Visit Diagnoses       Hospital discharge follow-up    -  Primary            Return in about 1 month (around 8/19/2024) for Medicare Wellness, Recheck.or sooner if symptoms problems or concerns.  Will recheck labs at follow up appointment.

## 2024-07-20 RX ORDER — MAGNESIUM OXIDE 400 MG/1
400 TABLET ORAL NIGHTLY
Qty: 90 TABLET | Refills: 1 | Status: SHIPPED | OUTPATIENT
Start: 2024-07-20

## 2024-07-22 VITALS
OXYGEN SATURATION: 95 % | HEART RATE: 73 BPM | BODY MASS INDEX: 23.07 KG/M2 | DIASTOLIC BLOOD PRESSURE: 70 MMHG | SYSTOLIC BLOOD PRESSURE: 110 MMHG | HEIGHT: 67 IN | WEIGHT: 147 LBS

## 2024-07-22 PROBLEM — R07.9 CHEST PAIN: Status: RESOLVED | Noted: 2023-09-13 | Resolved: 2024-07-22

## 2024-07-22 PROBLEM — R79.0 LOW MAGNESIUM LEVEL: Status: ACTIVE | Noted: 2024-07-22

## 2024-07-23 NOTE — ASSESSMENT & PLAN NOTE
Patient's depression is a recurrent episode that is moderate without psychosis. Depression is active and stable.    Plan:   Continue current medication therapy   Continue Escitalopram daily.  Followup at the next regular appointment.

## 2024-07-23 NOTE — PATIENT INSTRUCTIONS
Try daily probiotic.  Continue to monitor your blood sugar once daily before a meal and record results.  Continue to monitor your blood pressure periodically and record results.  Continue to work on healthy diet and exercise.  Follow up in 1 month for Medicare Wellness, or sooner if problems or concerns.

## 2024-07-23 NOTE — ASSESSMENT & PLAN NOTE
Diabetes is stable per home blood sugar readings.   Continue current treatment regimen.  Regular aerobic exercise.  Reminded to get yearly retinal exam.  Diabetes will be reassessed in 1 month  Continue Levemir daily and Metformin twice daily.

## 2024-07-23 NOTE — ASSESSMENT & PLAN NOTE
Psychological condition is stable.  Continue current treatment regimen.  Psychological condition  will be reassessed at the next regular appointment.  Continue Escitalopram daily.

## 2024-07-23 NOTE — ASSESSMENT & PLAN NOTE
Hypertension is stable and controlled  Continue current treatment regimen.  Ambulatory blood pressure monitoring.  Blood pressure will be reassessed in 1 month.  Continue Lisinopril and Metoprolol daily.

## 2024-08-06 ENCOUNTER — OFFICE VISIT (OUTPATIENT)
Dept: FAMILY MEDICINE CLINIC | Facility: CLINIC | Age: 64
End: 2024-08-06
Payer: MEDICARE

## 2024-08-06 VITALS
DIASTOLIC BLOOD PRESSURE: 70 MMHG | OXYGEN SATURATION: 96 % | HEIGHT: 67 IN | WEIGHT: 145.6 LBS | HEART RATE: 75 BPM | TEMPERATURE: 97 F | SYSTOLIC BLOOD PRESSURE: 110 MMHG | BODY MASS INDEX: 22.85 KG/M2

## 2024-08-06 DIAGNOSIS — G25.81 RESTLESS LEG SYNDROME: ICD-10-CM

## 2024-08-06 DIAGNOSIS — E78.49 OTHER HYPERLIPIDEMIA: ICD-10-CM

## 2024-08-06 DIAGNOSIS — E11.42 TYPE 2 DIABETES MELLITUS WITH DIABETIC POLYNEUROPATHY, WITH LONG-TERM CURRENT USE OF INSULIN: ICD-10-CM

## 2024-08-06 DIAGNOSIS — I25.118 CORONARY ARTERY DISEASE OF NATIVE ARTERY OF NATIVE HEART WITH STABLE ANGINA PECTORIS: ICD-10-CM

## 2024-08-06 DIAGNOSIS — F33.1 MODERATE EPISODE OF RECURRENT MAJOR DEPRESSIVE DISORDER: ICD-10-CM

## 2024-08-06 DIAGNOSIS — D64.9 ANEMIA, UNSPECIFIED TYPE: ICD-10-CM

## 2024-08-06 DIAGNOSIS — M79.642 PAIN OF LEFT HAND: ICD-10-CM

## 2024-08-06 DIAGNOSIS — I10 PRIMARY HYPERTENSION: ICD-10-CM

## 2024-08-06 DIAGNOSIS — Z79.4 TYPE 2 DIABETES MELLITUS WITH DIABETIC POLYNEUROPATHY, WITH LONG-TERM CURRENT USE OF INSULIN: ICD-10-CM

## 2024-08-06 DIAGNOSIS — Z23 ENCOUNTER FOR IMMUNIZATION: ICD-10-CM

## 2024-08-06 DIAGNOSIS — K21.9 GASTROESOPHAGEAL REFLUX DISEASE, UNSPECIFIED WHETHER ESOPHAGITIS PRESENT: ICD-10-CM

## 2024-08-06 DIAGNOSIS — Z00.00 ENCOUNTER FOR MEDICARE ANNUAL WELLNESS EXAM: Primary | ICD-10-CM

## 2024-08-06 DIAGNOSIS — F41.1 GENERALIZED ANXIETY DISORDER: ICD-10-CM

## 2024-08-06 DIAGNOSIS — R79.0 LOW MAGNESIUM LEVEL: ICD-10-CM

## 2024-08-06 RX ORDER — LISINOPRIL 10 MG/1
10 TABLET ORAL DAILY
COMMUNITY

## 2024-08-06 NOTE — PATIENT INSTRUCTIONS
Continue to monitor your blood sugar once daily before a meal and record results.  Continue to monitor your blood pressure periodically and record results.  Continue to work on healthy diet and exercise.  Follow up pending lab results.  Follow up in 4 months, or sooner if problems or concerns.       Medicare Wellness  Personal Prevention Plan of Service     Date of Office Visit:    Encounter Provider:  DARIAN Garcia  Place of Service:  [facility]  Patient Name: Gely Kimble  :  1960    As part of the Medicare Wellness portion of your visit today, we are providing you with this personalized preventive plan of services (PPPS). This plan is based upon recommendations of the [place] Preventive Services Task Force (USPSTF) and the Advisory Committee on Immunization Practices (ACIP).    This lists the preventive care services that should be considered, and provides dates of when you are due. Items listed as completed are up-to-date and do not require any further intervention.    Health Maintenance   Topic Date Due    COLORECTAL CANCER SCREENING  Never done    DIABETIC EYE EXAM  Never done    ZOSTER VACCINE (1 of 2) Never done    Pneumococcal Vaccine 65+ (2 of 2 - PCV) 2017    ANNUAL WELLNESS VISIT  2024    DIABETIC FOOT EXAM  2024    URINE MICROALBUMIN  2024    INFLUENZA VACCINE  2024    COVID-19 Vaccine ( - - season) 2025 (Originally 2023)    HEMOGLOBIN A1C  2024    LIPID PANEL  2025    LUNG CANCER SCREENING  2025    DXA SCAN  04/10/2026    MAMMOGRAM  2026    TDAP/TD VACCINES (2 - Td or Tdap) 2030    HEPATITIS C SCREENING  Completed    PAP SMEAR  Discontinued       Orders Placed This Encounter   Procedures    Pneumococcal Conjugate Vaccine 20-Valent All    Microalbumin / Creatinine Urine Ratio - Urine, Clean Catch     Order Specific Question:   Release to patient     Answer:   Routine  Release [1400000002]    Comprehensive Metabolic Panel     Order Specific Question:   Release to patient     Answer:   Routine Release [1400000002]    Hemoglobin A1c     Order Specific Question:   Release to patient     Answer:   Routine Release [1400000002]    Magnesium     Order Specific Question:   Release to patient     Answer:   Routine Release [1400000002]    Ferritin     Order Specific Question:   Release to patient     Answer:   Routine Release [1400000002]    Iron     Order Specific Question:   Release to patient     Answer:   Routine Release [1400000002]    Vitamin B12 & Folate     Order Specific Question:   Release to patient     Answer:   Routine Release [1400000002]    CBC & Differential     Order Specific Question:   Manual Differential     Answer:   No     Order Specific Question:   Release to patient     Answer:   Routine Release [1400000002]       Return in about 4 months (around 12/6/2024) for Recheck.

## 2024-08-06 NOTE — ASSESSMENT & PLAN NOTE
Hypertension is stable and controlled  Continue current treatment regimen.  Regular aerobic exercise.  Ambulatory blood pressure monitoring.  Blood pressure will be reassessed 4 months.  Continue Lisinopril and Metoprolol daily.  Follow up as scheduled with cardiology.

## 2024-08-06 NOTE — PROGRESS NOTES
" Subjective   The ABCs of the Annual Wellness Visit  Medicare Wellness Visit      Gely Kimble is a 64 y.o. patient who presents for a Medicare Wellness Visit.    The following portions of the patient's history were reviewed and   updated as appropriate: allergies, current medications, past family history, past medical history, past social history, past surgical history, and problem list.    Compared to one year ago, the patient's physical   health is the same.  Compared to one year ago, the patient's mental   health is worse.    Recent Hospitalizations:  This patient has had a [facility] admission record on file within the last 365 days.  Current Medical Providers:  Patient Care Team:  Sofi Fernandes APRN as PCP - General (Family Medicine)  Tasneem Hayes MD as Consulting Physician (Cardiology)    Outpatient Medications Prior to Visit   Medication Sig Dispense Refill    acetaminophen (TYLENOL) 500 MG tablet Take 1 tablet by mouth Every 8 (Eight) Hours As Needed for Mild Pain or Moderate Pain.      aspirin 81 MG chewable tablet Chew 1 tablet Every Night.      atorvastatin (LIPITOR) 40 MG tablet Take 1 tablet by mouth Daily. (Patient taking differently: Take 1 tablet by mouth Every Night.) 30 tablet 11    clopidogrel (PLAVIX) 75 MG tablet Take 1 tablet by mouth Daily. 30 tablet 11    escitalopram (LEXAPRO) 20 MG tablet TAKE 1 TABLET BY MOUTH DAILY (Patient taking differently: Take 1 tablet by mouth Every Night.) 90 tablet 1    Ferrous Sulfate  (45 Fe) MG tablet controlled-release Take 1 tablet by mouth Daily. 30 tablet 2    Homeopathic Products (LEG CRAMP RELIEF PO) Take 1 tablet by mouth Daily.      insulin detemir (Levemir) 100 UNIT/ML injection Inject 20 Units under the skin into the appropriate area as directed Every Night.      Insulin Syringe 31G X 5/16\" 1 ML misc Use 1 syringe Daily. For use with Levemir vials 100 each 2    lisinopril (PRINIVIL,ZESTRIL) 10 MG tablet Take 1 tablet by " mouth Daily.      LORATADINE PO Take 1 tablet by mouth Daily.      magnesium oxide (MAG-OX) 400 MG tablet Take 1 tablet by mouth Every Night. 90 tablet 1    metFORMIN ER (GLUCOPHAGE-XR) 500 MG 24 hr tablet TAKE TWO TABLETS BY MOUTH TWICE A DAY WITH A MEAL (Patient taking differently: Take 2 tablets by mouth 2 (Two) Times a Day.) 120 tablet 3    metoprolol succinate XL (TOPROL-XL) 25 MG 24 hr tablet Take 1 tablet by mouth Daily. 90 tablet 2    Multiple Vitamin (MULTIVITAMIN) tablet Take 1 tablet by mouth Daily.      nitroglycerin (Nitrostat) 0.4 MG SL tablet Place 1 tablet under the tongue Every 5 (Five) Minutes As Needed for Chest Pain. Take no more than 3 doses in 15 minutes. 100 tablet 12    pantoprazole (PROTONIX) 20 MG EC tablet TAKE 1 TABLET BY MOUTH DAILY 30 tablet 2    rOPINIRole (REQUIP) 0.25 MG tablet TAKE ONE TABLET BY MOUTH ONCE NIGHTLYONE HOUR BEFORE BEDTIME. (Patient taking differently: Take 1 tablet by mouth Every Night.) 30 tablet 2    vitamin D (ERGOCALCIFEROL) 1.25 MG (22254 UT) capsule capsule Take 1 capsule by mouth 1 (One) Time Per Week.      lisinopril (PRINIVIL,ZESTRIL) 5 MG tablet Take 1 tablet by mouth Daily. (Patient not taking: Reported on 8/6/2024) 90 tablet 1     No facility-administered medications prior to visit.     No opioid medication identified on active medication list. I have reviewed chart for other potential  high risk medication/s and harmful drug interactions in the elderly.      Aspirin is on active medication list. Aspirin use is indicated based on review of current medical condition/s. Pros and cons of this therapy have been discussed today. Benefits of this medication outweigh potential harm.  Patient has been encouraged to continue taking this medication.  .      Patient Active Problem List   Diagnosis    Type 2 diabetes mellitus    Pulmonary emphysema    Arthritis    Other hyperlipidemia    Primary hypertension    Abnormal liver function tests    Impingement syndrome of  "shoulder region    Neck pain    Postmenopause    Restless leg syndrome    Urinary frequency    Vitamin D deficiency    Fatigue    Moderate episode of recurrent major depressive disorder    Generalized anxiety disorder    Insomnia    Skin lesion of back    Nausea    Incomplete right bundle branch block (RBBB)    Coronary artery disease of native artery of native heart with stable angina pectoris    Tendency to bleed    Gastroesophageal reflux disease    Pain of left hand    Left leg swelling    Anemia    Former smoker    Long term (current) use of antithrombotics/antiplatelets    Low magnesium level     Advance Care Planning Advance Directive is on file.  ACP discussion was held with the patient during this visit. Patient has an advance directive in EMR which is still valid.           Objective   Vitals:    24 0758   BP: 110/70   BP Location: Left arm   Patient Position: Sitting   Cuff Size: Adult   Pulse: 75   Temp: 97 °F (36.1 °C)   SpO2: 96%   Weight: 66 kg (145 lb 9.6 oz)   Height: 170.2 cm (67\")       Estimated body mass index is 22.8 kg/m² as calculated from the following:    Height as of this encounter: 170.2 cm (67\").    Weight as of this encounter: 66 kg (145 lb 9.6 oz).    BMI is within normal parameters. No other follow-up for BMI required.    Does the patient have evidence of cognitive impairment?  No, as evidenced by mini-cog assessment; see results.    Lab Results   Component Value Date    TRIG 106 2024    HDL 51 2024    LDL 27 2024    VLDL 20 2024    HGBA1C 6.80 (H) 2024                                                                                                Health  Risk Assessment    Smoking Status:  Social History     Tobacco Use   Smoking Status Former    Current packs/day: 0.00    Average packs/day: 1.5 packs/day for 45.8 years (68.7 ttl pk-yrs)    Types: Cigarettes    Start date:     Quit date: 10/11/2017    Years since quittin.8    Passive " exposure: Past   Smokeless Tobacco Never   Tobacco Comments    Previously smoked about 1.5 packs per day for about 45 years     Alcohol Consumption:  Social History     Substance and Sexual Activity   Alcohol Use Not Currently    Comment: rare pan 1-2 times per year       Fall Risk Screen  STEADI Fall Risk Assessment was completed, and patient is at LOW risk for falls.Assessment completed on:2024    Depression Screenin/6/2024     7:58 AM   PHQ-2/PHQ-9 Depression Screening   Little Interest or Pleasure in Doing Things 0-->not at all   Feeling Down, Depressed or Hopeless 0-->not at all   PHQ-9: Brief Depression Severity Measure Score 0     Health Habits and Functional and Cognitive Screenin/6/2024     7:00 AM   Functional & Cognitive Status   Do you have difficulty preparing food and eating? No   Do you have difficulty bathing yourself, getting dressed or grooming yourself? No   Do you have difficulty using the toilet? No   Do you have difficulty moving around from place to place? No   Do you have trouble with steps or getting out of a bed or a chair? No   Current Diet Well Balanced Diet   Dental Exam Other   Eye Exam Not up to date   Exercise (times per week) 2 times per week   Current Exercises Include Walking;Treadmill   Do you need help using the phone?  No   Are you deaf or do you have serious difficulty hearing?  No   Do you need help to go to places out of walking distance? No   Do you need help shopping? No   Do you need help preparing meals?  No   Do you need help with housework?  No   Do you need help with laundry? No   Do you need help taking your medications? No   Do you need help managing money? No   Do you ever drive or ride in a car without wearing a seat belt? No   Have you felt unusual stress, anger or loneliness in the last month? No   Who do you live with? Sibling   If you need help, do you have trouble finding someone available to you? No   Have you been bothered in the  last four weeks by sexual problems? No   Do you have difficulty concentrating, remembering or making decisions? No           Age-appropriate Screening Schedule:  Refer to the list below for future screening recommendations based on patient's age, sex and/or medical conditions. Orders for these recommended tests are listed in the plan section. The patient has been provided with a written plan.    Health Maintenance List  Health Maintenance   Topic Date Due    COLORECTAL CANCER SCREENING  Never done    DIABETIC EYE EXAM  Never done    ZOSTER VACCINE (1 of 2) Never done    URINE MICROALBUMIN  08/02/2024    INFLUENZA VACCINE  08/01/2024    COVID-19 Vaccine (4 - 2023-24 season) 03/19/2025 (Originally 9/1/2023)    HEMOGLOBIN A1C  11/18/2024    LIPID PANEL  06/21/2025    LUNG CANCER SCREENING  07/11/2025    ANNUAL WELLNESS VISIT  08/06/2025    DIABETIC FOOT EXAM  08/06/2025    DXA SCAN  04/10/2026    MAMMOGRAM  07/11/2026    TDAP/TD VACCINES (2 - Td or Tdap) 12/12/2030    HEPATITIS C SCREENING  Completed    Pneumococcal Vaccine 65+  Completed    PAP SMEAR  Discontinued     Has upcoming eye exam, due this month, wears glasses.  Has upcoming appointment with GI, due for colonoscopy.  Will consider Shingrix at pharmacy; will get PCV20 today.                                                                                                                                             CMS Preventative Services Quick Reference  Risk Factors Identified During Encounter  Immunizations Discussed/Encouraged: Prevnar 20 (Pneumococcal 20-valent conjugate) and Shingrix    The above risks/problems have been discussed with the patient.    Discussed low risk for falls and recommended avoiding throw rugs, installing grab bars in bathroom, making sure have handrails on steps, etc.    Pertinent information has been shared with the patient in the After Visit Summary.  An After Visit Summary and PPPS were made available to the patient.    Follow  Up:  Next Medicare Wellness visit to be scheduled in 1 year.         Additional E&M Note during same encounter follows:  Patient has additional, significant, and separately identifiable condition(s)/problem(s) that require work above and beyond the Medicare Wellness Visit     Chief Complaint  Annual Exam ([place] /)    Subjective   BRAD Hong is also being seen today for additional medical problem/s.    Follow up DM2: takes Levemir daily and Metformin twice daily; last A1c 6.8%; monitors blood sugar and typically runs 90-100s; watches carbs/sugars in diet; goes to gym twice per week and does 20-25 min on treadmill and then does abdominal crunches and bicycle; no numbness or tingling other than some numbness and pain in left thumb; last eye exam 8/2023 at [facility]; has upcoming eye exam.     F/U HTN: takes Lisinopril and Metoprolol daily; monitors BP, typically runs 130s/80s; no headaches; no change orthostasis on occasion; no swelling; also takes Plavix and aspirin daily; has follow up with cardiology in 5 months.     F/U Hyperlipidemia: takes Atorvastatin daily; no myalgias; fasting today.     F/U anemia: takes daily iron supplement; has been feeling better with iron supplement; no constipation or problems with iron; has upcoming appointment with GI later this month.     F/U YARELIS: takes Pantoprazole daily and works well; no heartburn since has been on medication.     F/U anxiety/depression: takes Escitalopram nightly and working well; occasional days will feel down around passed family member's birthdays or other special occasions; no change in some trouble falling asleep and staying asleep; no SI/HI; has good support, lives with sister and son.     F/U RLS: takes Ropinirole nightly and working well; no leg cramps.     Also, c/o discomfort in left 1st finger; takes Tylenol arthritis as needed and helps; has not heard about referral to hand specialty; has some decreased ROM due to discomfort.    "  Review of Systems   Constitutional:  Positive for fatigue. Negative for appetite change, chills and fever.   HENT:  Negative for ear pain, sinus pressure, sore throat and trouble swallowing.    Eyes:  Negative for discharge and visual disturbance.   Respiratory:  Negative for cough, chest tightness and shortness of breath.    Cardiovascular:  Negative for chest pain and palpitations.   Gastrointestinal:  Negative for abdominal pain, blood in stool, constipation and diarrhea.   Endocrine: Positive for polydipsia. Negative for cold intolerance and heat intolerance.   Genitourinary:  Negative for dysuria and frequency.   Musculoskeletal:  Negative for back pain. Arthralgias: see HPI.  Skin:  Negative for rash.   Neurological:  Negative for syncope and weakness.   Hematological:  Bruises/bleeds easily: some with Plavix.          Objective   Vital Signs:  /70 (BP Location: Left arm, Patient Position: Sitting, Cuff Size: Adult)   Pulse 75   Temp 97 °F (36.1 °C)   Ht 170.2 cm (67\")   Wt 66 kg (145 lb 9.6 oz)   SpO2 96%   BMI 22.80 kg/m²     Physical Exam  Vitals and nursing note reviewed.   Constitutional:       General: She is not in acute distress.     Appearance: She is well-developed and well-groomed. She is not diaphoretic.   HENT:      Head: Normocephalic and atraumatic.      Jaw: No tenderness or pain on movement.      Right Ear: Tympanic membrane and external ear normal. No decreased hearing noted.      Left Ear: External ear normal. No decreased hearing noted. Left ear middle ear effusion: TM dull. Tympanic membrane is not erythematous.      Nose: Nose normal.      Right Sinus: No maxillary sinus tenderness or frontal sinus tenderness.      Left Sinus: No maxillary sinus tenderness or frontal sinus tenderness.      Mouth/Throat:      Mouth: Mucous membranes are moist.      Pharynx: No oropharyngeal exudate or posterior oropharyngeal erythema.   Eyes:      Extraocular Movements: Extraocular movements " intact.      Conjunctiva/sclera: Conjunctivae normal.      Pupils: Pupils are equal, round, and reactive to light.   Neck:      Thyroid: No thyromegaly.      Vascular: No carotid bruit.      Trachea: No tracheal deviation.   Cardiovascular:      Rate and Rhythm: Normal rate and regular rhythm.      Pulses: Normal pulses.           Dorsalis pedis pulses are 1+ on the right side and 1+ on the left side.        Posterior tibial pulses are 2+ on the right side and 2+ on the left side.      Heart sounds: Normal heart sounds. No murmur heard.  Pulmonary:      Effort: Pulmonary effort is normal. No respiratory distress.      Breath sounds: Normal breath sounds.   Abdominal:      General: Bowel sounds are normal.      Palpations: Abdomen is soft. There is no hepatomegaly or splenomegaly.      Tenderness: There is no abdominal tenderness. There is no guarding.   Musculoskeletal:         General: Normal range of motion.      Cervical back: Normal range of motion and neck supple. No bony tenderness.      Thoracic back: No bony tenderness.      Lumbar back: No bony tenderness.      Right lower leg: No edema.      Left lower leg: No edema.        Feet:    Feet:      Right foot:      Protective Sensation: 10 sites tested.  8 sites sensed.      Skin integrity: Skin integrity normal.      Left foot:      Protective Sensation: 10 sites tested.  8 sites sensed.      Skin integrity: Skin integrity normal.      Comments: Diabetic Foot Exam Performed and Monofilament Test Performed    Lymphadenopathy:      Cervical: No cervical adenopathy.   Skin:     General: Skin is warm and dry.      Findings: No rash.   Neurological:      Mental Status: She is alert and oriented to person, place, and time.      Cranial Nerves: No cranial nerve deficit.      Coordination: Coordination normal.      Gait: Abnormal gait: guarded gait.      Deep Tendon Reflexes: Reflexes are normal and symmetric.   Psychiatric:         Mood and Affect: Mood normal.          Behavior: Behavior normal.         Thought Content: Thought content normal.         Cognition and Memory: Cognition normal.         Judgment: Judgment normal.           Lab Results   Component Value Date    WBC 6.20 06/22/2024    RBC 4.66 06/22/2024    HGB 10.9 (L) 06/22/2024    HCT 36.4 06/22/2024    MCV 78.1 (L) 06/22/2024    MCH 23.4 (L) 06/22/2024    MCHC 29.9 (L) 06/22/2024    RDW 18.3 (H) 06/22/2024    RDWSD 51.6 06/22/2024    MPV 11.1 06/22/2024     06/22/2024    NEUTRORELPCT 61.4 06/21/2024    LYMPHORELPCT 30.4 06/21/2024    MONORELPCT 5.7 06/21/2024    EOSRELPCT 1.2 06/21/2024    BASORELPCT 1.2 06/21/2024    AUTOIGPER 0.1 06/21/2024    NEUTROABS 4.17 06/21/2024    LYMPHSABS 2.07 06/21/2024    MONOSABS 0.39 06/21/2024    EOSABS 0.08 06/21/2024    BASOSABS 0.08 06/21/2024    AUTOIGNUM 0.01 06/21/2024    NRBC 0.0 06/21/2024     Lab Results   Component Value Date    GLUCOSE 119 (H) 06/22/2024    BUN 10 06/22/2024    CREATININE 0.70 06/22/2024    EGFRIFNONA 108 03/20/2020    EGFRIFAFRI >60 10/28/2020    BCR 14.3 06/22/2024    K 4.1 06/22/2024    CO2 30.6 (H) 06/22/2024    CALCIUM 9.3 06/22/2024    PROTENTOTREF 6.6 03/19/2024    ALBUMIN 4.5 06/21/2024    LABIL2 2.0 03/19/2024    AST 14 06/21/2024    ALT 14 06/21/2024      Lab Results   Component Value Date    CHLPL 117 03/19/2024    TRIG 106 06/21/2024    HDL 51 06/21/2024    VLDL 20 06/21/2024    LDL 27 06/21/2024     Lab Results   Component Value Date    TSH 0.693 05/18/2024     Lab Results   Component Value Date    HGBA1C 6.80 (H) 05/18/2024     Lab Results   Component Value Date    RBCUA 0-2 07/15/2017    BACTERIA None Seen 07/15/2017    COLORU Yellow 09/13/2023    CLARITYU Clear 09/13/2023    LEUKOCYTESUR Negative 09/13/2023    GLUCOSEU >=1000 mg/dL (3+) (A) 09/13/2023    BLOODU Negative 09/13/2023    BILIRUBINUR Negative 09/13/2023    NITRITEU Negative 09/13/2023            Assessment and Plan               Encounter for Medicare annual wellness  exam    Encounter for immunization    Type 2 diabetes mellitus with diabetic polyneuropathy, with long-term current use of insulin  Diabetes is  pending lab results .   Continue current treatment regimen.  Regular aerobic exercise.  Reminded to get yearly retinal exam.  Diabetes will be reassessed  4 months  Continue Levemir daily and Metformin twice daily.  Primary hypertension  Hypertension is stable and controlled  Continue current treatment regimen.  Regular aerobic exercise.  Ambulatory blood pressure monitoring.  Blood pressure will be reassessed  4 months .  Continue Lisinopril and Metoprolol daily.  Follow up as scheduled with cardiology.  Anemia, unspecified type  Continue daily iron supplement.  Low magnesium level    Gastroesophageal reflux disease, unspecified whether esophagitis present  Stable.  Continue Pantoprazole daily.  Moderate episode of recurrent major depressive disorder  Patient's depression is a recurrent episode that is moderate without psychosis. Depression is active and stable.    Plan:   Continue current medication therapy   Continue Escitalopram daily.    Followup  4 months .   Generalized anxiety disorder  Psychological condition is stable.  Continue current treatment regimen.  Psychological condition  will be reassessed  4 months .  Continue Escitalopram daily.  Restless leg syndrome  Stable.  Continue Ropinirole nightly.  Pain of left hand  Continue Tylenol as needed.  Follow up as scheduled with hand specialty.  Coronary artery disease of native artery of native heart with stable angina pectoris  Continue aspirin and Plavix daily.  Follow up as scheduled with cardiology.  Other hyperlipidemia  Continue Atorvastatin daily.  Continue to work on healthy diet and exercise.    Orders Placed This Encounter   Procedures    Pneumococcal Conjugate Vaccine 20-Valent All    Microalbumin / Creatinine Urine Ratio - Urine, Clean Catch     Order Specific Question:   Release to patient     Answer:    Routine Release [1400000002]    Comprehensive Metabolic Panel     Order Specific Question:   Release to patient     Answer:   Routine Release [1400000002]    Hemoglobin A1c     Order Specific Question:   Release to patient     Answer:   Routine Release [1400000002]    Magnesium     Order Specific Question:   Release to patient     Answer:   Routine Release [1400000002]    Ferritin     Order Specific Question:   Release to patient     Answer:   Routine Release [1400000002]    Iron     Order Specific Question:   Release to patient     Answer:   Routine Release [1400000002]    Vitamin B12 & Folate     Order Specific Question:   Release to patient     Answer:   Routine Release [1400000002]    CBC & Differential     Order Specific Question:   Manual Differential     Answer:   No     Order Specific Question:   Release to patient     Answer:   Routine Release [1400000002]             Follow Up   Return in about 4 months (around 12/6/2024) for Recheck.or sooner if problems or concerns..    Patient was given instructions and counseling regarding her condition or for health maintenance advice. Please see specific information pulled into the AVS if appropriate.

## 2024-08-06 NOTE — ASSESSMENT & PLAN NOTE
Diabetes is pending lab results.   Continue current treatment regimen.  Regular aerobic exercise.  Reminded to get yearly retinal exam.  Diabetes will be reassessed 4 months  Continue Levemir daily and Metformin twice daily.

## 2024-08-07 LAB
ALBUMIN SERPL-MCNC: 4.4 G/DL (ref 3.9–4.9)
ALBUMIN/CREAT UR: 11 MG/G CREAT (ref 0–29)
ALP SERPL-CCNC: 62 IU/L (ref 44–121)
ALT SERPL-CCNC: 10 IU/L (ref 0–32)
AST SERPL-CCNC: 10 IU/L (ref 0–40)
BASOPHILS # BLD AUTO: 0.1 X10E3/UL (ref 0–0.2)
BASOPHILS NFR BLD AUTO: 1 %
BILIRUB SERPL-MCNC: 0.2 MG/DL (ref 0–1.2)
BUN SERPL-MCNC: 10 MG/DL (ref 8–27)
BUN/CREAT SERPL: 18 (ref 12–28)
CALCIUM SERPL-MCNC: 9.8 MG/DL (ref 8.7–10.3)
CHLORIDE SERPL-SCNC: 101 MMOL/L (ref 96–106)
CO2 SERPL-SCNC: 26 MMOL/L (ref 20–29)
CREAT SERPL-MCNC: 0.56 MG/DL (ref 0.57–1)
CREAT UR-MCNC: 120 MG/DL
EGFRCR SERPLBLD CKD-EPI 2021: 101 ML/MIN/1.73
EOSINOPHIL # BLD AUTO: 0.1 X10E3/UL (ref 0–0.4)
EOSINOPHIL NFR BLD AUTO: 1 %
ERYTHROCYTE [DISTWIDTH] IN BLOOD BY AUTOMATED COUNT: 19.2 % (ref 11.7–15.4)
FERRITIN SERPL-MCNC: 10 NG/ML (ref 15–150)
FOLATE SERPL-MCNC: 18.7 NG/ML
GLOBULIN SER CALC-MCNC: 2.4 G/DL (ref 1.5–4.5)
GLUCOSE SERPL-MCNC: 108 MG/DL (ref 70–99)
HBA1C MFR BLD: 6.8 % (ref 4.8–5.6)
HCT VFR BLD AUTO: 39.7 % (ref 34–46.6)
HGB BLD-MCNC: 11.9 G/DL (ref 11.1–15.9)
IMM GRANULOCYTES # BLD AUTO: 0 X10E3/UL (ref 0–0.1)
IMM GRANULOCYTES NFR BLD AUTO: 0 %
IRON SERPL-MCNC: 29 UG/DL (ref 27–139)
LYMPHOCYTES # BLD AUTO: 2.6 X10E3/UL (ref 0.7–3.1)
LYMPHOCYTES NFR BLD AUTO: 33 %
MAGNESIUM SERPL-MCNC: 1.6 MG/DL (ref 1.6–2.3)
MCH RBC QN AUTO: 24.8 PG (ref 26.6–33)
MCHC RBC AUTO-ENTMCNC: 30 G/DL (ref 31.5–35.7)
MCV RBC AUTO: 83 FL (ref 79–97)
MICROALBUMIN UR-MCNC: 12.7 UG/ML
MONOCYTES # BLD AUTO: 0.5 X10E3/UL (ref 0.1–0.9)
MONOCYTES NFR BLD AUTO: 6 %
NEUTROPHILS # BLD AUTO: 4.6 X10E3/UL (ref 1.4–7)
NEUTROPHILS NFR BLD AUTO: 59 %
PLATELET # BLD AUTO: 320 X10E3/UL (ref 150–450)
POTASSIUM SERPL-SCNC: 4.4 MMOL/L (ref 3.5–5.2)
PROT SERPL-MCNC: 6.8 G/DL (ref 6–8.5)
RBC # BLD AUTO: 4.79 X10E6/UL (ref 3.77–5.28)
SODIUM SERPL-SCNC: 141 MMOL/L (ref 134–144)
VIT B12 SERPL-MCNC: 541 PG/ML (ref 232–1245)
WBC # BLD AUTO: 7.8 X10E3/UL (ref 3.4–10.8)

## 2024-08-07 NOTE — ASSESSMENT & PLAN NOTE
Psychological condition is stable.  Continue current treatment regimen.  Psychological condition  will be reassessed 4 months.  Continue Escitalopram daily.

## 2024-08-07 NOTE — ASSESSMENT & PLAN NOTE
Patient's depression is a recurrent episode that is moderate without psychosis. Depression is active and stable.    Plan:   Continue current medication therapy   Continue Escitalopram daily.    Followup 4 months.

## 2024-08-08 DIAGNOSIS — G25.81 RESTLESS LEG SYNDROME: ICD-10-CM

## 2024-08-19 DIAGNOSIS — Z79.4 TYPE 2 DIABETES MELLITUS WITH DIABETIC POLYNEUROPATHY, WITH LONG-TERM CURRENT USE OF INSULIN: ICD-10-CM

## 2024-08-19 DIAGNOSIS — E11.42 TYPE 2 DIABETES MELLITUS WITH DIABETIC POLYNEUROPATHY, WITH LONG-TERM CURRENT USE OF INSULIN: ICD-10-CM

## 2024-08-19 RX ORDER — CALCIUM CARB/VITAMIN D3/VIT K1 500-100-40
TABLET,CHEWABLE ORAL
Qty: 100 EACH | Refills: 2 | Status: SHIPPED | OUTPATIENT
Start: 2024-08-19

## 2024-08-28 DIAGNOSIS — Z79.4 TYPE 2 DIABETES MELLITUS WITH DIABETIC POLYNEUROPATHY, WITH LONG-TERM CURRENT USE OF INSULIN: ICD-10-CM

## 2024-08-28 DIAGNOSIS — E11.42 TYPE 2 DIABETES MELLITUS WITH DIABETIC POLYNEUROPATHY, WITH LONG-TERM CURRENT USE OF INSULIN: ICD-10-CM

## 2024-08-28 RX ORDER — METFORMIN HCL 500 MG
TABLET, EXTENDED RELEASE 24 HR ORAL
Qty: 120 TABLET | Refills: 3 | Status: SHIPPED | OUTPATIENT
Start: 2024-08-28

## 2024-08-29 ENCOUNTER — OFFICE VISIT (OUTPATIENT)
Dept: GASTROENTEROLOGY | Facility: CLINIC | Age: 64
End: 2024-08-29
Payer: MEDICARE

## 2024-08-29 ENCOUNTER — TELEPHONE (OUTPATIENT)
Dept: GASTROENTEROLOGY | Facility: CLINIC | Age: 64
End: 2024-08-29
Payer: MEDICARE

## 2024-08-29 ENCOUNTER — PATIENT ROUNDING (BHMG ONLY) (OUTPATIENT)
Dept: GASTROENTEROLOGY | Facility: CLINIC | Age: 64
End: 2024-08-29
Payer: MEDICARE

## 2024-08-29 VITALS
HEART RATE: 62 BPM | SYSTOLIC BLOOD PRESSURE: 133 MMHG | BODY MASS INDEX: 23.4 KG/M2 | DIASTOLIC BLOOD PRESSURE: 73 MMHG | HEIGHT: 67 IN | WEIGHT: 149.1 LBS | TEMPERATURE: 97.1 F

## 2024-08-29 DIAGNOSIS — R19.7 DIARRHEA, UNSPECIFIED TYPE: Primary | ICD-10-CM

## 2024-08-29 DIAGNOSIS — D50.9 IRON DEFICIENCY ANEMIA, UNSPECIFIED IRON DEFICIENCY ANEMIA TYPE: ICD-10-CM

## 2024-08-29 PROCEDURE — 3078F DIAST BP <80 MM HG: CPT | Performed by: PHYSICIAN ASSISTANT

## 2024-08-29 PROCEDURE — 3075F SYST BP GE 130 - 139MM HG: CPT | Performed by: PHYSICIAN ASSISTANT

## 2024-08-29 PROCEDURE — 1160F RVW MEDS BY RX/DR IN RCRD: CPT | Performed by: PHYSICIAN ASSISTANT

## 2024-08-29 PROCEDURE — 1159F MED LIST DOCD IN RCRD: CPT | Performed by: PHYSICIAN ASSISTANT

## 2024-08-29 PROCEDURE — 99204 OFFICE O/P NEW MOD 45 MIN: CPT | Performed by: PHYSICIAN ASSISTANT

## 2024-08-29 NOTE — PROGRESS NOTES
"Chief Complaint  Anemia, Diarrhea, and Rectal Bleeding    Subjective          History Of Present Illness:    Gely Kimble is a  64 y.o. female patient with a past medical history of type 2 diabetes mellitus, hypertension, hyperlipidemia, GERD, anxiety depression, restless leg syndrome, CAD status post PCI in 5/16/2024 currently on aspirin and Plavix.    Patient reports diarrhea for the last year.  She reports she goes 3-4 times a day.  She does use Imodium which is effective.  She does report some occasional lower abdominal pain.  She additionally reports some occasional bright red blood per rectum.  She primarily notices this in her depends and with wiping.  She additionally reports some alternating colors in her stool which include black, yellow, green stools.  She does have a history of a cholecystectomy.      Patient cannot recall when her last colonoscopy was but says it is less than 10 years ago.  She does not recall have any polyps at this time.    Patient does have a history of GERD.  She reports a EGD greater than 10 years ago and was told she had ulcers at that time. Patient reports her reflux is now currently well-controlled on pantoprazole therapy.  She denies any nausea, vomiting, dysphagia, odynophagia.    Patient referred by DARIAN Fernandes in the setting of anemia.  Patient first had iron studies completed on 5/28/2024 with a ferritin of 12 and an iron of 27.  Most recently her ferritin ferritin 10, iron 29, CBC with hemoglobin of 11.9.    Objective   Vital Signs:   /73   Pulse 62   Temp 97.1 °F (36.2 °C)   Ht 170.2 cm (67\")   Wt 67.6 kg (149 lb 1.6 oz)   BMI 23.35 kg/m²       Physical Exam  Vitals reviewed.   Constitutional:       General: She is not in acute distress.     Appearance: Normal appearance. She is not ill-appearing.   HENT:      Head: Normocephalic and atraumatic.      Nose: Nose normal.      Mouth/Throat:      Pharynx: Oropharynx is clear.   Eyes:      " Conjunctiva/sclera: Conjunctivae normal.   Pulmonary:      Effort: Pulmonary effort is normal.   Abdominal:      General: There is no distension.      Palpations: Abdomen is soft. There is no mass.      Tenderness: There is no abdominal tenderness.   Genitourinary:     Rectum: Guaiac result positive. No mass, tenderness, anal fissure or external hemorrhoid. Normal anal tone.      Comments: Noted to have anal skin tag  Musculoskeletal:         General: No swelling. Normal range of motion.      Cervical back: Normal range of motion.   Skin:     General: Skin is warm and dry.      Findings: No bruising or rash.   Neurological:      General: No focal deficit present.      Mental Status: She is alert and oriented to person, place, and time.      Motor: No weakness.      Gait: Gait normal.   Psychiatric:         Mood and Affect: Mood normal.          Result Review :   The following data was reviewed by: Thelma Wheatley PA-C on 08/29/2024:  CMP          6/21/2024    11:01 6/22/2024    03:24 8/6/2024    08:52   CMP   Glucose 164  119  108    BUN 8  10  10    Creatinine 0.63  0.70  0.56    EGFR 98.6  96.1     Sodium 144  143  141    Potassium 4.4  4.1  4.4    Chloride 105  104  101    Calcium 9.3  9.3  9.8    Total Protein   6.8    Total Protein 6.6      Albumin 4.5   4.4    Globulin   2.4    Globulin 2.1      Total Bilirubin 0.3   0.2    Alkaline Phosphatase 53   62    AST (SGOT) 14   10    ALT (SGPT) 14   10    Albumin/Globulin Ratio 2.1      BUN/Creatinine Ratio 12.7  14.3  18    Anion Gap 12.8  8.4       CBC          6/21/2024    11:01 6/22/2024    03:24 8/6/2024    08:52   CBC   WBC 6.80  6.20  7.8    RBC 4.64  4.66  4.79    Hemoglobin 10.8  10.9  11.9    Hematocrit 36.7  36.4  39.7    MCV 79.1  78.1  83    MCH 23.3  23.4  24.8    MCHC 29.4  29.9  30.0    RDW 18.8  18.3  19.2    Platelets 257  236  320            Assessment and Plan      Diagnoses and all orders for this visit:    1. Diarrhea, unspecified type  (Primary)  -     TSH Rfx On Abnormal To Free T4  -     Celiac Comprehensive Panel  -     Pancreatic Elastase, Fecal - Stool, Per Rectum  -     Fecal Fat, Qualitative - Stool, Per Rectum  -     Calprotectin, Fecal - Stool, Per Rectum  -     Giardia / Cryptosporidium Screen - Stool, Per Rectum  -     Stool Culture (Reference Lab) - Stool, Per Rectum  -     Clostridioides difficile EIA - Stool, Per Rectum    2. Iron deficiency anemia, unspecified iron deficiency anemia type       Obtain colonoscopy records from [facility]  Patient certainly needs bidirectional endoscopic evaluation but we are currently limited by her need for uninterrupted DAPT therapy with recent PCI.  Will discuss further with Dr. Charles and update the patient thereafter.  Obtain stool studies in the setting of subacute diarrhea  Check TSH and celiac panel    Follow Up   Return To be determined.    Dragon dictation used throughout this note.            Thelma Alcantar PA-C   [facility]  [address] [place] [address]  Office: [phone]

## 2024-08-30 LAB
ENDOMYSIUM IGA SER QL: NEGATIVE
GLIADIN PEPTIDE IGA SER-ACNC: 4 UNITS (ref 0–19)
GLIADIN PEPTIDE IGG SER-ACNC: 2 UNITS (ref 0–19)
IGA SERPL-MCNC: 228 MG/DL (ref 87–352)
TSH SERPL DL<=0.005 MIU/L-ACNC: 1.19 UIU/ML (ref 0.27–4.2)
TTG IGA SER-ACNC: <2 U/ML (ref 0–3)
TTG IGG SER-ACNC: <2 U/ML (ref 0–5)

## 2024-08-30 NOTE — PROGRESS NOTES
August 29, 2024    Hello, may I speak with Gely Kimble?    My name is Pearl       I am  with [facility]  [address] [place] [id number] [phone].    Before we get started may I verify your date of birth? 1960    I am calling to officially welcome you to our practice and ask about your recent visit. Is this a good time to talk? yes    Tell me about your visit with us. What things went well?  I loved Thelma she is such a doll. You all really have a good employee when it comes to her. I was very pleased with the visit. I didn't have a long wait either.        We're always looking for ways to make our patients' experiences even better. Do you have recommendations on ways we may improve?  no    Overall were you satisfied with your first visit to our practice? yes       I appreciate you taking the time to speak with me today. Is there anything else I can do for you? no      Thank you, and have a great day.

## 2024-09-10 ENCOUNTER — TELEPHONE (OUTPATIENT)
Dept: GASTROENTEROLOGY | Facility: CLINIC | Age: 64
End: 2024-09-10
Payer: MEDICARE

## 2024-09-10 NOTE — TELEPHONE ENCOUNTER
----- Message from Thelma Alcantar sent at 9/5/2024  3:37 PM EDT -----  Please let her know that her celiac panel and thyroid function were both normal.  Will await stool study findings once completed.

## 2024-09-12 DIAGNOSIS — G25.81 RESTLESS LEG SYNDROME: ICD-10-CM

## 2024-09-12 RX ORDER — ROPINIROLE 0.25 MG/1
0.25 TABLET, FILM COATED ORAL NIGHTLY
Qty: 30 TABLET | Refills: 2 | Status: SHIPPED | OUTPATIENT
Start: 2024-09-12

## 2024-09-12 NOTE — TELEPHONE ENCOUNTER
Rx Refill Note  Requested Prescriptions     Pending Prescriptions Disp Refills    rOPINIRole (REQUIP) 0.25 MG tablet [Pharmacy Med Name: rOPINIRole HCL 0.25 MG TABLET] 30 tablet 2     Sig: TAKE 1 TABLET BY MOUTH 1 HOUR BEFORE BEDTIME      Last office visit with prescribing clinician: 8/6/2024   Last telemedicine visit with prescribing clinician: Visit date not found   Next office visit with prescribing clinician: 12/10/2024                         Would you like a call back once the refill request has been completed: [] Yes [] No    If the office needs to give you a call back, can they leave a voicemail: [] Yes [] No    Peri Christiansen MA  09/12/24, 12:50 EDT

## 2024-09-25 RX ORDER — CLOPIDOGREL BISULFATE 75 MG/1
75 TABLET ORAL DAILY
Qty: 90 TABLET | Refills: 1 | Status: SHIPPED | OUTPATIENT
Start: 2024-09-25

## 2024-09-26 ENCOUNTER — EXTERNAL PBMM DATA (OUTPATIENT)
Dept: PHARMACY | Facility: OTHER | Age: 64
End: 2024-09-26
Payer: MEDICARE

## 2024-09-30 DIAGNOSIS — K21.9 GASTROESOPHAGEAL REFLUX DISEASE, UNSPECIFIED WHETHER ESOPHAGITIS PRESENT: ICD-10-CM

## 2024-09-30 RX ORDER — PANTOPRAZOLE SODIUM 20 MG/1
20 TABLET, DELAYED RELEASE ORAL DAILY
Qty: 30 TABLET | Refills: 2 | Status: SHIPPED | OUTPATIENT
Start: 2024-09-30

## 2024-09-30 NOTE — TELEPHONE ENCOUNTER
LOV                   8/6/2024  NOV                   12/10/2024  Last refill             7/8/24  Protocol              met

## 2024-10-01 DIAGNOSIS — G25.81 RESTLESS LEG SYNDROME: ICD-10-CM

## 2024-10-01 RX ORDER — CLOPIDOGREL BISULFATE 75 MG/1
75 TABLET ORAL DAILY
Qty: 90 TABLET | Refills: 1 | Status: SHIPPED | OUTPATIENT
Start: 2024-10-01

## 2024-10-01 RX ORDER — ATORVASTATIN CALCIUM 40 MG/1
40 TABLET, FILM COATED ORAL NIGHTLY
Qty: 90 TABLET | Refills: 1 | Status: SHIPPED | OUTPATIENT
Start: 2024-10-01

## 2024-10-02 RX ORDER — ROPINIROLE 0.25 MG/1
0.25 TABLET, FILM COATED ORAL NIGHTLY
Qty: 30 TABLET | Refills: 2 | OUTPATIENT
Start: 2024-10-02

## 2024-10-02 NOTE — TELEPHONE ENCOUNTER
rOPINIRole (REQUIP) 0.25 MG tablet [[id number]]    Order Details  Dose: 0.25 mg Route: Oral Frequency: Nightly   Dispense Quantity: 30 tablet Refills: 2          Sig: TAKE 1 TABLET BY MOUTH 1 HOUR BEFORE BEDTIME         Start Date: 09/12/24          Refill not appropriate     ULISES Allan/LMR

## 2024-10-03 DIAGNOSIS — Z79.4 TYPE 2 DIABETES MELLITUS WITH DIABETIC POLYNEUROPATHY, WITH LONG-TERM CURRENT USE OF INSULIN: ICD-10-CM

## 2024-10-03 DIAGNOSIS — E11.42 TYPE 2 DIABETES MELLITUS WITH DIABETIC POLYNEUROPATHY, WITH LONG-TERM CURRENT USE OF INSULIN: ICD-10-CM

## 2024-10-03 RX ORDER — METFORMIN HCL 500 MG
TABLET, EXTENDED RELEASE 24 HR ORAL
Qty: 360 TABLET | Refills: 1 | Status: SHIPPED | OUTPATIENT
Start: 2024-10-03

## 2024-10-03 NOTE — TELEPHONE ENCOUNTER
LOV                   8/6/2024  NOV                  12/10/2024  Last RF              8/28/24       PROTOCOL       not met    Pt requesting a 90 day supply    ULISES Allan/KEVINR

## 2024-10-08 ENCOUNTER — POP HEALTH PHARMACY (OUTPATIENT)
Dept: PHARMACY | Facility: OTHER | Age: 64
End: 2024-10-08
Payer: MEDICARE

## 2024-10-21 ENCOUNTER — OFFICE VISIT (OUTPATIENT)
Dept: FAMILY MEDICINE CLINIC | Facility: CLINIC | Age: 64
End: 2024-10-21
Payer: MEDICARE

## 2024-10-21 VITALS
BODY MASS INDEX: 23.27 KG/M2 | DIASTOLIC BLOOD PRESSURE: 72 MMHG | HEART RATE: 70 BPM | OXYGEN SATURATION: 97 % | TEMPERATURE: 97.5 F | HEIGHT: 67 IN | WEIGHT: 148.25 LBS | SYSTOLIC BLOOD PRESSURE: 130 MMHG

## 2024-10-21 DIAGNOSIS — J01.90 ACUTE NON-RECURRENT SINUSITIS, UNSPECIFIED LOCATION: Primary | ICD-10-CM

## 2024-10-21 DIAGNOSIS — R22.31 LOCALIZED SWELLING OF RIGHT UPPER EXTREMITY: ICD-10-CM

## 2024-10-21 DIAGNOSIS — K21.9 GASTROESOPHAGEAL REFLUX DISEASE, UNSPECIFIED WHETHER ESOPHAGITIS PRESENT: ICD-10-CM

## 2024-10-21 DIAGNOSIS — E11.42 TYPE 2 DIABETES MELLITUS WITH DIABETIC POLYNEUROPATHY, WITH LONG-TERM CURRENT USE OF INSULIN: ICD-10-CM

## 2024-10-21 DIAGNOSIS — Z79.4 TYPE 2 DIABETES MELLITUS WITH DIABETIC POLYNEUROPATHY, WITH LONG-TERM CURRENT USE OF INSULIN: ICD-10-CM

## 2024-10-21 DIAGNOSIS — I10 PRIMARY HYPERTENSION: ICD-10-CM

## 2024-10-21 PROCEDURE — 1126F AMNT PAIN NOTED NONE PRSNT: CPT | Performed by: NURSE PRACTITIONER

## 2024-10-21 PROCEDURE — 99214 OFFICE O/P EST MOD 30 MIN: CPT | Performed by: NURSE PRACTITIONER

## 2024-10-21 PROCEDURE — 3044F HG A1C LEVEL LT 7.0%: CPT | Performed by: NURSE PRACTITIONER

## 2024-10-21 PROCEDURE — 3078F DIAST BP <80 MM HG: CPT | Performed by: NURSE PRACTITIONER

## 2024-10-21 PROCEDURE — 1160F RVW MEDS BY RX/DR IN RCRD: CPT | Performed by: NURSE PRACTITIONER

## 2024-10-21 PROCEDURE — 1159F MED LIST DOCD IN RCRD: CPT | Performed by: NURSE PRACTITIONER

## 2024-10-21 PROCEDURE — 3075F SYST BP GE 130 - 139MM HG: CPT | Performed by: NURSE PRACTITIONER

## 2024-10-21 RX ORDER — AMOXICILLIN 500 MG/1
500 CAPSULE ORAL 3 TIMES DAILY
Qty: 21 CAPSULE | Refills: 0 | Status: SHIPPED | OUTPATIENT
Start: 2024-10-21 | End: 2024-10-28

## 2024-10-21 NOTE — PATIENT INSTRUCTIONS
Continue increased intake of clear liquids and rest.  Decrease Metformin to 1 tablet twice daily and see if helps diarrhea.  Continue to monitor your blood sugar once daily before a meal and record results.  Continue to monitor your blood pressure periodically and record results.  Follow up pending test results.  Follow up in 6 weeks, or sooner if symptoms persist or worsen.

## 2024-10-21 NOTE — PROGRESS NOTES
Subjective   Gely Kimble is a 64 y.o. female.     Chief Complaint   Patient presents with    Cough    Diarrhea    Nasal Congestion    Arm Pain     Rt arm warm to the touch        History of Present Illness   Patient presents with c/o persistent cough x2 weeks; cough seems random, but when starts coughing will have episodes cannot stop coughing; had not had cough since stopped smoking until recently; no fever; has had productive cough--greenish; no SOA; has had runny nose; no ear pain; no sore throat; no change in ongoing diarrhea for long time; no nausea or vomiting; has tried OTC sinus/allergy med and has not helped.     F/U HTN: takes Lisinopril and Metoprolol daily; monitors BP on occasion, typically runs 130s/70s; some headaches recently, attributes to sinuses; has had some orthostasis; no swelling.    F/U YARELIS: takes Pantoprazole and works well; no heartburn or reflux.    Also, c/o pain in right upper arm at times and will feel warm in area at that time, but not other times; muscle will seem to get inflamed; symptoms started about 2-3 months ago; seems random, not related to lifting; does repetitive motion at work with right arm, but will have symptoms when has not been at work; no OTC treatment; no weakness in right arm; no known injury.    F/U DM2: monitors blood sugar and has been stable; has not been going to gym recently.      The following portions of the patient's history were reviewed and updated as appropriate: allergies, current medications, past family history, past medical history, past social history, past surgical history and problem list.    Current Outpatient Medications on File Prior to Visit   Medication Sig    acetaminophen (TYLENOL) 500 MG tablet Take 1 tablet by mouth Every 8 (Eight) Hours As Needed for Mild Pain or Moderate Pain.    aspirin 81 MG chewable tablet Chew 1 tablet Every Night.    atorvastatin (LIPITOR) 40 MG tablet Take 1 tablet by mouth Every Night.    clopidogrel (PLAVIX)  "75 MG tablet Take 1 tablet by mouth Daily.    escitalopram (LEXAPRO) 20 MG tablet TAKE 1 TABLET BY MOUTH DAILY (Patient taking differently: Take 1 tablet by mouth Every Night.)    Ferrous Sulfate  (45 Fe) MG tablet controlled-release Take 1 tablet by mouth Daily.    Homeopathic Products (LEG CRAMP RELIEF PO) Take 1 tablet by mouth Daily.    insulin detemir (Levemir) 100 UNIT/ML injection Inject 20 Units under the skin into the appropriate area as directed Every Night.    Insulin Syringe 31G X 5/16\" 1 ML misc USE FOR LEVEMIR ONCE DAILY INJECTIONS    lisinopril (PRINIVIL,ZESTRIL) 10 MG tablet Take 1 tablet by mouth Daily.    LORATADINE PO Take 1 tablet by mouth Daily.    magnesium oxide (MAG-OX) 400 MG tablet Take 1 tablet by mouth Every Night.    metFORMIN ER (GLUCOPHAGE-XR) 500 MG 24 hr tablet TAKES 2 TABLETS BY MOUTH TWICE A DAY WITH FOOD    metoprolol succinate XL (TOPROL-XL) 25 MG 24 hr tablet Take 1 tablet by mouth Daily.    Multiple Vitamin (MULTIVITAMIN) tablet Take 1 tablet by mouth Daily.    nitroglycerin (Nitrostat) 0.4 MG SL tablet Place 1 tablet under the tongue Every 5 (Five) Minutes As Needed for Chest Pain. Take no more than 3 doses in 15 minutes.    pantoprazole (PROTONIX) 20 MG EC tablet TAKE 1 TABLET BY MOUTH DAILY    rOPINIRole (REQUIP) 0.25 MG tablet TAKE 1 TABLET BY MOUTH 1 HOUR BEFORE BEDTIME    vitamin D (ERGOCALCIFEROL) 1.25 MG (00107 UT) capsule capsule Take 1 capsule by mouth 1 (One) Time Per Week.     No current facility-administered medications on file prior to visit.        Past Medical History:   Diagnosis Date    Abnormal nuclear stress test     Arthritis     COPD (chronic obstructive pulmonary disease)     Elevated cholesterol     Emphysema of lung     Gallbladder abscess     Herpes zoster 08/03/2023    History of positive hepatitis C     previously positive, never treated, negative RNA finding    Hypertension     Myocardial perfusion defect 02/16/2016    Type 2 diabetes mellitus "        Past Surgical History:   Procedure Laterality Date    APPENDECTOMY      CARDIAC CATHETERIZATION N/A 10/25/2023    Procedure: Left Heart Cath;  Surgeon: Tasneem Hayes MD;  Location: [facility] CATH [facility]VASIVE LOC[facility];  Service: Cardiovascular;  Laterality: N/A;    CARDIAC CATHETERIZATION N/A 10/25/2023    Procedure: Left ventriculography;  Surgeon: Tasneem Hayes MD;  Location:  [facility] CATH INVASIVE LOCATION;  Service: Cardiovascular;  Laterality: N/A;    CARDIAC CATHETERIZATION N/A 10/25/2023    Procedure: Stent LEYLA coronary;  Surgeon: Tasneem Hayes MD;  Location:  [facility] CATH INVASIVE LOCATION;  Service: Cardiovascular;  Laterality: N/A;    CARDIAC CATHETERIZATION N/A 10/25/2023    Procedure: Coronary angiography;  Surgeon: Tasneem Hayes MD;  Location: [facility] CATH [facility]ASIVE LOCATION;  Service: Cardiovascular;  Laterality: N/A;    CARDIAC CATHETERIZATION N/A 10/25/2023    Procedure: Percutaneous Coronary Intervention;  Surgeon: Tasneem Hayes MD;  Location: [facility] CATH [facility]ASIVE LOC[facility];  Service: Cardiovascular;  Laterality: N/A;    CARDIAC CATHETERIZATION N/A 11/3/2023    Procedure: Percutaneous Coronary Intervention RCA;  Surgeon: Tasneem Hayes MD;  Location: [facility] C[facility] LOC[facility];  Service: Cardiovascular;  Laterality: N/A;    CARDIAC CATHETERIZATION N/A 11/3/2023    Procedure: Coronary angiography;  Surgeon: Tasneem Hayes MD;  Location: [facility] CATH [facility]VASIVE LOCATION;  Service: Cardiovascular;  Laterality: N/A;    CARDIAC CATHETERIZATION N/A 11/3/2023    Procedure: Stent LEYLA coronary;  Surgeon: Tasneem Hayes MD;  Location: [facility] CATH [facility]ASIVE LOCATION;  Service: Cardiovascular;  Laterality: N/A;    CARDIAC CATHETERIZATION N/A 5/17/2024    Procedure: Left Heart Cath;  Surgeon: Tasneem Hayes MD;  Location: [facility] CATH [facility] LOC[facility];  Service: Cardiovascular;  Laterality: N/A;    CARDIAC CATHETERIZATION N/A 5/17/2024     Procedure: Percutaneous Coronary Intervention;  Surgeon: Tasneem Hayes MD;  Location:  [facility] CATH INVASIVE LOCATION;  Service: Cardiovascular;  Laterality: N/A;    CARDIAC CATHETERIZATION N/A 2024    Procedure: Coronary angiography;  Surgeon: Tasneem Hayes MD;  Location:  [facility] CATH INVASIVE LOCATION;  Service: Cardiovascular;  Laterality: N/A;    CARDIAC CATHETERIZATION N/A 2024    Procedure: Left ventriculography;  Surgeon: Tasneem Hayes MD;  Location:  [facility] CATH INVASIVE LOCATION;  Service: Cardiovascular;  Laterality: N/A;    CHOLECYSTECTOMY      HYSTERECTOMY      TUBAL ABDOMINAL LIGATION         Family History   Problem Relation Age of Onset    Diabetes Mother     Heart disease Mother     Diabetes Father     Diabetes Brother     Heart disease Maternal Grandmother     Cancer Paternal Grandfather        Social History     Socioeconomic History    Marital status:    Tobacco Use    Smoking status: Former     Current packs/day: 0.00     Average packs/day: 1.5 packs/day for 45.8 years (68.7 ttl pk-yrs)     Types: Cigarettes     Start date:      Quit date: 10/11/2017     Years since quittin.0     Passive exposure: Past    Smokeless tobacco: Never    Tobacco comments:     Previously smoked about 1.5 packs per day for about 45 years   Vaping Use    Vaping status: Never Used   Substance and Sexual Activity    Alcohol use: Not Currently     Comment: rare margaritia 1-2 times per year    Drug use: No     Comment: in past; clean since     Sexual activity: Not Currently       Review of Systems   Constitutional:  Positive for fatigue. Negative for appetite change, fever, unexpected weight gain and unexpected weight loss. Chills: stays cold.  HENT:  Positive for postnasal drip and sinus pressure. Negative for trouble swallowing.    Respiratory:  Positive for cough (see HPI). Negative for shortness of breath. Chest tightness: some at times.   Cardiovascular:  Negative for  "chest pain and palpitations.   Gastrointestinal:  Negative for abdominal pain, blood in stool and constipation. Diarrhea: see HPI.  Genitourinary:  Positive for frequency (no change, drinks a lot of liquids). Negative for dysuria.   Skin:  Negative for rash.   Neurological:  Negative for syncope and weakness.       Objective   Vitals:    10/21/24 1557   BP: 130/72   BP Location: Left arm   Patient Position: Sitting   Cuff Size: Large Adult   Pulse: 70   Temp: 97.5 °F (36.4 °C)   TempSrc: Temporal   SpO2: 97%   Weight: 67.2 kg (148 lb 4 oz)   Height: 170.2 cm (67.01\")   PainSc: 0-No pain     Body mass index is 23.21 kg/m².    Physical Exam  Vitals and nursing note reviewed.   Constitutional:       General: She is not in acute distress.     Appearance: She is well-developed and well-groomed. She is not diaphoretic.   HENT:      Head: Normocephalic.      Right Ear: External ear normal. No decreased hearing noted. Right ear middle ear effusion: TM dull. Tympanic membrane is not erythematous.      Left Ear: External ear normal. No decreased hearing noted. A middle ear effusion is present. Tympanic membrane is not erythematous.      Nose: Nose normal.      Right Sinus: Maxillary sinus tenderness and frontal sinus tenderness present.      Left Sinus: Maxillary sinus tenderness and frontal sinus tenderness present.      Mouth/Throat:      Mouth: Mucous membranes are moist.      Pharynx: No oropharyngeal exudate or posterior oropharyngeal erythema.   Eyes:      Conjunctiva/sclera: Conjunctivae normal.   Neck:      Vascular: No carotid bruit.   Cardiovascular:      Rate and Rhythm: Normal rate and regular rhythm.      Pulses: Normal pulses.      Heart sounds: Normal heart sounds. No murmur heard.  Pulmonary:      Effort: Pulmonary effort is normal. No respiratory distress.      Breath sounds: Normal breath sounds. No wheezing or rales. Decreased breath sounds: some in bilateral bases.  Abdominal:      General: Bowel sounds " are normal.      Palpations: Abdomen is soft. There is no hepatomegaly or splenomegaly.      Tenderness: There is no abdominal tenderness. There is no guarding.   Musculoskeletal:        Arms:       Cervical back: Normal range of motion and neck supple.      Right lower leg: No edema.      Left lower leg: No edema.   Lymphadenopathy:      Cervical: Cervical adenopathy: approx 0.5 cm cervical lymph node on left.   Skin:     General: Skin is warm and dry.      Findings: No rash.   Neurological:      Mental Status: She is alert and oriented to person, place, and time.      Gait: Abnormal gait: guarded gait.   Psychiatric:         Mood and Affect: Mood normal.         Behavior: Behavior normal.         Thought Content: Thought content normal.         Cognition and Memory: Cognition normal.         Judgment: Judgment normal.         Lab Results   Component Value Date    WBC 7.8 08/06/2024    RBC 4.79 08/06/2024    HGB 11.9 08/06/2024    HCT 39.7 08/06/2024    MCV 83 08/06/2024    MCH 24.8 (L) 08/06/2024    MCHC 30.0 (L) 08/06/2024    RDW 19.2 (H) 08/06/2024    RDWSD 51.6 06/22/2024    MPV 11.1 06/22/2024     08/06/2024    NEUTRORELPCT 59 08/06/2024    LYMPHORELPCT 33 08/06/2024    MONORELPCT 6 08/06/2024    EOSRELPCT 1 08/06/2024    BASORELPCT 1 08/06/2024    AUTOIGPER 0.1 06/21/2024    NEUTROABS 4.6 08/06/2024    LYMPHSABS 2.6 08/06/2024    MONOSABS 0.5 08/06/2024    EOSABS 0.1 08/06/2024    BASOSABS 0.1 08/06/2024    AUTOIGNUM 0.01 06/21/2024    NRBC 0.0 06/21/2024     Lab Results   Component Value Date    GLUCOSE 108 (H) 08/06/2024    BUN 10 08/06/2024    CREATININE 0.56 (L) 08/06/2024    EGFRIFNONA 108 03/20/2020    EGFRIFAFRI >60 10/28/2020    BCR 18 08/06/2024    K 4.4 08/06/2024    CO2 26 08/06/2024    CALCIUM 9.8 08/06/2024    PROTENTOTREF 6.8 08/06/2024    ALBUMIN 4.4 08/06/2024    LABIL2 2.0 03/19/2024    AST 10 08/06/2024    ALT 10 08/06/2024      Lab Results   Component Value Date    CHLPL 117  03/19/2024    TRIG 106 06/21/2024    HDL 51 06/21/2024    VLDL 20 06/21/2024    LDL 27 06/21/2024     Lab Results   Component Value Date    TSH 1.190 08/29/2024     Lab Results   Component Value Date    HGBA1C 6.8 (H) 08/06/2024         Assessment    Problem List Items Addressed This Visit       Type 2 diabetes mellitus    Current Assessment & Plan     Stable per home blood sugar readings.  Continue Levemir daily.  Decrease Metformin to 1 tablet twice daily and see if helps diarrhea.         Primary hypertension    Current Assessment & Plan     Hypertension is stable and controlled  Continue current treatment regimen.  Regular aerobic exercise.  Ambulatory blood pressure monitoring.  Blood pressure will be reassessed  6 weeks .  Continue Lisinopril and Metoprolol daily.         Gastroesophageal reflux disease    Current Assessment & Plan     Stable.  Continue Pantoprazole daily.         Localized swelling of right upper extremity    Current Assessment & Plan     Continue to monitor symptoms.         Relevant Orders    US Nonvascular Extremity Limited    Acute non-recurrent sinusitis - Primary    Current Assessment & Plan     Continue increased intake of clear liquids and rest.         Relevant Medications    amoxicillin (AMOXIL) 500 MG capsule        Return in about 6 weeks (around 12/2/2024) for Recheck.or sooner if symptoms persist or worsen.  Will treat sinusitis with Amoxicillin and see if helps symptoms; will consider changing BP med to Losartan instead of Lisinopril if cough persists; will also consider chest x-ray if cough persists.         COVID-19 Precautions - Patient was compliant in wearing a mask. When I saw the patient, I used appropriate personal protective equipment (PPE) including mask, gloves, and eye shield (standard procedure).  Hand hygiene was completed before and after seeing the patient.

## 2024-10-22 PROBLEM — J01.90 ACUTE NON-RECURRENT SINUSITIS: Status: ACTIVE | Noted: 2024-10-22

## 2024-10-22 PROBLEM — R22.31 LOCALIZED SWELLING OF RIGHT UPPER EXTREMITY: Status: ACTIVE | Noted: 2024-10-22

## 2024-10-22 NOTE — ASSESSMENT & PLAN NOTE
Stable per home blood sugar readings.  Continue Levemir daily.  Decrease Metformin to 1 tablet twice daily and see if helps diarrhea.

## 2024-10-22 NOTE — ASSESSMENT & PLAN NOTE
Hypertension is stable and controlled  Continue current treatment regimen.  Regular aerobic exercise.  Ambulatory blood pressure monitoring.  Blood pressure will be reassessed  6 weeks .  Continue Lisinopril and Metoprolol daily.

## 2024-10-24 ENCOUNTER — EXTERNAL PBMM DATA (OUTPATIENT)
Dept: PHARMACY | Facility: OTHER | Age: 64
End: 2024-10-24
Payer: MEDICARE

## 2024-10-24 ENCOUNTER — POP HEALTH PHARMACY (OUTPATIENT)
Dept: PHARMACY | Facility: OTHER | Age: 64
End: 2024-10-24
Payer: MEDICARE

## 2024-10-29 DIAGNOSIS — F41.1 GENERALIZED ANXIETY DISORDER: ICD-10-CM

## 2024-10-29 DIAGNOSIS — F33.1 MODERATE EPISODE OF RECURRENT MAJOR DEPRESSIVE DISORDER: ICD-10-CM

## 2024-10-29 RX ORDER — ESCITALOPRAM OXALATE 20 MG/1
20 TABLET ORAL DAILY
Qty: 90 TABLET | Refills: 1 | Status: SHIPPED | OUTPATIENT
Start: 2024-10-29

## 2024-10-29 NOTE — TELEPHONE ENCOUNTER
Rx Refill Note  Requested Prescriptions     Pending Prescriptions Disp Refills    escitalopram (LEXAPRO) 20 MG tablet [Pharmacy Med Name: ESCITALOPRAM 20 MG TABLET] 90 tablet 1     Sig: TAKE 1 TABLET BY MOUTH DAILY      Last office visit with prescribing clinician: 10/21/2024   Last telemedicine visit with prescribing clinician: Visit date not found   Next office visit with prescribing clinician: 12/10/2024                         Would you like a call back once the refill request has been completed: [] Yes [] No    If the office needs to give you a call back, can they leave a voicemail: [] Yes [] No    Peri Christiansen MA  10/29/24, 10:56 EDT

## 2024-11-01 ENCOUNTER — HOSPITAL ENCOUNTER (OUTPATIENT)
Dept: ULTRASOUND IMAGING | Facility: HOSPITAL | Age: 64
Discharge: HOME | End: 2024-11-01
Payer: MEDICARE

## 2024-11-01 DIAGNOSIS — R22.31 LOCALIZED SWELLING OF RIGHT UPPER EXTREMITY: ICD-10-CM

## 2024-11-01 PROCEDURE — 76882 US LMTD JT/FCL EVL NVASC XTR: CPT

## 2024-11-21 ENCOUNTER — OFFICE VISIT (OUTPATIENT)
Dept: CARDIOLOGY | Facility: CLINIC | Age: 64
End: 2024-11-21
Payer: MEDICARE

## 2024-11-21 VITALS
OXYGEN SATURATION: 99 % | DIASTOLIC BLOOD PRESSURE: 74 MMHG | HEIGHT: 67 IN | WEIGHT: 144 LBS | HEART RATE: 71 BPM | BODY MASS INDEX: 22.6 KG/M2 | SYSTOLIC BLOOD PRESSURE: 152 MMHG

## 2024-11-21 DIAGNOSIS — I25.118 CORONARY ARTERY DISEASE OF NATIVE ARTERY OF NATIVE HEART WITH STABLE ANGINA PECTORIS: Primary | ICD-10-CM

## 2024-11-21 DIAGNOSIS — E78.49 OTHER HYPERLIPIDEMIA: ICD-10-CM

## 2024-11-21 DIAGNOSIS — I10 PRIMARY HYPERTENSION: ICD-10-CM

## 2024-11-21 DIAGNOSIS — Z79.02 LONG TERM (CURRENT) USE OF ANTITHROMBOTICS/ANTIPLATELETS: ICD-10-CM

## 2024-11-21 NOTE — PROGRESS NOTES
Subjective:        Gely Kimble is a 64 y.o. female who here for follow up    CC  Follow-up coronary artery disease hypertension hyperlipidemia  HPI  64 years old female with coronary artery disease hypertension hyperlipidemia here for the follow-up denies any chest pains or tightness in the chest     Problems Addressed this Visit          Cardiac and Vasculature    Other hyperlipidemia    Primary hypertension    Coronary artery disease of native artery of native heart with stable angina pectoris - Primary       Coag and Thromboembolic    Long term (current) use of antithrombotics/antiplatelets     Diagnoses         Codes Comments    Coronary artery disease of native artery of native heart with stable angina pectoris    -  Primary ICD-10-CM: I25.118  ICD-9-CM: 414.01, 413.9     Primary hypertension     ICD-10-CM: I10  ICD-9-CM: 401.9     Other hyperlipidemia     ICD-10-CM: E78.49  ICD-9-CM: 272.4     Long term (current) use of antithrombotics/antiplatelets     ICD-10-CM: Z79.02  ICD-9-CM: V58.63           .    The following portions of the patient's history were reviewed and updated as appropriate: allergies, current medications, past family history, past medical history, past social history, past surgical history and problem list.    Past Medical History:   Diagnosis Date    Abnormal nuclear stress test     Arthritis     COPD (chronic obstructive pulmonary disease)     Elevated cholesterol     Emphysema of lung     Gallbladder abscess     Herpes zoster 08/03/2023    History of positive hepatitis C     previously positive, never treated, negative RNA finding    Hypertension     Myocardial perfusion defect 02/16/2016    Type 2 diabetes mellitus      reports that she quit smoking about 7 years ago. Her smoking use included cigarettes. She started smoking about 52 years ago. She has a 68.7 pack-year smoking history. She has been exposed to tobacco smoke. She has never used smokeless tobacco. She reports that she  "does not currently use alcohol. She reports that she does not use drugs.   Family History   Problem Relation Age of Onset    Diabetes Mother     Heart disease Mother     Diabetes Father     Diabetes Brother     Heart disease Maternal Grandmother     Cancer Paternal Grandfather        Review of Systems  Constitutional: No wt loss, fever, fatigue  Gastrointestinal: No nausea, abdominal pain  Behavioral/Psych: No insomnia or anxiety   Cardiovascular no chest pains or tightness in the chest  Objective:       Physical Exam  /74   Pulse 71   Ht 170.2 cm (67.01\")   Wt 65.3 kg (144 lb)   SpO2 99%   BMI 22.55 kg/m²   General appearance: No acute changes   Neck: Trachea midline; NECK, supple, no thyromegaly or lymphadenopathy   Lungs: Normal size and shape, normal breath sounds, equal distribution of air, no rales and rhonchi   CV: S1-S2 regular, no murmurs, no rub, no gallop   Abdomen: Soft, nontender; no masses , no abnormal abdominal sounds   Extremities: No deformity , normal color , no peripheral edema   Skin: Normal temperature, turgor and texture; no rash, ulcers            ECG 12 Lead    Date/Time: 11/21/2024 9:55 AM  Performed by: Tasneem Hayes MD    Authorized by: Tasneem Hayes MD  Comparison: compared with previous ECG   Similar to previous ECG  Rhythm: sinus rhythm    Clinical impression: non-specific ECG            Echocardiogram:    Results for orders placed during the hospital encounter of 06/21/24    Adult Transthoracic Echo Complete W/ Cont if Necessary Per Protocol    Interpretation Summary    Left ventricular ejection fraction appears to be 66 - 70%.    Left ventricular diastolic function was normal.          Current Outpatient Medications:     acetaminophen (TYLENOL) 500 MG tablet, Take 1 tablet by mouth Every 8 (Eight) Hours As Needed for Mild Pain or Moderate Pain., Disp: , Rfl:     aspirin 81 MG chewable tablet, Chew 1 tablet Every Night., Disp: , Rfl:     atorvastatin " "(LIPITOR) 40 MG tablet, Take 1 tablet by mouth Every Night., Disp: 90 tablet, Rfl: 1    clopidogrel (PLAVIX) 75 MG tablet, Take 1 tablet by mouth Daily., Disp: 90 tablet, Rfl: 1    escitalopram (LEXAPRO) 20 MG tablet, TAKE 1 TABLET BY MOUTH DAILY, Disp: 90 tablet, Rfl: 1    Ferrous Sulfate  (45 Fe) MG tablet controlled-release, Take 1 tablet by mouth Daily., Disp: 30 tablet, Rfl: 2    Homeopathic Products (LEG CRAMP RELIEF PO), Take 1 tablet by mouth Daily., Disp: , Rfl:     insulin detemir (Levemir) 100 UNIT/ML injection, Inject 20 Units under the skin into the appropriate area as directed Every Night., Disp: , Rfl:     Insulin Syringe 31G X 5/16\" 1 ML misc, USE FOR LEVEMIR ONCE DAILY INJECTIONS, Disp: 100 each, Rfl: 2    lisinopril (PRINIVIL,ZESTRIL) 10 MG tablet, Take 1 tablet by mouth Daily., Disp: , Rfl:     LORATADINE PO, Take 1 tablet by mouth Daily., Disp: , Rfl:     magnesium oxide (MAG-OX) 400 MG tablet, Take 1 tablet by mouth Every Night., Disp: 90 tablet, Rfl: 1    metFORMIN ER (GLUCOPHAGE-XR) 500 MG 24 hr tablet, TAKES 2 TABLETS BY MOUTH TWICE A DAY WITH FOOD, Disp: 360 tablet, Rfl: 1    metoprolol succinate XL (TOPROL-XL) 25 MG 24 hr tablet, Take 1 tablet by mouth Daily., Disp: 90 tablet, Rfl: 2    Multiple Vitamin (MULTIVITAMIN) tablet, Take 1 tablet by mouth Daily., Disp: , Rfl:     nitroglycerin (Nitrostat) 0.4 MG SL tablet, Place 1 tablet under the tongue Every 5 (Five) Minutes As Needed for Chest Pain. Take no more than 3 doses in 15 minutes., Disp: 100 tablet, Rfl: 12    pantoprazole (PROTONIX) 20 MG EC tablet, TAKE 1 TABLET BY MOUTH DAILY, Disp: 30 tablet, Rfl: 2    rOPINIRole (REQUIP) 0.25 MG tablet, TAKE 1 TABLET BY MOUTH 1 HOUR BEFORE BEDTIME, Disp: 30 tablet, Rfl: 2    vitamin D (ERGOCALCIFEROL) 1.25 MG (39308 UT) capsule capsule, Take 1 capsule by mouth 1 (One) Time Per Week., Disp: , Rfl:    Assessment:                Plan:          ICD-10-CM ICD-9-CM   1. Coronary artery disease of " native artery of native heart with stable angina pectoris  I25.118 414.01     413.9   2. Primary hypertension  I10 401.9   3. Other hyperlipidemia  E78.49 272.4   4. Long term (current) use of antithrombotics/antiplatelets  Z79.02 V58.63     1. Coronary artery disease of native artery of native heart with stable angina pectoris  No angina pectoris    2. Primary hypertension  Continue current treatment    3. Other hyperlipidemia  Continue current treatment    4. Long term (current) use of antithrombotics/antiplatelets  Continue aspirin and Plavix      6 MONTHS   CON ASA , PLAVIX AS PT HAS OCCASIONAL CP  COUNSELING:    Gely Murguiaeling was given to patient for the following topics: diagnostic results, risk factor reductions, impressions, risks and benefits of treatment options and importance of treatment compliance .       SMOKING COUNSELING:        Dictated using Dragon dictation

## 2024-12-10 ENCOUNTER — OFFICE VISIT (OUTPATIENT)
Dept: FAMILY MEDICINE CLINIC | Facility: CLINIC | Age: 64
End: 2024-12-10
Payer: MEDICARE

## 2024-12-10 VITALS
BODY MASS INDEX: 23.12 KG/M2 | TEMPERATURE: 97.8 F | SYSTOLIC BLOOD PRESSURE: 130 MMHG | WEIGHT: 147.3 LBS | HEART RATE: 82 BPM | OXYGEN SATURATION: 95 % | HEIGHT: 67 IN | DIASTOLIC BLOOD PRESSURE: 78 MMHG

## 2024-12-10 DIAGNOSIS — E78.49 OTHER HYPERLIPIDEMIA: ICD-10-CM

## 2024-12-10 DIAGNOSIS — I25.118 CORONARY ARTERY DISEASE OF NATIVE ARTERY OF NATIVE HEART WITH STABLE ANGINA PECTORIS: ICD-10-CM

## 2024-12-10 DIAGNOSIS — J06.9 UPPER RESPIRATORY TRACT INFECTION, UNSPECIFIED TYPE: ICD-10-CM

## 2024-12-10 DIAGNOSIS — F41.1 GENERALIZED ANXIETY DISORDER: ICD-10-CM

## 2024-12-10 DIAGNOSIS — R05.1 ACUTE COUGH: ICD-10-CM

## 2024-12-10 DIAGNOSIS — I10 PRIMARY HYPERTENSION: ICD-10-CM

## 2024-12-10 DIAGNOSIS — E55.9 VITAMIN D DEFICIENCY: ICD-10-CM

## 2024-12-10 DIAGNOSIS — F33.1 MODERATE EPISODE OF RECURRENT MAJOR DEPRESSIVE DISORDER: ICD-10-CM

## 2024-12-10 DIAGNOSIS — G25.81 RESTLESS LEG SYNDROME: ICD-10-CM

## 2024-12-10 DIAGNOSIS — E11.42 TYPE 2 DIABETES MELLITUS WITH DIABETIC POLYNEUROPATHY, WITH LONG-TERM CURRENT USE OF INSULIN: Primary | ICD-10-CM

## 2024-12-10 DIAGNOSIS — R22.31 LOCALIZED SWELLING OF RIGHT UPPER EXTREMITY: ICD-10-CM

## 2024-12-10 DIAGNOSIS — D64.9 ANEMIA, UNSPECIFIED TYPE: ICD-10-CM

## 2024-12-10 DIAGNOSIS — K21.9 GASTROESOPHAGEAL REFLUX DISEASE, UNSPECIFIED WHETHER ESOPHAGITIS PRESENT: ICD-10-CM

## 2024-12-10 DIAGNOSIS — R19.7 DIARRHEA, UNSPECIFIED TYPE: ICD-10-CM

## 2024-12-10 DIAGNOSIS — Z79.4 TYPE 2 DIABETES MELLITUS WITH DIABETIC POLYNEUROPATHY, WITH LONG-TERM CURRENT USE OF INSULIN: Primary | ICD-10-CM

## 2024-12-10 LAB
EXPIRATION DATE: NORMAL
FLUAV AG UPPER RESP QL IA.RAPID: NOT DETECTED
FLUBV AG UPPER RESP QL IA.RAPID: NOT DETECTED
INTERNAL CONTROL: NORMAL
Lab: NORMAL
SARS-COV-2 AG UPPER RESP QL IA.RAPID: NOT DETECTED

## 2024-12-10 PROCEDURE — 3078F DIAST BP <80 MM HG: CPT | Performed by: NURSE PRACTITIONER

## 2024-12-10 PROCEDURE — 1126F AMNT PAIN NOTED NONE PRSNT: CPT | Performed by: NURSE PRACTITIONER

## 2024-12-10 PROCEDURE — 87428 SARSCOV & INF VIR A&B AG IA: CPT | Performed by: NURSE PRACTITIONER

## 2024-12-10 PROCEDURE — 3044F HG A1C LEVEL LT 7.0%: CPT | Performed by: NURSE PRACTITIONER

## 2024-12-10 PROCEDURE — 1160F RVW MEDS BY RX/DR IN RCRD: CPT | Performed by: NURSE PRACTITIONER

## 2024-12-10 PROCEDURE — 99214 OFFICE O/P EST MOD 30 MIN: CPT | Performed by: NURSE PRACTITIONER

## 2024-12-10 PROCEDURE — 1159F MED LIST DOCD IN RCRD: CPT | Performed by: NURSE PRACTITIONER

## 2024-12-10 PROCEDURE — 3075F SYST BP GE 130 - 139MM HG: CPT | Performed by: NURSE PRACTITIONER

## 2024-12-10 NOTE — PATIENT INSTRUCTIONS
Increase intake of clear liquids and rest.  Try plain Mucinex to thin secretions.  Consider nasal steroid, such as Flonase or Nasacort.  Continue to monitor your blood sugar once daily before a meal and record results.  Continue to monitor your blood pressure periodically and record results.  Continue to work on healthy diet and exercise.  Follow up pending lab results.  Follow up in 3 months, or sooner if problems or concerns.   Return stool studies to GI.

## 2024-12-10 NOTE — PROGRESS NOTES
Subjective   Gely Kimble is a 64 y.o. female.     Chief Complaint   Patient presents with    Diabetes     Follow up        History of Present Illness   Patient presents for follow up DM2: takes Levemir daily and Metformin twice daily; decreased Metformin to 1 tablet twice daily and has helped, but still having diarrhea; saw GI and recommended stool studies, has not returned at this point; cannot have colonoscopy due to need for uninterrupted DAPT therapy for 6 more months; last A1c 6.8%; monitors blood sugar, typically runs 100-140s; does not really watch carbs/sugars in diet as close as should; regular exercise; walks twice daily; has numbness/tingling in feet; last eye exam 8/2023, plans to schedule appointment.    F/U HTN: takes Lisinopril and Metoprolol daily; monitors BP on occasion, has been running 140-150s/70-80s; home BP runs higher than manual BP; considering a new BP machine; no headaches other than one today; has some orthostasis, particularly with bending over; also, will be walking and feel like drifting to side; no swelling.    F/U Hyperlipidemia: takes Atorvastatin daily; no myalgias; watches saturated fats in diet; fasting today.    F/U CAD: takes aspirin and Plavix daily as well as Metoprolol daily; had recent follow up with cardiology and no changes.    F/U anemia: takes daily iron supplement; no problems with medication.     F/U YARELIS: takes Pantoprazole and works well; will have symptoms if misses a dose.    F/U anxiety/depression: takes Escitalopram daily and works well; chronic trouble falling asleep and staying asleep; has been taking Tylenol PM and helps fall asleep; no SI/HI.    F/U RLS: takes Ropinirole nightly and works well.    F/U right upper arm pain and warmth: no change; will get random flares throughout the day; does not seem to be related to activity; will have a lot of discomfort with movement; had US and WNL; has symptoms in muscle of upper arm; no redness.    Also, c/o cold  "symptoms since yesterday; started with cough yesterday and has sore throat today; no fever; has had nonproductive cough; no SOA; no ear pain; no nausea, vomiting, or diarrhea; takes Claritin daily; no other OTC treatment; woke up feeling worse today.          The following portions of the patient's history were reviewed and updated as appropriate: allergies, current medications, past family history, past medical history, past social history, past surgical history and problem list.    Current Outpatient Medications on File Prior to Visit   Medication Sig    acetaminophen (TYLENOL) 500 MG tablet Take 1 tablet by mouth Every 8 (Eight) Hours As Needed for Mild Pain or Moderate Pain.    aspirin 81 MG chewable tablet Chew 1 tablet Every Night.    atorvastatin (LIPITOR) 40 MG tablet Take 1 tablet by mouth Every Night.    clopidogrel (PLAVIX) 75 MG tablet Take 1 tablet by mouth Daily.    escitalopram (LEXAPRO) 20 MG tablet TAKE 1 TABLET BY MOUTH DAILY    Ferrous Sulfate  (45 Fe) MG tablet controlled-release Take 1 tablet by mouth Daily.    insulin detemir (Levemir) 100 UNIT/ML injection Inject 20 Units under the skin into the appropriate area as directed Every Night.    Insulin Syringe 31G X 5/16\" 1 ML misc USE FOR LEVEMIR ONCE DAILY INJECTIONS    lisinopril (PRINIVIL,ZESTRIL) 10 MG tablet Take 1 tablet by mouth Daily.    LORATADINE PO Take 1 tablet by mouth Daily.    magnesium oxide (MAG-OX) 400 MG tablet Take 1 tablet by mouth Every Night.    metFORMIN ER (GLUCOPHAGE-XR) 500 MG 24 hr tablet TAKES 2 TABLETS BY MOUTH TWICE A DAY WITH FOOD (Patient taking differently: Take 1 tablet by mouth 2 (Two) Times a Day With Meals.)    metoprolol succinate XL (TOPROL-XL) 25 MG 24 hr tablet Take 1 tablet by mouth Daily.    Multiple Vitamin (MULTIVITAMIN) tablet Take 1 tablet by mouth Daily.    nitroglycerin (Nitrostat) 0.4 MG SL tablet Place 1 tablet under the tongue Every 5 (Five) Minutes As Needed for Chest Pain. Take no more " than 3 doses in 15 minutes.    pantoprazole (PROTONIX) 20 MG EC tablet TAKE 1 TABLET BY MOUTH DAILY    rOPINIRole (REQUIP) 0.25 MG tablet TAKE 1 TABLET BY MOUTH 1 HOUR BEFORE BEDTIME    vitamin D (ERGOCALCIFEROL) 1.25 MG (46172 UT) capsule capsule Take 1 capsule by mouth 1 (One) Time Per Week.     No current facility-administered medications on file prior to visit.        Past Medical History:   Diagnosis Date    Abnormal nuclear stress test     Arthritis     COPD (chronic obstructive pulmonary disease)     Elevated cholesterol     Emphysema of lung     Gallbladder abscess     Herpes zoster 08/03/2023    History of positive hepatitis C     previously positive, never treated, negative RNA finding    Hypertension     Myocardial perfusion defect 02/16/2016    Type 2 diabetes mellitus        Past Surgical History:   Procedure Laterality Date    APPENDECTOMY      CARDIAC CATHETERIZATION N/A 10/25/2023    Procedure: Left Heart Cath;  Surgeon: Tasneem Hayes MD;  Location: [facility] CATH INVASIVE LOCATION;  Service: Cardiovascular;  Laterality: N/A;    CARDIAC CATHETERIZATION N/A 10/25/2023    Procedure: Left ventriculography;  Surgeon: Tasneem Hayes MD;  Location: [facility] CATH INVASIVE LOCATION;  Service: Cardiovascular;  Laterality: N/A;    CARDIAC CATHETERIZATION N/A 10/25/2023    Procedure: Stent LEYLA coronary;  Surgeon: Tasneem Hayes MD;  Location: [facility] CATH INVASIVE LOCATION;  Service: Cardiovascular;  Laterality: N/A;    CARDIAC CATHETERIZATION N/A 10/25/2023    Procedure: Coronary angiography;  Surgeon: Tasneem Hayes MD;  Location: [facility] CATH INVASIVE LOCATION;  Service: Cardiovascular;  Laterality: N/A;    CARDIAC CATHETERIZATION N/A 10/25/2023    Procedure: Percutaneous Coronary Intervention;  Surgeon: Tasneem Hayes MD;  Location: [facility] CATH INVASIVE LOCATION;  Service: Cardiovascular;  Laterality: N/A;    CARDIAC CATHETERIZATION N/A 11/3/2023    Procedure: Percutaneous  Coronary Intervention RCA;  Surgeon: Tasneem Hayes MD;  Location:  [facility] CATH INVASIVE LOCATION;  Service: Cardiovascular;  Laterality: N/A;    CARDIAC CATHETERIZATION N/A 11/3/2023    Procedure: Coronary angiography;  Surgeon: Tasneem Hayes MD;  Location:  [facility] CATH INVASIVE LOCATION;  Service: Cardiovascular;  Laterality: N/A;    CARDIAC CATHETERIZATION N/A 11/3/2023    Procedure: Stent LEYLA coronary;  Surgeon: Tasneem Hayes MD;  Location:  [facility] CATH INVASIVE LOCATION;  Service: Cardiovascular;  Laterality: N/A;    CARDIAC CATHETERIZATION N/A 2024    Procedure: Left Heart Cath;  Surgeon: Tasneem Hayes MD;  Location:  [facility] CATH INVASIVE LOCATION;  Service: Cardiovascular;  Laterality: N/A;    CARDIAC CATHETERIZATION N/A 2024    Procedure: Percutaneous Coronary Intervention;  Surgeon: Tasneem Hayes MD;  Location:  [facility] CATH INVASIVE LOCATION;  Service: Cardiovascular;  Laterality: N/A;    CARDIAC CATHETERIZATION N/A 2024    Procedure: Coronary angiography;  Surgeon: Tasneem Hayes MD;  Location:  [facility] CATH INVASIVE LOCATION;  Service: Cardiovascular;  Laterality: N/A;    CARDIAC CATHETERIZATION N/A 2024    Procedure: Left ventriculography;  Surgeon: Tasneem Hayes MD;  [facility];  Service: Cardiovascular;  Laterality: N/A;    CHOLECYSTECTOMY      HYSTERECTOMY      TUBAL ABDOMINAL LIGATION         Family History   Problem Relation Age of Onset    Diabetes Mother     Heart disease Mother     Diabetes Father     Diabetes Brother     Heart disease Maternal Grandmother     Cancer Paternal Grandfather        Social History     Socioeconomic History    Marital status:    Tobacco Use    Smoking status: Former     Current packs/day: 0.00     Average packs/day: 1.5 packs/day for 45.8 years (68.7 ttl pk-yrs)     Types: Cigarettes     Start date:      Quit date: 10/11/2017     Years since quittin.1  "    Passive exposure: Past    Smokeless tobacco: Never    Tobacco comments:     Previously smoked about 1.5 packs per day for about 45 years   Vaping Use    Vaping status: Never Used   Substance and Sexual Activity    Alcohol use: Not Currently     Comment: rare margaritia 1-2 times per year    Drug use: No     Comment: in past; clean since 2007    Sexual activity: Not Currently       Review of Systems   Constitutional:  Positive for fatigue. Negative for appetite change, fever, unexpected weight gain and unexpected weight loss. Chills: always cold.  HENT:  Positive for rhinorrhea and sinus pressure. Negative for postnasal drip and trouble swallowing.    Eyes:  Negative for discharge. Blurred vision: no acute change in vision.  Respiratory:  Positive for cough (see HPI). Negative for chest tightness and shortness of breath.    Cardiovascular:  Negative for chest pain and palpitations.   Gastrointestinal:  Positive for diarrhea (see HPI). Negative for abdominal pain, blood in stool and constipation.   Endocrine: Positive for cold intolerance and polydipsia (no change).   Genitourinary:  Negative for dysuria and frequency.   Musculoskeletal:  Negative for back pain.   Skin:  Negative for rash.   Neurological:  Negative for syncope and weakness.       Objective   Vitals:    12/10/24 0752   BP: 130/78   BP Location: Left arm   Patient Position: Sitting   Cuff Size: Adult   Pulse: 82   Temp: 97.8 °F (36.6 °C)   SpO2: 95%   Weight: 66.8 kg (147 lb 4.8 oz)   Height: 170.2 cm (67.01\")     Body mass index is 23.06 kg/m².    Physical Exam  Vitals and nursing note reviewed.   Constitutional:       General: She is not in acute distress.     Appearance: She is well-developed and well-groomed. She is not diaphoretic.   HENT:      Head: Normocephalic.      Right Ear: External ear normal. No decreased hearing noted. Right ear middle ear effusion: TM dull. Tympanic membrane is not erythematous.      Left Ear: External ear normal. No " decreased hearing noted. Left ear middle ear effusion: TM dull. Tympanic membrane is not erythematous.      Nose: Nose normal.      Right Sinus: Maxillary sinus tenderness and frontal sinus tenderness present.      Left Sinus: Maxillary sinus tenderness and frontal sinus tenderness present.      Mouth/Throat:      Mouth: Mucous membranes are moist.      Dentition: Has dentures.      Pharynx: No oropharyngeal exudate or posterior oropharyngeal erythema.   Eyes:      Conjunctiva/sclera: Conjunctivae normal.   Neck:      Vascular: No carotid bruit.   Cardiovascular:      Rate and Rhythm: Normal rate and regular rhythm.      Pulses: Normal pulses.      Heart sounds: Normal heart sounds. No murmur heard.  Pulmonary:      Effort: Pulmonary effort is normal. No respiratory distress.      Breath sounds: Normal breath sounds. Decreased breath sounds: mild bilateral lower lobes.   Abdominal:      General: Bowel sounds are normal.      Palpations: Abdomen is soft. There is no hepatomegaly or splenomegaly.      Tenderness: There is no abdominal tenderness. There is no guarding.   Musculoskeletal:        Arms:       Cervical back: Normal range of motion and neck supple.      Right lower leg: No edema.      Left lower leg: No edema.   Lymphadenopathy:      Cervical: No cervical adenopathy.   Skin:     General: Skin is warm and dry.      Findings: No rash.   Neurological:      Mental Status: She is alert and oriented to person, place, and time.      Gait: Gait normal.   Psychiatric:         Mood and Affect: Mood normal.         Behavior: Behavior normal.         Thought Content: Thought content normal.         Cognition and Memory: Cognition normal.         Judgment: Judgment normal.         Lab Results   Component Value Date    WBC 7.8 08/06/2024    RBC 4.79 08/06/2024    HGB 11.9 08/06/2024    HCT 39.7 08/06/2024    MCV 83 08/06/2024    MCH 24.8 (L) 08/06/2024    MCHC 30.0 (L) 08/06/2024    RDW 19.2 (H) 08/06/2024    RDWSD 51.6  06/22/2024    MPV 11.1 06/22/2024     08/06/2024    NEUTRORELPCT 59 08/06/2024    LYMPHORELPCT 33 08/06/2024    MONORELPCT 6 08/06/2024    EOSRELPCT 1 08/06/2024    BASORELPCT 1 08/06/2024    AUTOIGPER 0.1 06/21/2024    NEUTROABS 4.6 08/06/2024    LYMPHSABS 2.6 08/06/2024    MONOSABS 0.5 08/06/2024    EOSABS 0.1 08/06/2024    BASOSABS 0.1 08/06/2024    AUTOIGNUM 0.01 06/21/2024    NRBC 0.0 06/21/2024     Lab Results   Component Value Date    GLUCOSE 108 (H) 08/06/2024    BUN 10 08/06/2024    CREATININE 0.56 (L) 08/06/2024    EGFRIFNONA 108 03/20/2020    EGFRIFAFRI >60 10/28/2020    BCR 18 08/06/2024    K 4.4 08/06/2024    CO2 26 08/06/2024    CALCIUM 9.8 08/06/2024    PROTENTOTREF 6.8 08/06/2024    ALBUMIN 4.4 08/06/2024    LABIL2 2.0 03/19/2024    AST 10 08/06/2024    ALT 10 08/06/2024      Lab Results   Component Value Date    CHLPL 117 03/19/2024    TRIG 106 06/21/2024    HDL 51 06/21/2024    VLDL 20 06/21/2024    LDL 27 06/21/2024     Lab Results   Component Value Date    TSH 1.190 08/29/2024     Lab Results   Component Value Date    HGBA1C 6.8 (H) 08/06/2024           Assessment    Problem List Items Addressed This Visit       Type 2 diabetes mellitus - Primary    Current Assessment & Plan     Diabetes is  pending lab results .   Continue current treatment regimen.  Regular aerobic exercise.  Reminded to get yearly retinal exam.  Diabetes will be reassessed in 3 months  Continue Levemir daily.  Will consider stopping Metformin pending lab results due to diarrhea.         Relevant Orders    Lipid Panel With LDL / HDL Ratio    Comprehensive Metabolic Panel    Hemoglobin A1c    Other hyperlipidemia    Current Assessment & Plan     Continue Atorvastatin daily.  Continue to work on healthy diet and exercise.         Relevant Orders    Lipid Panel With LDL / HDL Ratio    Comprehensive Metabolic Panel    Primary hypertension    Current Assessment & Plan     Hypertension is stable and controlled  Continue  current treatment regimen.  Regular aerobic exercise.  Ambulatory blood pressure monitoring.  Blood pressure will be reassessed in 3 months.  Continue Lisinopril and Metoprolol daily.  Follow-up as scheduled with cardiology.         Relevant Orders    CBC & Differential    Lipid Panel With LDL / HDL Ratio    Comprehensive Metabolic Panel    Restless leg syndrome    Current Assessment & Plan     Stable.  Continue Ropinirole nightly.         Vitamin D deficiency    Relevant Orders    Vitamin D,25-Hydroxy    Moderate episode of recurrent major depressive disorder    Current Assessment & Plan     Patient's depression is a recurrent episode that is moderate without psychosis. Depression is active and stable.    Plan:   Continue current medication therapy   Continue Escitalopram daily.    Followup in 3 months.          Generalized anxiety disorder    Current Assessment & Plan     Psychological condition is stable.  Continue current treatment regimen.  Psychological condition  will be reassessed in 3 months.  Continue Escitalopram daily.         Coronary artery disease of native artery of native heart with stable angina pectoris    Current Assessment & Plan     Continue aspirin and Plavix daily.  Follow-up as scheduled with cardiology.         Gastroesophageal reflux disease    Current Assessment & Plan     Stable.  Continue Pantoprazole daily.         Anemia    Relevant Orders    CBC & Differential    Iron    Ferritin    Localized swelling of right upper extremity    Current Assessment & Plan     Symptoms persist.  Will get CT right upper arm for further evaluation.         Relevant Orders    CT Upper Extremity Right With & Without Contrast    Upper respiratory tract infection    Current Assessment & Plan     Increase intake of clear liquids and rest.  Try plain Mucinex to thin secretions.  Consider nasal steroid, such as Flonase or Nasacort.         Diarrhea    Current Assessment & Plan     Will consider holding  Metformin pending lab results.  Return stool studies to GI.          Other Visit Diagnoses       Acute cough        Relevant Orders    POCT SARS-CoV-2 + Flu Antigen LAUREN (Completed)             Return in about 3 months (around 3/10/2025) for Recheck.or sooner if symptoms persist or worsen.  Negative for flu and COVID-19 virus today; will treat symptoms and see if helps.  Will consider stopping Metformin pending lab results due to diarrhea.       COVID-19 Precautions - Patient was compliant in wearing a mask. When I saw the patient, I used appropriate personal protective equipment (PPE) including N95 mask, gloves, and eye shield (standard procedure).  Hand hygiene was completed before and after seeing the patient.

## 2024-12-11 PROBLEM — J06.9 UPPER RESPIRATORY TRACT INFECTION: Status: ACTIVE | Noted: 2024-12-11

## 2024-12-11 PROBLEM — R19.7 DIARRHEA: Status: ACTIVE | Noted: 2024-12-11

## 2024-12-11 LAB
25(OH)D3+25(OH)D2 SERPL-MCNC: 35.6 NG/ML (ref 30–100)
ALBUMIN SERPL-MCNC: 4.2 G/DL (ref 3.5–5.2)
ALBUMIN/GLOB SERPL: 1.9 G/DL
ALP SERPL-CCNC: 66 U/L (ref 39–117)
ALT SERPL-CCNC: 15 U/L (ref 1–33)
AST SERPL-CCNC: 18 U/L (ref 1–32)
BASOPHILS # BLD AUTO: 0.04 10*3/MM3 (ref 0–0.2)
BASOPHILS NFR BLD AUTO: 0.5 % (ref 0–1.5)
BILIRUB SERPL-MCNC: 0.4 MG/DL (ref 0–1.2)
BUN SERPL-MCNC: 12 MG/DL (ref 8–23)
BUN/CREAT SERPL: 22.6 (ref 7–25)
CALCIUM SERPL-MCNC: 9.5 MG/DL (ref 8.6–10.5)
CHLORIDE SERPL-SCNC: 101 MMOL/L (ref 98–107)
CHOLEST SERPL-MCNC: 113 MG/DL (ref 0–200)
CO2 SERPL-SCNC: 27.7 MMOL/L (ref 22–29)
CREAT SERPL-MCNC: 0.53 MG/DL (ref 0.57–1)
EGFRCR SERPLBLD CKD-EPI 2021: 102.1 ML/MIN/1.73
EOSINOPHIL # BLD AUTO: 0.03 10*3/MM3 (ref 0–0.4)
EOSINOPHIL NFR BLD AUTO: 0.4 % (ref 0.3–6.2)
ERYTHROCYTE [DISTWIDTH] IN BLOOD BY AUTOMATED COUNT: 14.3 % (ref 12.3–15.4)
FERRITIN SERPL-MCNC: 18.4 NG/ML (ref 13–150)
GLOBULIN SER CALC-MCNC: 2.2 GM/DL
GLUCOSE SERPL-MCNC: 125 MG/DL (ref 65–99)
HBA1C MFR BLD: 7.5 % (ref 4.8–5.6)
HCT VFR BLD AUTO: 39.4 % (ref 34–46.6)
HDLC SERPL-MCNC: 55 MG/DL (ref 40–60)
HGB BLD-MCNC: 13 G/DL (ref 12–15.9)
IMM GRANULOCYTES # BLD AUTO: 0.01 10*3/MM3 (ref 0–0.05)
IMM GRANULOCYTES NFR BLD AUTO: 0.1 % (ref 0–0.5)
IRON SERPL-MCNC: 90 MCG/DL (ref 37–145)
LDLC SERPL CALC-MCNC: 39 MG/DL (ref 0–100)
LDLC/HDLC SERPL: 0.68 {RATIO}
LYMPHOCYTES # BLD AUTO: 1.74 10*3/MM3 (ref 0.7–3.1)
LYMPHOCYTES NFR BLD AUTO: 20.4 % (ref 19.6–45.3)
MCH RBC QN AUTO: 28.5 PG (ref 26.6–33)
MCHC RBC AUTO-ENTMCNC: 33 G/DL (ref 31.5–35.7)
MCV RBC AUTO: 86.4 FL (ref 79–97)
MONOCYTES # BLD AUTO: 0.38 10*3/MM3 (ref 0.1–0.9)
MONOCYTES NFR BLD AUTO: 4.5 % (ref 5–12)
NEUTROPHILS # BLD AUTO: 6.33 10*3/MM3 (ref 1.7–7)
NEUTROPHILS NFR BLD AUTO: 74.1 % (ref 42.7–76)
NRBC BLD AUTO-RTO: 0 /100 WBC (ref 0–0.2)
PLATELET # BLD AUTO: 240 10*3/MM3 (ref 140–450)
POTASSIUM SERPL-SCNC: 4.3 MMOL/L (ref 3.5–5.2)
PROT SERPL-MCNC: 6.4 G/DL (ref 6–8.5)
RBC # BLD AUTO: 4.56 10*6/MM3 (ref 3.77–5.28)
SODIUM SERPL-SCNC: 139 MMOL/L (ref 136–145)
TRIGL SERPL-MCNC: 103 MG/DL (ref 0–150)
VLDLC SERPL CALC-MCNC: 19 MG/DL (ref 5–40)
WBC # BLD AUTO: 8.53 10*3/MM3 (ref 3.4–10.8)

## 2024-12-11 NOTE — ASSESSMENT & PLAN NOTE
Increase intake of clear liquids and rest.  Try plain Mucinex to thin secretions.  Consider nasal steroid, such as Flonase or Nasacort.

## 2024-12-11 NOTE — ASSESSMENT & PLAN NOTE
Diabetes is  pending lab results .   Continue current treatment regimen.  Regular aerobic exercise.  Reminded to get yearly retinal exam.  Diabetes will be reassessed in 3 months  Continue Levemir daily.  Will consider stopping Metformin pending lab results due to diarrhea.   with patient

## 2024-12-11 NOTE — ASSESSMENT & PLAN NOTE
Hypertension is stable and controlled  Continue current treatment regimen.  Regular aerobic exercise.  Ambulatory blood pressure monitoring.  Blood pressure will be reassessed in 3 months.  Continue Lisinopril and Metoprolol daily.  Follow-up as scheduled with cardiology.

## 2024-12-13 DIAGNOSIS — E11.42 TYPE 2 DIABETES MELLITUS WITH DIABETIC POLYNEUROPATHY, WITH LONG-TERM CURRENT USE OF INSULIN: ICD-10-CM

## 2024-12-13 DIAGNOSIS — Z79.4 TYPE 2 DIABETES MELLITUS WITH DIABETIC POLYNEUROPATHY, WITH LONG-TERM CURRENT USE OF INSULIN: ICD-10-CM

## 2024-12-13 RX ORDER — METFORMIN HYDROCHLORIDE 500 MG/1
500 TABLET, EXTENDED RELEASE ORAL
Start: 2024-12-13

## 2024-12-13 RX ORDER — INSULIN DETEMIR 100 [IU]/ML
24 INJECTION, SOLUTION SUBCUTANEOUS NIGHTLY
Start: 2024-12-13

## 2024-12-16 ENCOUNTER — POP HEALTH PHARMACY (OUTPATIENT)
Dept: PHARMACY | Facility: OTHER | Age: 64
End: 2024-12-16
Payer: MEDICARE

## 2024-12-27 DIAGNOSIS — K21.9 GASTROESOPHAGEAL REFLUX DISEASE, UNSPECIFIED WHETHER ESOPHAGITIS PRESENT: ICD-10-CM

## 2024-12-30 RX ORDER — PANTOPRAZOLE SODIUM 20 MG/1
20 TABLET, DELAYED RELEASE ORAL DAILY
Qty: 30 TABLET | Refills: 2 | Status: SHIPPED | OUTPATIENT
Start: 2024-12-30

## 2024-12-31 ENCOUNTER — HOSPITAL ENCOUNTER (EMERGENCY)
Facility: HOSPITAL | Age: 64
Discharge: HOME | End: 2024-12-31
Attending: EMERGENCY MEDICINE | Admitting: EMERGENCY MEDICINE
Payer: MEDICARE

## 2024-12-31 ENCOUNTER — APPOINTMENT (OUTPATIENT)
Dept: CT IMAGING | Facility: HOSPITAL | Age: 64
End: 2024-12-31
Payer: MEDICARE

## 2024-12-31 ENCOUNTER — APPOINTMENT (OUTPATIENT)
Dept: GENERAL RADIOLOGY | Facility: HOSPITAL | Age: 64
End: 2024-12-31
Payer: MEDICARE

## 2024-12-31 VITALS
HEIGHT: 67 IN | TEMPERATURE: 97.6 F | SYSTOLIC BLOOD PRESSURE: 161 MMHG | DIASTOLIC BLOOD PRESSURE: 86 MMHG | WEIGHT: 145 LBS | OXYGEN SATURATION: 93 % | BODY MASS INDEX: 22.76 KG/M2 | RESPIRATION RATE: 16 BRPM | HEART RATE: 77 BPM

## 2024-12-31 DIAGNOSIS — R07.89 ATYPICAL CHEST PAIN: ICD-10-CM

## 2024-12-31 DIAGNOSIS — R51.9 NONINTRACTABLE EPISODIC HEADACHE, UNSPECIFIED HEADACHE TYPE: Primary | ICD-10-CM

## 2024-12-31 LAB
ALBUMIN SERPL-MCNC: 4.3 G/DL (ref 3.5–5.2)
ALBUMIN/GLOB SERPL: 1.5 G/DL
ALP SERPL-CCNC: 82 U/L (ref 39–117)
ALT SERPL W P-5'-P-CCNC: 16 U/L (ref 1–33)
ANION GAP SERPL CALCULATED.3IONS-SCNC: 6 MMOL/L (ref 5–15)
AST SERPL-CCNC: 16 U/L (ref 1–32)
B PARAPERT DNA SPEC QL NAA+PROBE: NOT DETECTED
B PERT DNA SPEC QL NAA+PROBE: NOT DETECTED
BASOPHILS # BLD AUTO: 0.04 10*3/MM3 (ref 0–0.2)
BASOPHILS NFR BLD AUTO: 0.5 % (ref 0–1.5)
BILIRUB SERPL-MCNC: 0.4 MG/DL (ref 0–1.2)
BUN SERPL-MCNC: 15 MG/DL (ref 8–23)
BUN/CREAT SERPL: 24.6 (ref 7–25)
C PNEUM DNA NPH QL NAA+NON-PROBE: NOT DETECTED
CALCIUM SPEC-SCNC: 9.5 MG/DL (ref 8.6–10.5)
CHLORIDE SERPL-SCNC: 100 MMOL/L (ref 98–107)
CO2 SERPL-SCNC: 27 MMOL/L (ref 22–29)
CREAT SERPL-MCNC: 0.61 MG/DL (ref 0.57–1)
DEPRECATED RDW RBC AUTO: 44.7 FL (ref 37–54)
EGFRCR SERPLBLD CKD-EPI 2021: 98.7 ML/MIN/1.73
EOSINOPHIL # BLD AUTO: 0.01 10*3/MM3 (ref 0–0.4)
EOSINOPHIL NFR BLD AUTO: 0.1 % (ref 0.3–6.2)
ERYTHROCYTE [DISTWIDTH] IN BLOOD BY AUTOMATED COUNT: 14 % (ref 12.3–15.4)
ERYTHROCYTE [SEDIMENTATION RATE] IN BLOOD: 16 MM/HR (ref 0–30)
FLUAV SUBTYP SPEC NAA+PROBE: NOT DETECTED
FLUBV RNA ISLT QL NAA+PROBE: NOT DETECTED
GEN 5 1HR TROPONIN T REFLEX: <6 NG/L
GLOBULIN UR ELPH-MCNC: 2.8 GM/DL
GLUCOSE SERPL-MCNC: 251 MG/DL (ref 65–99)
HADV DNA SPEC NAA+PROBE: NOT DETECTED
HCOV 229E RNA SPEC QL NAA+PROBE: NOT DETECTED
HCOV HKU1 RNA SPEC QL NAA+PROBE: NOT DETECTED
HCOV NL63 RNA SPEC QL NAA+PROBE: NOT DETECTED
HCOV OC43 RNA SPEC QL NAA+PROBE: NOT DETECTED
HCT VFR BLD AUTO: 40.9 % (ref 34–46.6)
HGB BLD-MCNC: 13.2 G/DL (ref 12–15.9)
HMPV RNA NPH QL NAA+NON-PROBE: NOT DETECTED
HOLD SPECIMEN: NORMAL
HOLD SPECIMEN: NORMAL
HPIV1 RNA ISLT QL NAA+PROBE: NOT DETECTED
HPIV2 RNA SPEC QL NAA+PROBE: NOT DETECTED
HPIV3 RNA NPH QL NAA+PROBE: NOT DETECTED
HPIV4 P GENE NPH QL NAA+PROBE: NOT DETECTED
IMM GRANULOCYTES # BLD AUTO: 0.02 10*3/MM3 (ref 0–0.05)
IMM GRANULOCYTES NFR BLD AUTO: 0.2 % (ref 0–0.5)
LYMPHOCYTES # BLD AUTO: 1.19 10*3/MM3 (ref 0.7–3.1)
LYMPHOCYTES NFR BLD AUTO: 13.7 % (ref 19.6–45.3)
M PNEUMO IGG SER IA-ACNC: NOT DETECTED
MCH RBC QN AUTO: 28.4 PG (ref 26.6–33)
MCHC RBC AUTO-ENTMCNC: 32.3 G/DL (ref 31.5–35.7)
MCV RBC AUTO: 88.1 FL (ref 79–97)
MONOCYTES # BLD AUTO: 0.15 10*3/MM3 (ref 0.1–0.9)
MONOCYTES NFR BLD AUTO: 1.7 % (ref 5–12)
NEUTROPHILS NFR BLD AUTO: 7.29 10*3/MM3 (ref 1.7–7)
NEUTROPHILS NFR BLD AUTO: 83.8 % (ref 42.7–76)
NRBC BLD AUTO-RTO: 0 /100 WBC (ref 0–0.2)
NT-PROBNP SERPL-MCNC: 184 PG/ML (ref 0–900)
PLATELET # BLD AUTO: 217 10*3/MM3 (ref 140–450)
PMV BLD AUTO: 11.3 FL (ref 6–12)
POTASSIUM SERPL-SCNC: 4.6 MMOL/L (ref 3.5–5.2)
PROT SERPL-MCNC: 7.1 G/DL (ref 6–8.5)
RBC # BLD AUTO: 4.64 10*6/MM3 (ref 3.77–5.28)
RHINOVIRUS RNA SPEC NAA+PROBE: NOT DETECTED
RSV RNA NPH QL NAA+NON-PROBE: NOT DETECTED
SARS-COV-2 RNA NPH QL NAA+NON-PROBE: NOT DETECTED
SODIUM SERPL-SCNC: 133 MMOL/L (ref 136–145)
TROPONIN T NUMERIC DELTA: NORMAL
TROPONIN T SERPL HS-MCNC: <6 NG/L
WBC NRBC COR # BLD AUTO: 8.7 10*3/MM3 (ref 3.4–10.8)
WHOLE BLOOD HOLD COAG: NORMAL
WHOLE BLOOD HOLD SPECIMEN: NORMAL

## 2024-12-31 PROCEDURE — 93005 ELECTROCARDIOGRAM TRACING: CPT | Performed by: EMERGENCY MEDICINE

## 2024-12-31 PROCEDURE — 84484 ASSAY OF TROPONIN QUANT: CPT

## 2024-12-31 PROCEDURE — 36415 COLL VENOUS BLD VENIPUNCTURE: CPT

## 2024-12-31 PROCEDURE — 85025 COMPLETE CBC W/AUTO DIFF WBC: CPT

## 2024-12-31 PROCEDURE — 99284 EMERGENCY DEPT VISIT MOD MDM: CPT

## 2024-12-31 PROCEDURE — 96375 TX/PRO/DX INJ NEW DRUG ADDON: CPT

## 2024-12-31 PROCEDURE — 93005 ELECTROCARDIOGRAM TRACING: CPT

## 2024-12-31 PROCEDURE — 71045 X-RAY EXAM CHEST 1 VIEW: CPT

## 2024-12-31 PROCEDURE — 80053 COMPREHEN METABOLIC PANEL: CPT

## 2024-12-31 PROCEDURE — 83880 ASSAY OF NATRIURETIC PEPTIDE: CPT | Performed by: PHYSICIAN ASSISTANT

## 2024-12-31 PROCEDURE — 0202U NFCT DS 22 TRGT SARS-COV-2: CPT | Performed by: PHYSICIAN ASSISTANT

## 2024-12-31 PROCEDURE — 85652 RBC SED RATE AUTOMATED: CPT | Performed by: PHYSICIAN ASSISTANT

## 2024-12-31 PROCEDURE — 70450 CT HEAD/BRAIN W/O DYE: CPT

## 2024-12-31 PROCEDURE — 25010000002 DIPHENHYDRAMINE PER 50 MG: Performed by: PHYSICIAN ASSISTANT

## 2024-12-31 PROCEDURE — 96374 THER/PROPH/DIAG INJ IV PUSH: CPT

## 2024-12-31 PROCEDURE — 25010000002 PROCHLORPERAZINE 10 MG/2ML SOLUTION: Performed by: PHYSICIAN ASSISTANT

## 2024-12-31 RX ORDER — SODIUM CHLORIDE 0.9 % (FLUSH) 0.9 %
10 SYRINGE (ML) INJECTION AS NEEDED
Status: DISCONTINUED | OUTPATIENT
Start: 2024-12-31 | End: 2024-12-31 | Stop reason: HOSPADM

## 2024-12-31 RX ORDER — PROCHLORPERAZINE EDISYLATE 5 MG/ML
10 INJECTION INTRAMUSCULAR; INTRAVENOUS ONCE
Status: COMPLETED | OUTPATIENT
Start: 2024-12-31 | End: 2024-12-31

## 2024-12-31 RX ORDER — DIPHENHYDRAMINE HYDROCHLORIDE 50 MG/ML
25 INJECTION INTRAMUSCULAR; INTRAVENOUS ONCE
Status: COMPLETED | OUTPATIENT
Start: 2024-12-31 | End: 2024-12-31

## 2024-12-31 RX ORDER — ASPIRIN 325 MG
325 TABLET ORAL ONCE
Status: DISCONTINUED | OUTPATIENT
Start: 2024-12-31 | End: 2024-12-31

## 2024-12-31 RX ADMIN — PROCHLORPERAZINE EDISYLATE 10 MG: 5 INJECTION INTRAMUSCULAR; INTRAVENOUS at 19:10

## 2024-12-31 RX ADMIN — DIPHENHYDRAMINE HYDROCHLORIDE 25 MG: 50 INJECTION, SOLUTION INTRAMUSCULAR; INTRAVENOUS at 19:11

## 2024-12-31 RX ADMIN — Medication 10 ML: at 19:11

## 2024-12-31 NOTE — ED TRIAGE NOTES
Pt to ED from home due to HA that started this morning followed by chest pain. Pt ambulatory at triage. Pt also reports nausea the past few days

## 2024-12-31 NOTE — ED PROVIDER NOTES
EMERGENCY DEPARTMENT ENCOUNTER  Room Number:  04/04  PCP: Sofi Fernandes APRN  Independent Historians: Patient      HPI:  Chief Complaint: had concerns including Chest Pain.     A complete HPI/ROS/PMH/PSH/SH/FH are unobtainable due to: None    Chronic or social conditions impacting patient care (Social Determinants of Health): None      Context: The patient is a 64 y.o. female with a medical history of  who presents to the ED c/o acute symptoms that started this morning upon waking.    Woke up with headache  Frontal in location.  Took tylenol, no help  No significant HA hx  No fever, trauma or vision changes,she does report photophobia and phonophobia      Nauseated, vomited x 1  No diarrhea    Chest pain x 3 hours, pressure, moderate, generalized.  Not exertional or pleuritic.  Mild    Review of prior external notes (non-ED) -and- Review of prior external test results outside of this encounter:  Cardiac catheterization performed 5/17/2024 with successful angioplasty of the ostial medium size second diagonal branch that was 99% occluded.  Normal left main, angioplasty also performed to the LAD diagonal branch.        PAST MEDICAL HISTORY  Active Ambulatory Problems     Diagnosis Date Noted    Type 2 diabetes mellitus 06/03/2017    Pulmonary emphysema 06/03/2017    Arthritis 06/03/2017    Other hyperlipidemia 06/03/2017    Primary hypertension 06/03/2017    Abnormal liver function tests 02/16/2016    Impingement syndrome of shoulder region 07/03/2013    Neck pain 05/08/2013    Postmenopause 08/03/2023    Restless leg syndrome 08/03/2023    Urinary frequency 08/03/2023    Vitamin D deficiency 08/03/2023    Fatigue 08/03/2023    Moderate episode of recurrent major depressive disorder 08/03/2023    Generalized anxiety disorder 08/03/2023    Insomnia 08/03/2023    Skin lesion of back 08/17/2023    Nausea 09/13/2023    Incomplete right bundle branch block (RBBB) 09/13/2023    Coronary artery disease of native artery of  native heart with stable angina pectoris 10/25/2023    Tendency to bleed 01/11/2024    Gastroesophageal reflux disease 01/11/2024    Pain of left hand 01/11/2024    Left leg swelling 05/28/2024    Anemia 05/30/2024    Former smoker 05/30/2024    Long term (current) use of antithrombotics/antiplatelets 07/16/2024    Low magnesium level 07/22/2024    Localized swelling of right upper extremity 10/22/2024    Acute non-recurrent sinusitis 10/22/2024    Upper respiratory tract infection 12/11/2024    Diarrhea 12/11/2024     Resolved Ambulatory Problems     Diagnosis Date Noted    Obesity 06/03/2017    Myocardial perfusion defect 02/16/2016    Tobacco use 02/12/2016    Anxiety 08/03/2023    Herpes zoster 08/03/2023    Dizziness 08/03/2023    Decreased thyroid stimulating hormone (TSH) level 09/13/2023    Chest pain 09/13/2023    Abnormal nuclear stress test 10/12/2023    Skin lesion of left arm 01/11/2024     Past Medical History:   Diagnosis Date    COPD (chronic obstructive pulmonary disease)     Elevated cholesterol     Emphysema of lung     Gallbladder abscess     History of positive hepatitis C     Hypertension          PAST SURGICAL HISTORY  Past Surgical History:   Procedure Laterality Date    APPENDECTOMY      CARDIAC CATHETERIZATION N/A 10/25/2023    Procedure: Left Heart Cath;  Surgeon: Tasneem Hayes MD;  Location:  [facility];  Service: Cardiovascular;  Laterality: N/A;    CARDIAC CATHETERIZATION N/A 10/25/2023    Procedure: Left ventriculography;  Surgeon: Tasneem Hayes MD;  Location:  [facility];  Service: Cardiovascular;  Laterality: N/A;    CARDIAC CATHETERIZATION N/A 10/25/2023    Procedure: Stent LEYLA coronary;  Surgeon: Tasneem Hayes MD;  Location:  [facility];  Service: Cardiovascular;  Laterality: N/A;    CARDIAC CATHETERIZATION N/A 10/25/2023    Procedure: Coronary angiography;  Surgeon: Tasneem Hayes MD;   Location:  [facility];  Service: Cardiovascular;  Laterality: N/A;    CARDIAC CATHETERIZATION N/A 10/25/2023    Procedure: Percutaneous Coronary Intervention;  Surgeon: Tasneem Hayes MD;  Location: [facility] CATH INVASIVE LOCATION;  Service: Cardiovascular;  Laterality: N/A;    CARDIAC CATHETERIZATION N/A 11/3/2023    Procedure: Percutaneous Coronary Intervention RCA;  Surgeon: Tasneem Hayes MD;  Location: [facility] CATH INVASIVE LOC[facility];  Service: Cardiovascular;  Laterality: N/A;    CARDIAC CATHETERIZATION N/A 11/3/2023    Procedure: Coronary angiography;  Surgeon: Tasneem Hayes MD;  Location: [facility] CATH INVASIVE LOC[facility];  Service: Cardiovascular;  Laterality: N/A;    CARDIAC CATHETERIZATION N/A 11/3/2023    Procedure: Stent LEYLA coronary;  Surgeon: Tasneem Hayes MD;  Location: [facility] CATH INVASIVE LOCATION;  Service: Cardiovascular;  Laterality: N/A;    CARDIAC CATHETERIZATION N/A 5/17/2024    Procedure: Left Heart Cath;  Surgeon: Tasneem Hayes MD;  Location: [facility] CATH INVASIVE LOCATION;  Service: Cardiovascular;  Laterality: N/A;    CARDIAC CATHETERIZATION N/A 5/17/2024    Procedure: Percutaneous Coronary Intervention;  Surgeon: Tasneem Hayes MD;  Location: [facility] CATH INVASIVE LOCATION;  Service: Cardiovascular;  Laterality: N/A;    CARDIAC CATHETERIZATION N/A 5/17/2024    Procedure: Coronary angiography;  Surgeon: Tasneem Hayes MD;  Location: [facility] CATH INVASIVE LOCATION;  Service: Cardiovascular;  Laterality: N/A;    CARDIAC CATHETERIZATION N/A 5/17/2024    Procedure: Left ventriculography;  Surgeon: Tasneem Hayes MD;  Location: [facility] CATH INVASIVE LOCATION;  Service: Cardiovascular;  Laterality: N/A;    CHOLECYSTECTOMY      HYSTERECTOMY      TUBAL ABDOMINAL LIGATION           FAMILY HISTORY  Family History   Problem Relation Age of Onset    Diabetes Mother     Heart disease Mother     Diabetes Father     Diabetes  Brother     Heart disease Maternal Grandmother     Cancer Paternal Grandfather          SOCIAL HISTORY  Social History     Socioeconomic History    Marital status:    Tobacco Use    Smoking status: Former     Current packs/day: 0.00     Average packs/day: 1.5 packs/day for 45.8 years (68.7 ttl pk-yrs)     Types: Cigarettes     Start date:      Quit date: 10/11/2017     Years since quittin.2     Passive exposure: Past    Smokeless tobacco: Never    Tobacco comments:     Previously smoked about 1.5 packs per day for about 45 years   Vaping Use    Vaping status: Never Used   Substance and Sexual Activity    Alcohol use: Not Currently     Comment: rare margaritia 1-2 times per year    Drug use: No     Comment: in past; clean since     Sexual activity: Not Currently         ALLERGIES  Patient has no known allergies.      REVIEW OF SYSTEMS  Review of Systems  Included in HPI  All systems reviewed and negative except for those discussed in HPI.      PHYSICAL EXAM    I have reviewed the triage vital signs and nursing notes.    ED Triage Vitals   Temp Heart Rate Resp BP SpO2   24 1648 24 1648 24 1648 24 1700 24 1648   97.6 °F (36.4 °C) 95 16 144/92 92 %      Temp src Heart Rate Source Patient Position BP Location FiO2 (%)   24 1648 24 1809 -- -- --   Tympanic Monitor          Physical Exam  GENERAL: alert, no acute distress  SKIN: Warm, dry  HENT: Normocephalic, atraumatic  EYES: no scleral icterus  CV: regular rhythm, regular rate  RESPIRATORY: normal effort, lungs clear  ABDOMEN: nondistended soft nontender no guarding or rigidity  MUSCULOSKELETAL: no deformity  NEURO: alert, moves all extremities, follows commands.  Cranial nerves II through XII grossly intact, no focal neurologic deficit            LAB RESULTS  Recent Results (from the past 24 hours)   ECG 12 Lead ED Triage Standing Order; Chest Pain    Collection Time: 24  4:53 PM   Result Value Ref  Range    QT Interval 395 ms    QTC Interval 465 ms   Comprehensive Metabolic Panel    Collection Time: 12/31/24  4:58 PM    Specimen: Blood   Result Value Ref Range    Glucose 251 (H) 65 - 99 mg/dL    BUN 15 8 - 23 mg/dL    Creatinine 0.61 0.57 - 1.00 mg/dL    Sodium 133 (L) 136 - 145 mmol/L    Potassium 4.6 3.5 - 5.2 mmol/L    Chloride 100 98 - 107 mmol/L    CO2 27.0 22.0 - 29.0 mmol/L    Calcium 9.5 8.6 - 10.5 mg/dL    Total Protein 7.1 6.0 - 8.5 g/dL    Albumin 4.3 3.5 - 5.2 g/dL    ALT (SGPT) 16 1 - 33 U/L    AST (SGOT) 16 1 - 32 U/L    Alkaline Phosphatase 82 39 - 117 U/L    Total Bilirubin 0.4 0.0 - 1.2 mg/dL    Globulin 2.8 gm/dL    A/G Ratio 1.5 g/dL    BUN/Creatinine Ratio 24.6 7.0 - 25.0    Anion Gap 6.0 5.0 - 15.0 mmol/L    eGFR 98.7 >60.0 mL/min/1.73   High Sensitivity Troponin T    Collection Time: 12/31/24  4:58 PM    Specimen: Blood   Result Value Ref Range    HS Troponin T <6 <14 ng/L   Green Top (Gel)    Collection Time: 12/31/24  4:58 PM   Result Value Ref Range    Extra Tube Hold for add-ons.    Lavender Top    Collection Time: 12/31/24  4:58 PM   Result Value Ref Range    Extra Tube hold for add-on    Gold Top - SST    Collection Time: 12/31/24  4:58 PM   Result Value Ref Range    Extra Tube Hold for add-ons.    Light Blue Top    Collection Time: 12/31/24  4:58 PM   Result Value Ref Range    Extra Tube Hold for add-ons.    CBC Auto Differential    Collection Time: 12/31/24  4:58 PM    Specimen: Blood   Result Value Ref Range    WBC 8.70 3.40 - 10.80 10*3/mm3    RBC 4.64 3.77 - 5.28 10*6/mm3    Hemoglobin 13.2 12.0 - 15.9 g/dL    Hematocrit 40.9 34.0 - 46.6 %    MCV 88.1 79.0 - 97.0 fL    MCH 28.4 26.6 - 33.0 pg    MCHC 32.3 31.5 - 35.7 g/dL    RDW 14.0 12.3 - 15.4 %    RDW-SD 44.7 37.0 - 54.0 fl    MPV 11.3 6.0 - 12.0 fL    Platelets 217 140 - 450 10*3/mm3    Neutrophil % 83.8 (H) 42.7 - 76.0 %    Lymphocyte % 13.7 (L) 19.6 - 45.3 %    Monocyte % 1.7 (L) 5.0 - 12.0 %    Eosinophil % 0.1 (L) 0.3 -  6.2 %    Basophil % 0.5 0.0 - 1.5 %    Immature Grans % 0.2 0.0 - 0.5 %    Neutrophils, Absolute 7.29 (H) 1.70 - 7.00 10*3/mm3    Lymphocytes, Absolute 1.19 0.70 - 3.10 10*3/mm3    Monocytes, Absolute 0.15 0.10 - 0.90 10*3/mm3    Eosinophils, Absolute 0.01 0.00 - 0.40 10*3/mm3    Basophils, Absolute 0.04 0.00 - 0.20 10*3/mm3    Immature Grans, Absolute 0.02 0.00 - 0.05 10*3/mm3    nRBC 0.0 0.0 - 0.2 /100 WBC   Sedimentation Rate    Collection Time: 12/31/24  4:58 PM    Specimen: Blood   Result Value Ref Range    Sed Rate 16 0 - 30 mm/hr   High Sensitivity Troponin T 1Hr    Collection Time: 12/31/24  6:08 PM    Specimen: Arm, Left; Blood   Result Value Ref Range    HS Troponin T <6 <14 ng/L    Troponin T Numeric Delta     BNP    Collection Time: 12/31/24  6:08 PM    Specimen: Arm, Left; Blood   Result Value Ref Range    proBNP 184.0 0.0 - 900.0 pg/mL   Respiratory Panel PCR w/COVID-19(SARS-CoV-2) [facility], NP Swab in UTM/VTM, 2 HR TAT - Swab, Nasopharynx    Collection Time: 12/31/24  7:15 PM    Specimen: Nasopharynx; Swab   Result Value Ref Range    ADENOVIRUS, PCR Not Detected Not Detected    Coronavirus 229E Not Detected Not Detected    Coronavirus HKU1 Not Detected Not Detected    Coronavirus NL63 Not Detected Not Detected    Coronavirus OC43 Not Detected Not Detected    COVID19 Not Detected Not Detected - Ref. Range    Human Metapneumovirus Not Detected Not Detected    Human Rhinovirus/Enterovirus Not Detected Not Detected    Influenza A PCR Not Detected Not Detected    Influenza B PCR Not Detected Not Detected    Parainfluenza Virus 1 Not Detected Not Detected    Parainfluenza Virus 2 Not Detected Not Detected    Parainfluenza Virus 3 Not Detected Not Detected    Parainfluenza Virus 4 Not Detected Not Detected    RSV, PCR Not Detected Not Detected    Bordetella pertussis pcr Not Detected Not Detected    Bordetella parapertussis PCR Not Detected Not Detected    Chlamydophila pneumoniae PCR  Not Detected Not Detected    Mycoplasma pneumo by PCR Not Detected Not Detected         RADIOLOGY  XR Chest 1 View    Result Date: 12/31/2024  XR CHEST 1 VW-  CLINICAL HISTORY:  Chest Pain Triage Protocol  FINDINGS:  The lungs are clear of acute infiltrates. The previously identified sub-6 mm pulmonary nodules as seen on the prior chest CT dated 07/11/2024 are not well delineated on plain radiography. The cardiomediastinal silhouette is within normal limits. The osseous structures are unremarkable.       No active disease in the chest.  The previously identified sub-6 mm pulmonary nodules, as seen on the prior chest CT dated 07/11/2024, are not well delineated on plain radiography. Please follow the recommendations of the prior chest CT report with regards to follow-up.    This report was finalized on 12/31/2024 9:21 PM by Dr. Checo Paez M.D on Workstation: [id number]      CT Head Without Contrast    Result Date: 12/31/2024  CT HEAD WITHOUT CONTRAST  CLINICAL HISTORY: severe headache x this am  TECHNIQUE: CT scan of the head was obtained with 3 mm axial soft tissue algorithm images. No intravenous contrast was administered. Sagittal and coronal reconstructions were obtained.  COMPARISON: None.  FINDINGS:   Mild changes of chronic small vessel ischemic phenomena are noted. The ventricles, sulci, and cisterns are age-appropriate. The gray-white matter differentiation is within normal limits. The basal ganglia and thalami are unremarkable in appearance. The posterior fossa structures are unremarkable.       No CT evidence for acute intracranial pathology.  The etiology of the patient's headache is not further elucidated on this examination.   Radiation dose reduction techniques were utilized, including automated exposure control and exposure modulation based on body size.  This report was finalized on 12/31/2024 8:52 PM by Dr. Checo Paez M.D on Workstation: [id number]         MEDICATIONS GIVEN IN  ER  Medications   sodium chloride 0.9 % flush 10 mL (10 mL Intravenous Given 12/31/24 1911)   diphenhydrAMINE (BENADRYL) injection 25 mg (25 mg Intravenous Given 12/31/24 1911)   prochlorperazine (COMPAZINE) injection 10 mg (10 mg Intravenous Given 12/31/24 1910)         ORDERS PLACED DURING THIS VISIT:  Orders Placed This Encounter   Procedures    Respiratory Panel PCR w/COVID-19(SARS-CoV-2) [facility], NP Swab in UTM/VTM, 2 HR TAT - Swab, Nasopharynx    XR Chest 1 View    CT Head Without Contrast    [facility] Draw    Comprehensive Metabolic Panel    High Sensitivity Troponin T    CBC Auto Differential    High Sensitivity Troponin T 1Hr    BNP    Sedimentation Rate    NPO Diet NPO Type: Strict NPO    Undress & Gown    Continuous Pulse Oximetry    Oxygen Therapy- Nasal Cannula; Titrate 1-6 LPM Per SpO2; 90 - 95%    ECG 12 Lead ED Triage Standing Order; Chest Pain    ECG 12 Lead ED Triage Standing Order; Chest Pain    Insert Peripheral IV    CBC & Differential    Green Top (Gel)    Lavender Top    Gold Top - SST    Light Blue Top         OUTPATIENT MEDICATION MANAGEMENT:  Current Facility-Administered Medications Ordered in Epic   Medication Dose Route Frequency Provider Last Rate Last Admin    sodium chloride 0.9 % flush 10 mL  10 mL Intravenous PRN Skip Stanford MD   10 mL at 12/31/24 1911     Current Outpatient Medications Ordered in Epic   Medication Sig Dispense Refill    acetaminophen (TYLENOL) 500 MG tablet Take 1 tablet by mouth Every 8 (Eight) Hours As Needed for Mild Pain or Moderate Pain.      aspirin 81 MG chewable tablet Chew 1 tablet Every Night.      atorvastatin (LIPITOR) 40 MG tablet Take 1 tablet by mouth Every Night. 90 tablet 1    clopidogrel (PLAVIX) 75 MG tablet Take 1 tablet by mouth Daily. 90 tablet 1    escitalopram (LEXAPRO) 20 MG tablet TAKE 1 TABLET BY MOUTH DAILY 90 tablet 1    Ferrous Sulfate  (45 Fe) MG tablet controlled-release Take 1 tablet by mouth Daily.  "30 tablet 2    insulin detemir (Levemir) 100 UNIT/ML injection Inject 24 Units under the skin into the appropriate area as directed Every Night.      Insulin Syringe 31G X 5/16\" 1 ML misc USE FOR LEVEMIR ONCE DAILY INJECTIONS 100 each 2    lisinopril (PRINIVIL,ZESTRIL) 10 MG tablet Take 1 tablet by mouth Daily.      LORATADINE PO Take 1 tablet by mouth Daily.      magnesium oxide (MAG-OX) 400 MG tablet Take 1 tablet by mouth Every Night. 90 tablet 1    metFORMIN ER (GLUCOPHAGE-XR) 500 MG 24 hr tablet Take 1 tablet by mouth Daily With Dinner.      metoprolol succinate XL (TOPROL-XL) 25 MG 24 hr tablet Take 1 tablet by mouth Daily. 90 tablet 2    Multiple Vitamin (MULTIVITAMIN) tablet Take 1 tablet by mouth Daily.      nitroglycerin (Nitrostat) 0.4 MG SL tablet Place 1 tablet under the tongue Every 5 (Five) Minutes As Needed for Chest Pain. Take no more than 3 doses in 15 minutes. 100 tablet 12    pantoprazole (PROTONIX) 20 MG EC tablet TAKE 1 TABLET BY MOUTH DAILY 30 tablet 2    rOPINIRole (REQUIP) 0.25 MG tablet TAKE 1 TABLET BY MOUTH 1 HOUR BEFORE BEDTIME 30 tablet 2    vitamin D (ERGOCALCIFEROL) 1.25 MG (34538 UT) capsule capsule Take 1 capsule by mouth 1 (One) Time Per Week.           PROCEDURES  Procedures            PROGRESS, DATA ANALYSIS, CONSULTS, AND MEDICAL DECISION MAKING  All labs have been independently interpreted by me.  All radiology studies have been reviewed by me. All EKG's have been independently viewed and interpreted by me.  Discussion below represents my analysis of pertinent findings related to patient's condition, differential diagnosis, treatment plan and final disposition.    DIFFERENTIAL    DDx includes but is not limited to acute coronary syndrome, pulmonary embolism, thoracic aortic dissection, pneumonia, pneumothorax, musculoskeletal pain, GERD or esophageal spasm, anxiety, myocarditis/pericarditis, esophageal rupture, pancreatitis.     HEART SCORE:    History  Highly suspicious        "       2    Moderately suspicious             1    Slightly or non-suspicious             0    ECG  Significant ST depression              2    Nonspecific repol disturbance            1    Normal                           0    Age  > or = 65                          2     46-65                           1    < or = 45                          0    Risk factors (hypercholesterolemia, HTN, DM, smoking, pos fam hx, obesity)                            > or = to 3 RF for atherosclerotic dx   2    1 or 2                 1    No risk factors                0    Troponin > or = 3x normal limit               2    1-3x normal limit    1    < or = Normal limit    0        HEART Score Key:  Scores 0-3: 0.9-1.7% risk of adverse cardiac event. In the HEART Score study, these patients were discharged (0.99% in the retrospective study, 1.7% in the prospective study)  Scores 4-6: 12-16.6% risk of adverse cardiac event. In the HEART Score study, these patients were admitted to the hospital. (11.6% retrospective, 16.6% prospective)  Scores >=7: 50-65% risk of adverse cardiac event. In the HEART Score study, these patients were candidates for early invasive measures. (65.2% retrospective, 50.1% prospective)      This patient's HEART score is 4         Clinical Scores:                  ED Course as of 12/31/24 2141   Tue Dec 31, 2024   1838 WBC: 8.70 [KA]   1838 Hemoglobin: 13.2 [KA]   1838 Glucose(!): 251 [KA]   1838 Creatinine: 0.61 [KA]   1838 HS Troponin T: <6 [KA]   1838 My Independent interpretation of the chest x-ray is no cardiomegaly or acute infiltrate. [KA]   1840 My independent interpretation of the EKG performed at 1653 is sinus rhythm rate 83 normal axis normal intervals, incomplete RBBB, no ST elevation, no significant change from prior on 11/21/2024 [KA]   1905 XR Chest 1 View  My independent interpretation of the imaging study is no pneumothorax [TR]   1905 EKG          EKG time: 1653  Rhythm/Rate: Normal sinus, rate  83  P waves and AL: Normal P, normal AL  QRS, axis: Narrow QRS, normal axis  ST and T waves: No acute    Independently Interpreted by me  Not significantly changed compared to prior 11/21/2024   [TR]   2108 Sed Rate: 16 [KA]      ED Course User Index  [KA] Pia Banegas PA-C  [TR] Skip Stanford MD     Reassessed the patient, headache resolved.  No temporal tenderness, ESR normal, not suggestive of giant cell arteritis.  Cardiac workup negative, heart score 4 due to risk factors and age, serial troponins negative, no acute findings on EKG.  Suspect she may have a viral syndrome as a source of her generalized symptoms, glad she is feeling much better.  I counseled her on home care, follow-up and indications for repeat evaluation in the ER.  She is eager for discharge.        AS OF 21:41 EST VITALS:    BP - 157/83  HR - 82  TEMP - 97.6 °F (36.4 °C) (Tympanic)  O2 SATS - 92%    COMPLEXITY OF CARE  The patient requires admission.      DIAGNOSIS  Final diagnoses:   Nonintractable episodic headache, unspecified headache type   Atypical chest pain         DISPOSITION  ED Disposition       ED Disposition   Discharge    Condition   Good    Comment   --                  FOLLOW UP  Sofi Fernandes, APRN  [address] [facility] [address] [phone]    Schedule an appointment as soon as possible for a visit in 2 days          Prescribed Medications     Medication List      No changes were made to your prescriptions during this visit.                   Please note that portions of this document were completed with a voice recognition program.    Note Disclaimer: At [facility], we believe that sharing information builds trust and better relationships. You are receiving this note because you recently visited [facility]. It is possible you will see health information before a provider has talked with you about it. This kind of information can be easy to misunderstand. To help you fully understand what it means  for your health, we urge you to discuss this note with your provider.         Pia Banegas PA-C  12/31/24 [id number]

## 2025-01-01 LAB
QT INTERVAL: 395 MS
QTC INTERVAL: 465 MS

## 2025-01-01 NOTE — ED PROVIDER NOTES
EMERGENCY DEPARTMENT MD ATTESTATION NOTE    Room Number:  04/04  PCP: Sofi Fernandes APRN  Independent Historians: Patient and Family    HPI:  A complete HPI/ROS/PMH/PSH/SH/FH are unobtainable due to: None    Chronic or social conditions impacting patient care (Social Determinants of Health): None      Context: Gely Kimble is a 64 y.o. female with a medical history of coronary disease, partial right bundle branch block who presents to the ED c/o acute headache and chest pain.  The patient reports that she developed a headache this morning.  She reports later on she developed chest pain.  She reports some nausea and vomiting.  She denies fevers.  She denies shortness of breath.  She reports her headache is severe and is in the frontal region.  She denies any focal weakness or numbness.  She denies vision changes.        Review of prior external notes (non-ED) -and- Review of prior external test results outside of this encounter:  Laboratory evaluation 12/10/2024 shows normal CMP, normal CBC    Prescription drug monitoring program review:           PHYSICAL EXAM    I have reviewed the triage vital signs and nursing notes.    ED Triage Vitals   Temp Heart Rate Resp BP SpO2   12/31/24 1648 12/31/24 1648 12/31/24 1648 12/31/24 1700 12/31/24 1648   97.6 °F (36.4 °C) 95 16 144/92 92 %      Temp src Heart Rate Source Patient Position BP Location FiO2 (%)   12/31/24 1648 12/31/24 1809 -- -- --   Tympanic Monitor          Physical Exam  GENERAL: Awake, alert, no acute distress  SKIN: Warm, dry  HENT: Normocephalic, atraumatic  EYES: no scleral icterus  CV: regular rhythm, regular rate  RESPIRATORY: normal effort, lungs clear  ABDOMEN: soft, nontender, nondistended  MUSCULOSKELETAL: no deformity, no meningismus  NEURO: alert, moves all extremities, follows commands            MEDICATIONS GIVEN IN ER  Medications   diphenhydrAMINE (BENADRYL) injection 25 mg (25 mg Intravenous Given 12/31/24 1911)   prochlorperazine  (COMPAZINE) injection 10 mg (10 mg Intravenous Given 12/31/24 1910)         ORDERS PLACED DURING THIS VISIT:  Orders Placed This Encounter   Procedures    Respiratory Panel PCR w/COVID-19(SARS-CoV-2) [facility] In-House, NP Swab in UTM/VTM, 2 HR TAT - Swab, Nasopharynx    XR Chest 1 View    CT Head Without Contrast    [place] Draw    Comprehensive Metabolic Panel    High Sensitivity Troponin T    CBC Auto Differential    High Sensitivity Troponin T 1Hr    BNP    Sedimentation Rate    Undress & Gown    Continuous Pulse Oximetry    ECG 12 Lead ED Triage Standing Order; Chest Pain    CBC & Differential    Green Top (Gel)    Lavender Top    Gold Top - SST    Light Blue Top         PROCEDURES  Procedures            PROGRESS, DATA ANALYSIS, CONSULTS, AND MEDICAL DECISION MAKING  All labs have been independently interpreted by me.  All radiology studies have been reviewed by me. All EKG's have been independently viewed and interpreted by me.  Discussion below represents my analysis of pertinent findings related to patient's condition, differential diagnosis, treatment plan and final disposition.    Differential diagnosis includes but is not limited to sinusitis, migraine headache, intracranial hemorrhage, intracranial mass, acute coronary syndrome, acute aortic syndrome, PE, pneumothorax.    Clinical Scores:                                     ED Course as of 01/01/25 0046   Tue Dec 31, 2024   1838 WBC: 8.70 [KA]   1838 Hemoglobin: 13.2 [KA]   1838 Glucose(!): 251 [KA]   1838 Creatinine: 0.61 [KA]   1838 HS Troponin T: <6 [KA]   1838 My Independent interpretation of the chest x-ray is no cardiomegaly or acute infiltrate. [KA]   1840 My independent interpretation of the EKG performed at 1653 is sinus rhythm rate 83 normal axis normal intervals, incomplete RBBB, no ST elevation, no significant change from prior on 11/21/2024 [KA]   1905 XR Chest 1 View  My independent interpretation of the imaging study is no  pneumothorax [TR]   1905 EKG          EKG time: 1653  Rhythm/Rate: Normal sinus, rate 83  P waves and VA: Normal P, normal VA  QRS, axis: Narrow QRS, normal axis  ST and T waves: No acute    Independently Interpreted by me  Not significantly changed compared to prior 11/21/2024   [TR]   2108 Sed Rate: 16 [KA]      ED Course User Index  [KA] Pia Banegas PA-C  [TR] Skip Stanford MD       MDM: The patient is primarily complaining of her headache.  We will give migraine cocktail, CT her head.  Will obtain laboratory evaluation including EKG, serial troponins.      COMPLEXITY OF CARE  Admission was considered but after careful review of the patient's presentation, physical examination, diagnostic results, and response to treatment the patient may be safely discharged with outpatient follow-up.    Please note that portions of this document were completed with a voice recognition program.    Note Disclaimer: At [facility], we believe that sharing information builds trust and better relationships. You are receiving this note because you recently visited [facility]. It is possible you will see health information before a provider has talked with you about it. This kind of information can be easy to misunderstand. To help you fully understand what it means for your health, we urge you to discuss this note with your provider.         Skip Stanford MD  01/01/25 0046

## 2025-01-09 ENCOUNTER — HOSPITAL ENCOUNTER (OUTPATIENT)
Facility: HOSPITAL | Age: 65
Discharge: HOME | End: 2025-01-09
Admitting: NURSE PRACTITIONER
Payer: MEDICARE

## 2025-01-09 DIAGNOSIS — R22.31 LOCALIZED SWELLING OF RIGHT UPPER EXTREMITY: ICD-10-CM

## 2025-01-09 DIAGNOSIS — M25.511 RIGHT SHOULDER PAIN, UNSPECIFIED CHRONICITY: ICD-10-CM

## 2025-01-09 DIAGNOSIS — M79.621 PAIN IN RIGHT UPPER ARM: ICD-10-CM

## 2025-01-09 DIAGNOSIS — R22.31 LOCALIZED SWELLING OF RIGHT UPPER EXTREMITY: Primary | ICD-10-CM

## 2025-01-09 PROCEDURE — 25510000001 IOPAMIDOL 61 % SOLUTION: Performed by: NURSE PRACTITIONER

## 2025-01-09 PROCEDURE — 73201 CT UPPER EXTREMITY W/DYE: CPT

## 2025-01-09 RX ORDER — IOPAMIDOL 612 MG/ML
100 INJECTION, SOLUTION INTRAVASCULAR
Status: COMPLETED | OUTPATIENT
Start: 2025-01-09 | End: 2025-01-09

## 2025-01-09 RX ADMIN — IOPAMIDOL 85 ML: 612 INJECTION, SOLUTION INTRAVENOUS at 10:40

## 2025-01-23 ENCOUNTER — OFFICE VISIT (OUTPATIENT)
Age: 65
End: 2025-01-23
Payer: MEDICARE

## 2025-01-23 VITALS — BODY MASS INDEX: 22.76 KG/M2 | TEMPERATURE: 98.6 F | WEIGHT: 145 LBS | HEIGHT: 67 IN

## 2025-01-23 DIAGNOSIS — R22.31 MASS OF ARM, RIGHT: Primary | ICD-10-CM

## 2025-01-23 DIAGNOSIS — M79.621 PAIN IN RIGHT UPPER ARM: ICD-10-CM

## 2025-01-23 PROCEDURE — 99213 OFFICE O/P EST LOW 20 MIN: CPT | Performed by: NURSE PRACTITIONER

## 2025-01-27 NOTE — PROGRESS NOTES
"Patient: Gely Kimble    YOB: 1960    Medical Record Number: [id number]    Chief Complaints:  Right arm pain    History of Present Illness:     64 y.o. female patient who presents with a complaint of right upper arm pain.  She is referred to me by DARIAN Manzanares.  Reports she developed a painful mass in the right upper arm approximately 3 months ago.  Denies injury or precipitating factor.  She was evaluated with a nonvascular ultrasound as well as CT scan of the humerus.  The studies are available for review today.  Patient reports she has a history of right shoulder arthritis, but says her shoulder pain is intermittent and only mildly uncomfortable.  She denies shoulder pain today.  Localizes all of her pain to the deltoid region.  Pain is moderate, intermittent, and aching.  The pain has been getting progressively worse. She denies any alleviating factors. She denies any shooting pain down the arm, weakness, numbness or paresthesias.      Allergies: No Known Allergies    Home Medications:    Current Outpatient Medications:     acetaminophen (TYLENOL) 500 MG tablet, Take 1 tablet by mouth Every 8 (Eight) Hours As Needed for Mild Pain or Moderate Pain., Disp: , Rfl:     aspirin 81 MG chewable tablet, Chew 1 tablet Every Night., Disp: , Rfl:     atorvastatin (LIPITOR) 40 MG tablet, Take 1 tablet by mouth Every Night., Disp: 90 tablet, Rfl: 1    clopidogrel (PLAVIX) 75 MG tablet, Take 1 tablet by mouth Daily., Disp: 90 tablet, Rfl: 1    escitalopram (LEXAPRO) 20 MG tablet, TAKE 1 TABLET BY MOUTH DAILY, Disp: 90 tablet, Rfl: 1    Ferrous Sulfate  (45 Fe) MG tablet controlled-release, Take 1 tablet by mouth Daily., Disp: 30 tablet, Rfl: 2    insulin detemir (Levemir) 100 UNIT/ML injection, Inject 24 Units under the skin into the appropriate area as directed Every Night., Disp: , Rfl:     Insulin Syringe 31G X 5/16\" 1 ML misc, USE FOR LEVEMIR ONCE DAILY INJECTIONS, Disp: 100 each, Rfl: 2    " lisinopril (PRINIVIL,ZESTRIL) 10 MG tablet, Take 1 tablet by mouth Daily., Disp: , Rfl:     LORATADINE PO, Take 1 tablet by mouth Daily., Disp: , Rfl:     magnesium oxide (MAG-OX) 400 MG tablet, Take 1 tablet by mouth Every Night., Disp: 90 tablet, Rfl: 1    metFORMIN ER (GLUCOPHAGE-XR) 500 MG 24 hr tablet, Take 1 tablet by mouth Daily With Dinner., Disp: , Rfl:     metoprolol succinate XL (TOPROL-XL) 25 MG 24 hr tablet, Take 1 tablet by mouth Daily., Disp: 90 tablet, Rfl: 2    Multiple Vitamin (MULTIVITAMIN) tablet, Take 1 tablet by mouth Daily., Disp: , Rfl:     nitroglycerin (Nitrostat) 0.4 MG SL tablet, Place 1 tablet under the tongue Every 5 (Five) Minutes As Needed for Chest Pain. Take no more than 3 doses in 15 minutes., Disp: 100 tablet, Rfl: 12    pantoprazole (PROTONIX) 20 MG EC tablet, TAKE 1 TABLET BY MOUTH DAILY, Disp: 30 tablet, Rfl: 2    rOPINIRole (REQUIP) 0.25 MG tablet, TAKE 1 TABLET BY MOUTH 1 HOUR BEFORE BEDTIME, Disp: 30 tablet, Rfl: 2    vitamin D (ERGOCALCIFEROL) 1.25 MG (26554 UT) capsule capsule, Take 1 capsule by mouth 1 (One) Time Per Week., Disp: , Rfl:     Past Medical History:   Diagnosis Date    Abnormal nuclear stress test     Arthritis     COPD (chronic obstructive pulmonary disease)     Elevated cholesterol     Emphysema of lung     Gallbladder abscess     Herpes zoster 08/03/2023    History of positive hepatitis C     previously positive, never treated, negative RNA finding    Hypertension     Myocardial perfusion defect 02/16/2016    Type 2 diabetes mellitus        Past Surgical History:   Procedure Laterality Date    APPENDECTOMY      CARDIAC CATHETERIZATION N/A 10/25/2023    Procedure: Left Heart Cath;  Surgeon: Tasneem Hayes MD;  Location: [facility] CATH INVASIVE LOCATION;  Service: Cardiovascular;  Laterality: N/A;    CARDIAC CATHETERIZATION N/A 10/25/2023    Procedure: Left ventriculography;  Surgeon: Tasneem Hayes MD;  Location: [facility] CATH INVASIVE LOCATION;   Service: Cardiovascular;  Laterality: N/A;    CARDIAC CATHETERIZATION N/A 10/25/2023    Procedure: Stent LEYLA coronary;  Surgeon: Tasneem Hayes MD;  Location:  [facility] CATH INVASIVE LOCATION;  Service: Cardiovascular;  Laterality: N/A;    CARDIAC CATHETERIZATION N/A 10/25/2023    Procedure: Coronary angiography;  Surgeon: Tasneem Hayes MD;  Location:  [facility] CATH INVASIVE LOCATION;  Service: Cardiovascular;  Laterality: N/A;    CARDIAC CATHETERIZATION N/A 10/25/2023    Procedure: Percutaneous Coronary Intervention;  Surgeon: Tasneem Hayes MD;  Location:  [facility] CATH INVASIVE LOCATION;  Service: Cardiovascular;  Laterality: N/A;    CARDIAC CATHETERIZATION N/A 11/3/2023    Procedure: Percutaneous Coronary Intervention RCA;  Surgeon: Tasneem Hayes MD;  Location:  [facility] CATH INVASIVE LOCATION;  Service: Cardiovascular;  Laterality: N/A;    CARDIAC CATHETERIZATION N/A 11/3/2023    Procedure: Coronary angiography;  Surgeon: Tasneem Hayes MD;  Location: [facility] CATH INVASIVE LOCATION;  Service: Cardiovascular;  Laterality: N/A;    CARDIAC CATHETERIZATION N/A 11/3/2023    Procedure: Stent LEYLA coronary;  Surgeon: Tasneem Hayes MD;  Location: [facility] CATH INVASIVE LOCATION;  Service: Cardiovascular;  Laterality: N/A;    CARDIAC CATHETERIZATION N/A 5/17/2024    Procedure: Left Heart Cath;  Surgeon: Tasneem Hayes MD;  Location: [facility] CATH INVASIVE LOCATION;  Service: Cardiovascular;  Laterality: N/A;    CARDIAC CATHETERIZATION N/A 5/17/2024    Procedure: Percutaneous Coronary Intervention;  Surgeon: Tasneem Hayes MD;  Location: [facility] CATH INVASIVE LOCATION;  Service: Cardiovascular;  Laterality: N/A;    CARDIAC CATHETERIZATION N/A 5/17/2024    Procedure: Coronary angiography;  Surgeon: Tasneem Hayes MD;  Location: [facility] CATH INVASIVE LOCATION;  Service: Cardiovascular;  Laterality: N/A;    CARDIAC CATHETERIZATION N/A 5/17/2024    Procedure: Left  ventriculography;  Surgeon: Tasneem Hayes MD;  Location: [facility] INVASIVE LOCATION;  Service: Cardiovascular;  Laterality: N/A;    CHOLECYSTECTOMY      HYSTERECTOMY      TUBAL ABDOMINAL LIGATION         Social History     Occupational History    Not on file   Tobacco Use    Smoking status: Former     Current packs/day: 0.00     Average packs/day: 1.5 packs/day for 45.8 years (68.7 ttl pk-yrs)     Types: Cigarettes     Start date:      Quit date: 10/11/2017     Years since quittin.2     Passive exposure: Past    Smokeless tobacco: Never    Tobacco comments:     Previously smoked about 1.5 packs per day for about 45 years   Vaping Use    Vaping status: Never Used   Substance and Sexual Activity    Alcohol use: Not Currently     Comment: rare margaritia 1-2 times per year    Drug use: No     Comment: in past; clean since     Sexual activity: Not Currently      Social History     Social History Narrative    Not on file       Family History   Problem Relation Age of Onset    Diabetes Mother     Heart disease Mother     Diabetes Father     Diabetes Brother     Heart disease Maternal Grandmother     Cancer Paternal Grandfather        Review of Systems:      Constitutional: Denies fever, shaking or chills   Eyes: Denies change in visual acuity   HEENT: Denies nasal congestion or sore throat   Respiratory: Denies cough or shortness of breath   Cardiovascular: Denies chest pain or edema  Endocrine: Denies tremors, palpitations, intolerance of heat or cold, polyuria, polydipsia.  GI: Denies abdominal pain, nausea, vomiting, bloody stools or diarrhea  : Denies frequency, urgency, incontinence, retention, or nocturia.  Musculoskeletal: Denies numbness, tingling or loss of motor function   Integument: Denies rash, lesion or ulceration   Neurologic: Denies headache or focal weakness, deficits  Heme: Denies spontaneous or excessive bleeding, epistaxis, hematuria, melena, fatigue, enlarged or tender lymph  "nodes.      All other pertinent positives and negatives as noted above in HPI.    Physical Exam:   64 y.o. female  Vitals:    01/23/25 0947   Temp: 98.6 °F (37 °C)   Weight: 65.8 kg (145 lb)   Height: 170.2 cm (67.01\")       General:  Patient is awake and alert.  Appears in no acute distress or discomfort.    Psych:  Affect and demeanor are appropriate.    Eyes:  Conjunctiva and sclera appear grossly normal.  Eyes track well and EOM seem to be intact.    Ears:  No gross abnormalities.  Hearing adequate for the exam.    Cardiovascular:  Regular rate and rhythm.    Lungs:  Good chest expansion.  Breathing unlabored.    Spine:  Neck appears grossly normal.  No palpable masses or adenopathy.  Good motion.  Spurling's maneuver is negative for any shoulder or arm symptoms.    Extremities:  Right shoulder is examined.  Skin is benign.  No obvious gross abnormalities.  Slightly prominent mass in the deltoid with marginally defined borders.  Focal tenderness over this area.  No tenderness about the shoulder.  Shoulder motion is full.  All provocative tests are negative for shoulder pathology, however, all testing reproduces her typical deltoid pain.  No instability.  Good strength in the rotator cuff, deltoid, triceps, biceps, and .   Palpable radial pulse.  Brisk capillary refill.         Imaging:     The right upper extremity non-vascular ultrasound report from 11/01/2024 is reviewed.  The findings are as follows:    IMPRESSION:  At site of palpable abnormality in the right upper arm, subcutaneous fat and muscle is seen. No discrete fluid collection or lesion seen. If palpable abnormality persists or worsens, consider further evaluation with CT or MRI as clinically indicated.     The right humerus CT report from 01/09/2025 is reviewed.  The findings are as follows:      IMPRESSION:  1. At the site of clinical interest there is prominent subcutaneous fat.  This may be due to asymmetric fat distribution or a poorly " encapsulated  lipoma. No discrete soft tissue nodule or underlying muscular abnormality.  2. Moderate right glenohumeral joint arthritis.     Assessment/Plan:   Chronic right arm pain over mass in deltoid region, unknown etiology    I am very concerned about her pain over this mass in her deltoid region.  The CT scan was not definitive in regards to the source.  After consulting with Dr. Terry, I decided to refer her for a MRI with and without contrast.  I have entered this referral for her.  I will call her with results and come up with a plan at that time.    DARIAN Cárdenas    01/23/2025    CC to Sofi Fernandes APRN    Much of this encounter note is an electronic transcription/translation of spoken language to printed text. The electronic translation of spoken language may permit erroneous, or at times, nonsensical words or phrases to be inadvertently transcribed.  Although I have reviewed the note for such errors, some may still exist.

## 2025-01-29 ENCOUNTER — TELEPHONE (OUTPATIENT)
Dept: ORTHOPEDIC SURGERY | Facility: CLINIC | Age: 65
End: 2025-01-29
Payer: MEDICARE

## 2025-01-29 NOTE — TELEPHONE ENCOUNTER
Caller: JEROMY    Relationship: SELF    Best call back number: [phone] (home)       What is the best time to reach you: ANYTIME    Who are you requesting to speak with (clinical staff, provider,  specific staff member): JONATHAN     Do you know the name of the person who called: PATIENT    What was the call regarding: PATIENT RECEIVED A CALL FROM JONATHAN IN REGARDS TO AN MRI       Is it okay if the provider responds through MyChart:

## 2025-01-29 NOTE — TELEPHONE ENCOUNTER
I spoke to Ms. Kimble.  Reports she had a rough night last night due to her arm pain.  I explained someone should be contacting her to schedule the right humerus MRI with and without contrast.  I will call her with the results.  She verbalized understanding and appreciated the return call.

## 2025-02-17 ENCOUNTER — HOSPITAL ENCOUNTER (OUTPATIENT)
Dept: MRI IMAGING | Facility: HOSPITAL | Age: 65
Discharge: HOME | End: 2025-02-17
Admitting: NURSE PRACTITIONER
Payer: MEDICARE

## 2025-02-17 DIAGNOSIS — M79.621 PAIN IN RIGHT UPPER ARM: ICD-10-CM

## 2025-02-17 DIAGNOSIS — R22.31 MASS OF ARM, RIGHT: ICD-10-CM

## 2025-02-17 PROCEDURE — 25510000002 GADOBENATE DIMEGLUMINE 529 MG/ML SOLUTION: Performed by: NURSE PRACTITIONER

## 2025-02-17 PROCEDURE — 73220 MRI UPPR EXTREMITY W/O&W/DYE: CPT

## 2025-02-17 PROCEDURE — A9577 INJ MULTIHANCE: HCPCS | Performed by: NURSE PRACTITIONER

## 2025-02-17 RX ADMIN — GADOBENATE DIMEGLUMINE 13 ML: 529 INJECTION, SOLUTION INTRAVENOUS at 20:39

## 2025-02-20 ENCOUNTER — TELEPHONE (OUTPATIENT)
Age: 65
End: 2025-02-20
Payer: MEDICARE

## 2025-02-20 NOTE — TELEPHONE ENCOUNTER
I spoke to MsEzio Lamin.  I provided her with the right humerus MRI results.  There is no soft tissue mass or cyst or explanation for her palpable abnormality lateral to the proximal humerus.  A lipoma surrounded by subcutaneous fat may be occult on MRI.  She has moderate arthritis in the shoulder.  Patient reports she is having persistent pain.  She says the pain is not too terrible today, but she has been very cautious about using her arm.  Reports she has been using Voltaren gel as recommended.  I suggested she consider trying physical therapy, but she declined my offer.  She says she is interested in getting this problem completely resolved.  I will consult with Dr. Terry upon his return to clinic to determine the next best step for her.  She verbalized understanding and appreciated the call.

## 2025-02-23 ENCOUNTER — HOSPITAL ENCOUNTER (OUTPATIENT)
Facility: HOSPITAL | Age: 65
Setting detail: OBSERVATION
Discharge: HOME | End: 2025-02-24
Attending: EMERGENCY MEDICINE | Admitting: EMERGENCY MEDICINE
Payer: MEDICARE

## 2025-02-23 ENCOUNTER — APPOINTMENT (OUTPATIENT)
Dept: GENERAL RADIOLOGY | Facility: HOSPITAL | Age: 65
End: 2025-02-23
Payer: MEDICARE

## 2025-02-23 DIAGNOSIS — R07.2 PRECORDIAL PAIN: Primary | ICD-10-CM

## 2025-02-23 PROBLEM — R07.9 CHEST PAIN: Status: ACTIVE | Noted: 2025-02-23

## 2025-02-23 LAB
ALBUMIN SERPL-MCNC: 4.3 G/DL (ref 3.5–5.2)
ALBUMIN/GLOB SERPL: 1.7 G/DL
ALP SERPL-CCNC: 68 U/L (ref 39–117)
ALT SERPL W P-5'-P-CCNC: 10 U/L (ref 1–33)
ANION GAP SERPL CALCULATED.3IONS-SCNC: 12.4 MMOL/L (ref 5–15)
AST SERPL-CCNC: 13 U/L (ref 1–32)
BASOPHILS # BLD AUTO: 0.05 10*3/MM3 (ref 0–0.2)
BASOPHILS NFR BLD AUTO: 0.7 % (ref 0–1.5)
BILIRUB SERPL-MCNC: <0.2 MG/DL (ref 0–1.2)
BUN SERPL-MCNC: 14 MG/DL (ref 8–23)
BUN/CREAT SERPL: 25.5 (ref 7–25)
CALCIUM SPEC-SCNC: 9.5 MG/DL (ref 8.6–10.5)
CHLORIDE SERPL-SCNC: 100 MMOL/L (ref 98–107)
CHOLEST SERPL-MCNC: 114 MG/DL (ref 0–200)
CO2 SERPL-SCNC: 26.6 MMOL/L (ref 22–29)
CREAT SERPL-MCNC: 0.55 MG/DL (ref 0.57–1)
DEPRECATED RDW RBC AUTO: 46.1 FL (ref 37–54)
EGFRCR SERPLBLD CKD-EPI 2021: 101.2 ML/MIN/1.73
EOSINOPHIL # BLD AUTO: 0.09 10*3/MM3 (ref 0–0.4)
EOSINOPHIL NFR BLD AUTO: 1.3 % (ref 0.3–6.2)
ERYTHROCYTE [DISTWIDTH] IN BLOOD BY AUTOMATED COUNT: 13.9 % (ref 12.3–15.4)
GEN 5 1HR TROPONIN T REFLEX: <6 NG/L
GLOBULIN UR ELPH-MCNC: 2.6 GM/DL
GLUCOSE BLDC GLUCOMTR-MCNC: 142 MG/DL (ref 70–130)
GLUCOSE BLDC GLUCOMTR-MCNC: 147 MG/DL (ref 70–130)
GLUCOSE BLDC GLUCOMTR-MCNC: 314 MG/DL (ref 70–130)
GLUCOSE SERPL-MCNC: 189 MG/DL (ref 65–99)
HCT VFR BLD AUTO: 38.7 % (ref 34–46.6)
HDLC SERPL-MCNC: 61 MG/DL (ref 40–60)
HGB BLD-MCNC: 12.2 G/DL (ref 12–15.9)
HOLD SPECIMEN: NORMAL
HOLD SPECIMEN: NORMAL
IMM GRANULOCYTES # BLD AUTO: 0.01 10*3/MM3 (ref 0–0.05)
IMM GRANULOCYTES NFR BLD AUTO: 0.1 % (ref 0–0.5)
LDLC SERPL CALC-MCNC: 39 MG/DL (ref 0–100)
LDLC/HDLC SERPL: 0.66 {RATIO}
LYMPHOCYTES # BLD AUTO: 2.74 10*3/MM3 (ref 0.7–3.1)
LYMPHOCYTES NFR BLD AUTO: 41.1 % (ref 19.6–45.3)
MCH RBC QN AUTO: 28.6 PG (ref 26.6–33)
MCHC RBC AUTO-ENTMCNC: 31.5 G/DL (ref 31.5–35.7)
MCV RBC AUTO: 90.6 FL (ref 79–97)
MONOCYTES # BLD AUTO: 0.48 10*3/MM3 (ref 0.1–0.9)
MONOCYTES NFR BLD AUTO: 7.2 % (ref 5–12)
NEUTROPHILS NFR BLD AUTO: 3.3 10*3/MM3 (ref 1.7–7)
NEUTROPHILS NFR BLD AUTO: 49.6 % (ref 42.7–76)
NRBC BLD AUTO-RTO: 0 /100 WBC (ref 0–0.2)
PLATELET # BLD AUTO: 204 10*3/MM3 (ref 140–450)
PMV BLD AUTO: 11.2 FL (ref 6–12)
POTASSIUM SERPL-SCNC: 4.3 MMOL/L (ref 3.5–5.2)
PROT SERPL-MCNC: 6.9 G/DL (ref 6–8.5)
QT INTERVAL: 432 MS
QTC INTERVAL: 435 MS
RBC # BLD AUTO: 4.27 10*6/MM3 (ref 3.77–5.28)
SODIUM SERPL-SCNC: 139 MMOL/L (ref 136–145)
TRIGL SERPL-MCNC: 65 MG/DL (ref 0–150)
TROPONIN T NUMERIC DELTA: NORMAL
TROPONIN T SERPL HS-MCNC: <6 NG/L
VLDLC SERPL-MCNC: 14 MG/DL (ref 5–40)
WBC NRBC COR # BLD AUTO: 6.67 10*3/MM3 (ref 3.4–10.8)
WHOLE BLOOD HOLD COAG: NORMAL
WHOLE BLOOD HOLD SPECIMEN: NORMAL

## 2025-02-23 PROCEDURE — 85025 COMPLETE CBC W/AUTO DIFF WBC: CPT

## 2025-02-23 PROCEDURE — 71045 X-RAY EXAM CHEST 1 VIEW: CPT

## 2025-02-23 PROCEDURE — G0378 HOSPITAL OBSERVATION PER HR: HCPCS

## 2025-02-23 PROCEDURE — 84484 ASSAY OF TROPONIN QUANT: CPT

## 2025-02-23 PROCEDURE — 25010000002 MORPHINE PER 10 MG: Performed by: EMERGENCY MEDICINE

## 2025-02-23 PROCEDURE — 93005 ELECTROCARDIOGRAM TRACING: CPT | Performed by: EMERGENCY MEDICINE

## 2025-02-23 PROCEDURE — 80061 LIPID PANEL: CPT | Performed by: NURSE PRACTITIONER

## 2025-02-23 PROCEDURE — 82948 REAGENT STRIP/BLOOD GLUCOSE: CPT

## 2025-02-23 PROCEDURE — 96374 THER/PROPH/DIAG INJ IV PUSH: CPT

## 2025-02-23 PROCEDURE — 93005 ELECTROCARDIOGRAM TRACING: CPT

## 2025-02-23 PROCEDURE — 99285 EMERGENCY DEPT VISIT HI MDM: CPT

## 2025-02-23 PROCEDURE — 96375 TX/PRO/DX INJ NEW DRUG ADDON: CPT

## 2025-02-23 PROCEDURE — 84484 ASSAY OF TROPONIN QUANT: CPT | Performed by: EMERGENCY MEDICINE

## 2025-02-23 PROCEDURE — 63710000001 INSULIN LISPRO (HUMAN) PER 5 UNITS: Performed by: NURSE PRACTITIONER

## 2025-02-23 PROCEDURE — 80053 COMPREHEN METABOLIC PANEL: CPT

## 2025-02-23 PROCEDURE — 36415 COLL VENOUS BLD VENIPUNCTURE: CPT

## 2025-02-23 PROCEDURE — 93010 ELECTROCARDIOGRAM REPORT: CPT | Performed by: INTERNAL MEDICINE

## 2025-02-23 PROCEDURE — 63710000001 INSULIN GLARGINE PER 5 UNITS: Performed by: NURSE PRACTITIONER

## 2025-02-23 RX ORDER — SODIUM CHLORIDE 0.9 % (FLUSH) 0.9 %
10 SYRINGE (ML) INJECTION EVERY 12 HOURS SCHEDULED
Status: DISCONTINUED | OUTPATIENT
Start: 2025-02-23 | End: 2025-02-24 | Stop reason: HOSPADM

## 2025-02-23 RX ORDER — MORPHINE SULFATE 2 MG/ML
2 INJECTION, SOLUTION INTRAMUSCULAR; INTRAVENOUS ONCE
Status: COMPLETED | OUTPATIENT
Start: 2025-02-23 | End: 2025-02-23

## 2025-02-23 RX ORDER — ASPIRIN 325 MG
325 TABLET ORAL ONCE
Status: COMPLETED | OUTPATIENT
Start: 2025-02-23 | End: 2025-02-23

## 2025-02-23 RX ORDER — NITROGLYCERIN 0.4 MG/1
0.4 TABLET SUBLINGUAL
Status: DISCONTINUED | OUTPATIENT
Start: 2025-02-23 | End: 2025-02-24 | Stop reason: HOSPADM

## 2025-02-23 RX ORDER — METOPROLOL SUCCINATE 25 MG/1
25 TABLET, EXTENDED RELEASE ORAL DAILY
Status: DISCONTINUED | OUTPATIENT
Start: 2025-02-23 | End: 2025-02-24 | Stop reason: HOSPADM

## 2025-02-23 RX ORDER — IBUPROFEN 600 MG/1
1 TABLET ORAL
Status: DISCONTINUED | OUTPATIENT
Start: 2025-02-23 | End: 2025-02-24 | Stop reason: HOSPADM

## 2025-02-23 RX ORDER — INSULIN LISPRO 100 [IU]/ML
2-7 INJECTION, SOLUTION INTRAVENOUS; SUBCUTANEOUS
Status: DISCONTINUED | OUTPATIENT
Start: 2025-02-23 | End: 2025-02-24 | Stop reason: HOSPADM

## 2025-02-23 RX ORDER — ASPIRIN 81 MG/1
81 TABLET, CHEWABLE ORAL NIGHTLY
Status: DISCONTINUED | OUTPATIENT
Start: 2025-02-24 | End: 2025-02-24 | Stop reason: HOSPADM

## 2025-02-23 RX ORDER — ALBUTEROL SULFATE 0.83 MG/ML
2.5 SOLUTION RESPIRATORY (INHALATION) EVERY 6 HOURS PRN
Status: DISCONTINUED | OUTPATIENT
Start: 2025-02-23 | End: 2025-02-24 | Stop reason: HOSPADM

## 2025-02-23 RX ORDER — ATORVASTATIN CALCIUM 20 MG/1
40 TABLET, FILM COATED ORAL NIGHTLY
Status: DISCONTINUED | OUTPATIENT
Start: 2025-02-23 | End: 2025-02-24 | Stop reason: HOSPADM

## 2025-02-23 RX ORDER — LISINOPRIL 10 MG/1
10 TABLET ORAL DAILY
Status: DISCONTINUED | OUTPATIENT
Start: 2025-02-23 | End: 2025-02-24 | Stop reason: HOSPADM

## 2025-02-23 RX ORDER — CLOPIDOGREL BISULFATE 75 MG/1
75 TABLET ORAL DAILY
Status: DISCONTINUED | OUTPATIENT
Start: 2025-02-23 | End: 2025-02-24 | Stop reason: HOSPADM

## 2025-02-23 RX ORDER — ESCITALOPRAM OXALATE 20 MG/1
20 TABLET ORAL DAILY
Status: DISCONTINUED | OUTPATIENT
Start: 2025-02-23 | End: 2025-02-24 | Stop reason: HOSPADM

## 2025-02-23 RX ORDER — SODIUM CHLORIDE 9 MG/ML
40 INJECTION, SOLUTION INTRAVENOUS AS NEEDED
Status: DISCONTINUED | OUTPATIENT
Start: 2025-02-23 | End: 2025-02-24 | Stop reason: HOSPADM

## 2025-02-23 RX ORDER — DEXTROSE MONOHYDRATE 25 G/50ML
25 INJECTION, SOLUTION INTRAVENOUS
Status: DISCONTINUED | OUTPATIENT
Start: 2025-02-23 | End: 2025-02-24 | Stop reason: HOSPADM

## 2025-02-23 RX ORDER — PANTOPRAZOLE SODIUM 40 MG/1
40 TABLET, DELAYED RELEASE ORAL
Status: DISCONTINUED | OUTPATIENT
Start: 2025-02-24 | End: 2025-02-24 | Stop reason: HOSPADM

## 2025-02-23 RX ORDER — ACETAMINOPHEN 325 MG/1
650 TABLET ORAL EVERY 6 HOURS PRN
Status: DISCONTINUED | OUTPATIENT
Start: 2025-02-23 | End: 2025-02-24 | Stop reason: HOSPADM

## 2025-02-23 RX ORDER — SODIUM CHLORIDE 0.9 % (FLUSH) 0.9 %
10 SYRINGE (ML) INJECTION AS NEEDED
Status: DISCONTINUED | OUTPATIENT
Start: 2025-02-23 | End: 2025-02-24 | Stop reason: HOSPADM

## 2025-02-23 RX ORDER — NICOTINE POLACRILEX 4 MG
15 LOZENGE BUCCAL
Status: DISCONTINUED | OUTPATIENT
Start: 2025-02-23 | End: 2025-02-24 | Stop reason: HOSPADM

## 2025-02-23 RX ORDER — ROPINIROLE 0.5 MG/1
0.25 TABLET, FILM COATED ORAL NIGHTLY
Status: DISCONTINUED | OUTPATIENT
Start: 2025-02-23 | End: 2025-02-24 | Stop reason: HOSPADM

## 2025-02-23 RX ADMIN — ATORVASTATIN CALCIUM 40 MG: 20 TABLET, FILM COATED ORAL at 20:14

## 2025-02-23 RX ADMIN — NITROGLYCERIN 1 INCH: 20 OINTMENT TOPICAL at 10:24

## 2025-02-23 RX ADMIN — Medication 10 ML: at 20:09

## 2025-02-23 RX ADMIN — LISINOPRIL 10 MG: 10 TABLET ORAL at 17:59

## 2025-02-23 RX ADMIN — Medication 10 ML: at 10:28

## 2025-02-23 RX ADMIN — ROPINIROLE HYDROCHLORIDE 0.25 MG: 0.5 TABLET, FILM COATED ORAL at 20:14

## 2025-02-23 RX ADMIN — INSULIN GLARGINE 24 UNITS: 100 INJECTION, SOLUTION SUBCUTANEOUS at 20:55

## 2025-02-23 RX ADMIN — ASPIRIN 325 MG ORAL TABLET 325 MG: 325 PILL ORAL at 10:23

## 2025-02-23 RX ADMIN — MORPHINE SULFATE 2 MG: 2 INJECTION, SOLUTION INTRAMUSCULAR; INTRAVENOUS at 10:27

## 2025-02-23 RX ADMIN — ACETAMINOPHEN 650 MG: 325 TABLET, FILM COATED ORAL at 20:18

## 2025-02-23 RX ADMIN — CLOPIDOGREL BISULFATE 75 MG: 75 TABLET ORAL at 17:59

## 2025-02-23 RX ADMIN — METOPROLOL SUCCINATE 25 MG: 25 TABLET, EXTENDED RELEASE ORAL at 17:59

## 2025-02-23 RX ADMIN — ESCITALOPRAM 20 MG: 20 TABLET, FILM COATED ORAL at 17:59

## 2025-02-23 RX ADMIN — INSULIN LISPRO 5 UNITS: 100 INJECTION, SOLUTION INTRAVENOUS; SUBCUTANEOUS at 17:37

## 2025-02-23 NOTE — PLAN OF CARE
Goal Outcome Evaluation:  Plan of Care Reviewed With: patient        Progress: improving  Outcome Evaluation: pt came in for chest pain that started today at 3am while at work. AO4, VSS, up ad destin, on room air. Pending Cardiology review, plan to keep NPO midnight.

## 2025-02-23 NOTE — PROGRESS NOTES
MD ATTESTATION NOTE     SHARED VISIT: This visit was performed by BOTH a physician and an APC. The substantive portion of the medical decision making was performed by this attesting physician who made or approved the management plan and takes responsibility for patient management. All studies in the APC note (if performed) were independently interpreted by me.  The NAIMA and I have discussed this patient's history, physical exam, and treatment plan. I have reviewed the documentation and personally had a face to face interaction with the patient. I affirm the documentation and agree with the treatment and plan. I provided a substantive portion of the care of the patient.  I personally performed the physical exam in its entirety, and below are my findings.      Brief HPI: Patient is being admitted from the ED for chest pain.  Patient having substernal chest pressure around 3 AM this morning while she was sitting at work.  Pain radiated into the left arm.  She reports associated shortness of breath and nausea.  Nothing made the pain better or worse.  She has a history of CAD.  She had angioplasty in May 2024.  In the ED, EKG had nonspecific changes.  Chest x-ray was negative acute.  Troponin was negative x 2.    PHYSICAL EXAM    GENERAL: Awake, alert, oriented x 3.  Nontoxic-appearing elderly female.  Resting comfortably in no acute distress  HENT: nares patent  EYES: no scleral icterus  CV: regular rhythm, normal rate, equal bilateral radial pulses  RESPIRATORY: normal effort, CTAB  ABDOMEN: soft, nontender  MUSCULOSKELETAL: Extremities are nontender.  No calf tenderness  NEURO: alert, moves all extremities, follows commands  PSYCH:  calm, cooperative  SKIN: warm, dry    Vital signs and nursing notes reviewed.        Plan: Trend troponin.  Cardiac monitor.  Consult cardiology.

## 2025-02-23 NOTE — ED PROVIDER NOTES
EMERGENCY DEPARTMENT ENCOUNTER    CHIEF COMPLAINT  Chief Complaint: Chest pain  History given by: Patient  History limited by: Nothing  Room Number: HB2/D  PMD: Sofi Fernandes, DARIAN  Cardiologist: Dr Hayes    HPI:  Pt is a 64 y.o. female with history of coronary artery disease presents complaining of left lower chest pain radiating to the left upper chest onset 2 PM yesterday.  Patient reports mild shortness of air, denies cough, fever, pain, nausea/vomiting, changes to urinary or bowel habits, reports swelling of her extremities is improved today.  The patient denies exacerbating or relieving factors, no pleuritic component, not worse with movement.  Patient reports she is non-smoker reports she is compliant with her medications including aspirin and Plavix, reports that nitro did not improve her symptoms.  Patient with history of CAD.      Aggravating Factors: None  Alleviating Factors: None  Treatment before arrival: Regular medications  Chronic or social conditions impacting care: History of CAD    Additional sources:  Discussed/ obtained information from independent historians: Patient gave entire history    External (non-ED) record review:   Patient with history of type 2 diabetes, hyperlipidemia, hypertension, CAD  2d echo 6/21/2024 with EF of 65 to 70%  Cardiac cath 5/17/2024 with successful angioplasty of OM second diagonal 99% reduced to 20-30%, normal left main, LAD with previous stent patent, second diagonal with 99% reduced to 20-30% with balloon angioplasty, the rest of the LAD/diagonal/ with minimal irregularities  Circumflex normal, RCA 20-30% proximal  EF 60%      PAST MEDICAL HISTORY  Active Ambulatory Problems     Diagnosis Date Noted    Type 2 diabetes mellitus 06/03/2017    Pulmonary emphysema 06/03/2017    Arthritis 06/03/2017    Other hyperlipidemia 06/03/2017    Primary hypertension 06/03/2017    Abnormal liver function tests 02/16/2016    Impingement syndrome of shoulder  region 07/03/2013    Neck pain 05/08/2013    Postmenopause 08/03/2023    Restless leg syndrome 08/03/2023    Urinary frequency 08/03/2023    Vitamin D deficiency 08/03/2023    Fatigue 08/03/2023    Moderate episode of recurrent major depressive disorder 08/03/2023    Generalized anxiety disorder 08/03/2023    Insomnia 08/03/2023    Skin lesion of back 08/17/2023    Nausea 09/13/2023    Incomplete right bundle branch block (RBBB) 09/13/2023    Coronary artery disease of native artery of native heart with stable angina pectoris 10/25/2023    Tendency to bleed 01/11/2024    Gastroesophageal reflux disease 01/11/2024    Pain of left hand 01/11/2024    Left leg swelling 05/28/2024    Anemia 05/30/2024    Former smoker 05/30/2024    Long term (current) use of antithrombotics/antiplatelets 07/16/2024    Low magnesium level 07/22/2024    Localized swelling of right upper extremity 10/22/2024    Acute non-recurrent sinusitis 10/22/2024    Upper respiratory tract infection 12/11/2024    Diarrhea 12/11/2024     Resolved Ambulatory Problems     Diagnosis Date Noted    Obesity 06/03/2017    Myocardial perfusion defect 02/16/2016    Tobacco use 02/12/2016    Anxiety 08/03/2023    Herpes zoster 08/03/2023    Dizziness 08/03/2023    Decreased thyroid stimulating hormone (TSH) level 09/13/2023    Chest pain 09/13/2023    Abnormal nuclear stress test 10/12/2023    Skin lesion of left arm 01/11/2024     Past Medical History:   Diagnosis Date    COPD (chronic obstructive pulmonary disease)     Elevated cholesterol     Emphysema of lung     Gallbladder abscess     History of positive hepatitis C     Hypertension        PAST SURGICAL HISTORY  Past Surgical History:   Procedure Laterality Date    APPENDECTOMY      CARDIAC CATHETERIZATION N/A 10/25/2023    Procedure: Left Heart Cath;  Surgeon: Tasneem Hayes MD;  Location: [facility] CATH INVASIVE LOCATION;  Service: Cardiovascular;  Laterality: N/A;    CARDIAC CATHETERIZATION N/A  10/25/2023    Procedure: Left ventriculography;  Surgeon: Tasneem Hayes MD;  Location:  [facility] CATH INVASIVE LOCATION;  Service: Cardiovascular;  Laterality: N/A;    CARDIAC CATHETERIZATION N/A 10/25/2023    Procedure: Stent ELYLA coronary;  Surgeon: Tasneem Hayes MD;  Location:  [facility] CATH INVASIVE LOCATION;  Service: Cardiovascular;  Laterality: N/A;    CARDIAC CATHETERIZATION N/A 10/25/2023    Procedure: Coronary angiography;  Surgeon: Tasneem Hayes MD;  Location:  [facility] CATH INVASIVE LOCATION;  Service: Cardiovascular;  Laterality: N/A;    CARDIAC CATHETERIZATION N/A 10/25/2023    Procedure: Percutaneous Coronary Intervention;  Surgeon: Tasneem Hayes MD;  Location:  [facility] CATH INVASIVE LOCATION;  Service: Cardiovascular;  Laterality: N/A;    CARDIAC CATHETERIZATION N/A 11/3/2023    Procedure: Percutaneous Coronary Intervention RCA;  Surgeon: Tasneem Hayes MD;  Location:  [facility] CATH INVASIVE LOCATION;  Service: Cardiovascular;  Laterality: N/A;    CARDIAC CATHETERIZATION N/A 11/3/2023    Procedure: Coronary angiography;  Surgeon: Tasneem Hayes MD;  Location:  [facility] CATH INVASIVE LOCATION;  Service: Cardiovascular;  Laterality: N/A;    CARDIAC CATHETERIZATION N/A 11/3/2023    Procedure: Stent LEYLA coronary;  Surgeon: Tasneem Hayes MD;  Location:  [facility] CATH INVASIVE LOCATION;  Service: Cardiovascular;  Laterality: N/A;    CARDIAC CATHETERIZATION N/A 5/17/2024    Procedure: Left Heart Cath;  Surgeon: Tasneem Hayes MD;  Location[facility] CATH INVAS[facility] LOC[facility];  Service: Cardiovascular;  Laterality: N/A;    CARDIAC CATHETERIZATION N/A 5/17/2024    Procedure: Percutaneous Coronary Intervention;  Surgeon: Tasneem Hayes MD;  Location:  [facility] CATH INVASIVE LOCATION;  Service: Cardiovascular;  Laterality: N/A;    CARDIAC CATHETERIZATION N/A 5/17/2024    Procedure: Coronary angiography;  Surgeon: Tasneem Hayes MD;  [facility]  [facility]VAS[facility];  Service: Cardiovascular;  Laterality: N/A;    CARDIAC CATHETERIZATION N/A 2024    Procedure: Left ventriculography;  Surgeon: Tasneem Hayes MD;  Location: [facility] INVASIVE LOCATION;  Service: Cardiovascular;  Laterality: N/A;    CHOLECYSTECTOMY      HYSTERECTOMY      TUBAL ABDOMINAL LIGATION         FAMILY HISTORY  Family History   Problem Relation Age of Onset    Diabetes Mother     Heart disease Mother     Diabetes Father     Diabetes Brother     Heart disease Maternal Grandmother     Cancer Paternal Grandfather        SOCIAL HISTORY  Social History     Socioeconomic History    Marital status:    Tobacco Use    Smoking status: Former     Current packs/day: 0.00     Average packs/day: 1.5 packs/day for 45.8 years (68.7 ttl pk-yrs)     Types: Cigarettes     Start date:      Quit date: 10/11/2017     Years since quittin.3     Passive exposure: Past    Smokeless tobacco: Never    Tobacco comments:     Previously smoked about 1.5 packs per day for about 45 years   Vaping Use    Vaping status: Never Used   Substance and Sexual Activity    Alcohol use: Not Currently     Comment: rare margaritia 1-2 times per year    Drug use: No     Comment: in past; clean since     Sexual activity: Not Currently       ALLERGIES  Patient has no known allergies.    REVIEW OF SYSTEMS  Review of Systems    PHYSICAL EXAM  ED Triage Vitals   Temp Heart Rate Resp BP SpO2   25 0615 25 0615 25 0615 25 0619 25 0615   98.3 °F (36.8 °C) 62 16 164/75 98 %      Temp src Heart Rate Source Patient Position BP Location FiO2 (%)   25 0615 25 0615 25 0619 25 0619 --   Tympanic Monitor Sitting Right arm        Physical Exam  Vitals and nursing note reviewed.   Constitutional:       General: She is in acute distress (mild).      Appearance: She is not toxic-appearing.   HENT:      Head: Normocephalic and atraumatic.   Cardiovascular:      Rate and  Rhythm: Normal rate and regular rhythm.      Pulses: Normal pulses.           Posterior tibial pulses are 2+ on the right side and 2+ on the left side.      Heart sounds: Normal heart sounds. No murmur heard.  Pulmonary:      Effort: Pulmonary effort is normal. No respiratory distress.      Breath sounds: Normal breath sounds.   Abdominal:      General: Bowel sounds are normal.      Palpations: Abdomen is soft.      Tenderness: There is no abdominal tenderness. There is no guarding or rebound.   Musculoskeletal:         General: Normal range of motion.      Cervical back: Normal range of motion and neck supple.   Skin:     General: Skin is warm and dry.   Neurological:      Mental Status: She is alert and oriented to person, place, and time.   Psychiatric:         Mood and Affect: Affect normal.         LAB RESULTS  Recent Results (from the past 24 hours)   ECG 12 Lead ED Triage Standing Order; Chest Pain    Collection Time: 02/23/25  6:29 AM   Result Value Ref Range    QT Interval 432 ms    QTC Interval 435 ms   Comprehensive Metabolic Panel    Collection Time: 02/23/25  6:30 AM    Specimen: Arm, Right; Blood   Result Value Ref Range    Glucose 189 (H) 65 - 99 mg/dL    BUN 14 8 - 23 mg/dL    Creatinine 0.55 (L) 0.57 - 1.00 mg/dL    Sodium 139 136 - 145 mmol/L    Potassium 4.3 3.5 - 5.2 mmol/L    Chloride 100 98 - 107 mmol/L    CO2 26.6 22.0 - 29.0 mmol/L    Calcium 9.5 8.6 - 10.5 mg/dL    Total Protein 6.9 6.0 - 8.5 g/dL    Albumin 4.3 3.5 - 5.2 g/dL    ALT (SGPT) 10 1 - 33 U/L    AST (SGOT) 13 1 - 32 U/L    Alkaline Phosphatase 68 39 - 117 U/L    Total Bilirubin <0.2 0.0 - 1.2 mg/dL    Globulin 2.6 gm/dL    A/G Ratio 1.7 g/dL    BUN/Creatinine Ratio 25.5 (H) 7.0 - 25.0    Anion Gap 12.4 5.0 - 15.0 mmol/L    eGFR 101.2 >60.0 mL/min/1.73   High Sensitivity Troponin T    Collection Time: 02/23/25  6:30 AM    Specimen: Arm, Right; Blood   Result Value Ref Range    HS Troponin T <6 <14 ng/L   Green Top (Gel)     Collection Time: 02/23/25  6:30 AM   Result Value Ref Range    Extra Tube Hold for add-ons.    Lavender Top    Collection Time: 02/23/25  6:30 AM   Result Value Ref Range    Extra Tube hold for add-on    Gold Top - SST    Collection Time: 02/23/25  6:30 AM   Result Value Ref Range    Extra Tube Hold for add-ons.    Light Blue Top    Collection Time: 02/23/25  6:30 AM   Result Value Ref Range    Extra Tube Hold for add-ons.    CBC Auto Differential    Collection Time: 02/23/25  6:30 AM    Specimen: Arm, Right; Blood   Result Value Ref Range    WBC 6.67 3.40 - 10.80 10*3/mm3    RBC 4.27 3.77 - 5.28 10*6/mm3    Hemoglobin 12.2 12.0 - 15.9 g/dL    Hematocrit 38.7 34.0 - 46.6 %    MCV 90.6 79.0 - 97.0 fL    MCH 28.6 26.6 - 33.0 pg    MCHC 31.5 31.5 - 35.7 g/dL    RDW 13.9 12.3 - 15.4 %    RDW-SD 46.1 37.0 - 54.0 fl    MPV 11.2 6.0 - 12.0 fL    Platelets 204 140 - 450 10*3/mm3    Neutrophil % 49.6 42.7 - 76.0 %    Lymphocyte % 41.1 19.6 - 45.3 %    Monocyte % 7.2 5.0 - 12.0 %    Eosinophil % 1.3 0.3 - 6.2 %    Basophil % 0.7 0.0 - 1.5 %    Immature Grans % 0.1 0.0 - 0.5 %    Neutrophils, Absolute 3.30 1.70 - 7.00 10*3/mm3    Lymphocytes, Absolute 2.74 0.70 - 3.10 10*3/mm3    Monocytes, Absolute 0.48 0.10 - 0.90 10*3/mm3    Eosinophils, Absolute 0.09 0.00 - 0.40 10*3/mm3    Basophils, Absolute 0.05 0.00 - 0.20 10*3/mm3    Immature Grans, Absolute 0.01 0.00 - 0.05 10*3/mm3    nRBC 0.0 0.0 - 0.2 /100 WBC   High Sensitivity Troponin T 1Hr    Collection Time: 02/23/25  8:14 AM    Specimen: Blood   Result Value Ref Range    HS Troponin T <6 <14 ng/L    Troponin T Numeric Delta         I ordered the above labs and reviewed the results    RADIOLOGY  XR Chest 1 View    Result Date: 2/23/2025  XR CHEST 1 VW-  HISTORY: Female who is 64 years-old, chest pain  TECHNIQUE: Frontal view of the chest  COMPARISON: 12/31/2024  FINDINGS: The heart size is normal. Aorta is calcified. Pulmonary vasculature is unremarkable. No focal pulmonary  consolidation, pleural effusion, or pneumothorax. Old granulomatous disease is apparent. No acute osseous process.      No evidence for acute pulmonary process. Follow-up as clinical indications persist.  This report was finalized on 2/23/2025 7:03 AM by Dr. Donta Thompson M.D on Workstation: [id number]       I ordered the above noted radiological studies. Interpreted by radiologist. Viewed and interpreted by me in PACS -no large pneumothorax      PROCEDURES  Procedures      MEDICATIONS GIVEN IN THE EMERGENCY DEPARTMENT  Medications   sodium chloride 0.9 % flush 10 mL (has no administration in time range)   aspirin tablet 325 mg (has no administration in time range)   nitroglycerin (NITROSTAT) ointment 1 inch (has no administration in time range)   nitroglycerin (NITROSTAT) SL tablet 0.4 mg (has no administration in time range)   sodium chloride 0.9 % flush 10 mL (has no administration in time range)   sodium chloride 0.9 % flush 10 mL (has no administration in time range)   sodium chloride 0.9 % infusion 40 mL (has no administration in time range)   morphine injection 2 mg (has no administration in time range)           MEDICAL DECISION MAKING  Results were reviewed/discussed with the patient and they were also made aware of online access. Pt also made aware that some labs, such as cultures, will not be resulted during ER visit and followup with PMD is necessary.   EKGs and labs independently viewed and interpreted by me.  Discussion below represents my analysis of pertinent findings related to patient's condition, differential diagnosis, treatment plan and final disposition.    Differential diagnosis includes but is not limited to:  Muscle strain, costochondritis, pleurisy, rib fracture,  herpes zoster, tumor, myocardial infarction, coronary syndrome, unstable angina, angina, aortic dissection,  pericarditis, palpitations, pulmonary embolus, pneumonia, pneumothorax, lung cancer, GERD, esophagitis, esophageal  spasm      Independent interpretation of labs, radiology studies, and discussions with consultants:  ED Course as of 02/23/25 1018   Sun Feb 23, 2025   0955 Discussed with Dr Hayes, who is familiar with patient, voices understanding of presentation and persistent complaints of chest pain.  With reassuring troponins.  He agrees for admission to observation unit and to see in consult for further testing, treatment as needed. [TO]      ED Course User Index  [TO] Dolores Garcia MD     EKG          EKG time: 06 29  Rhythm/Rate: Sinus rhythm, rate in the 60s  P waves and NV: Normal P waves, normal HARLEY's  QRS, axis: Unremarkable  ST and T waves: Unremarkable ST/T wave findings    Interpreted Contemporaneously by me, independently viewed  Minimally changed compared to prior 12/31/2024      Shared decision making: Family reports understanding of plan for admission to the observation unit with consultation by Dr Hayes, agreed to this plan.      MDM         DIAGNOSIS  Final diagnoses:   Precordial pain       DISPOSITION  ADMISSION    Discussed treatment plan and reason for admission with pt/family and admitting physician.  Pt/family voiced understanding of the plan for admission for further testing/treatment as needed.       Latest Documented Vital Signs:  As of 10:18 EST  BP- 157/74 HR- 62 Temp- 98.3 °F (36.8 °C) (Tympanic) O2 sat- 98%    --      Please note that portions of this note were completed with a voice recognition program.       Note Disclaimer: At [facility], we believe that sharing information builds trust and better relationships. You are receiving this note because you are receiving care at [facility] or recently visited. It is possible you will see health information before a provider has talked with you about it. This kind of information can be easy to misunderstand. To help you fully understand what it means for your health, we urge you to discuss this note with your provider.     Radha  Dolores ZAMUDIO MD  02/23/25 1013

## 2025-02-23 NOTE — H&P
[facility]   HISTORY AND PHYSICAL    Patient Name: Gely Kimble  : 1960  MRN: [id number]  Primary Care Physician:  Sofi Fernandes APRN  Date of admission: 2025    Subjective   Subjective     Chief Complaint:   Chief Complaint   Patient presents with    Chest Pain     HPI:    Gely Kimble is a 64 y.o. female with a past medical history significant for CAD with previous PCI, COPD, hypertension, hyperlipidemia and diabetes who presented to the emergency department with complaints of left-sided chest pain that started around 3:00 this morning while she was at work.  The pain radiates into her left arm.  She reports associated shortness of breath.  Denies nausea, vomiting.  No abdominal pain or diarrhea.  No recent illness, fever, chills, cough.    High-sensitivity troponin was negative x 2.  EKG shows sinus rhythm without evidence of acute ischemia.  Chest x-ray was negative for acute findings.  CBC and CMP largely unremarkable.  Glucose slightly elevated at 189.  Total cholesterol is 114 with a LDL of 39.    She will be admitted to the ED observation unit and we will consult cardiology.    Review of Systems   All systems were reviewed and negative except for: Those mentioned in HPI    Personal History     Past Medical History:   Diagnosis Date    Abnormal nuclear stress test     Arthritis     COPD (chronic obstructive pulmonary disease)     Elevated cholesterol     Emphysema of lung     Gallbladder abscess     Herpes zoster 2023    History of positive hepatitis C     previously positive, never treated, negative RNA finding    Hypertension     Myocardial perfusion defect 2016    Type 2 diabetes mellitus        Past Surgical History:   Procedure Laterality Date    APPENDECTOMY      CARDIAC CATHETERIZATION N/A 10/25/2023    Procedure: Left Heart Cath;  Surgeon: Tasneem Hayes MD;  Location: [facility] CATH INVASIVE LOCATION;  Service: Cardiovascular;  Laterality: N/A;    CARDIAC  CATHETERIZATION N/A 10/25/2023    Procedure: Left ventriculography;  Surgeon: Tasneem Hayes MD;  Location:  [facility] CATH INVASIVE LOCATION;  Service: Cardiovascular;  Laterality: N/A;    CARDIAC CATHETERIZATION N/A 10/25/2023    Procedure: Stent LEYLA coronary;  Surgeon: Tasneem Hayes MD;  Location:  [facility] CATH INVASIVE LOCATION;  Service: Cardiovascular;  Laterality: N/A;    CARDIAC CATHETERIZATION N/A 10/25/2023    Procedure: Coronary angiography;  Surgeon: Tasneem Hayes MD;  Location:  [facility] CATH INVASIVE LOCATION;  Service: Cardiovascular;  Laterality: N/A;    CARDIAC CATHETERIZATION N/A 10/25/2023    Procedure: Percutaneous Coronary Intervention;  Surgeon: Tasneem Hayes MD;  Location:  [facility] CATH INVASIVE LOCATION;  Service: Cardiovascular;  Laterality: N/A;    CARDIAC CATHETERIZATION N/A 11/3/2023    Procedure: Percutaneous Coronary Intervention RCA;  Surgeon: Tasneem Hayes MD;  [facility];  Service: Cardiovascular;  Laterality: N/A;    CARDIAC CATHETERIZATION N/A 11/3/2023    Procedure: Coronary angiography;  Surgeon: Tasneem Hayes MD;  Location:  [facility] CATH INVASIVE LOCATION;  Service: Cardiovascular;  Laterality: N/A;    CARDIAC CATHETERIZATION N/A 11/3/2023    Procedure: Stent LEYLA coronary;  Surgeon: Tasneem Hayes MD;  [facility];  Service: Cardiovascular;  Laterality: N/A;    CARDIAC CATHETERIZATION N/A 5/17/2024    Procedure: Left Heart Cath;  Surgeon: Tasneem Hayes MD;  [facility];  Service: Cardiovascular;  Laterality: N/A;    CARDIAC CATHETERIZATION N/A 5/17/2024    Procedure: Percutaneous Coronary Intervention;  Surgeon: Tasneem Hayes MD;  [facility];  Service: Cardiovascular;  Laterality: N/A;    CARDIAC CATHETERIZATION N/A 5/17/2024    Procedure: Coronary angiography;  Surgeon: Tasneem Hayes MD;   Location:  [facility] CATH INVASIVE LOCATION;  Service: Cardiovascular;  Laterality: N/A;    CARDIAC CATHETERIZATION N/A 5/17/2024    Procedure: Left ventriculography;  Surgeon: Tasneem Hayes MD;  Location: [facility] CATH INVASIVE LOC[facility];  Service: Cardiovascular;  Laterality: N/A;    CHOLECYSTECTOMY      HYSTERECTOMY      TUBAL ABDOMINAL LIGATION         Family History: family history includes Cancer in her paternal grandfather; Diabetes in her brother, father, and mother; Heart disease in her maternal grandmother and mother. Otherwise pertinent FHx was reviewed and not pertinent to current issue.    Social History:  reports that she quit smoking about 7 years ago. Her smoking use included cigarettes. She started smoking about 53 years ago. She has a 68.7 pack-year smoking history. She has been exposed to tobacco smoke. She has never used smokeless tobacco. She reports that she does not currently use alcohol. She reports that she does not use drugs.    Home Medications:  Ferrous Sulfate ER, Insulin Syringe, Loratadine, acetaminophen, aspirin, atorvastatin, clopidogrel, escitalopram, insulin detemir, lisinopril, magnesium oxide, metFORMIN ER, metoprolol succinate XL, multivitamin, nitroglycerin, pantoprazole, rOPINIRole, and vitamin D    Allergies:  No Known Allergies    Objective   Objective     Vitals:   Temp:  [98.3 °F (36.8 °C)] 98.3 °F (36.8 °C)  Heart Rate:  [61-62] 61  Resp:  [16] 16  BP: (157-169)/(74-83) 169/83  Physical Exam    Constitutional: Awake, alert   Eyes: PERRLA, sclerae anicteric, no conjunctival injection   HENT: NCAT, mucous membranes moist   Neck: Supple, no thyromegaly, no lymphadenopathy, trachea midline   Respiratory: Clear to auscultation bilaterally, nonlabored respirations    Cardiovascular: RRR, no murmurs, rubs, or gallops, palpable pedal pulses bilaterally   Gastrointestinal: Positive bowel sounds, soft, nontender, nondistended   Musculoskeletal: No bilateral ankle edema, no  clubbing or cyanosis to extremities   Psychiatric: Appropriate affect, cooperative   Neurologic: Oriented x 3, strength symmetric in all extremities, Cranial Nerves grossly intact to confrontation, speech clear   Skin: No rashes     Result Review    Result Review:  I have personally reviewed the results from the time of this admission to 2/23/2025 12:00 EST and agree with these findings:  [x]  Laboratory list / accordion  []  Microbiology  [x]  Radiology  [x]  EKG/Telemetry   []  Cardiology/Vascular   []  Pathology  []  Old records  []  Other:  Most notable findings include: Creatinine 0.55, glucose 189      Assessment & Plan   The ASCVD Risk score (Nayeli DIXON, et al., 2019) failed to calculate for the following reasons:    The valid total cholesterol range is 130 to 320 mg/dL    Assessment / Plan     Brief Patient Summary:  Gely Kimble is a 64 y.o. female who will be admitted to the ED observation unit    Active Hospital Problems:  Active Hospital Problems    Diagnosis     Chest pain      Plan:     Chest pain  Hx CAD, PCI May 15847  -Continuous telemetry monitoring  -Vital signs per nursing routine  -High-sensitivity troponin negative x 2  -LDL 39, triglycerides 65, chest x-ray NAD  -EKG sinus rhythm without acute ischemia  -Continue aspirin, Plavix, statin  -Consult cardiology    Hypertension  Hyperlipidemia  -Continue home regimen    Diabetes  -Hold metformin while inpatient  -Continue long-acting insulin  -Add correction scale    COPD, without exacerbation  -Albuterol as needed    VTE Prophylaxis:  No VTE prophylaxis order currently exists.    CODE STATUS:       Admission Status:  I believe this patient meets observation status.    Electronically signed by DARIAN Ruff, 02/23/25, 12:00 PM EST.    76 minutes has been spent by [facility] providers in the care of this patient while under observation status    I have worn appropriate PPE during this patient encounter,  sanitized my hands both with entering and exiting patient's room.    I have discussed plan of care with patient including advance care plan and/or surrogate decision maker.  Patient advises that their son, Dao will be their primary surrogate decision maker

## 2025-02-24 ENCOUNTER — APPOINTMENT (OUTPATIENT)
Dept: NUCLEAR MEDICINE | Facility: HOSPITAL | Age: 65
End: 2025-02-24
Payer: MEDICARE

## 2025-02-24 ENCOUNTER — APPOINTMENT (OUTPATIENT)
Dept: CARDIOLOGY | Facility: HOSPITAL | Age: 65
End: 2025-02-24
Payer: MEDICARE

## 2025-02-24 ENCOUNTER — READMISSION MANAGEMENT (OUTPATIENT)
Dept: CALL CENTER | Facility: HOSPITAL | Age: 65
End: 2025-02-24
Payer: MEDICARE

## 2025-02-24 VITALS
TEMPERATURE: 98 F | BODY MASS INDEX: 21.97 KG/M2 | DIASTOLIC BLOOD PRESSURE: 83 MMHG | HEART RATE: 71 BPM | OXYGEN SATURATION: 95 % | RESPIRATION RATE: 16 BRPM | SYSTOLIC BLOOD PRESSURE: 130 MMHG | HEIGHT: 67 IN | WEIGHT: 140 LBS

## 2025-02-24 LAB
ANION GAP SERPL CALCULATED.3IONS-SCNC: 8 MMOL/L (ref 5–15)
AORTIC DIMENSIONLESS INDEX: 0.8 (DI)
AV MEAN PRESS GRAD SYS DOP V1V2: 2 MMHG
AV VMAX SYS DOP: 107 CM/SEC
BH CV ECHO MEAS - AO MAX PG: 4.6 MMHG
BH CV ECHO MEAS - AO ROOT DIAM: 2.7 CM
BH CV ECHO MEAS - AO V2 VTI: 20.3 CM
BH CV ECHO MEAS - AVA(I,D): 2.5 CM2
BH CV ECHO MEAS - EDV(CUBED): 50.7 ML
BH CV ECHO MEAS - EDV(MOD-SP2): 65 ML
BH CV ECHO MEAS - EDV(MOD-SP4): 60 ML
BH CV ECHO MEAS - EF(MOD-SP2): 60 %
BH CV ECHO MEAS - EF(MOD-SP4): 55 %
BH CV ECHO MEAS - ESV(CUBED): 19 ML
BH CV ECHO MEAS - ESV(MOD-SP2): 26 ML
BH CV ECHO MEAS - ESV(MOD-SP4): 27 ML
BH CV ECHO MEAS - FS: 27.8 %
BH CV ECHO MEAS - IVS/LVPW: 0.89 CM
BH CV ECHO MEAS - IVSD: 0.8 CM
BH CV ECHO MEAS - LAT PEAK E' VEL: 6.9 CM/SEC
BH CV ECHO MEAS - LV DIASTOLIC VOL/BSA (35-75): 34.5 CM2
BH CV ECHO MEAS - LV MASS(C)D: 89.5 GRAMS
BH CV ECHO MEAS - LV MAX PG: 4 MMHG
BH CV ECHO MEAS - LV MEAN PG: 2 MMHG
BH CV ECHO MEAS - LV SYSTOLIC VOL/BSA (12-30): 15.5 CM2
BH CV ECHO MEAS - LV V1 MAX: 99.5 CM/SEC
BH CV ECHO MEAS - LV V1 VTI: 18.1 CM
BH CV ECHO MEAS - LVIDD: 3.7 CM
BH CV ECHO MEAS - LVIDS: 2.7 CM
BH CV ECHO MEAS - LVOT AREA: 2.8 CM2
BH CV ECHO MEAS - LVOT DIAM: 1.89 CM
BH CV ECHO MEAS - LVPWD: 0.9 CM
BH CV ECHO MEAS - MED PEAK E' VEL: 5.4 CM/SEC
BH CV ECHO MEAS - MV A DUR: 0.1 SEC
BH CV ECHO MEAS - MV A MAX VEL: 94.1 CM/SEC
BH CV ECHO MEAS - MV DEC SLOPE: 219.7 CM/SEC2
BH CV ECHO MEAS - MV DEC TIME: 301 SEC
BH CV ECHO MEAS - MV E MAX VEL: 68.9 CM/SEC
BH CV ECHO MEAS - MV E/A: 0.73
BH CV ECHO MEAS - MV MAX PG: 5.1 MMHG
BH CV ECHO MEAS - MV MEAN PG: 1.64 MMHG
BH CV ECHO MEAS - MV P1/2T: 97.2 MSEC
BH CV ECHO MEAS - MV V2 VTI: 24.4 CM
BH CV ECHO MEAS - MVA(P1/2T): 2.26 CM2
BH CV ECHO MEAS - MVA(VTI): 2.09 CM2
BH CV ECHO MEAS - PA ACC TIME: 0.11 SEC
BH CV ECHO MEAS - PA V2 MAX: 99.9 CM/SEC
BH CV ECHO MEAS - PULM A REVS DUR: 0.08 SEC
BH CV ECHO MEAS - PULM A REVS VEL: 48.1 CM/SEC
BH CV ECHO MEAS - PULM DIAS VEL: 37.2 CM/SEC
BH CV ECHO MEAS - PULM S/D: 1.1
BH CV ECHO MEAS - PULM SYS VEL: 41 CM/SEC
BH CV ECHO MEAS - RAP SYSTOLE: 3 MMHG
BH CV ECHO MEAS - RV MAX PG: 2 MMHG
BH CV ECHO MEAS - RV V1 MAX: 70.7 CM/SEC
BH CV ECHO MEAS - RV V1 VTI: 14.4 CM
BH CV ECHO MEAS - RVSP: 17.8 MMHG
BH CV ECHO MEAS - SV(LVOT): 51 ML
BH CV ECHO MEAS - SV(MOD-SP2): 39 ML
BH CV ECHO MEAS - SV(MOD-SP4): 33 ML
BH CV ECHO MEAS - SVI(LVOT): 29.4 ML/M2
BH CV ECHO MEAS - SVI(MOD-SP2): 22.4 ML/M2
BH CV ECHO MEAS - SVI(MOD-SP4): 19 ML/M2
BH CV ECHO MEAS - TAPSE (>1.6): 1.59 CM
BH CV ECHO MEAS - TR MAX PG: 14.8 MMHG
BH CV ECHO MEAS - TR MAX VEL: 192.2 CM/SEC
BH CV ECHO MEASUREMENTS AVERAGE E/E' RATIO: 11.2
BH CV REST NUCLEAR ISOTOPE DOSE: 9.3 MCI
BH CV STRESS COMMENTS STAGE 1: NORMAL
BH CV STRESS DOSE REGADENOSON STAGE 1: 0.4
BH CV STRESS DURATION MIN STAGE 1: 0
BH CV STRESS DURATION SEC STAGE 1: 10
BH CV STRESS NUCLEAR ISOTOPE DOSE: 27.2 MCI
BH CV STRESS PROTOCOL 1: NORMAL
BH CV STRESS RECOVERY BP: NORMAL MMHG
BH CV STRESS RECOVERY HR: 90 BPM
BH CV STRESS STAGE 1: 1
BH CV XLRA - RV BASE: 2.6 CM
BH CV XLRA - TDI S': 8.6 CM/SEC
BUN SERPL-MCNC: 13 MG/DL (ref 8–23)
BUN/CREAT SERPL: 20 (ref 7–25)
CALCIUM SPEC-SCNC: 9.5 MG/DL (ref 8.6–10.5)
CHLORIDE SERPL-SCNC: 100 MMOL/L (ref 98–107)
CO2 SERPL-SCNC: 31 MMOL/L (ref 22–29)
CREAT SERPL-MCNC: 0.65 MG/DL (ref 0.57–1)
DEPRECATED RDW RBC AUTO: 46.3 FL (ref 37–54)
EGFRCR SERPLBLD CKD-EPI 2021: 97.2 ML/MIN/1.73
ERYTHROCYTE [DISTWIDTH] IN BLOOD BY AUTOMATED COUNT: 13.8 % (ref 12.3–15.4)
GLUCOSE BLDC GLUCOMTR-MCNC: 173 MG/DL (ref 70–130)
GLUCOSE BLDC GLUCOMTR-MCNC: 185 MG/DL (ref 70–130)
GLUCOSE SERPL-MCNC: 190 MG/DL (ref 65–99)
HCT VFR BLD AUTO: 40.4 % (ref 34–46.6)
HGB BLD-MCNC: 12.5 G/DL (ref 12–15.9)
LEFT ATRIUM VOLUME INDEX: 10.7 ML/M2
LV EF BIPLANE MOD: 57.7 %
MAGNESIUM SERPL-MCNC: 1.8 MG/DL (ref 1.6–2.4)
MAXIMAL PREDICTED HEART RATE: 154 BPM
MCH RBC QN AUTO: 28 PG (ref 26.6–33)
MCHC RBC AUTO-ENTMCNC: 30.9 G/DL (ref 31.5–35.7)
MCV RBC AUTO: 90.6 FL (ref 79–97)
PERCENT MAX PREDICTED HR: 62.34 %
PLATELET # BLD AUTO: 221 10*3/MM3 (ref 140–450)
PMV BLD AUTO: 11.1 FL (ref 6–12)
POTASSIUM SERPL-SCNC: 4.4 MMOL/L (ref 3.5–5.2)
QT INTERVAL: 442 MS
QTC INTERVAL: 445 MS
RBC # BLD AUTO: 4.46 10*6/MM3 (ref 3.77–5.28)
SODIUM SERPL-SCNC: 139 MMOL/L (ref 136–145)
SPECT HRT GATED+EF W RNC IV: 70 %
STRESS BASELINE BP: NORMAL MMHG
STRESS BASELINE HR: 69 BPM
STRESS PERCENT HR: 73 %
STRESS POST PEAK BP: NORMAL MMHG
STRESS POST PEAK HR: 96 BPM
STRESS TARGET HR: 131 BPM
TROPONIN T SERPL HS-MCNC: 7 NG/L
WBC NRBC COR # BLD AUTO: 5.88 10*3/MM3 (ref 3.4–10.8)

## 2025-02-24 PROCEDURE — G0378 HOSPITAL OBSERVATION PER HR: HCPCS

## 2025-02-24 PROCEDURE — 78452 HT MUSCLE IMAGE SPECT MULT: CPT | Performed by: INTERNAL MEDICINE

## 2025-02-24 PROCEDURE — 93018 CV STRESS TEST I&R ONLY: CPT | Performed by: INTERNAL MEDICINE

## 2025-02-24 PROCEDURE — 93017 CV STRESS TEST TRACING ONLY: CPT

## 2025-02-24 PROCEDURE — A9502 TC99M TETROFOSMIN: HCPCS | Performed by: EMERGENCY MEDICINE

## 2025-02-24 PROCEDURE — 99213 OFFICE O/P EST LOW 20 MIN: CPT | Performed by: INTERNAL MEDICINE

## 2025-02-24 PROCEDURE — 83735 ASSAY OF MAGNESIUM: CPT | Performed by: NURSE PRACTITIONER

## 2025-02-24 PROCEDURE — 25010000002 REGADENOSON 0.4 MG/5ML SOLUTION: Performed by: EMERGENCY MEDICINE

## 2025-02-24 PROCEDURE — 93306 TTE W/DOPPLER COMPLETE: CPT | Performed by: INTERNAL MEDICINE

## 2025-02-24 PROCEDURE — 93306 TTE W/DOPPLER COMPLETE: CPT

## 2025-02-24 PROCEDURE — 93010 ELECTROCARDIOGRAM REPORT: CPT | Performed by: INTERNAL MEDICINE

## 2025-02-24 PROCEDURE — 82948 REAGENT STRIP/BLOOD GLUCOSE: CPT

## 2025-02-24 PROCEDURE — 78452 HT MUSCLE IMAGE SPECT MULT: CPT

## 2025-02-24 PROCEDURE — 85027 COMPLETE CBC AUTOMATED: CPT | Performed by: NURSE PRACTITIONER

## 2025-02-24 PROCEDURE — 80048 BASIC METABOLIC PNL TOTAL CA: CPT | Performed by: NURSE PRACTITIONER

## 2025-02-24 PROCEDURE — 93005 ELECTROCARDIOGRAM TRACING: CPT | Performed by: NURSE PRACTITIONER

## 2025-02-24 PROCEDURE — 84484 ASSAY OF TROPONIN QUANT: CPT | Performed by: NURSE PRACTITIONER

## 2025-02-24 PROCEDURE — 34310000005 TECHNETIUM TETROFOSMIN KIT: Performed by: EMERGENCY MEDICINE

## 2025-02-24 RX ORDER — ISOSORBIDE MONONITRATE 30 MG/1
30 TABLET, EXTENDED RELEASE ORAL
Status: DISCONTINUED | OUTPATIENT
Start: 2025-02-24 | End: 2025-02-24

## 2025-02-24 RX ORDER — ISOSORBIDE MONONITRATE 30 MG/1
30 TABLET, EXTENDED RELEASE ORAL
Status: DISCONTINUED | OUTPATIENT
Start: 2025-02-25 | End: 2025-02-24 | Stop reason: HOSPADM

## 2025-02-24 RX ORDER — REGADENOSON 0.08 MG/ML
0.4 INJECTION, SOLUTION INTRAVENOUS
Status: COMPLETED | OUTPATIENT
Start: 2025-02-24 | End: 2025-02-24

## 2025-02-24 RX ORDER — IBUPROFEN 400 MG/1
400 TABLET, FILM COATED ORAL EVERY 6 HOURS PRN
Status: DISCONTINUED | OUTPATIENT
Start: 2025-02-24 | End: 2025-02-24 | Stop reason: HOSPADM

## 2025-02-24 RX ORDER — ISOSORBIDE MONONITRATE 30 MG/1
30 TABLET, EXTENDED RELEASE ORAL DAILY
Qty: 30 TABLET | Refills: 0 | Status: SHIPPED | OUTPATIENT
Start: 2025-02-25

## 2025-02-24 RX ADMIN — ESCITALOPRAM 20 MG: 20 TABLET, FILM COATED ORAL at 08:30

## 2025-02-24 RX ADMIN — ACETAMINOPHEN 650 MG: 325 TABLET, FILM COATED ORAL at 02:01

## 2025-02-24 RX ADMIN — TETROFOSMIN 1 DOSE: 1.38 INJECTION, POWDER, LYOPHILIZED, FOR SOLUTION INTRAVENOUS at 08:36

## 2025-02-24 RX ADMIN — TETROFOSMIN 1 DOSE: 1.38 INJECTION, POWDER, LYOPHILIZED, FOR SOLUTION INTRAVENOUS at 11:26

## 2025-02-24 RX ADMIN — IBUPROFEN 400 MG: 400 TABLET, FILM COATED ORAL at 08:30

## 2025-02-24 RX ADMIN — REGADENOSON 0.4 MG: 0.08 INJECTION, SOLUTION INTRAVENOUS at 11:26

## 2025-02-24 RX ADMIN — PANTOPRAZOLE SODIUM 40 MG: 40 TABLET, DELAYED RELEASE ORAL at 08:30

## 2025-02-24 RX ADMIN — LISINOPRIL 10 MG: 10 TABLET ORAL at 08:30

## 2025-02-24 RX ADMIN — Medication 10 ML: at 08:30

## 2025-02-24 RX ADMIN — CLOPIDOGREL BISULFATE 75 MG: 75 TABLET ORAL at 08:30

## 2025-02-24 NOTE — PROGRESS NOTES
"[facility]  Cardiology Progress Note    Patient Identification:  Name: Gely Kimble  Age: 64 y.o.  Sex: female  :  1960  MRN: [id number]                 Gely Kimble is a 64 year old male, is current with our service.  She has a history of CAD, hypertension, hyperlipidemia, COPD, and diabetes mellitus type 2. She presented to [facility] with chest tightness that radiated to her left arm. She states she took nitro but this did not relieve her pain. She does say this pain is similar to previous chest tightness that she had prior to her previous pci last year.  Currently without pain.  Troponin negative x 2, EKG shows sinus rhythm with nonspecific T waves.  At this time she will undergo a stress test, echo, trend EKG and troponin.  Further recommendations once these have been completed and read.  Consult note to follow.      Add imdur, follow up in the office.       -Monitor blood pressure 1 hour and a half after taking blood pressure medications and  write the reading down along with heart rate.  Please bring these readings with you on your follow up. Call if sys <100, dizzy, or HR<60          )2025  DARIAN Murphy/Transcription:   \"Dictated utilizing Dragon dictation\".     "

## 2025-02-24 NOTE — OUTREACH NOTE
Prep Survey      Flowsheet Row Responses   [facility] patient discharged from? [place]   Is LACE score < 7 ? Yes   Eligibility [facility]   Date of Admission 02/23/25   Date of Discharge 02/24/25   Discharge Disposition Home or Self Care   Discharge diagnosis Chest pain, stress test negative   Does the patient have one of the following disease processes/diagnoses(primary or secondary)? Other   Does the patient have Home health ordered? No   Is there a DME ordered? No   Prep survey completed? Yes            Ambar GLASS - Registered Nurse

## 2025-02-24 NOTE — PLAN OF CARE
Goal Outcome Evaluation:  Plan of Care Reviewed With: patient        Progress: no change  Outcome Evaluation: Assumed care of patient. Patient is A&Ox4, VSS, up ad destin, on RA, tele shows SR, denies any chest discomfort during shift, maintained on NPO with sips after MN. Patient reported generalised headache-PO Tylenol given, reported relief of symptoms. Await Cardiology review in the morning. Pending AM labs. Plan of care ongoing.

## 2025-02-24 NOTE — DISCHARGE SUMMARY
ED OBSERVATION PROGRESS/DISCHARGE SUMMARY    Date of Admission: 2/23/2025   LOS: 0 days   PCP: Sofi Fernandes, DARIAN    Final Diagnosis Chest pain      Subjective     Hospital Outcome:     Gely Kimble is a 64 y.o. female with a past medical history significant for CAD with previous PCI, COPD, hypertension, hyperlipidemia and diabetes who presented to the emergency department with complaints of left-sided chest pain that started around 3:00 this morning while she was at work.  The pain radiates into her left arm.  She reports associated shortness of breath.  Denies nausea, vomiting.  No abdominal pain or diarrhea.  No recent illness, fever, chills, cough.     High-sensitivity troponin was negative x 2.  EKG shows sinus rhythm without evidence of acute ischemia.  Chest x-ray was negative for acute findings.  CBC and CMP largely unremarkable.  Glucose slightly elevated at 189.  Total cholesterol is 114 with a LDL of 39.     She will be admitted to the ED observation unit and we will consult cardiology.     2/24/25:  Chest pain had resolved, no new complaints overnight.  Patient seen and evaluated by Dr. Hayes with the cardiology team who recommends nitro paste and stress testing today.     Patient's echocardiogram showed ejection fraction 56 to 60%.  She underwent stress testing which was consistent with a low risk study and showed normal myocardial perfusion without any evidence of ischemia.  Cardiology team discussed with patient that they would like to start her on Imdur 30 mg daily, discharge tomorrow she had Nitropaste this morning.  Patient is agreeable to plan.    ROS:  General: no fevers, chills  Respiratory: no cough, dyspnea  Cardiovascular: + chest pain, no palpitations  Abdomen: No abdominal pain, nausea, vomiting, or diarrhea  Neurologic: No focal weakness    Objective   Physical Exam:  I have reviewed the vital signs.  Temp:  [97.7 °F (36.5 °C)-98.4 °F (36.9 °C)] 97.8 °F (36.6 °C)  Heart Rate:   [59-66] 64  Resp:  [16-17] 17  BP: (106-169)/(59-83) 129/66  General Appearance:    Alert, cooperative, no distress, appears older than stated age  Head:    Normocephalic, atraumatic  Eyes:    Sclerae anicteric  Neck:   Supple, no mass  Lungs: Clear to auscultation bilaterally, respirations unlabored  Heart: Regular rate and rhythm, S1 and S2 normal, no murmur, rub or gallop  Abdomen:  Soft, nontender, bowel sounds active, nondistended  Extremities: No clubbing, cyanosis, or edema to lower extremities  Pulses:  2+ and symmetric in distal lower extremities  Skin: No rashes   Neurologic: Oriented x3, Normal strength to extremities    Results Review:    I have reviewed the labs, radiology results and diagnostic studies.    Results from last 7 days   Lab Units 02/24/25  0406   WBC 10*3/mm3 5.88   HEMOGLOBIN g/dL 12.5   HEMATOCRIT % 40.4   PLATELETS 10*3/mm3 221     Results from last 7 days   Lab Units 02/24/25  0406 02/23/25  0630   SODIUM mmol/L 139 139   POTASSIUM mmol/L 4.4 4.3   CHLORIDE mmol/L 100 100   CO2 mmol/L 31.0* 26.6   BUN mg/dL 13 14   CREATININE mg/dL 0.65 0.55*   CALCIUM mg/dL 9.5 9.5   BILIRUBIN mg/dL  --  <0.2   ALK PHOS U/L  --  68   ALT (SGPT) U/L  --  10   AST (SGOT) U/L  --  13   GLUCOSE mg/dL 190* 189*     Imaging Results (Last 24 Hours)       Procedure Component Value Units Date/Time    XR Chest 1 View [[id number]] Collected: 02/23/25 0703     Updated: 02/23/25 0707    Narrative:      XR CHEST 1 VW-     HISTORY: Female who is 64 years-old, chest pain     TECHNIQUE: Frontal view of the chest     COMPARISON: 12/31/2024     FINDINGS: The heart size is normal. Aorta is calcified. Pulmonary  vasculature is unremarkable. No focal pulmonary consolidation, pleural  effusion, or pneumothorax. Old granulomatous disease is apparent. No  acute osseous process.       Impression:      No evidence for acute pulmonary process. Follow-up as  clinical indications persist.     This report was finalized on 2/23/2025  "7:03 AM by Dr. Donta Thompson M.D on Workstation: [email or web address]               I have reviewed the medications.     Discharge Medications        Continue These Medications        Instructions Start Date   Insulin Syringe 31G X 5/16\" 1 ML misc   USE FOR LEVEMIR ONCE DAILY INJECTIONS             ASK your doctor about these medications        Instructions Start Date   acetaminophen 500 MG tablet  Commonly known as: TYLENOL   500 mg, Every 8 Hours PRN      aspirin 81 MG chewable tablet   81 mg, Nightly      atorvastatin 40 MG tablet  Commonly known as: LIPITOR   40 mg, Oral, Nightly      clopidogrel 75 MG tablet  Commonly known as: PLAVIX   75 mg, Oral, Daily      escitalopram 20 MG tablet  Commonly known as: LEXAPRO   20 mg, Oral, Daily      Ferrous Sulfate  (45 Fe) MG tablet controlled-release   1 tablet, Oral, Daily      Levemir 100 UNIT/ML injection  Generic drug: insulin detemir   24 Units, Subcutaneous, Nightly      lisinopril 10 MG tablet  Commonly known as: PRINIVIL,ZESTRIL   10 mg, Daily      LORATADINE PO   1 tablet, Daily      magnesium oxide 400 MG tablet  Commonly known as: MAG-OX   400 mg, Oral, Nightly      metFORMIN  MG 24 hr tablet  Commonly known as: GLUCOPHAGE-XR   500 mg, Oral, Daily With Dinner      metoprolol succinate XL 25 MG 24 hr tablet  Commonly known as: TOPROL-XL   25 mg, Oral, Daily      multivitamin tablet  Generic drug: multivitamin   1 tablet, Daily      nitroglycerin 0.4 MG SL tablet  Commonly known as: Nitrostat   0.4 mg, Sublingual, Every 5 Minutes PRN, Take no more than 3 doses in 15 minutes.      pantoprazole 20 MG EC tablet  Commonly known as: PROTONIX   20 mg, Oral, Daily      rOPINIRole 0.25 MG tablet  Commonly known as: REQUIP   0.25 mg, Oral, Nightly, 1 hour before bedtime      vitamin D 1.25 MG (16902 UT) capsule capsule  Commonly known as: ERGOCALCIFEROL   50,000 Units, Weekly            "   ---------------------------------------------------------------------------------------------  Assessment & Plan   Assessment/Problem List    Chest pain      Plan:    Chest pain  Hx CAD, PCI May 77998  -Continuous telemetry monitoring overnight uneventful  -High-sensitivity troponin negative x 3  -LDL 39, triglycerides 65, chest x-ray NAD  -EKG sinus rhythm without acute ischemia  -Continue aspirin, Plavix, statin  -Stress testing negative, echocardiogram EF 56 to 60%  -Consult cardiology, cleared for discharge home     Hypertension  Hyperlipidemia  -Continue home regimen     Diabetes  -Resume home medication at discharge    COPD, without exacerbation  -Albuterol as needed     VTE Prophylaxis:  No VTE prophylaxis order currently exists.    Disposition: Home    Follow-up after Discharge: PCP and cardiology    This note will serve as a discharge summary    PARISA Greene 02/24/25 06:25 EST    I have worn appropriate PPE during this patient encounter, sanitized my hands both with entering and exiting patient's room.      40 minutes has been spent by [facility] providers in the care of this patient while under observation status

## 2025-02-24 NOTE — DISCHARGE INSTRUCTIONS
Monitor blood pressure 1 hour and a half after taking blood pressure medications and write the reading down along with heart rate. Please bring these readings with you on your follow up. Call if sys <100, dizzy, or HR<60

## 2025-02-24 NOTE — PLAN OF CARE
Goal Outcome Evaluation:         Patient is ready for discharge to home

## 2025-02-25 ENCOUNTER — TRANSITIONAL CARE MANAGEMENT TELEPHONE ENCOUNTER (OUTPATIENT)
Dept: CALL CENTER | Facility: HOSPITAL | Age: 65
End: 2025-02-25
Payer: MEDICARE

## 2025-02-25 NOTE — CASE MANAGEMENT/SOCIAL WORK
Case Management Discharge Note      Final Note: home         Selected Continued Care - Discharged on 2/24/2025 Admission date: 2/23/2025 - Discharge disposition: Home or Self Care      Destination    No services have been selected for the patient.                Durable Medical Equipment    No services have been selected for the patient.                Dialysis/Infusion    No services have been selected for the patient.                Home Medical Care    No services have been selected for the patient.                Therapy    No services have been selected for the patient.                Community Resources    No services have been selected for the patient.                Community & DME    No services have been selected for the patient.                    Transportation Services  Private: Car    Final Discharge Disposition Code: 01 - home or self-care

## 2025-02-25 NOTE — OUTREACH NOTE
Call Center TCM Note      Flowsheet Row Responses   [facility] patient discharged from? [place]   Does the patient have one of the following disease processes/diagnoses(primary or secondary)? Other   TCM attempt successful? Yes   Call start time 0955   Call end time 0958   Discharge diagnosis Chest pain, stress test negative   Meds reviewed with patient/caregiver? Yes   Is the patient having any side effects they believe may be caused by any medication additions or changes? No   Does the patient have all medications ordered at discharge? Yes   Prescription comments New rx Imdur to be ready today at pt [facility]   Is the patient taking all medications as directed (includes completed medication regime)? Yes   Comments Pt declines TCM APPT with PCP DARIAN Fernandes but will keep scheduled ov appt on 03/12/2025.   Does the patient have an appointment with their PCP within 7-14 days of discharge? No   Nursing Interventions Routed TCM call to PCP office   Has home health visited the patient within 72 hours of discharge? N/A   Psychosocial issues? No   Did the patient receive a copy of their discharge instructions? Yes   Nursing interventions Reviewed instructions with patient   What is the patient's perception of their health status since discharge? Improving   Is the patient/caregiver able to teach back signs and symptoms related to disease process for when to call PCP? Yes   Is the patient/caregiver able to teach back signs and symptoms related to disease process for when to call 911? Yes   Is the patient/caregiver able to teach back the hierarchy of who to call/visit for symptoms/problems? PCP, Specialist, Home health nurse, Urgent Care, ED, 911 Yes   If the patient is a current smoker, are they able to teach back resources for cessation? Not a smoker   TCM call completed? Yes   Wrap up additional comments D/C DX: Chest pain, stress test negative - Pt feeling well, chest pain resolved. No questions. Pt  declines TCM APPT with PCP DARIAN Fernandes but will keep scheduled ov appt on 03/12/2025.   Call end time 0919            Ember Rm MA    2/25/2025, 10:00 EST

## 2025-02-28 ENCOUNTER — TELEPHONE (OUTPATIENT)
Dept: CARDIOLOGY | Facility: CLINIC | Age: 65
End: 2025-02-28
Payer: MEDICARE

## 2025-02-28 NOTE — TELEPHONE ENCOUNTER
Caller: Gely Kimble    Relationship: Self    Best call back number: [phone]    Which medication are you concerned about: isosorbide mononitrate (IMDUR) 30 MG 24 hr tablet     Who prescribed you this medication:     When did you start taking this medication: TUESDAY MORNING     What are your concerns: PT IS GETTING HEADACHES IMMEDIATELY AFTER TAKING THIS MEDICATION. HAS LEFT HER WITH A CONSTANT DULL HEADACHE. PT HAS STOPPED TAKING MEDICATION. PLEASE CALL TO ADVISE.     How long have you had these concerns:

## 2025-03-03 ENCOUNTER — TELEPHONE (OUTPATIENT)
Dept: ORTHOPEDIC SURGERY | Facility: CLINIC | Age: 65
End: 2025-03-03
Payer: MEDICARE

## 2025-03-03 LAB — CREAT BLDA-MCNC: 0.7 MG/DL (ref 0.6–1.3)

## 2025-03-03 NOTE — TELEPHONE ENCOUNTER
I spoke to Ms. Kimble.  I explained I spoke to Dr. Terry about her MRI findings of the humerus.  Without seeing her,  he cannot say exactly what is causing her pain, however, some things to consider would be referred pain from the shoulder arthritis or possibly even a pinched nerve.  Dr. Terry is happy to see her back in clinic for evaluation.  She agreed.  I will have staff member call her to schedule an appointment.

## 2025-03-04 NOTE — TELEPHONE ENCOUNTER
Can get pt in on 4/7 with Dr. Terry at our [place] location at 10:50am.  Please check to see if this works for patient. Thanks

## 2025-03-10 RX ORDER — CLOPIDOGREL BISULFATE 75 MG/1
75 TABLET ORAL DAILY
Qty: 90 TABLET | Refills: 0 | Status: SHIPPED | OUTPATIENT
Start: 2025-03-10

## 2025-03-12 ENCOUNTER — OFFICE VISIT (OUTPATIENT)
Dept: CARDIOLOGY | Facility: CLINIC | Age: 65
End: 2025-03-12
Payer: MEDICARE

## 2025-03-12 ENCOUNTER — OFFICE VISIT (OUTPATIENT)
Dept: FAMILY MEDICINE CLINIC | Facility: CLINIC | Age: 65
End: 2025-03-12
Payer: MEDICARE

## 2025-03-12 VITALS
OXYGEN SATURATION: 96 % | HEART RATE: 70 BPM | HEIGHT: 67 IN | TEMPERATURE: 97.3 F | WEIGHT: 151 LBS | BODY MASS INDEX: 23.7 KG/M2 | DIASTOLIC BLOOD PRESSURE: 82 MMHG | SYSTOLIC BLOOD PRESSURE: 164 MMHG

## 2025-03-12 VITALS
SYSTOLIC BLOOD PRESSURE: 169 MMHG | DIASTOLIC BLOOD PRESSURE: 83 MMHG | HEART RATE: 74 BPM | WEIGHT: 151 LBS | HEIGHT: 67 IN | BODY MASS INDEX: 23.7 KG/M2

## 2025-03-12 DIAGNOSIS — F41.1 GENERALIZED ANXIETY DISORDER: ICD-10-CM

## 2025-03-12 DIAGNOSIS — I1A.0 RESISTANT HYPERTENSION: ICD-10-CM

## 2025-03-12 DIAGNOSIS — F33.1 MODERATE EPISODE OF RECURRENT MAJOR DEPRESSIVE DISORDER: ICD-10-CM

## 2025-03-12 DIAGNOSIS — I10 PRIMARY HYPERTENSION: ICD-10-CM

## 2025-03-12 DIAGNOSIS — Z79.4 TYPE 2 DIABETES MELLITUS WITH DIABETIC POLYNEUROPATHY, WITH LONG-TERM CURRENT USE OF INSULIN: Primary | ICD-10-CM

## 2025-03-12 DIAGNOSIS — R60.0 LOCALIZED EDEMA: ICD-10-CM

## 2025-03-12 DIAGNOSIS — G43.909 MIGRAINE WITHOUT STATUS MIGRAINOSUS, NOT INTRACTABLE, UNSPECIFIED MIGRAINE TYPE: ICD-10-CM

## 2025-03-12 DIAGNOSIS — I25.118 CORONARY ARTERY DISEASE OF NATIVE ARTERY OF NATIVE HEART WITH STABLE ANGINA PECTORIS: ICD-10-CM

## 2025-03-12 DIAGNOSIS — E11.42 TYPE 2 DIABETES MELLITUS WITH DIABETIC POLYNEUROPATHY, WITH LONG-TERM CURRENT USE OF INSULIN: Primary | ICD-10-CM

## 2025-03-12 DIAGNOSIS — J43.9 PULMONARY EMPHYSEMA, UNSPECIFIED EMPHYSEMA TYPE: ICD-10-CM

## 2025-03-12 DIAGNOSIS — G25.81 RESTLESS LEG SYNDROME: ICD-10-CM

## 2025-03-12 DIAGNOSIS — R06.83 SNORING: ICD-10-CM

## 2025-03-12 DIAGNOSIS — D64.9 ANEMIA, UNSPECIFIED TYPE: ICD-10-CM

## 2025-03-12 DIAGNOSIS — K21.9 GASTROESOPHAGEAL REFLUX DISEASE, UNSPECIFIED WHETHER ESOPHAGITIS PRESENT: ICD-10-CM

## 2025-03-12 DIAGNOSIS — R22.31 LOCALIZED SWELLING OF RIGHT UPPER EXTREMITY: ICD-10-CM

## 2025-03-12 DIAGNOSIS — E78.49 OTHER HYPERLIPIDEMIA: ICD-10-CM

## 2025-03-12 DIAGNOSIS — R07.82 INTERCOSTAL PAIN: ICD-10-CM

## 2025-03-12 DIAGNOSIS — R07.2 PRECORDIAL PAIN: Primary | ICD-10-CM

## 2025-03-12 RX ORDER — LISINOPRIL 10 MG/1
10 TABLET ORAL DAILY
Qty: 90 TABLET | Refills: 1 | Status: SHIPPED | OUTPATIENT
Start: 2025-03-12

## 2025-03-12 RX ORDER — NITROGLYCERIN 0.4 MG/1
0.4 TABLET SUBLINGUAL
Qty: 30 TABLET | Refills: 3 | Status: SHIPPED | OUTPATIENT
Start: 2025-03-12

## 2025-03-12 NOTE — H&P (VIEW-ONLY)
Subjective:        Gely Kimble is a 64 y.o. female who here for follow up    No chief complaint on file.      HPI    Gely Kimble is a 64-year-old female who has history of CAD, hypertension, COPD, hyperlipidemia, and diabetes mellitus who is here today for hospital follow-up for chest pain.  Presented to [facility] with chest tightness that radiated to her left arm.  Nitro did not relieve her chest pain.  At the time her EKG showed sinus rhythm and troponins were negative x 3.    2/24/2025 echo: EF 56 to 60% with LV function consistent with grade 1.    2/24/2025 stress test: Myocardial perfusion imaging indicated a normal Myocardial perfusion study with no evidence of ischemia.    5/17/24 Cath with angioplasty of ostial medium sized second diagonal branch 99% reduced to 20 to 30% with 2.25/12 NC balloon.  See full report as below.     The following portions of the patient's history were reviewed and updated as appropriate: allergies, current medications, past family history, past medical history, past social history, past surgical history and problem list.    Past Medical History:   Diagnosis Date    Abnormal nuclear stress test 10/12/2023    Arthritis     COPD (chronic obstructive pulmonary disease)     Elevated cholesterol     Emphysema of lung     Gallbladder abscess     Herpes zoster 08/03/2023    History of positive hepatitis C     previously positive, never treated, negative RNA finding    Hypertension     Myocardial perfusion defect 02/16/2016    Type 2 diabetes mellitus          reports that she quit smoking about 7 years ago. Her smoking use included cigarettes. She started smoking about 53 years ago. She has a 68.7 pack-year smoking history. She has been exposed to tobacco smoke. She has never used smokeless tobacco. She reports that she does not currently use alcohol. She reports that she does not use drugs.     Family History   Problem Relation Age of Onset    Diabetes Mother      Heart disease Mother     Diabetes Father     Diabetes Brother     Heart disease Maternal Grandmother     Cancer Paternal Grandfather        ROS     Review of Systems  Constitutional: No wt loss, fever, fatigue  Gastrointestinal: No nausea, abdominal pain  Behavioral/Psych: No insomnia or anxiety  Cardiovascular:  +cp and some shob comes and goes      Objective:           Vitals and nursing note reviewed.   Constitutional:       Appearance: Well-developed.   HENT:      Head: Normocephalic.      Right Ear: External ear normal.      Left Ear: External ear normal.   Neck:      Vascular: No JVD.   Pulmonary:      Effort: Pulmonary effort is normal. No respiratory distress.      Breath sounds: Normal breath sounds. No stridor. No rales.   Cardiovascular:      Normal rate. Regular rhythm.      No gallop.    Pulses:     Intact distal pulses.   Edema:     Peripheral edema absent.   Abdominal:      General: Bowel sounds are normal. There is no distension.      Palpations: Abdomen is soft.      Tenderness: There is no abdominal tenderness. There is no guarding.   Musculoskeletal: Normal range of motion.         General: No tenderness.      Cervical back: Normal range of motion. Skin:     General: Skin is warm.   Neurological:      Mental Status: Alert and oriented to person, place, and time.      Deep Tendon Reflexes: Reflexes are normal and symmetric.   Psychiatric:         Judgment: Judgment normal.           ECG 12 Lead    Date/Time: 3/12/2025 1:08 PM  Performed by: Misty Covington APRN    Authorized by: Misty Covington APRN  Comparison: compared with previous ECG from 2/24/2025  Rhythm: sinus rhythm    Clinical impression: non-specific ECG              2/24/2025    Interpretation Summary         Left ventricular ejection fraction appears to be 56 - 60%.    Left ventricular diastolic function is consistent with (grade I) impaired relaxation.    Estimated right ventricular systolic pressure from tricuspid  regurgitation is normal (<35 mmHg).     2/24/2025  Interpretation Summary         Findings consistent with a normal ECG stress test.    Myocardial perfusion imaging indicates a normal myocardial perfusion study with no evidence of ischemia. Impressions are consistent with a low risk study.    Left ventricular ejection fraction is normal (Calculated EF = 70%).    5/17/24  HEMODYNAMIC / ANGIOGRAPHIC DATA:   Left ventricular end diastolic pressure was 10 mmHg.  Left ventriculography revealed an EF around 60%.   The left main is normal left main.  The left anterior descending artery is Left anterior descending shows previous stent widely patent with a jailed second diagonal branch with a medium sized vessel with 99% reduced to 20 to 30% with 2.25/12 NC balloon, minimum irregularity of the rest of the LAD diagonal and  branches  The left circumflex is nondominant and normal.  The right coronary artery is , Right coronary artery dominant with 20 to 30% proximal and the distal stenosis  Successful angioplasty of the ostial medium size second diagonal branch 99% reduced to 20 to 30% with 2.25/12 NC balloon    GUZMAN FLOW PRE.2...... POST...3...    TYPE OF LESION...B.........    RECOMMENDATIONS: Post-procedure care will focus on prevention of any ischemic events and congestive complications. Aggressive risk factor modification will be carried out.         Current Outpatient Medications:     acetaminophen (TYLENOL) 500 MG tablet, Take 1 tablet by mouth Every 8 (Eight) Hours As Needed for Mild Pain or Moderate Pain., Disp: , Rfl:     aspirin 81 MG chewable tablet, Chew 1 tablet Every Night., Disp: , Rfl:     atorvastatin (LIPITOR) 40 MG tablet, Take 1 tablet by mouth Every Night., Disp: 90 tablet, Rfl: 1    clopidogrel (PLAVIX) 75 MG tablet, TAKE ONE TABLET BY MOUTH EVERY DAY, Disp: 90 tablet, Rfl: 0    escitalopram (LEXAPRO) 20 MG tablet, TAKE 1 TABLET BY MOUTH DAILY, Disp: 90 tablet, Rfl: 1    Ferrous Sulfate   "(45 Fe) MG tablet controlled-release, Take 1 tablet by mouth Daily., Disp: 30 tablet, Rfl: 2    insulin detemir (Levemir) 100 UNIT/ML injection, Inject 24 Units under the skin into the appropriate area as directed Every Night. (Patient taking differently: Inject 25 Units under the skin into the appropriate area as directed Every Night.), Disp: , Rfl:     Insulin Syringe 31G X 5/16\" 1 ML misc, USE FOR LEVEMIR ONCE DAILY INJECTIONS, Disp: 100 each, Rfl: 2    isosorbide mononitrate (IMDUR) 30 MG 24 hr tablet, Take 1 tablet by mouth Daily. (Patient not taking: Reported on 3/12/2025), Disp: 30 tablet, Rfl: 0    lisinopril (PRINIVIL,ZESTRIL) 10 MG tablet, Take 1 tablet by mouth Daily., Disp: 90 tablet, Rfl: 1    LORATADINE PO, Take 1 tablet by mouth Daily., Disp: , Rfl:     magnesium oxide (MAG-OX) 400 MG tablet, Take 1 tablet by mouth Every Night., Disp: 90 tablet, Rfl: 1    metFORMIN ER (GLUCOPHAGE-XR) 500 MG 24 hr tablet, Take 1 tablet by mouth Daily With Dinner. (Patient taking differently: Take 1 tablet by mouth Daily With Breakfast.), Disp: , Rfl:     metoprolol succinate XL (TOPROL-XL) 25 MG 24 hr tablet, Take 1 tablet by mouth Daily., Disp: 90 tablet, Rfl: 2    Multiple Vitamin (MULTIVITAMIN) tablet, Take 1 tablet by mouth Daily. (Patient not taking: Reported on 3/12/2025), Disp: , Rfl:     nitroglycerin (Nitrostat) 0.4 MG SL tablet, Place 1 tablet under the tongue Every 5 (Five) Minutes As Needed for Chest Pain. Take no more than 3 doses in 15 minutes., Disp: 100 tablet, Rfl: 12    pantoprazole (PROTONIX) 20 MG EC tablet, TAKE 1 TABLET BY MOUTH DAILY, Disp: 30 tablet, Rfl: 2    rOPINIRole (REQUIP) 0.25 MG tablet, TAKE 1 TABLET BY MOUTH 1 HOUR BEFORE BEDTIME, Disp: 30 tablet, Rfl: 2    vitamin D (ERGOCALCIFEROL) 1.25 MG (69602 UT) capsule capsule, Take 1 capsule by mouth 1 (One) Time Per Week., Disp: , Rfl:      Assessment:        Patient Active Problem List   Diagnosis    Type 2 diabetes mellitus    Pulmonary " emphysema    Arthritis    Other hyperlipidemia    Primary hypertension    Abnormal liver function tests    Impingement syndrome of shoulder region    Neck pain    Postmenopause    Restless leg syndrome    Urinary frequency    Vitamin D deficiency    Fatigue    Moderate episode of recurrent major depressive disorder    Generalized anxiety disorder    Insomnia    Skin lesion of back    Nausea    Incomplete right bundle branch block (RBBB)    Coronary artery disease of native artery of native heart with stable angina pectoris    Tendency to bleed    Gastroesophageal reflux disease    Pain of left hand    Left leg swelling    Anemia    Former smoker    Long term (current) use of antithrombotics/antiplatelets    Low magnesium level    Localized swelling of right upper extremity    Acute non-recurrent sinusitis    Upper respiratory tract infection    Diarrhea    Chest pain               Plan:   1.  Hospital follow-up for chest pain with known CAD.  It was noted her stress test was negative and it was deemed her chest pain was atypical.  If she continues to have chest pain then we could consider a cardiac cath.  She was started on Imdur prior to discharge. She was started on imdur but was unable to take due to it causing headaches. She states she has been having chest pain that comes and goes. Some shob . None currently.      Procedure, risks and options of cardiac cath explained to pt INCLUDING BUT NOT LIMITED TO MI, STROKE, DEATH, INFECTION HAEMORRHAGE, . Pt understands well and agrees with no further questions.    Return to ER per EMS for any recurrent symptoms including, but not limited to: chest pain/pressure/tightness, weakness, Shortness of breath, dizziness, palpitations, near syncope or syncope      Risk reduction for the coronary artery disease, controlling the blood pressure, blood sugar management, cholesterol management, exercise, stress management, and proper compliance with medications and follow-up has been  discussed    2.  Hypertension: BP elevated but she believes it is because she is having right shoulder pain.    Educated patient on exercising for at least 30 minutes a day for 2 to 3 days a week. Importance of controlling hypertension and blood pressure checkup on the regular basis has been explained. Hypertension as a silent killer has been discussed. Risk reduction of the weight and regular exercises to control the hypertension has been explained.    3.  Hyperlipidemia: Her PCP manages her labs and cholesterol.    Risk of the hyperlipidemia, importance of the treatment has been explained. Pros and cons of the statins has been explained. Regular blood workup as well as side effects including the liver failure, myelopathy death has been explained.             No diagnosis found.    There are no diagnoses linked to this encounter.    COUNSELING: kevan Murguiaeling was given to patient for the following topics: diagnostic results, risk factor reductions, impressions, risks and benefits of treatment options and importance of treatment compliance .       SMOKING COUNSELING: quit Oct 11 2017.    She will have a cardiac cath with known CAD, and chest pain    Sincerely,   DARIAN Murphy  [facility]  03/12/25  12:44 EDT    EMR Dragon/Transcription disclaimer: Dictated utilizing Dragon Dictation

## 2025-03-12 NOTE — PROGRESS NOTES
Subjective:        Gely Kimble is a 64 y.o. female who here for follow up    No chief complaint on file.      HPI    Gely Kimble is a 64-year-old female who has history of CAD, hypertension, COPD, hyperlipidemia, and diabetes mellitus who is here today for hospital follow-up for chest pain.  Presented to [facility] with chest tightness that radiated to her left arm.  Nitro did not relieve her chest pain.  At the time her EKG showed sinus rhythm and troponins were negative x 3.    2/24/2025 echo: EF 56 to 60% with LV function consistent with grade 1.    2/24/2025 stress test: Myocardial perfusion imaging indicated a normal Myocardial perfusion study with no evidence of ischemia.    5/17/24 Cath with angioplasty of ostial medium sized second diagonal branch 99% reduced to 20 to 30% with 2.25/12 NC balloon.  See full report as below.     The following portions of the patient's history were reviewed and updated as appropriate: allergies, current medications, past family history, past medical history, past social history, past surgical history and problem list.    Past Medical History:   Diagnosis Date    Abnormal nuclear stress test 10/12/2023    Arthritis     COPD (chronic obstructive pulmonary disease)     Elevated cholesterol     Emphysema of lung     Gallbladder abscess     Herpes zoster 08/03/2023    History of positive hepatitis C     previously positive, never treated, negative RNA finding    Hypertension     Myocardial perfusion defect 02/16/2016    Type 2 diabetes mellitus          reports that she quit smoking about 7 years ago. Her smoking use included cigarettes. She started smoking about 53 years ago. She has a 68.7 pack-year smoking history. She has been exposed to tobacco smoke. She has never used smokeless tobacco. She reports that she does not currently use alcohol. She reports that she does not use drugs.     Family History   Problem Relation Age of Onset    Diabetes Mother      Heart disease Mother     Diabetes Father     Diabetes Brother     Heart disease Maternal Grandmother     Cancer Paternal Grandfather        ROS     Review of Systems  Constitutional: No wt loss, fever, fatigue  Gastrointestinal: No nausea, abdominal pain  Behavioral/Psych: No insomnia or anxiety  Cardiovascular:  +cp and some shob comes and goes      Objective:           Vitals and nursing note reviewed.   Constitutional:       Appearance: Well-developed.   HENT:      Head: Normocephalic.      Right Ear: External ear normal.      Left Ear: External ear normal.   Neck:      Vascular: No JVD.   Pulmonary:      Effort: Pulmonary effort is normal. No respiratory distress.      Breath sounds: Normal breath sounds. No stridor. No rales.   Cardiovascular:      Normal rate. Regular rhythm.      No gallop.    Pulses:     Intact distal pulses.   Edema:     Peripheral edema absent.   Abdominal:      General: Bowel sounds are normal. There is no distension.      Palpations: Abdomen is soft.      Tenderness: There is no abdominal tenderness. There is no guarding.   Musculoskeletal: Normal range of motion.         General: No tenderness.      Cervical back: Normal range of motion. Skin:     General: Skin is warm.   Neurological:      Mental Status: Alert and oriented to person, place, and time.      Deep Tendon Reflexes: Reflexes are normal and symmetric.   Psychiatric:         Judgment: Judgment normal.           ECG 12 Lead    Date/Time: 3/12/2025 1:08 PM  Performed by: Misty Covington APRN    Authorized by: Misty Covington APRN  Comparison: compared with previous ECG from 2/24/2025  Rhythm: sinus rhythm    Clinical impression: non-specific ECG              2/24/2025    Interpretation Summary         Left ventricular ejection fraction appears to be 56 - 60%.    Left ventricular diastolic function is consistent with (grade I) impaired relaxation.    Estimated right ventricular systolic pressure from tricuspid  regurgitation is normal (<35 mmHg).     2/24/2025  Interpretation Summary         Findings consistent with a normal ECG stress test.    Myocardial perfusion imaging indicates a normal myocardial perfusion study with no evidence of ischemia. Impressions are consistent with a low risk study.    Left ventricular ejection fraction is normal (Calculated EF = 70%).    5/17/24  HEMODYNAMIC / ANGIOGRAPHIC DATA:   Left ventricular end diastolic pressure was 10 mmHg.  Left ventriculography revealed an EF around 60%.   The left main is normal left main.  The left anterior descending artery is Left anterior descending shows previous stent widely patent with a jailed second diagonal branch with a medium sized vessel with 99% reduced to 20 to 30% with 2.25/12 NC balloon, minimum irregularity of the rest of the LAD diagonal and  branches  The left circumflex is nondominant and normal.  The right coronary artery is , Right coronary artery dominant with 20 to 30% proximal and the distal stenosis  Successful angioplasty of the ostial medium size second diagonal branch 99% reduced to 20 to 30% with 2.25/12 NC balloon    GUZMAN FLOW PRE.2...... POST...3...    TYPE OF LESION...B.........    RECOMMENDATIONS: Post-procedure care will focus on prevention of any ischemic events and congestive complications. Aggressive risk factor modification will be carried out.         Current Outpatient Medications:     acetaminophen (TYLENOL) 500 MG tablet, Take 1 tablet by mouth Every 8 (Eight) Hours As Needed for Mild Pain or Moderate Pain., Disp: , Rfl:     aspirin 81 MG chewable tablet, Chew 1 tablet Every Night., Disp: , Rfl:     atorvastatin (LIPITOR) 40 MG tablet, Take 1 tablet by mouth Every Night., Disp: 90 tablet, Rfl: 1    clopidogrel (PLAVIX) 75 MG tablet, TAKE ONE TABLET BY MOUTH EVERY DAY, Disp: 90 tablet, Rfl: 0    escitalopram (LEXAPRO) 20 MG tablet, TAKE 1 TABLET BY MOUTH DAILY, Disp: 90 tablet, Rfl: 1    Ferrous Sulfate   "(45 Fe) MG tablet controlled-release, Take 1 tablet by mouth Daily., Disp: 30 tablet, Rfl: 2    insulin detemir (Levemir) 100 UNIT/ML injection, Inject 24 Units under the skin into the appropriate area as directed Every Night. (Patient taking differently: Inject 25 Units under the skin into the appropriate area as directed Every Night.), Disp: , Rfl:     Insulin Syringe 31G X 5/16\" 1 ML misc, USE FOR LEVEMIR ONCE DAILY INJECTIONS, Disp: 100 each, Rfl: 2    isosorbide mononitrate (IMDUR) 30 MG 24 hr tablet, Take 1 tablet by mouth Daily. (Patient not taking: Reported on 3/12/2025), Disp: 30 tablet, Rfl: 0    lisinopril (PRINIVIL,ZESTRIL) 10 MG tablet, Take 1 tablet by mouth Daily., Disp: 90 tablet, Rfl: 1    LORATADINE PO, Take 1 tablet by mouth Daily., Disp: , Rfl:     magnesium oxide (MAG-OX) 400 MG tablet, Take 1 tablet by mouth Every Night., Disp: 90 tablet, Rfl: 1    metFORMIN ER (GLUCOPHAGE-XR) 500 MG 24 hr tablet, Take 1 tablet by mouth Daily With Dinner. (Patient taking differently: Take 1 tablet by mouth Daily With Breakfast.), Disp: , Rfl:     metoprolol succinate XL (TOPROL-XL) 25 MG 24 hr tablet, Take 1 tablet by mouth Daily., Disp: 90 tablet, Rfl: 2    Multiple Vitamin (MULTIVITAMIN) tablet, Take 1 tablet by mouth Daily. (Patient not taking: Reported on 3/12/2025), Disp: , Rfl:     nitroglycerin (Nitrostat) 0.4 MG SL tablet, Place 1 tablet under the tongue Every 5 (Five) Minutes As Needed for Chest Pain. Take no more than 3 doses in 15 minutes., Disp: 100 tablet, Rfl: 12    pantoprazole (PROTONIX) 20 MG EC tablet, TAKE 1 TABLET BY MOUTH DAILY, Disp: 30 tablet, Rfl: 2    rOPINIRole (REQUIP) 0.25 MG tablet, TAKE 1 TABLET BY MOUTH 1 HOUR BEFORE BEDTIME, Disp: 30 tablet, Rfl: 2    vitamin D (ERGOCALCIFEROL) 1.25 MG (10640 UT) capsule capsule, Take 1 capsule by mouth 1 (One) Time Per Week., Disp: , Rfl:      Assessment:        Patient Active Problem List   Diagnosis    Type 2 diabetes mellitus    Pulmonary " emphysema    Arthritis    Other hyperlipidemia    Primary hypertension    Abnormal liver function tests    Impingement syndrome of shoulder region    Neck pain    Postmenopause    Restless leg syndrome    Urinary frequency    Vitamin D deficiency    Fatigue    Moderate episode of recurrent major depressive disorder    Generalized anxiety disorder    Insomnia    Skin lesion of back    Nausea    Incomplete right bundle branch block (RBBB)    Coronary artery disease of native artery of native heart with stable angina pectoris    Tendency to bleed    Gastroesophageal reflux disease    Pain of left hand    Left leg swelling    Anemia    Former smoker    Long term (current) use of antithrombotics/antiplatelets    Low magnesium level    Localized swelling of right upper extremity    Acute non-recurrent sinusitis    Upper respiratory tract infection    Diarrhea    Chest pain               Plan:   1.  Hospital follow-up for chest pain with known CAD.  It was noted her stress test was negative and it was deemed her chest pain was atypical.  If she continues to have chest pain then we could consider a cardiac cath.  She was started on Imdur prior to discharge. She was started on imdur but was unable to take due to it causing headaches. She states she has been having chest pain that comes and goes. Some shob . None currently.      Procedure, risks and options of cardiac cath explained to pt INCLUDING BUT NOT LIMITED TO MI, STROKE, DEATH, INFECTION HAEMORRHAGE, . Pt understands well and agrees with no further questions.    Return to ER per EMS for any recurrent symptoms including, but not limited to: chest pain/pressure/tightness, weakness, Shortness of breath, dizziness, palpitations, near syncope or syncope      Risk reduction for the coronary artery disease, controlling the blood pressure, blood sugar management, cholesterol management, exercise, stress management, and proper compliance with medications and follow-up has been  discussed    2.  Hypertension: BP elevated but she believes it is because she is having right shoulder pain.    Educated patient on exercising for at least 30 minutes a day for 2 to 3 days a week. Importance of controlling hypertension and blood pressure checkup on the regular basis has been explained. Hypertension as a silent killer has been discussed. Risk reduction of the weight and regular exercises to control the hypertension has been explained.    3.  Hyperlipidemia: Her PCP manages her labs and cholesterol.    Risk of the hyperlipidemia, importance of the treatment has been explained. Pros and cons of the statins has been explained. Regular blood workup as well as side effects including the liver failure, myelopathy death has been explained.             No diagnosis found.    There are no diagnoses linked to this encounter.    COUNSELING: kevan Murguiaeling was given to patient for the following topics: diagnostic results, risk factor reductions, impressions, risks and benefits of treatment options and importance of treatment compliance .       SMOKING COUNSELING: quit Oct 11 2017.    She will have a cardiac cath with known CAD, and chest pain    Sincerely,   DARIAN Murphy  [facility]  03/12/25  12:44 EDT    EMR Dragon/Transcription disclaimer: Dictated utilizing Dragon Dictation

## 2025-03-12 NOTE — PATIENT INSTRUCTIONS
Resume Lisinopril daily.  Try compression stockings.  Call with name of allergy/sinus medication have been taking.  Continue to monitor your blood sugar once daily before a meal and record results.  Continue to monitor your blood pressure periodically and record results.  Continue to work on healthy diet and exercise.  Follow up pending lab results.  Follow up in 3 months, or sooner if symptoms persist or worsen.  Follow up as scheduled with cardiology.

## 2025-03-12 NOTE — PROGRESS NOTES
Subjective   Gely Kimble is a 64 y.o. female.     Chief Complaint   Patient presents with    Follow-up    Headache     She is still having headaches everyday    Leg Swelling     She has been experiencing leg swelling.        History of Present Illness   Patient presents for follow up headaches; still having daily headache; went to ER on 12/31/24; was given migraine cocktail and headache resolved; also had CT head and WNL; diagnosed with migraine; no history of migraines in past; started Imdur 2 weeks ago and will have instant headache after takes Imdur; discussed with cardiology and recommended to try half tablet, but was still getting headaches so stopped taking Imdur 3 days ago; still having headaches but not as bad; will take Extra Strength Tylenol twice daily and helps; no change in vision with headaches; will have some nausea, but no vomiting with headaches; some light sensitivity with headaches; headaches started prior to starting Imdur; will wake up with headache, resolves with Tylenol, but then returns; does not feel rested when wakes up; last eye exam 8/2024, wears glasses; drinks plenty of water.    Also, c/o swelling in legs; no persistent sitting or standing at work; notes swelling in lower legs later in the day and resolved next morning; no added salt in diet.    F/U DM2: takes Levemir daily and Metformin daily; last A1c 7.5% and increased Levemir; monitors blood sugar, typically runs 100-110s; watches carbs/sugars in diet; regular exercise, walks every other day; has some numbness/tingling in hands/feet; last eye exam 8/2024 at [facility].     F/U HTN: takes Metoprolol daily; has not been taking Lisinopril for few months, thought was told to stop medication; monitors BP on occasion, /72 one check; BP was 169/96 this morning before taking medications; some orthostasis; balance seems to be getting worse; has had runny nose for last 3 weeks; no ear pain; has follow up with cardiology  "today.     F/U Hyperlipidemia: takes Atorvastatin daily; no myalgias; watches saturated fats in diet; fasting today.     F/U CAD: takes aspirin and Plavix daily as well as Metoprolol daily.    F/U anemia: takes daily iron supplement; no problems with medication.     F/U YARELIS: takes Pantoprazole and works well; will have symptoms if misses a dose.     F/U anxiety/depression: takes Escitalopram daily and works well; chronic trouble falling asleep and staying asleep; has been taking Tylenol PM and helps fall asleep; no SI/HI.     F/U RLS: takes Ropinirole nightly and works well.     F/U right upper arm pain and swelling: has swelling in right upper arm that will get inflamed at times; will have a lot of discomfort with movement; had US and WNL; had CT and WNL; saw ortho and had MRI; will be following up with MD 4/7/25.    Diarrhea resolved.      The following portions of the patient's history were reviewed and updated as appropriate: allergies, current medications, past family history, past medical history, past social history, past surgical history and problem list.    Current Outpatient Medications on File Prior to Visit   Medication Sig    acetaminophen (TYLENOL) 500 MG tablet Take 1 tablet by mouth Every 8 (Eight) Hours As Needed for Mild Pain or Moderate Pain.    atorvastatin (LIPITOR) 40 MG tablet Take 1 tablet by mouth Every Night.    clopidogrel (PLAVIX) 75 MG tablet TAKE ONE TABLET BY MOUTH EVERY DAY    escitalopram (LEXAPRO) 20 MG tablet TAKE 1 TABLET BY MOUTH DAILY    Ferrous Sulfate  (45 Fe) MG tablet controlled-release Take 1 tablet by mouth Daily.    insulin detemir (Levemir) 100 UNIT/ML injection Inject 24 Units under the skin into the appropriate area as directed Every Night. (Patient taking differently: Inject 25 Units under the skin into the appropriate area as directed Every Night.)    Insulin Syringe 31G X 5/16\" 1 ML misc USE FOR LEVEMIR ONCE DAILY INJECTIONS    LORATADINE PO Take 1 tablet by " mouth Daily.    magnesium oxide (MAG-OX) 400 MG tablet Take 1 tablet by mouth Every Night.    metoprolol succinate XL (TOPROL-XL) 25 MG 24 hr tablet Take 1 tablet by mouth Daily.    rOPINIRole (REQUIP) 0.25 MG tablet TAKE 1 TABLET BY MOUTH 1 HOUR BEFORE BEDTIME    vitamin D (ERGOCALCIFEROL) 1.25 MG (55662 UT) capsule capsule Take 1 capsule by mouth 1 (One) Time Per Week.    aspirin 81 MG chewable tablet Chew 1 tablet Every Night.    metFORMIN ER (GLUCOPHAGE-XR) 500 MG 24 hr tablet Take 1 tablet by mouth Daily With Dinner. (Patient taking differently: Take 1 tablet by mouth Daily With Breakfast.)    Multiple Vitamin (MULTIVITAMIN) tablet Take 1 tablet by mouth Daily.     No current facility-administered medications on file prior to visit.        Past Medical History:   Diagnosis Date    Abnormal nuclear stress test 10/12/2023    Arthritis     COPD (chronic obstructive pulmonary disease)     Elevated cholesterol     Emphysema of lung     Gallbladder abscess     Herpes zoster 08/03/2023    History of positive hepatitis C     previously positive, never treated, negative RNA finding    Hypertension     Myocardial perfusion defect 02/16/2016    Type 2 diabetes mellitus        Past Surgical History:   Procedure Laterality Date    APPENDECTOMY      CARDIAC CATHETERIZATION N/A 10/25/2023    Procedure: Left Heart Cath;  Surgeon: Tasneem Hayes MD;  Location: [facility] CATH INVASIVE LOCATION;  Service: Cardiovascular;  Laterality: N/A;    CARDIAC CATHETERIZATION N/A 10/25/2023    Procedure: Left ventriculography;  Surgeon: Tasneem Hayes MD;  Location: [facility] CATH INVASIVE LOCATION;  Service: Cardiovascular;  Laterality: N/A;    CARDIAC CATHETERIZATION N/A 10/25/2023    Procedure: Stent LEYLA coronary;  Surgeon: Tasneem Hayes MD;  Location: [facility] CATH INVASIVE LOCATION;  Service: Cardiovascular;  Laterality: N/A;    CARDIAC CATHETERIZATION N/A 10/25/2023    Procedure: Coronary angiography;  Surgeon:  Tasneem Hayes MD;  [facility];  Service: Cardiovascular;  Laterality: N/A;    CARDIAC CATHETERIZATION N/A 10/25/2023    Procedure: Percutaneous Coronary Intervention;  Surgeon: Tasneem Hayes MD;  Location:  [facility] CATH INVASIVE LOCATION;  Service: Cardiovascular;  Laterality: N/A;    CARDIAC CATHETERIZATION N/A 11/3/2023    Procedure: Percutaneous Coronary Intervention RCA;  Surgeon: Tasneem Hayes MD;  [facility];  Service: Cardiovascular;  Laterality: N/A;    CARDIAC CATHETERIZATION N/A 11/3/2023    Procedure: Coronary angiography;  Surgeon: Tasneem Hayes MD;  Location:  [facility] CATH INVASIVE LOCATION;  Service: Cardiovascular;  Laterality: N/A;    CARDIAC CATHETERIZATION N/A 11/3/2023    Procedure: Stent LEYLA coronary;  Surgeon: Tasneem Hayes MD;  [facility];  Service: Cardiovascular;  Laterality: N/A;    CARDIAC CATHETERIZATION N/A 5/17/2024    Procedure: Left Heart Cath;  Surgeon: Tasneem Hayes MD;  [facility];  Service: Cardiovascular;  Laterality: N/A;    CARDIAC CATHETERIZATION N/A 5/17/2024    Procedure: Percutaneous Coronary Intervention;  Surgeon: Tasneem Hayes MD;  [facility];  Service: Cardiovascular;  Laterality: N/A;    CARDIAC CATHETERIZATION N/A 5/17/2024    Procedure: Coronary angiography;  Surgeon: Tasneem Hayes MD;  Location: [facility] CATH INVASIVE LOCATION;  Service: Cardiovascular;  Laterality: N/A;    CARDIAC CATHETERIZATION N/A 5/17/2024    Procedure: Left ventriculography;  Surgeon: Tasneem Hayes MD;  Location: [facility] CATH INVASIVE LOCATION;  Service: Cardiovascular;  Laterality: N/A;    CHOLECYSTECTOMY      HYSTERECTOMY      TUBAL ABDOMINAL LIGATION         Family History   Problem Relation Age of Onset    Diabetes Mother     Heart disease Mother     Diabetes Father      "Diabetes Brother     Heart disease Maternal Grandmother     Cancer Paternal Grandfather        Social History     Socioeconomic History    Marital status:    Tobacco Use    Smoking status: Former     Current packs/day: 0.00     Average packs/day: 1.5 packs/day for 45.8 years (68.7 ttl pk-yrs)     Types: Cigarettes     Start date:      Quit date: 10/11/2017     Years since quittin.4     Passive exposure: Past    Smokeless tobacco: Never    Tobacco comments:     Previously smoked about 1.5 packs per day for about 45 years   Vaping Use    Vaping status: Never Used   Substance and Sexual Activity    Alcohol use: Not Currently     Comment: rare margaritia 1-2 times per year    Drug use: No     Comment: in past; clean since     Sexual activity: Not Currently       Review of Systems   Constitutional:  Positive for fatigue. Negative for appetite change, chills, fever, unexpected weight gain and unexpected weight loss.   HENT:  Negative for sore throat and trouble swallowing. Sinus pressure: some.   Eyes:  Negative for blurred vision.   Respiratory:  Negative for cough and shortness of breath. Chest tightness: some at times.   Cardiovascular:  Negative for palpitations. Chest pain: some at times; has follow up with cardiology today. Leg swelling: see HPI.  Gastrointestinal:  Negative for abdominal pain, blood in stool, constipation and diarrhea.   Endocrine: Negative for polydipsia.   Genitourinary:  Positive for frequency (no change). Negative for dysuria.   Skin:  Positive for rash (some on right arm, itches; has tried OTC anti-itch cream and antibiotic ointment has helped).   Neurological:  Negative for syncope. Weakness: some in general.      Objective   Vitals:    25 0859   BP: 164/82   BP Location: Left arm   Patient Position: Sitting   Cuff Size: Adult   Pulse: 70   Temp: 97.3 °F (36.3 °C)   TempSrc: Temporal   SpO2: 96%   Weight: 68.5 kg (151 lb)   Height: 170.2 cm (67.01\")     Body mass " index is 23.64 kg/m².    Has not taken BP medication yet today    Physical Exam  Vitals and nursing note reviewed.   Constitutional:       General: She is not in acute distress.     Appearance: She is well-developed and well-groomed. She is not diaphoretic.   HENT:      Head: Normocephalic.      Jaw: No tenderness or pain on movement.      Right Ear: External ear normal. No decreased hearing noted. Right ear middle ear effusion: TM dull. Tympanic membrane is not erythematous.      Left Ear: External ear normal. No decreased hearing noted. Left ear middle ear effusion: TM dull. Tympanic membrane is not erythematous.      Nose: Nose normal.      Right Sinus: No maxillary sinus tenderness. Right frontal sinus tenderness: mild.     Left Sinus: No maxillary sinus tenderness. Left frontal sinus tenderness: mild.     Mouth/Throat:      Mouth: Mucous membranes are moist.      Pharynx: No oropharyngeal exudate or posterior oropharyngeal erythema.   Eyes:      Extraocular Movements: Extraocular movements intact.      Conjunctiva/sclera: Conjunctivae normal.      Pupils: Pupils are equal, round, and reactive to light.   Neck:      Vascular: No carotid bruit.   Cardiovascular:      Rate and Rhythm: Normal rate and regular rhythm.      Pulses: Normal pulses.      Heart sounds: Normal heart sounds. No murmur heard.  Pulmonary:      Effort: Pulmonary effort is normal. No respiratory distress.      Breath sounds: Decreased breath sounds (bilateral bases) present. No rales.   Abdominal:      General: Bowel sounds are normal.      Palpations: Abdomen is soft. There is no hepatomegaly or splenomegaly.      Tenderness: There is no abdominal tenderness. There is no guarding.   Musculoskeletal:      Cervical back: Normal range of motion and neck supple.      Right lower leg: No edema.      Left lower leg: No edema.   Lymphadenopathy:      Cervical: No cervical adenopathy.   Skin:     General: Skin is warm and dry.          Neurological:       Mental Status: She is alert and oriented to person, place, and time.      Cranial Nerves: Cranial nerves 2-12 are intact.      Gait: Gait normal.   Psychiatric:         Mood and Affect: Mood normal.         Behavior: Behavior normal.         Thought Content: Thought content normal.         Cognition and Memory: Cognition normal.         Judgment: Judgment normal.         Lab Results   Component Value Date    WBC 5.88 02/24/2025    RBC 4.46 02/24/2025    HGB 12.5 02/24/2025    HCT 40.4 02/24/2025    MCV 90.6 02/24/2025    MCH 28.0 02/24/2025    MCHC 30.9 (L) 02/24/2025    RDW 13.8 02/24/2025    RDWSD 46.3 02/24/2025    MPV 11.1 02/24/2025     02/24/2025    NEUTRORELPCT 49.6 02/23/2025    LYMPHORELPCT 41.1 02/23/2025    MONORELPCT 7.2 02/23/2025    EOSRELPCT 1.3 02/23/2025    BASORELPCT 0.7 02/23/2025    AUTOIGPER 0.1 02/23/2025    NEUTROABS 3.30 02/23/2025    LYMPHSABS 2.74 02/23/2025    MONOSABS 0.48 02/23/2025    EOSABS 0.09 02/23/2025    BASOSABS 0.05 02/23/2025    AUTOIGNUM 0.01 02/23/2025    NRBC 0.0 02/23/2025     Lab Results   Component Value Date    GLUCOSE 190 (H) 02/24/2025    BUN 13 02/24/2025    CREATININE 0.65 02/24/2025    EGFRIFNONA 108 03/20/2020    EGFRIFAFRI >60 10/28/2020    BCR 20.0 02/24/2025    K 4.4 02/24/2025    CO2 31.0 (H) 02/24/2025    CALCIUM 9.5 02/24/2025    ALBUMIN 4.3 02/23/2025    LABIL2 1.5 10/28/2020    AST 13 02/23/2025    ALT 10 02/23/2025      Lab Results   Component Value Date    CHLPL 113 12/10/2024    TRIG 65 02/23/2025    HDL 61 (H) 02/23/2025    VLDL 14 02/23/2025    LDL 39 02/23/2025     Lab Results   Component Value Date    TSH 1.190 08/29/2024     Lab Results   Component Value Date    HGBA1C 7.50 (H) 12/10/2024     12/31/24 CT head: Mild changes of chronic small vessel ischemic phenomena are noted. The  ventricles, sulci, and cisterns are age-appropriate. The gray-white matter differentiation is within normal limits. The basal ganglia and thalami are  unremarkable in appearance. The posterior fossa structures  are unremarkable.    2/17/25 MRI right humerus: 1. No demonstrated soft tissue mass or cyst or explanation for palpable  abnormality lateral to the proximal humerus. A lipoma surrounded by subcutaneous fat may be occult on MRI.  2. Moderate right glenohumeral joint osteoarthritis.      Assessment    Problem List Items Addressed This Visit       Type 2 diabetes mellitus - Primary    Current Assessment & Plan   Diabetes is stable per home blood sugar readings.   Continue current treatment regimen.  Regular aerobic exercise.  Reminded to get yearly retinal exam.  Diabetes will be reassessed in 3 months  Continue Levemir and Metformin daily.         Relevant Orders    Comprehensive Metabolic Panel    Hemoglobin A1c    Pulmonary emphysema    Relevant Orders    CBC & Differential    Other hyperlipidemia    Current Assessment & Plan   Continue Atorvastatin daily.  Continue to work on healthy diet and exercise.         Primary hypertension    Current Assessment & Plan   Hypertension is increased today, but has not taken BP medication yet today.  Continue current treatment regimen.  Regular aerobic exercise.  Ambulatory blood pressure monitoring.  Blood pressure will be reassessed in 3 months.  Continue Metoprolol daily.  Resume Lisinopril daily.  Try compression stockings.  Follow up as scheduled with cardiology.         Relevant Medications    lisinopril (PRINIVIL,ZESTRIL) 10 MG tablet    Other Relevant Orders    CBC & Differential    Restless leg syndrome    Current Assessment & Plan   Stable.  Continue Ropinirole nightly.         Moderate episode of recurrent major depressive disorder    Current Assessment & Plan   Patient's depression is a recurrent episode that is moderate without psychosis. Depression is active and stable.    Plan:   Continue current medication therapy   Continue Escitalopram daily.  Followup in 3 months.          Generalized anxiety disorder     Current Assessment & Plan   Psychological condition is stable.  Continue current treatment regimen.  Psychological condition  will be reassessed in 3 months.  Continue Escitalopram daily.         Coronary artery disease of native artery of native heart with stable angina pectoris    Current Assessment & Plan   Continue aspirin and Plavix daily.  Follow up as scheduled with cardiology.         Gastroesophageal reflux disease    Current Assessment & Plan   Stable.  Continue Pantoprazole daily.         Anemia    Current Assessment & Plan   Continue daily iron supplement.         Localized swelling of right upper extremity    Current Assessment & Plan   Follow up as scheduled with ortho.         Localized edema    Current Assessment & Plan   Try compression stockings.         Relevant Orders    Comprehensive Metabolic Panel    Snoring    Relevant Orders    Ambulatory Referral to Sleep Medicine    Migraine headache    Current Assessment & Plan   Continue Tylenol as needed.         Relevant Orders    Ambulatory Referral to Sleep Medicine        Return in about 3 months (around 6/12/2025) for Recheck.or sooner if symptoms persist or worsen.  BP elevated today; pt has not taken Metoprolol yet today, but also has not taken Lisinopril for several months; will resume Lisinopril daily and see if helps headaches; will also refer to sleep medicine for further evaluation due to snoring and waking up with headache.

## 2025-03-13 ENCOUNTER — LAB (OUTPATIENT)
Dept: LAB | Facility: HOSPITAL | Age: 65
End: 2025-03-13
Payer: MEDICARE

## 2025-03-13 DIAGNOSIS — R07.2 PRECORDIAL PAIN: ICD-10-CM

## 2025-03-13 DIAGNOSIS — R07.82 INTERCOSTAL PAIN: ICD-10-CM

## 2025-03-13 PROBLEM — J06.9 UPPER RESPIRATORY TRACT INFECTION: Status: RESOLVED | Noted: 2024-12-11 | Resolved: 2025-03-13

## 2025-03-13 PROBLEM — G43.909 MIGRAINE HEADACHE: Status: ACTIVE | Noted: 2025-03-13

## 2025-03-13 PROBLEM — R19.7 DIARRHEA: Status: RESOLVED | Noted: 2024-12-11 | Resolved: 2025-03-13

## 2025-03-13 PROBLEM — R06.83 SNORING: Status: ACTIVE | Noted: 2025-03-13

## 2025-03-13 PROBLEM — R60.0 LOCALIZED EDEMA: Status: ACTIVE | Noted: 2025-03-13

## 2025-03-13 LAB
ALBUMIN SERPL-MCNC: 4.3 G/DL (ref 3.5–5.2)
ALBUMIN/GLOB SERPL: 1.9 G/DL
ALP SERPL-CCNC: 64 U/L (ref 39–117)
ALT SERPL-CCNC: 10 U/L (ref 1–33)
ANION GAP SERPL CALCULATED.3IONS-SCNC: 10.9 MMOL/L (ref 5–15)
APTT PPP: 27.5 SECONDS (ref 22.7–35.4)
AST SERPL-CCNC: 15 U/L (ref 1–32)
BASOPHILS # BLD AUTO: 0.03 10*3/MM3 (ref 0–0.2)
BASOPHILS # BLD AUTO: 0.05 10*3/MM3 (ref 0–0.2)
BASOPHILS NFR BLD AUTO: 0.5 % (ref 0–1.5)
BASOPHILS NFR BLD AUTO: 0.8 % (ref 0–1.5)
BILIRUB SERPL-MCNC: 0.4 MG/DL (ref 0–1.2)
BUN SERPL-MCNC: 11 MG/DL (ref 8–23)
BUN SERPL-MCNC: 8 MG/DL (ref 8–23)
BUN/CREAT SERPL: 12.5 (ref 7–25)
BUN/CREAT SERPL: 18 (ref 7–25)
CALCIUM SERPL-MCNC: 9.3 MG/DL (ref 8.6–10.5)
CALCIUM SPEC-SCNC: 8.9 MG/DL (ref 8.6–10.5)
CHLORIDE SERPL-SCNC: 104 MMOL/L (ref 98–107)
CHLORIDE SERPL-SCNC: 105 MMOL/L (ref 98–107)
CO2 SERPL-SCNC: 28.1 MMOL/L (ref 22–29)
CO2 SERPL-SCNC: 29.4 MMOL/L (ref 22–29)
CREAT SERPL-MCNC: 0.61 MG/DL (ref 0.57–1)
CREAT SERPL-MCNC: 0.64 MG/DL (ref 0.57–1)
DEPRECATED RDW RBC AUTO: 47.1 FL (ref 37–54)
EGFRCR SERPLBLD CKD-EPI 2021: 97.6 ML/MIN/1.73
EGFRCR SERPLBLD CKD-EPI 2021: 98.7 ML/MIN/1.73
EOSINOPHIL # BLD AUTO: 0.07 10*3/MM3 (ref 0–0.4)
EOSINOPHIL # BLD AUTO: 0.08 10*3/MM3 (ref 0–0.4)
EOSINOPHIL NFR BLD AUTO: 1.1 % (ref 0.3–6.2)
EOSINOPHIL NFR BLD AUTO: 1.3 % (ref 0.3–6.2)
ERYTHROCYTE [DISTWIDTH] IN BLOOD BY AUTOMATED COUNT: 13.7 % (ref 12.3–15.4)
ERYTHROCYTE [DISTWIDTH] IN BLOOD BY AUTOMATED COUNT: 13.9 % (ref 12.3–15.4)
GLOBULIN SER CALC-MCNC: 2.3 GM/DL
GLUCOSE SERPL-MCNC: 193 MG/DL (ref 65–99)
GLUCOSE SERPL-MCNC: 97 MG/DL (ref 65–99)
HBA1C MFR BLD: 8.3 % (ref 4.8–5.6)
HCT VFR BLD AUTO: 39.5 % (ref 34–46.6)
HCT VFR BLD AUTO: 39.9 % (ref 34–46.6)
HGB BLD-MCNC: 12.6 G/DL (ref 12–15.9)
HGB BLD-MCNC: 13 G/DL (ref 12–15.9)
IMM GRANULOCYTES # BLD AUTO: 0.01 10*3/MM3 (ref 0–0.05)
IMM GRANULOCYTES # BLD AUTO: 0.02 10*3/MM3 (ref 0–0.05)
IMM GRANULOCYTES NFR BLD AUTO: 0.2 % (ref 0–0.5)
IMM GRANULOCYTES NFR BLD AUTO: 0.3 % (ref 0–0.5)
INR PPP: 1 (ref 0.9–1.1)
LYMPHOCYTES # BLD AUTO: 1.57 10*3/MM3 (ref 0.7–3.1)
LYMPHOCYTES # BLD AUTO: 1.76 10*3/MM3 (ref 0.7–3.1)
LYMPHOCYTES NFR BLD AUTO: 26.3 % (ref 19.6–45.3)
LYMPHOCYTES NFR BLD AUTO: 28.7 % (ref 19.6–45.3)
MCH RBC QN AUTO: 28.8 PG (ref 26.6–33)
MCH RBC QN AUTO: 29.3 PG (ref 26.6–33)
MCHC RBC AUTO-ENTMCNC: 31.6 G/DL (ref 31.5–35.7)
MCHC RBC AUTO-ENTMCNC: 32.9 G/DL (ref 31.5–35.7)
MCV RBC AUTO: 89 FL (ref 79–97)
MCV RBC AUTO: 91.1 FL (ref 79–97)
MONOCYTES # BLD AUTO: 0.32 10*3/MM3 (ref 0.1–0.9)
MONOCYTES # BLD AUTO: 0.36 10*3/MM3 (ref 0.1–0.9)
MONOCYTES NFR BLD AUTO: 5.4 % (ref 5–12)
MONOCYTES NFR BLD AUTO: 5.9 % (ref 5–12)
NEUTROPHILS # BLD AUTO: 3.88 10*3/MM3 (ref 1.7–7)
NEUTROPHILS NFR BLD AUTO: 3.94 10*3/MM3 (ref 1.7–7)
NEUTROPHILS NFR BLD AUTO: 63.3 % (ref 42.7–76)
NEUTROPHILS NFR BLD AUTO: 66.2 % (ref 42.7–76)
NRBC BLD AUTO-RTO: 0 /100 WBC (ref 0–0.2)
NRBC BLD AUTO-RTO: 0 /100 WBC (ref 0–0.2)
PLATELET # BLD AUTO: 223 10*3/MM3 (ref 140–450)
PLATELET # BLD AUTO: 230 10*3/MM3 (ref 140–450)
PMV BLD AUTO: 11.2 FL (ref 6–12)
POTASSIUM SERPL-SCNC: 3.6 MMOL/L (ref 3.5–5.2)
POTASSIUM SERPL-SCNC: 3.9 MMOL/L (ref 3.5–5.2)
PROT SERPL-MCNC: 6.6 G/DL (ref 6–8.5)
PROTHROMBIN TIME: 13.1 SECONDS (ref 11.7–14.2)
RBC # BLD AUTO: 4.38 10*6/MM3 (ref 3.77–5.28)
RBC # BLD AUTO: 4.44 10*6/MM3 (ref 3.77–5.28)
SODIUM SERPL-SCNC: 143 MMOL/L (ref 136–145)
SODIUM SERPL-SCNC: 146 MMOL/L (ref 136–145)
WBC # BLD AUTO: 6.13 10*3/MM3 (ref 3.4–10.8)
WBC NRBC COR # BLD AUTO: 5.96 10*3/MM3 (ref 3.4–10.8)

## 2025-03-13 PROCEDURE — 85730 THROMBOPLASTIN TIME PARTIAL: CPT

## 2025-03-13 PROCEDURE — 36415 COLL VENOUS BLD VENIPUNCTURE: CPT

## 2025-03-13 PROCEDURE — 85025 COMPLETE CBC W/AUTO DIFF WBC: CPT

## 2025-03-13 PROCEDURE — 85610 PROTHROMBIN TIME: CPT

## 2025-03-13 PROCEDURE — 80048 BASIC METABOLIC PNL TOTAL CA: CPT

## 2025-03-13 NOTE — ASSESSMENT & PLAN NOTE
Hypertension is increased today, but has not taken BP medication yet today.  Continue current treatment regimen.  Regular aerobic exercise.  Ambulatory blood pressure monitoring.  Blood pressure will be reassessed in 3 months.  Continue Metoprolol daily.  Resume Lisinopril daily.  Try compression stockings.  Follow up as scheduled with cardiology.

## 2025-03-13 NOTE — ASSESSMENT & PLAN NOTE
Diabetes is stable per home blood sugar readings.   Continue current treatment regimen.  Regular aerobic exercise.  Reminded to get yearly retinal exam.  Diabetes will be reassessed in 3 months  Continue Levemir and Metformin daily.

## 2025-03-14 ENCOUNTER — TELEPHONE (OUTPATIENT)
Dept: FAMILY MEDICINE CLINIC | Facility: CLINIC | Age: 65
End: 2025-03-14
Payer: MEDICARE

## 2025-03-14 NOTE — TELEPHONE ENCOUNTER
I called the patients eye doctor [facility] and requested the patients last eye exam at [facility] request. They are supposed to be faxing it over.

## 2025-03-18 ENCOUNTER — HOSPITAL ENCOUNTER (OUTPATIENT)
Facility: HOSPITAL | Age: 65
Setting detail: HOSPITAL OUTPATIENT SURGERY
Discharge: HOME | End: 2025-03-18
Attending: INTERNAL MEDICINE | Admitting: INTERNAL MEDICINE
Payer: MEDICARE

## 2025-03-18 VITALS
WEIGHT: 144 LBS | BODY MASS INDEX: 22.6 KG/M2 | TEMPERATURE: 97.2 F | HEART RATE: 68 BPM | OXYGEN SATURATION: 95 % | SYSTOLIC BLOOD PRESSURE: 169 MMHG | HEIGHT: 67 IN | RESPIRATION RATE: 16 BRPM | DIASTOLIC BLOOD PRESSURE: 71 MMHG

## 2025-03-18 DIAGNOSIS — R07.2 PRECORDIAL PAIN: ICD-10-CM

## 2025-03-18 LAB
ACT BLD: 268 SECONDS (ref 82–152)
GLUCOSE BLDC GLUCOMTR-MCNC: 109 MG/DL (ref 70–130)
GLUCOSE BLDC GLUCOMTR-MCNC: 93 MG/DL (ref 70–130)

## 2025-03-18 PROCEDURE — C1769 GUIDE WIRE: HCPCS | Performed by: INTERNAL MEDICINE

## 2025-03-18 PROCEDURE — 25010000002 LIDOCAINE 2% SOLUTION: Performed by: INTERNAL MEDICINE

## 2025-03-18 PROCEDURE — 85347 COAGULATION TIME ACTIVATED: CPT

## 2025-03-18 PROCEDURE — 93458 L HRT ARTERY/VENTRICLE ANGIO: CPT | Performed by: INTERNAL MEDICINE

## 2025-03-18 PROCEDURE — 25010000002 FENTANYL CITRATE (PF) 50 MCG/ML SOLUTION: Performed by: INTERNAL MEDICINE

## 2025-03-18 PROCEDURE — 25010000002 MIDAZOLAM PER 1 MG: Performed by: INTERNAL MEDICINE

## 2025-03-18 PROCEDURE — 25810000003 SODIUM CHLORIDE 0.9 % SOLUTION: Performed by: INTERNAL MEDICINE

## 2025-03-18 PROCEDURE — 82948 REAGENT STRIP/BLOOD GLUCOSE: CPT

## 2025-03-18 PROCEDURE — C1894 INTRO/SHEATH, NON-LASER: HCPCS | Performed by: INTERNAL MEDICINE

## 2025-03-18 PROCEDURE — 25510000001 IOPAMIDOL PER 1 ML: Performed by: INTERNAL MEDICINE

## 2025-03-18 PROCEDURE — 25010000002 HEPARIN (PORCINE) PER 1000 UNITS: Performed by: INTERNAL MEDICINE

## 2025-03-18 PROCEDURE — 25010000002 ONDANSETRON PER 1 MG: Performed by: INTERNAL MEDICINE

## 2025-03-18 RX ORDER — VERAPAMIL HYDROCHLORIDE 2.5 MG/ML
INJECTION, SOLUTION INTRAVENOUS
Status: DISCONTINUED | OUTPATIENT
Start: 2025-03-18 | End: 2025-03-18 | Stop reason: HOSPADM

## 2025-03-18 RX ORDER — LIDOCAINE HYDROCHLORIDE 20 MG/ML
INJECTION, SOLUTION INFILTRATION; PERINEURAL
Status: DISCONTINUED | OUTPATIENT
Start: 2025-03-18 | End: 2025-03-18 | Stop reason: HOSPADM

## 2025-03-18 RX ORDER — ACETAMINOPHEN 325 MG/1
650 TABLET ORAL EVERY 4 HOURS PRN
Status: DISCONTINUED | OUTPATIENT
Start: 2025-03-18 | End: 2025-03-18 | Stop reason: HOSPADM

## 2025-03-18 RX ORDER — NITROGLYCERIN 0.4 MG/1
0.4 TABLET SUBLINGUAL
Status: DISCONTINUED | OUTPATIENT
Start: 2025-03-18 | End: 2025-03-18 | Stop reason: HOSPADM

## 2025-03-18 RX ORDER — SODIUM CHLORIDE 0.9 % (FLUSH) 0.9 %
10 SYRINGE (ML) INJECTION AS NEEDED
Status: DISCONTINUED | OUTPATIENT
Start: 2025-03-18 | End: 2025-03-18 | Stop reason: HOSPADM

## 2025-03-18 RX ORDER — HEPARIN SODIUM 1000 [USP'U]/ML
INJECTION, SOLUTION INTRAVENOUS; SUBCUTANEOUS
Status: DISCONTINUED | OUTPATIENT
Start: 2025-03-18 | End: 2025-03-18 | Stop reason: HOSPADM

## 2025-03-18 RX ORDER — SODIUM CHLORIDE 9 MG/ML
75 INJECTION, SOLUTION INTRAVENOUS CONTINUOUS
Status: DISCONTINUED | OUTPATIENT
Start: 2025-03-18 | End: 2025-03-18 | Stop reason: HOSPADM

## 2025-03-18 RX ORDER — IOPAMIDOL 755 MG/ML
INJECTION, SOLUTION INTRAVASCULAR
Status: DISCONTINUED | OUTPATIENT
Start: 2025-03-18 | End: 2025-03-18 | Stop reason: HOSPADM

## 2025-03-18 RX ORDER — ONDANSETRON 2 MG/ML
4 INJECTION INTRAMUSCULAR; INTRAVENOUS ONCE
Status: COMPLETED | OUTPATIENT
Start: 2025-03-18 | End: 2025-03-18

## 2025-03-18 RX ORDER — FENTANYL CITRATE 50 UG/ML
INJECTION, SOLUTION INTRAMUSCULAR; INTRAVENOUS
Status: DISCONTINUED | OUTPATIENT
Start: 2025-03-18 | End: 2025-03-18 | Stop reason: HOSPADM

## 2025-03-18 RX ORDER — MIDAZOLAM HYDROCHLORIDE 1 MG/ML
INJECTION, SOLUTION INTRAMUSCULAR; INTRAVENOUS
Status: DISCONTINUED | OUTPATIENT
Start: 2025-03-18 | End: 2025-03-18 | Stop reason: HOSPADM

## 2025-03-18 RX ADMIN — ONDANSETRON 4 MG: 2 INJECTION, SOLUTION INTRAMUSCULAR; INTRAVENOUS at 11:49

## 2025-03-18 RX ADMIN — SODIUM CHLORIDE 75 ML/HR: 9 INJECTION, SOLUTION INTRAVENOUS at 10:08

## 2025-03-18 RX ADMIN — ACETAMINOPHEN 650 MG: 325 TABLET, FILM COATED ORAL at 12:08

## 2025-03-18 NOTE — DISCHARGE INSTRUCTIONS
[facility]  [address] [place] [address]    Coronary Angiogram (Radial/Ulnar Approach) After Care    Refer to this sheet in the next few weeks. These instructions provide you with information on caring for yourself after your procedure. Your caregiver may also give you more specific instructions. Your treatment has been planned according to current medical practices, but problems sometimes occur. Call your caregiver if you have any problems or questions after your procedure.    Home Care Instructions:  You may shower the day after the procedure. Remove the bandage (dressing) and gently wash the site with plain soap and water. Gently pat the site dry. You may apply a band aid daily for 2 days if desired.    Do not apply powder or lotion to the site.  Do not submerge the affected site in water for 3 to 5 days or until the site is completely healed.   Do not lift, push or pull anything over 5 pounds for 5 days after your procedure or as directed by your physician.  As a reference, a gallon of milk weighs 8 pounds.   Inspect the site at least twice daily. You may notice some bruising at the site and it may be tender for 1 to 2 weeks.     Increase your fluid intake for the next 2 days.    Keep arm elevated for 24 hours. For the remainder of the day, keep your arm in “Pledge of Allegiance” position when up and about.     You may drive 24 hours after the procedure unless otherwise instructed by your caregiver.  Do not operate machinery or power tools for 24 hours.  A responsible adult should be with you for the first 24 hours after you arrive home. Do not make any important legal decisions or sign legal papers for 24 hours.  Do not drink alcohol for 24 hours.    Metformin or any medications containing Metformin should not be taken for 48 hours after your procedure.      Call Your Doctor if:   You have unusual pain at the radial/ulnar (wrist) site.  You have redness, warmth, swelling, or pain at the  radial/ulnar (wrist) site.  You have drainage (other than a small amount of blood on the dressing).  `You have chills or a fever > 101.  Your arm becomes pale or dark, cool, tingly, or numb.  You develop chest pain, shortness of breath, feel faint or pass out.    You have heavy bleeding from the site, hold pressure on the site for 20 minutes.  If the bleeding stops, apply a fresh bandage and call your cardiologist.  However, if you        continue to have bleeding, call 911 and continue to apply pressure to the site.   You have any symptoms of a stroke.  Remember BE FAST  B-balance. Sudden trouble walking or loss of balance.  E-eyes.  Sudden changes in how you see or a sudden onset of a very bad headache.   F-face. Sudden weakness or loss of feeling of the face or facial droop on one side.   A-arms Sudden weakness or numbness in one arm.  One arm drifts down if they are both held out in front of you. This happens suddenly and usually on one side of the body.   S-speech.  Sudden trouble speaking, slurred speech or trouble understanding what are saying.   T-time  Time to call emergency services.  Write down the symptoms and the time they started.

## 2025-03-23 DIAGNOSIS — K21.9 GASTROESOPHAGEAL REFLUX DISEASE, UNSPECIFIED WHETHER ESOPHAGITIS PRESENT: ICD-10-CM

## 2025-03-24 RX ORDER — PANTOPRAZOLE SODIUM 20 MG/1
20 TABLET, DELAYED RELEASE ORAL DAILY
Qty: 30 TABLET | Refills: 2 | OUTPATIENT
Start: 2025-03-24

## 2025-03-26 DIAGNOSIS — K21.9 GASTROESOPHAGEAL REFLUX DISEASE, UNSPECIFIED WHETHER ESOPHAGITIS PRESENT: ICD-10-CM

## 2025-03-26 RX ORDER — PANTOPRAZOLE SODIUM 20 MG/1
20 TABLET, DELAYED RELEASE ORAL DAILY
Qty: 30 TABLET | Refills: 2 | OUTPATIENT
Start: 2025-03-26

## 2025-03-26 RX ORDER — ATORVASTATIN CALCIUM 40 MG/1
40 TABLET, FILM COATED ORAL NIGHTLY
Qty: 90 TABLET | Refills: 1 | Status: SHIPPED | OUTPATIENT
Start: 2025-03-26

## 2025-04-01 ENCOUNTER — OFFICE VISIT (OUTPATIENT)
Facility: HOSPITAL | Age: 65
End: 2025-04-01
Payer: MEDICARE

## 2025-04-01 VITALS — BODY MASS INDEX: 23.35 KG/M2 | HEIGHT: 67 IN | WEIGHT: 148.8 LBS | HEART RATE: 72 BPM | OXYGEN SATURATION: 95 %

## 2025-04-01 DIAGNOSIS — G47.26 CIRCADIAN RHYTHM SLEEP DISORDER, SHIFT WORK TYPE: ICD-10-CM

## 2025-04-01 DIAGNOSIS — G47.8 NON-RESTORATIVE SLEEP: ICD-10-CM

## 2025-04-01 DIAGNOSIS — G25.81 RESTLESS LEG SYNDROME: ICD-10-CM

## 2025-04-01 DIAGNOSIS — G47.30 SLEEP APNEA, UNSPECIFIED TYPE: Primary | ICD-10-CM

## 2025-04-01 DIAGNOSIS — G47.00 INSOMNIA, UNSPECIFIED TYPE: ICD-10-CM

## 2025-04-01 DIAGNOSIS — G47.10 HYPERSOMNIA: ICD-10-CM

## 2025-04-01 DIAGNOSIS — R06.83 SNORING: ICD-10-CM

## 2025-04-01 DIAGNOSIS — G43.909 MIGRAINE WITHOUT STATUS MIGRAINOSUS, NOT INTRACTABLE, UNSPECIFIED MIGRAINE TYPE: ICD-10-CM

## 2025-04-01 PROCEDURE — G0463 HOSPITAL OUTPT CLINIC VISIT: HCPCS

## 2025-04-01 NOTE — PROGRESS NOTES
[facility] New [facility]Consultation       Reason for Consultation: Snoring, migraines      Patient Care Team:  Sofi Fernandes APRN as PCP - General (Family Medicine)  Tasneem Hayes MD as Consulting Physician (Cardiology)  Sidney Liu MD as Consulting Physician (Sleep Medicine)      History of present illness:  Thank you for asking me to see your patient.  The patient is a 64 y.o. female presents due to concern for sleep disorder.  No history of prior sleep study or tonsillectomy.  Sleep latency of several hours sleeps around 6 hours a night 0 naps.  Works third shift Friday Saturday and Sunday.  Reports hypersomnia nonrestorative sleep snoring waking up gasping for breath waking up coughing/choking morning headaches waking with dry mouth leg jerking at night pain disrupting sleep sweating during sleep nocturia 3-4 times a night restless sleep.  BMI 23.3.  On ropinirole nightly for restless leg syndrome.    Medical Conditions (PMH):   Depression  CAD  Hypertension  Diabetes  COPD  Migraines  Arthritis  Restless legs    Social history:  Do you drive a commercial vehicle:  No   Shift work:  Yes but switching to [employer] this week  Tobacco use:  No quit age 58  Alcohol use: 0 per week  Caffeinated drinks: 0 per day  Occupation:     Family History (parents and siblings) (pertaining to sleep medicine):  Unanswered    Allergies:  Patient has no known allergies.       Current Outpatient Medications:     acetaminophen (TYLENOL) 500 MG tablet, Take 1 tablet by mouth Every 8 (Eight) Hours As Needed for Mild Pain or Moderate Pain., Disp: , Rfl:     aspirin 81 MG chewable tablet, Chew 1 tablet Every Night., Disp: , Rfl:     atorvastatin (LIPITOR) 40 MG tablet, TAKE ONE TABLET BY MOUTH ONCE NIGHTLY, Disp: 90 tablet, Rfl: 1    clopidogrel (PLAVIX) 75 MG tablet, TAKE ONE TABLET BY MOUTH EVERY DAY, Disp: 90 tablet, Rfl: 0    escitalopram (LEXAPRO) 20 MG tablet, TAKE 1 TABLET BY MOUTH DAILY,  "Disp: 90 tablet, Rfl: 1    Ferrous Sulfate  (45 Fe) MG tablet controlled-release, Take 1 tablet by mouth Daily., Disp: 30 tablet, Rfl: 2    insulin detemir (Levemir) 100 UNIT/ML injection, Inject 24 Units under the skin into the appropriate area as directed Every Night., Disp: , Rfl:     Insulin Syringe 31G X 5/16\" 1 ML misc, USE FOR LEVEMIR ONCE DAILY INJECTIONS, Disp: 100 each, Rfl: 2    lisinopril (PRINIVIL,ZESTRIL) 10 MG tablet, Take 1 tablet by mouth Daily., Disp: 90 tablet, Rfl: 1    LORATADINE PO, Take 1 tablet by mouth Daily., Disp: , Rfl:     metFORMIN ER (GLUCOPHAGE-XR) 500 MG 24 hr tablet, Take 1 tablet by mouth Daily With Dinner. (Patient taking differently: Take 1 tablet by mouth Daily With Breakfast.), Disp: , Rfl:     metoprolol succinate XL (TOPROL-XL) 25 MG 24 hr tablet, Take 1 tablet by mouth Daily., Disp: 90 tablet, Rfl: 2    Multiple Vitamin (MULTIVITAMIN) tablet, Take 1 tablet by mouth Daily., Disp: , Rfl:     nitroglycerin (Nitrostat) 0.4 MG SL tablet, Place 1 tablet under the tongue Every 5 (Five) Minutes As Needed for Chest Pain. Take no more than 3 doses in 15 minutes., Disp: 30 tablet, Rfl: 3    rOPINIRole (REQUIP) 0.25 MG tablet, TAKE 1 TABLET BY MOUTH 1 HOUR BEFORE BEDTIME, Disp: 30 tablet, Rfl: 2    vitamin D (ERGOCALCIFEROL) 1.25 MG (47977 UT) capsule capsule, Take 1 capsule by mouth 1 (One) Time Per Week., Disp: , Rfl:     Vital Signs:    Vitals:    04/01/25 0936   Pulse: 72   SpO2: 95%   Weight: 67.5 kg (148 lb 12.8 oz)   Height: 170.2 cm (67\")      Body mass index is 23.31 kg/m².         REVIEW OF SYSTEMS:  Pertinent positive symptoms are:  Snoring  Witnessed apnea  [place] Sleepiness Scale of Total score: 14   Fatigue  Frequent urination  Dizziness  Depression  Excessive thirst      Physical exam:  Vitals:    04/01/25 0936   Pulse: 72   SpO2: 95%   Weight: 67.5 kg (148 lb 12.8 oz)   Height: 170.2 cm (67\")    Body mass index is 23.31 kg/m².    HEENT: Head is atraumatic, " normocephalic  Eyes: pupils are round equal and reacting to light and accommodation, conjunctiva normal  Throat: tongue normal  NECK:   RESPIRATORY SYSTEM: Regular respirations  CARDIOVASULAR SYSTEM: Regular rate  EXTREMITES: No cyanosis, clubbing  NEUROLOGICAL SYSTEM: Oriented x 3, no gross motor defects, gait normal      Impression:  1. Sleep apnea, unspecified type    2. Hypersomnia    3. Non-restorative sleep    4. Snoring    5. Circadian rhythm sleep disorder, shift work type    6. Migraine without status migrainosus, not intractable, unspecified migraine type    7. Restless leg syndrome    8. Insomnia, unspecified type          Plan:    Office note(s) from care team reviewed. Office note(s) reviewed: 3/12/2025 PCP    Labs/ Test Results Reviewed:  TSH          5/18/2024    03:49 8/29/2024    11:46   TSH   TSH 0.693  1.190       Most Recent A1C          3/12/2025    10:10   HGBA1C Most Recent   Hemoglobin A1C 8.30              ASSESSMENT AND PLAN:   Witnessed apnea: patient's symptoms and physical examination are concerning for possible sleep apnea.   I discussed the signs, symptoms, and pathophysiology of sleep apnea with this patient.  I also discussed the potential complications of untreated sleep apnea including but not limited to resistant hypertension, insulin resistance, pulmonary hypertension, atrial fibrillation, heart attack, stroke, nonrestorative sleep with hypersomnia which can increase risk for motor vehicle accidents, etc.   Different testing methods including home-based and lab based sleep studies were discussed with this patient.   Based on patient history and physical examination, will proceed with HST.  The order for the sleep study is placed in [facility].  The test will be scheduled after prior authorization has been obtained through patient's insurance.  Treatment and management will be discussed in more detail with this patient after the test is completed.  All questions were answered to  patient's satisfaction.   Snoring: snoring is the sound created by turbulent airflow vibrating upper airway soft tissue.  I have also discussed factors affecting snoring including sleep deprivation, sleeping on the back and alcohol ingestion. To minimize snoring, patient is advised to have adequate sleep, sleep on their side, and avoid alcohol and sedative medications around bedtime.   Excessive daytime sleepiness:  [place] Sleepiness Scale of Total score: 14.  There are many causes of excessive daytime sleepiness.  Rule out sleep apnea as a contributing factor, as above.\  Do not drive, operate heavy machinery, or do activities that require high concentration if feeling tired/drowsy.  Normal body weight: Body mass index is 23.31 kg/m².. Patients who are overweight or obese are at increased risk of sleep apnea/ sleep disordered breathing. Weight reduction and healthy lifestyle are encouraged in overweight/ obese patients as part of a comprehensive approach to sleep apnea treatment.      I have also discussed with the patient the following  Sleep hygiene: try to maintain a regular bed time and wake time, avoid watching TV/ using electronic devices in bed (including cell phones), limit caffeinated and alcoholic beverages before bed, try to maintain a cool and quiet sleep environment, avoid daytime naps  Adequate amount of sleep: most people need around 7 to 8 hours of sleep each night      Patient will follow-up after study, 31 to 90 days after PAP therapy initiated if applicable, or contact the office sooner for questions or concerns. Patient's questions were answered.      Thank you for allowing me to participate in your patient's care.    Sidney Liu MD  Sleep Medicine  04/01/25  10:22 EDT

## 2025-04-02 ENCOUNTER — OFFICE VISIT (OUTPATIENT)
Dept: CARDIOLOGY | Facility: CLINIC | Age: 65
End: 2025-04-02
Payer: MEDICARE

## 2025-04-02 VITALS
SYSTOLIC BLOOD PRESSURE: 144 MMHG | HEART RATE: 75 BPM | WEIGHT: 147 LBS | DIASTOLIC BLOOD PRESSURE: 79 MMHG | BODY MASS INDEX: 23.07 KG/M2 | HEIGHT: 67 IN

## 2025-04-02 DIAGNOSIS — Z09 HOSPITAL DISCHARGE FOLLOW-UP: Primary | ICD-10-CM

## 2025-04-02 DIAGNOSIS — I10 PRIMARY HYPERTENSION: ICD-10-CM

## 2025-04-02 RX ORDER — LISINOPRIL 10 MG/1
20 TABLET ORAL DAILY
Qty: 90 TABLET | Refills: 1 | Status: SHIPPED | OUTPATIENT
Start: 2025-04-02

## 2025-04-02 NOTE — PATIENT INSTRUCTIONS
- increase lisinopril    -Monitor blood pressure 1 hour and a half after taking blood pressure medications and  write the reading down along with heart rate.  Please bring these readings with you on your follow up. Call if sys <100, dizzy, or HR<60

## 2025-04-02 NOTE — PROGRESS NOTES
Subjective:        Gely Kimble is a 64 y.o. female who here for follow up    Chief Complaint   Patient presents with    Hospital Follow Up Visit     CATH       HPI    3/18/25  Hemodynamics    Pressures    Ao: 110/70 mmHg   LV: 110/10 mmHg  End-diastolic pressure: 10 mmHg  No significant aortic valvular gradient on pullback    Coronary Angiography    Left Main: The left main originates in the left coronary cusp. It bifurcates and gives rise to the LAD and circumflex system. Normal left main    Left Anterior Descending: Left anterior descending has midportion stent which is widely patent, diagonal branch which is jailed, ostium was angioplastied in the past looks great with 0 to 10% stenosis, minimum irregularity of the rest of the LAD diagonal and  branches    Left Circumflex: Nondominant and normal    Right Coronary Artery: The right coronary artery originates in the right coronary cusp. Dominant with a proximal 30% stenosis    Left Ventriculography:     Estimated Ejection Fraction: 60%   Wall motion abnormalities: None   Mitral Regurgitation: None     Impression:    Moderate coronary artery disease with previous stent widely patent    Normal LV gram    Recommendations:    Medical management        I sincerely appreciate the opportunity to participate in your patient's care. Please feel free to contact me anytime if I can be of assistance in this or any other way.    Tasneem Hayes MD  3/18/2025  11:46 EDT       2/24/25    Interpretation Summary         Left ventricular ejection fraction appears to be 56 - 60%.    Left ventricular diastolic function is consistent with (grade I) impaired relaxation.    Estimated right ventricular systolic pressure from tricuspid regurgitation is normal (<35 mmHg).        2/24/25    Interpretation Summary         Findings consistent with a normal ECG stress test.    Myocardial perfusion imaging indicates a normal myocardial perfusion study with no evidence of  ischemia. Impressions are consistent with a low risk study.    Left ventricular ejection fraction is normal (Calculated EF = 70%).      The following portions of the patient's history were reviewed and updated as appropriate: allergies, current medications, past family history, past medical history, past social history, past surgical history and problem list.    Past Medical History:   Diagnosis Date    Abnormal nuclear stress test 10/12/2023    Arthritis     COPD (chronic obstructive pulmonary disease)     Elevated cholesterol     Emphysema of lung     Gallbladder abscess     Herpes zoster 08/03/2023    History of positive hepatitis C     previously positive, never treated, negative RNA finding    Hypertension     Myocardial perfusion defect 02/16/2016    Type 2 diabetes mellitus          reports that she quit smoking about 7 years ago. Her smoking use included cigarettes. She started smoking about 53 years ago. She has a 68.7 pack-year smoking history. She has been exposed to tobacco smoke. She has never used smokeless tobacco. She reports that she does not currently use alcohol. She reports that she does not use drugs.     Family History   Problem Relation Age of Onset    Diabetes Mother     Heart disease Mother     Diabetes Father     Diabetes Brother     Heart disease Maternal Grandmother     Cancer Paternal Grandfather             Objective:           Physical Exam    Procedures       3/18/25  Hemodynamics    Pressures    Ao: 110/70 mmHg   LV: 110/10 mmHg  End-diastolic pressure: 10 mmHg  No significant aortic valvular gradient on pullback    Coronary Angiography    Left Main: The left main originates in the left coronary cusp. It bifurcates and gives rise to the LAD and circumflex system. Normal left main    Left Anterior Descending: Left anterior descending has midportion stent which is widely patent, diagonal branch which is jailed, ostium was angioplastied in the past looks great with 0 to 10% stenosis,  minimum irregularity of the rest of the LAD diagonal and  branches    Left Circumflex: Nondominant and normal    Right Coronary Artery: The right coronary artery originates in the right coronary cusp. Dominant with a proximal 30% stenosis    Left Ventriculography:     Estimated Ejection Fraction: 60%   Wall motion abnormalities: None   Mitral Regurgitation: None     Impression:    Moderate coronary artery disease with previous stent widely patent    Normal LV gram    Recommendations:    Medical management        I sincerely appreciate the opportunity to participate in your patient's care. Please feel free to contact me anytime if I can be of assistance in this or any other way.    Tasneem Hayes MD  3/18/2025  11:46 EDT       2/24/25    Interpretation Summary         Left ventricular ejection fraction appears to be 56 - 60%.    Left ventricular diastolic function is consistent with (grade I) impaired relaxation.    Estimated right ventricular systolic pressure from tricuspid regurgitation is normal (<35 mmHg).        2/24/25    Interpretation Summary         Findings consistent with a normal ECG stress test.    Myocardial perfusion imaging indicates a normal myocardial perfusion study with no evidence of ischemia. Impressions are consistent with a low risk study.    Left ventricular ejection fraction is normal (Calculated EF = 70%).      Current Outpatient Medications:     acetaminophen (TYLENOL) 500 MG tablet, Take 1 tablet by mouth Every 8 (Eight) Hours As Needed for Mild Pain or Moderate Pain., Disp: , Rfl:     aspirin 81 MG chewable tablet, Chew 1 tablet Every Night., Disp: , Rfl:     atorvastatin (LIPITOR) 40 MG tablet, TAKE ONE TABLET BY MOUTH ONCE NIGHTLY, Disp: 90 tablet, Rfl: 1    clopidogrel (PLAVIX) 75 MG tablet, TAKE ONE TABLET BY MOUTH EVERY DAY, Disp: 90 tablet, Rfl: 0    escitalopram (LEXAPRO) 20 MG tablet, TAKE 1 TABLET BY MOUTH DAILY, Disp: 90 tablet, Rfl: 1    Ferrous Sulfate ER  "143 (45 Fe) MG tablet controlled-release, Take 1 tablet by mouth Daily., Disp: 30 tablet, Rfl: 2    insulin detemir (Levemir) 100 UNIT/ML injection, Inject 24 Units under the skin into the appropriate area as directed Every Night., Disp: , Rfl:     Insulin Syringe 31G X 5/16\" 1 ML misc, USE FOR LEVEMIR ONCE DAILY INJECTIONS, Disp: 100 each, Rfl: 2    lisinopril (PRINIVIL,ZESTRIL) 10 MG tablet, Take 1 tablet by mouth Daily., Disp: 90 tablet, Rfl: 1    LORATADINE PO, Take 1 tablet by mouth Daily., Disp: , Rfl:     metFORMIN ER (GLUCOPHAGE-XR) 500 MG 24 hr tablet, Take 1 tablet by mouth Daily With Dinner. (Patient taking differently: Take 1 tablet by mouth Daily With Breakfast.), Disp: , Rfl:     metoprolol succinate XL (TOPROL-XL) 25 MG 24 hr tablet, Take 1 tablet by mouth Daily., Disp: 90 tablet, Rfl: 2    Multiple Vitamin (MULTIVITAMIN) tablet, Take 1 tablet by mouth Daily., Disp: , Rfl:     nitroglycerin (Nitrostat) 0.4 MG SL tablet, Place 1 tablet under the tongue Every 5 (Five) Minutes As Needed for Chest Pain. Take no more than 3 doses in 15 minutes., Disp: 30 tablet, Rfl: 3    rOPINIRole (REQUIP) 0.25 MG tablet, TAKE 1 TABLET BY MOUTH 1 HOUR BEFORE BEDTIME, Disp: 30 tablet, Rfl: 2    vitamin D (ERGOCALCIFEROL) 1.25 MG (92711 UT) capsule capsule, Take 1 capsule by mouth 1 (One) Time Per Week., Disp: , Rfl:      Assessment:        Patient Active Problem List   Diagnosis    Type 2 diabetes mellitus    Pulmonary emphysema    Arthritis    Other hyperlipidemia    Primary hypertension    Abnormal liver function tests    Impingement syndrome of shoulder region    Neck pain    Postmenopause    Restless leg syndrome    Urinary frequency    Vitamin D deficiency    Fatigue    Moderate episode of recurrent major depressive disorder    Generalized anxiety disorder    Insomnia    Skin lesion of back    Nausea    Incomplete right bundle branch block (RBBB)    Coronary artery disease of native artery of native heart with stable " angina pectoris    Tendency to bleed    Gastroesophageal reflux disease    Pain of left hand    Left leg swelling    Anemia    Former smoker    Long term (current) use of antithrombotics/antiplatelets    Low magnesium level    Localized swelling of right upper extremity    Acute non-recurrent sinusitis    Chest pain    Localized edema    Snoring    Migraine headache               Plan:   1.  CAD with Hospital follow-up status post cardiac cath.  Right radial site no signs or symptoms of infection or hematoma. Continue plavix, statin, aspirin, and beta-blockade.    Risk reduction for the coronary artery disease, controlling the blood pressure, blood sugar management, cholesterol management, exercise, stress management, and proper compliance with medications and follow-up has been discussed    2.  Hypertension: Slightly elevated.  I will increase her lisinopril to 20 mg daily and she will follow-up in 1 month with a CMP to make sure kidney function is stable.    Educated patient on exercising for at least 30 minutes a day for 2 to 3 days a week. Importance of controlling hypertension and blood pressure checkup on the regular basis has been explained. Hypertension as a silent killer has been discussed. Risk reduction of the weight and regular exercises to control the hypertension has been explained.    3.  Hyperlipidemia: Her primary care manages her labs and cholesterol.  Currently on Lipitor 40 mg.    Risk of the hyperlipidemia, importance of the treatment has been explained. Pros and cons of the statins has been explained. Regular blood workup as well as side effects including the liver failure, myelopathy death has been explained.              No diagnosis found.    There are no diagnoses linked to this encounter.    COUNSELING: kevan Murguiaeling was given to patient for the following topics: diagnostic results, risk factor reductions, impressions, risks and benefits of treatment options and  importance of treatment compliance .       SMOKING COUNSELING: former smoker    -Increase ace, and a blood pressure diary.  -Cmp day of office visit and follow up for blood pressure check.     Sincerely,   DARIAN Murphy  [facility]  04/02/25  11:01 EDT    EMR Dragon/Transcription disclaimer: Dictated utilizing Dragon Dictation

## 2025-04-14 DIAGNOSIS — K21.9 GASTROESOPHAGEAL REFLUX DISEASE, UNSPECIFIED WHETHER ESOPHAGITIS PRESENT: ICD-10-CM

## 2025-04-15 RX ORDER — PANTOPRAZOLE SODIUM 20 MG/1
20 TABLET, DELAYED RELEASE ORAL DAILY
Qty: 30 TABLET | Refills: 2 | OUTPATIENT
Start: 2025-04-15

## 2025-04-24 DIAGNOSIS — F41.1 GENERALIZED ANXIETY DISORDER: ICD-10-CM

## 2025-04-24 DIAGNOSIS — K21.9 GASTROESOPHAGEAL REFLUX DISEASE, UNSPECIFIED WHETHER ESOPHAGITIS PRESENT: ICD-10-CM

## 2025-04-24 DIAGNOSIS — F33.1 MODERATE EPISODE OF RECURRENT MAJOR DEPRESSIVE DISORDER: ICD-10-CM

## 2025-04-24 RX ORDER — ESCITALOPRAM OXALATE 20 MG/1
20 TABLET ORAL DAILY
Qty: 90 TABLET | Refills: 0 | Status: SHIPPED | OUTPATIENT
Start: 2025-04-24

## 2025-04-25 RX ORDER — PANTOPRAZOLE SODIUM 20 MG/1
20 TABLET, DELAYED RELEASE ORAL DAILY
Qty: 30 TABLET | Refills: 2 | OUTPATIENT
Start: 2025-04-25

## 2025-05-01 ENCOUNTER — TRANSCRIBE ORDERS (OUTPATIENT)
Dept: FAMILY MEDICINE CLINIC | Facility: CLINIC | Age: 65
End: 2025-05-01
Payer: MEDICARE

## 2025-05-01 DIAGNOSIS — Z12.31 SCREENING MAMMOGRAM FOR BREAST CANCER: Primary | ICD-10-CM

## 2025-05-08 ENCOUNTER — HOSPITAL ENCOUNTER (OUTPATIENT)
Dept: SLEEP MEDICINE | Facility: HOSPITAL | Age: 65
Discharge: HOME | End: 2025-05-08
Admitting: FAMILY MEDICINE
Payer: MEDICARE

## 2025-05-08 PROCEDURE — G0399 HOME SLEEP TEST/TYPE 3 PORTA: HCPCS

## 2025-05-09 ENCOUNTER — OFFICE VISIT (OUTPATIENT)
Dept: CARDIOLOGY | Facility: CLINIC | Age: 65
End: 2025-05-09
Payer: MEDICARE

## 2025-05-09 ENCOUNTER — HOSPITAL ENCOUNTER (OUTPATIENT)
Dept: CARDIOLOGY | Facility: HOSPITAL | Age: 65
Discharge: HOME | End: 2025-05-09
Payer: MEDICARE

## 2025-05-09 VITALS
BODY MASS INDEX: 24.17 KG/M2 | SYSTOLIC BLOOD PRESSURE: 120 MMHG | WEIGHT: 154 LBS | HEART RATE: 73 BPM | HEIGHT: 67 IN | DIASTOLIC BLOOD PRESSURE: 65 MMHG

## 2025-05-09 DIAGNOSIS — I25.118 CORONARY ARTERY DISEASE OF NATIVE ARTERY OF NATIVE HEART WITH STABLE ANGINA PECTORIS: Primary | ICD-10-CM

## 2025-05-09 DIAGNOSIS — I10 PRIMARY HYPERTENSION: ICD-10-CM

## 2025-05-09 DIAGNOSIS — E78.49 OTHER HYPERLIPIDEMIA: ICD-10-CM

## 2025-05-09 LAB
ALBUMIN SERPL-MCNC: 4.1 G/DL (ref 3.5–5.2)
ALBUMIN/GLOB SERPL: 1.8 G/DL
ALP SERPL-CCNC: 63 U/L (ref 39–117)
ALT SERPL W P-5'-P-CCNC: 12 U/L (ref 1–33)
ANION GAP SERPL CALCULATED.3IONS-SCNC: 11.3 MMOL/L (ref 5–15)
AST SERPL-CCNC: 13 U/L (ref 1–32)
BILIRUB SERPL-MCNC: 0.3 MG/DL (ref 0–1.2)
BUN SERPL-MCNC: 11 MG/DL (ref 8–23)
BUN/CREAT SERPL: 22 (ref 7–25)
CALCIUM SPEC-SCNC: 9.2 MG/DL (ref 8.6–10.5)
CHLORIDE SERPL-SCNC: 102 MMOL/L (ref 98–107)
CO2 SERPL-SCNC: 27.7 MMOL/L (ref 22–29)
CREAT SERPL-MCNC: 0.5 MG/DL (ref 0.57–1)
EGFRCR SERPLBLD CKD-EPI 2021: 103.6 ML/MIN/1.73
GLOBULIN UR ELPH-MCNC: 2.3 GM/DL
GLUCOSE SERPL-MCNC: 282 MG/DL (ref 65–99)
POTASSIUM SERPL-SCNC: 4.1 MMOL/L (ref 3.5–5.2)
PROT SERPL-MCNC: 6.4 G/DL (ref 6–8.5)
SODIUM SERPL-SCNC: 141 MMOL/L (ref 136–145)

## 2025-05-09 PROCEDURE — 99214 OFFICE O/P EST MOD 30 MIN: CPT

## 2025-05-09 PROCEDURE — 3078F DIAST BP <80 MM HG: CPT

## 2025-05-09 PROCEDURE — 80053 COMPREHEN METABOLIC PANEL: CPT | Performed by: NURSE PRACTITIONER

## 2025-05-09 PROCEDURE — 3074F SYST BP LT 130 MM HG: CPT

## 2025-05-09 NOTE — PROGRESS NOTES
Subjective:        Gely Kimble is a 64 y.o. female who here for follow up    Chief Complaint   Patient presents with    Follow-up     1MO       HPI:  This is a 64-year-old female known with this provider with coronary artery disease, hypertension, hyperlipidemia, diabetes mellitus, COPD, seen in office today in follow-up for blood pressure.  She was seen on 4/2/2025 with her blood pressure slightly elevated lisinopril was increased to 20 mg and she was asked to follow-up with labs in 1 month. No chest pain or shortness of breath.     Echo 2/24/2025 EF 56 to 60%, grade 1 LV diastolic dysfunction.    Stress test 2/24/2025 was a normal myocardial perfusion study no evidence of ischemia.  Cardiac catheterization 3/18/2025 normal left main.  LAD midportion stent widely patent.  Diagonal branch jailed.  Ostium angioplastied in the past looks great with 0% to 10% stenosis.  Minimal regularity of the rest of the LAD diagonal  branches.  Circumflex nondominant normal.  RCA dominant proximal 30% stenosis.  Normal LV gram.      The following portions of the patient's history were reviewed and updated as appropriate: allergies, current medications, past family history, past medical history, past social history, past surgical history and problem list.    Past Medical History:   Diagnosis Date    Abnormal nuclear stress test 10/12/2023    Arthritis     COPD (chronic obstructive pulmonary disease)     Elevated cholesterol     Emphysema of lung     Gallbladder abscess     Herpes zoster 08/03/2023    History of positive hepatitis C     previously positive, never treated, negative RNA finding    Hypertension     Myocardial perfusion defect 02/16/2016    Type 2 diabetes mellitus          reports that she quit smoking about 7 years ago. Her smoking use included cigarettes. She started smoking about 53 years ago. She has a 68.7 pack-year smoking history. She has been exposed to tobacco smoke. She has never used smokeless  tobacco. She reports that she does not currently use alcohol. She reports that she does not use drugs.     Family History   Problem Relation Age of Onset    Diabetes Mother     Heart disease Mother     Diabetes Father     Diabetes Brother     Heart disease Maternal Grandmother     Cancer Paternal Grandfather           Objective:           Vitals and nursing note reviewed.   Constitutional:       Appearance: Well-developed.   HENT:      Head: Normocephalic.      Right Ear: External ear normal.      Left Ear: External ear normal.   Neck:      Vascular: No JVD.   Pulmonary:      Effort: Pulmonary effort is normal. No respiratory distress.      Breath sounds: Normal breath sounds. No stridor. No rales.   Cardiovascular:      Normal rate. Regular rhythm.      No gallop.    Pulses:     Intact distal pulses.   Edema:     Peripheral edema absent.   Abdominal:      General: Bowel sounds are normal. There is no distension.      Palpations: Abdomen is soft.      Tenderness: There is no abdominal tenderness. There is no guarding.   Musculoskeletal: Normal range of motion.         General: No tenderness.      Cervical back: Normal range of motion. Skin:     General: Skin is warm.   Neurological:      Mental Status: Alert and oriented to person, place, and time.      Deep Tendon Reflexes: Reflexes are normal and symmetric.   Psychiatric:         Judgment: Judgment normal.         Procedures    Conclusion         Successful right and left coronary angiogram and LV gram    Normal left main    Left anterior descending has midportion stent which is widely patent, diagonal branch which is jailed, ostium was angioplastied in the past looks great with 0 to 10% stenosis, minimum irregularity of the rest of the LAD diagonal and  branches    Circumflex artery was nondominant and normal    Right coronary artery dominant with proximal 30% stenosis    Normal LV gram    Interpretation Summary         Findings consistent with a normal  "ECG stress test.    Myocardial perfusion imaging indicates a normal myocardial perfusion study with no evidence of ischemia. Impressions are consistent with a low risk study.    Left ventricular ejection fraction is normal (Calculated EF = 70%).    Interpretation Summary         Left ventricular ejection fraction appears to be 56 - 60%.    Left ventricular diastolic function is consistent with (grade I) impaired relaxation.    Estimated right ventricular systolic pressure from tricuspid regurgitation is normal (<35 mmHg).    Current Outpatient Medications:     acetaminophen (TYLENOL) 500 MG tablet, Take 1 tablet by mouth Every 8 (Eight) Hours As Needed for Mild Pain or Moderate Pain., Disp: , Rfl:     aspirin 81 MG chewable tablet, Chew 1 tablet Every Night., Disp: , Rfl:     atorvastatin (LIPITOR) 40 MG tablet, TAKE ONE TABLET BY MOUTH ONCE NIGHTLY, Disp: 90 tablet, Rfl: 1    clopidogrel (PLAVIX) 75 MG tablet, TAKE ONE TABLET BY MOUTH EVERY DAY, Disp: 90 tablet, Rfl: 0    escitalopram (LEXAPRO) 20 MG tablet, TAKE 1 TABLET BY MOUTH DAILY, Disp: 90 tablet, Rfl: 0    Ferrous Sulfate  (45 Fe) MG tablet controlled-release, Take 1 tablet by mouth Daily., Disp: 30 tablet, Rfl: 2    insulin detemir (Levemir) 100 UNIT/ML injection, Inject 24 Units under the skin into the appropriate area as directed Every Night., Disp: , Rfl:     Insulin Syringe 31G X 5/16\" 1 ML misc, USE FOR LEVEMIR ONCE DAILY INJECTIONS, Disp: 100 each, Rfl: 2    lisinopril (PRINIVIL,ZESTRIL) 10 MG tablet, Take 2 tablets by mouth Daily., Disp: 90 tablet, Rfl: 1    LORATADINE PO, Take 1 tablet by mouth Daily., Disp: , Rfl:     metFORMIN ER (GLUCOPHAGE-XR) 500 MG 24 hr tablet, Take 1 tablet by mouth Daily With Dinner. (Patient taking differently: Take 1 tablet by mouth Daily With Breakfast.), Disp: , Rfl:     metoprolol succinate XL (TOPROL-XL) 25 MG 24 hr tablet, Take 1 tablet by mouth Daily., Disp: 90 tablet, Rfl: 2    Multiple Vitamin " (MULTIVITAMIN) tablet, Take 1 tablet by mouth Daily., Disp: , Rfl:     nitroglycerin (Nitrostat) 0.4 MG SL tablet, Place 1 tablet under the tongue Every 5 (Five) Minutes As Needed for Chest Pain. Take no more than 3 doses in 15 minutes., Disp: 30 tablet, Rfl: 3    rOPINIRole (REQUIP) 0.25 MG tablet, TAKE 1 TABLET BY MOUTH 1 HOUR BEFORE BEDTIME, Disp: 30 tablet, Rfl: 2    vitamin D (ERGOCALCIFEROL) 1.25 MG (30598 UT) capsule capsule, Take 1 capsule by mouth 1 (One) Time Per Week., Disp: , Rfl:      Assessment:        Patient Active Problem List   Diagnosis    Type 2 diabetes mellitus    Pulmonary emphysema    Arthritis    Other hyperlipidemia    Primary hypertension    Abnormal liver function tests    Impingement syndrome of shoulder region    Neck pain    Postmenopause    Restless leg syndrome    Urinary frequency    Vitamin D deficiency    Fatigue    Moderate episode of recurrent major depressive disorder    Generalized anxiety disorder    Insomnia    Skin lesion of back    Nausea    Incomplete right bundle branch block (RBBB)    Coronary artery disease of native artery of native heart with stable angina pectoris    Tendency to bleed    Gastroesophageal reflux disease    Pain of left hand    Left leg swelling    Anemia    Former smoker    Long term (current) use of antithrombotics/antiplatelets    Low magnesium level    Localized swelling of right upper extremity    Acute non-recurrent sinusitis    Chest pain    Localized edema    Snoring    Migraine headache               Plan:   1.  Coronary artery disease: no chest pain, continue aspirin, atorvastatin, plavix, metoprolol    Risk reduction for the coronary artery disease, controlling the blood pressure, blood sugar management, cholesterol management, exercise, stress management, and proper compliance with medications and follow-up has been discussed    2.  Hypertension: BP controlled, continue lisinopril, metoprolol    Importance of controlling hypertension and  blood pressure  checkup on the regular basis has been explained. Hypertension as a silent killer has been discussed. Risk reduction of the weight and regular exercises to control the hypertension has been explained.    3.  Hyperlipidemia: continue atorvastatin    Risk of the hyperlipidemia, importance of the treatment has been explained. Pros and cons of the statins has been explained. Regular blood workup as well as side effects including the liver failure, myelopathy death has been explained.                   COUNSELING: Katie Ash was given to patient for the following topics: diagnostic results, risk factor reductions, impressions, risks and benefits of treatment options and importance of treatment compliance .       SMOKING COUNSELING: Denies    6mos or sooner if needed    Sincerely,   DARIAN Veliz  [facility]  05/09/25  09:50 EDT    EMR Dragon/Transcription disclaimer:   Much of this encounter note is an electronic transcription/translation of spoken language to printed text. The electronic translation of spoken language may permit erroneous, or at times, nonsensical words or phrases to be inadvertently transcribed; Although I have reviewed the note for such errors, some may still exist.

## 2025-05-13 ENCOUNTER — OFFICE VISIT (OUTPATIENT)
Facility: HOSPITAL | Age: 65
End: 2025-05-13
Payer: MEDICARE

## 2025-05-13 VITALS — BODY MASS INDEX: 24.17 KG/M2 | WEIGHT: 154 LBS | HEIGHT: 67 IN | HEART RATE: 81 BPM | OXYGEN SATURATION: 94 %

## 2025-05-13 DIAGNOSIS — R06.83 SNORING: ICD-10-CM

## 2025-05-13 DIAGNOSIS — G47.10 HYPERSOMNIA: ICD-10-CM

## 2025-05-13 DIAGNOSIS — G47.34 SLEEP RELATED HYPOXIA: Primary | ICD-10-CM

## 2025-05-13 DIAGNOSIS — G43.909 MIGRAINE WITHOUT STATUS MIGRAINOSUS, NOT INTRACTABLE, UNSPECIFIED MIGRAINE TYPE: ICD-10-CM

## 2025-05-13 DIAGNOSIS — G47.00 INSOMNIA, UNSPECIFIED TYPE: ICD-10-CM

## 2025-05-13 DIAGNOSIS — G47.8 NON-RESTORATIVE SLEEP: ICD-10-CM

## 2025-05-13 DIAGNOSIS — G47.30 SLEEP APNEA, UNSPECIFIED TYPE: ICD-10-CM

## 2025-05-13 DIAGNOSIS — G25.81 RESTLESS LEG SYNDROME: ICD-10-CM

## 2025-05-13 DIAGNOSIS — G47.26 CIRCADIAN RHYTHM SLEEP DISORDER, SHIFT WORK TYPE: ICD-10-CM

## 2025-05-13 PROCEDURE — G0463 HOSPITAL OUTPT CLINIC VISIT: HCPCS

## 2025-05-13 NOTE — PROGRESS NOTES
"Follow Up [facility] Note     Chief Complaint: Follow-up sleep study    Primary Care Physician: Sofi Fernandes APRN    Interval History:   The patient is a 64 y.o. female  who was last seen in the sleep lab: 2025 for HST; sleep study negative for HUAN with overall AHI of 3.3 with lowest SPO2 of 81% and average SPO2 of 90%.  Benign snoring noted.  Recommend over-the-counter snore Rx device to help decrease snoring.  However some sleep-related hypoxia noted; average SpO2 90%.  Patient spent 42.1 minutes of SpO2 was 89%.  Alternatively can consider in lab sleep study for more accurate assessment of sleep with hypoxia. .    Review of Systems:    A complete review of systems was done and all were negative with the exception of wheezing nasal congestion    Social History:    Social History     Socioeconomic History    Marital status:    Tobacco Use    Smoking status: Former     Current packs/day: 0.00     Average packs/day: 1.5 packs/day for 45.8 years (68.7 ttl pk-yrs)     Types: Cigarettes     Start date:      Quit date: 10/11/2017     Years since quittin.5     Passive exposure: Past    Smokeless tobacco: Never    Tobacco comments:     Previously smoked about 1.5 packs per day for about 45 years   Vaping Use    Vaping status: Never Used   Substance and Sexual Activity    Alcohol use: Not Currently     Comment: rare margaritia 1-2 times per year    Drug use: No     Comment: in past; clean since     Sexual activity: Not Currently       Allergies:  Patient has no known allergies.     Medication Review:  Reviewed.      Vital Signs:    Vitals:    25 1509   Pulse: 81   SpO2: 94%   Weight: 69.9 kg (154 lb)   Height: 170.2 cm (67\")     Body mass index is 24.12 kg/m².    Physical Exam:    Constitutional:  Well developed 64 y.o. female that appears in no apparent distress.  Awake & oriented times 3.  Normal mood with normal recent and remote memory and normal judgement.  Eyes:  " Conjunctivae normal.  Oropharynx: Previously, moist mucous membranes.    Self-administered [place] Sleepiness Scale test results:  9  0-5 Lower normal daytime sleepiness  6-10 Higher normal daytime sleepiness  11-12 Mild, 13-15 Moderate, & 16-24 Severe excessive daytime sleepiness    Impression:   1. Sleep related hypoxia    2. Hypersomnia    3. Non-restorative sleep    4. Snoring    5. Migraine without status migrainosus, not intractable, unspecified migraine type    6. Restless leg syndrome    7. Circadian rhythm sleep disorder, shift work type    8. Insomnia, unspecified type    9. Sleep apnea, unspecified type        Given significant sleep-related hypoxia fragmented sleep and insomnia patient amenable to in-lab split study.  Concerned that HST is missing diagnosis given sleep-related hypoxia noted on HST as HST can underestimate compared to an in lab test.  If negative HST refer to Dr. Srinivas Alberto for CBT-I to be seen by intern; if positive patient amenable to starting CPAP.    Plan:  Good sleep hygiene measures should be maintained.  Normal body weight by Body mass index is 24.12 kg/m²..      I answered all of the patient's questions.  The patient will call for any problems.      Sidney Liu MD  Sleep Medicine  05/13/25  15:26 EDT

## 2025-05-20 ENCOUNTER — TELEPHONE (OUTPATIENT)
Dept: ORTHOPEDIC SURGERY | Facility: CLINIC | Age: 65
End: 2025-05-20
Payer: MEDICARE

## 2025-05-20 NOTE — TELEPHONE ENCOUNTER
Caller: JEROMY RUCKER    Relationship to patient: PATIENT     Best call back number: [phone]    Chief complaint: RIGHT HUMERUS     Type of visit: NEW PATIENT     Requested date: FRIDAY 05/23/2025 BEFORE 11 AM  - PATIENT STATES SHE HAS TO GO OUT OF TOWN UNEXPECTEDLY      If rescheduling, when is the original appointment: 04/07/2025, 05/23/2025     Additional notes:IF PATIENT CANNOT BE WORKED IN FRIDAY BEFORE 11, PLEASE CX CURRENT APPOINTMENT AND RESCHEDULE FIRST AVAILABLE PLEASE

## 2025-05-21 ENCOUNTER — TELEPHONE (OUTPATIENT)
Dept: ORTHOPEDIC SURGERY | Facility: CLINIC | Age: 65
End: 2025-05-21

## 2025-05-21 NOTE — TELEPHONE ENCOUNTER
Caller: Gely Kimble    Relationship: Self    Best call back number:     What is the best time to reach you: ANYTIME    Who are you requesting to speak with (clinical staff, provider,  specific staff member): SHAKEEL MONAHAN OR DR. GR    What was the call regarding: PATIENT MISSED A CALL IN REGARDS TO HER TO HER APPOINTMENT ON FRIDAY 5.23.25 PLEASE CALL PATIENT BACK TO ADVISE IF POSSIBLE.    Is it okay if the provider responds through MyChart: CALL

## 2025-05-23 ENCOUNTER — OFFICE VISIT (OUTPATIENT)
Dept: ORTHOPEDIC SURGERY | Facility: CLINIC | Age: 65
End: 2025-05-23
Payer: MEDICARE

## 2025-05-23 VITALS — WEIGHT: 150.5 LBS | BODY MASS INDEX: 23.62 KG/M2 | HEIGHT: 67 IN | TEMPERATURE: 96.9 F

## 2025-05-23 DIAGNOSIS — M79.621 PAIN IN RIGHT UPPER ARM: Primary | ICD-10-CM

## 2025-05-23 RX ORDER — LIDOCAINE HYDROCHLORIDE 10 MG/ML
2 INJECTION, SOLUTION EPIDURAL; INFILTRATION; INTRACAUDAL; PERINEURAL
Status: COMPLETED | OUTPATIENT
Start: 2025-05-23 | End: 2025-05-23

## 2025-05-23 RX ORDER — METHYLPREDNISOLONE ACETATE 80 MG/ML
80 INJECTION, SUSPENSION INTRA-ARTICULAR; INTRALESIONAL; INTRAMUSCULAR; SOFT TISSUE
Status: COMPLETED | OUTPATIENT
Start: 2025-05-23 | End: 2025-05-23

## 2025-05-23 RX ADMIN — LIDOCAINE HYDROCHLORIDE 2 ML: 10 INJECTION, SOLUTION EPIDURAL; INFILTRATION; INTRACAUDAL; PERINEURAL at 12:00

## 2025-05-23 RX ADMIN — METHYLPREDNISOLONE ACETATE 80 MG: 80 INJECTION, SUSPENSION INTRA-ARTICULAR; INTRALESIONAL; INTRAMUSCULAR; SOFT TISSUE at 12:00

## 2025-05-23 NOTE — PROGRESS NOTES
"Patient:Gely Kimble    YOB: 1960    Medical Record Number:[id number]    Chief Complaints: Referral for right arm pain    History of Present Illness:     64 y.o. female patient who presents for evaluation of her right arm.  She was referred by Olga.  She says the arm has bothered her for about 8 months.  She reports moderate constant pain over the lateral aspect of her upper arm.  The pain is worse with reaching and lifting.  She has noticed a mass in this area.  She denies any change in the size or character of the mass over the past 6 months or so.  She has had CTs and MRIs of the mass.  She has not yet had any treatment.      Allergies:No Known Allergies    Home Medications:    Current Outpatient Medications:   •  acetaminophen (TYLENOL) 500 MG tablet, Take 1 tablet by mouth Every 8 (Eight) Hours As Needed for Mild Pain or Moderate Pain., Disp: , Rfl:   •  aspirin 81 MG chewable tablet, Chew 1 tablet Every Night., Disp: , Rfl:   •  atorvastatin (LIPITOR) 40 MG tablet, TAKE ONE TABLET BY MOUTH ONCE NIGHTLY, Disp: 90 tablet, Rfl: 1  •  clopidogrel (PLAVIX) 75 MG tablet, TAKE ONE TABLET BY MOUTH EVERY DAY, Disp: 90 tablet, Rfl: 0  •  escitalopram (LEXAPRO) 20 MG tablet, TAKE 1 TABLET BY MOUTH DAILY, Disp: 90 tablet, Rfl: 0  •  Ferrous Sulfate  (45 Fe) MG tablet controlled-release, Take 1 tablet by mouth Daily., Disp: 30 tablet, Rfl: 2  •  insulin detemir (Levemir) 100 UNIT/ML injection, Inject 24 Units under the skin into the appropriate area as directed Every Night., Disp: , Rfl:   •  Insulin Syringe 31G X 5/16\" 1 ML misc, USE FOR LEVEMIR ONCE DAILY INJECTIONS, Disp: 100 each, Rfl: 2  •  lisinopril (PRINIVIL,ZESTRIL) 10 MG tablet, Take 2 tablets by mouth Daily., Disp: 90 tablet, Rfl: 1  •  LORATADINE PO, Take 1 tablet by mouth Daily., Disp: , Rfl:   •  metFORMIN ER (GLUCOPHAGE-XR) 500 MG 24 hr tablet, Take 1 tablet by mouth Daily With Dinner. (Patient taking differently: Take 1 tablet by " mouth Daily With Breakfast.), Disp: , Rfl:   •  metoprolol succinate XL (TOPROL-XL) 25 MG 24 hr tablet, Take 1 tablet by mouth Daily., Disp: 90 tablet, Rfl: 2  •  Multiple Vitamin (MULTIVITAMIN) tablet, Take 1 tablet by mouth Daily., Disp: , Rfl:   •  nitroglycerin (Nitrostat) 0.4 MG SL tablet, Place 1 tablet under the tongue Every 5 (Five) Minutes As Needed for Chest Pain. Take no more than 3 doses in 15 minutes., Disp: 30 tablet, Rfl: 3  •  rOPINIRole (REQUIP) 0.25 MG tablet, TAKE 1 TABLET BY MOUTH 1 HOUR BEFORE BEDTIME, Disp: 30 tablet, Rfl: 2  •  vitamin D (ERGOCALCIFEROL) 1.25 MG (08052 UT) capsule capsule, Take 1 capsule by mouth 1 (One) Time Per Week., Disp: , Rfl:     Past Medical History:   Diagnosis Date   • Abnormal nuclear stress test 10/12/2023   • Arthritis    • COPD (chronic obstructive pulmonary disease)    • Elevated cholesterol    • Emphysema of lung    • Gallbladder abscess    • Herpes zoster 08/03/2023   • History of positive hepatitis C     previously positive, never treated, negative RNA finding   • Hypertension    • Myocardial perfusion defect 02/16/2016   • Type 2 diabetes mellitus        Past Surgical History:   Procedure Laterality Date   • APPENDECTOMY     • CARDIAC CATHETERIZATION N/A 10/25/2023    Procedure: Left Heart Cath;  Surgeon: Tasneem Hayes MD;  Location: [facility] CATH INVASIVE LOCATION;  Service: Cardiovascular;  Laterality: N/A;   • CARDIAC CATHETERIZATION N/A 10/25/2023    Procedure: Left ventriculography;  Surgeon: Tasneem Hayes MD;  Location: [facility] CATH INVASIVE LOCATION;  Service: Cardiovascular;  Laterality: N/A;   • CARDIAC CATHETERIZATION N/A 10/25/2023    Procedure: Stent LEYLA coronary;  Surgeon: Tasneem Hayes MD;  [facility];  Service: Cardiovascular;  Laterality: N/A;   • CARDIAC CATHETERIZATION N/A 10/25/2023    Procedure: Coronary angiography;  Surgeon: Tasneem Hayes MD;  Location: [facility] CATH INVASIVE  LOCATION;  Service: Cardiovascular;  Laterality: N/A;   • CARDIAC CATHETERIZATION N/A 10/25/2023    Procedure: Percutaneous Coronary Intervention;  Surgeon: Tasneem Hayes MD;  Location:  [facility] CATH INVASIVE LOCATION;  Service: Cardiovascular;  Laterality: N/A;   • CARDIAC CATHETERIZATION N/A 11/3/2023    Procedure: Percutaneous Coronary Intervention RCA;  Surgeon: Tasneem Hayes MD;  Location:  [facility] CATH INVASIVE LOCATION;  Service: Cardiovascular;  Laterality: N/A;   • CARDIAC CATHETERIZATION N/A 11/3/2023    Procedure: Coronary angiography;  Surgeon: Tasneem Hayes MD;  Location:  [facility] CATH INVASIVE LOCATION;  Service: Cardiovascular;  Laterality: N/A;   • CARDIAC CATHETERIZATION N/A 11/3/2023    Procedure: Stent LEYLA coronary;  Surgeon: Tasneem Hayes MD;  Location:  [facility] CATH INVASIVE LOCATION;  Service: Cardiovascular;  Laterality: N/A;   • CARDIAC CATHETERIZATION N/A 5/17/2024    Procedure: Left Heart Cath;  Surgeon: Tasneem Hayes MD;  [facility];  Service: Cardiovascular;  Laterality: N/A;   • CARDIAC CATHETERIZATION N/A 5/17/2024    Procedure: Percutaneous Coronary Intervention;  Surgeon: Tasneem Hayes MD;  [facility];  Service: Cardiovascular;  Laterality: N/A;   • CARDIAC CATHETERIZATION N/A 5/17/2024    Procedure: Coronary angiography;  Surgeon: Tasneem Hayes MD;  [facility];  Service: Cardiovascular;  Laterality: N/A;   • CARDIAC CATHETERIZATION N/A 5/17/2024    Procedure: Left ventriculography;  Surgeon: Tasneem Hayes MD;  Location:  [facility] CATH INVASIVE LOCATION;  Service: Cardiovascular;  Laterality: N/A;   • CARDIAC CATHETERIZATION N/A 3/18/2025    Procedure: Left Heart Cath;  Surgeon: Tasneem Hayes MD;  [facility];  Service: Cardiovascular;  Laterality: N/A;   • CARDIAC CATHETERIZATION N/A 3/18/2025     Procedure: Coronary angiography;  Surgeon: Tasneem Hayes MD;  Location:  [facility] CATH INVASIVE LOCATION;  Service: Cardiovascular;  Laterality: N/A;   • CARDIAC CATHETERIZATION N/A 3/18/2025    Procedure: Left ventriculography;  Surgeon: Tasneem Hayes MD;  Location:  [facility] CATH INVASIVE LOCATION;  Service: Cardiovascular;  Laterality: N/A;   • CHOLECYSTECTOMY     • HYSTERECTOMY     • TUBAL ABDOMINAL LIGATION         Social History     Occupational History   • Not on file   Tobacco Use   • Smoking status: Former     Current packs/day: 0.00     Average packs/day: 1.5 packs/day for 45.8 years (68.7 ttl pk-yrs)     Types: Cigarettes     Start date:      Quit date: 10/11/2017     Years since quittin.6     Passive exposure: Past   • Smokeless tobacco: Never   • Tobacco comments:     Previously smoked about 1.5 packs per day for about 45 years   Vaping Use   • Vaping status: Never Used   Substance and Sexual Activity   • Alcohol use: Not Currently     Comment: rare margaritia 1-2 times per year   • Drug use: No     Comment: in past; clean since    • Sexual activity: Not Currently      Social History     Social History Narrative   • Not on file       Family History   Problem Relation Age of Onset   • Diabetes Mother    • Heart disease Mother    • Diabetes Father    • Diabetes Brother    • Heart disease Maternal Grandmother    • Cancer Paternal Grandfather        Review of Systems:      Constitutional: Denies fever, shaking or chills   Eyes: Denies change in visual acuity   HEENT: Denies nasal congestion or sore throat   Respiratory: Denies cough or shortness of breath   Cardiovascular: Denies chest pain or edema  Endocrine: Denies tremors, palpitations, intolerance of heat or cold, polyuria, polydipsia.  GI: Denies abdominal pain, nausea, vomiting, bloody stools or diarrhea  : Denies frequency, urgency, incontinence, retention, or nocturia.  Musculoskeletal: Denies numbness, tingling or loss of  "motor function except as above  Integument: Denies rash, lesion or ulceration   Neurologic: Denies headache or focal weakness, deficits  Heme: Denies spontaneous or excessive bleeding, epistaxis, hematuria, melena, fatigue, enlarged or tender lymph nodes.      All other pertinent positives and negatives as noted above in HPI.    Physical Exam:64 y.o. female  Vitals:    05/23/25 0859   Temp: 96.9 °F (36.1 °C)   Weight: 68.3 kg (150 lb 8 oz)   Height: 170.2 cm (67\")       General:  Patient is awake and alert.  Appears in no acute distress or discomfort.    Psych:  Affect and demeanor are appropriate.    Extremities: Right arm is examined.  Skin is benign.  Moderate tenderness anteriorly without effusion.  She has a slight prominence of the subcutaneous fat adjacent to the deltoid insertion.  There is no discrete mass palpable in this area although I will say the subcutaneous tissue seems to be a little more full right there.  She is not really all that tender in that area.  Most of the tenderness seems to be centered at the shoulder.  She has good shoulder motion.  Abduction and resisted forward elevation are very uncomfortable for her and closely reproduces her typical lateral sided upper arm pain.  Positive Krueger.  Positive speeds maneuver as well.  She does have good strength in her rotator cuff and deltoid.  Intact motor and sensory function in her lower arm and hand.      Imaging: I reviewed and independently interpreted the outside images including a chest x-ray, CT of the right humerus and an MRI of the right humerus.  On all 3 studies, she appears to have at least moderate glenohumeral osteoarthritis with joint space narrowing, subchondral sclerosis and osteophyte formation.  I do not see any abnormality in the area of the mass.  There is increased subcutaneous fat which could suggest a benign lipoma.    Assessment/Plan: #1 right glenohumeral osteoarthritis with referred upper arm pain #2 right arm " lipoma    I told her that I really think her upper arm pain is coming from the shoulder.  I think an injection will be helpful from both the diagnostic and potentially a therapeutic standpoint.  The risk, benefits and alternatives were discussed including her elevated risk with anticoagulation and diabetes.  She consented and the procedure was performed as described below.  If this helps, we can continue conservative treatment indefinitely.  If the pain persist, I told her to let me know and I will be happy to see her back and reevaluate.    I assured her that the mass appears to be benign based on all the test that we have thus far.  I doubt this is the source of her pain.  I told her to keep an eye on the mass.  If it is progressing, she needs to let me know and I will see her back to reevaluate.  Otherwise, she can follow-up as needed.    Large Joint Arthrocentesis: R glenohumeral  Date/Time: 5/23/2025 12:00 PM  Consent given by: patient  Site marked: site marked  Timeout: Immediately prior to procedure a time out was called to verify the correct patient, procedure, equipment, support staff and site/side marked as required   Supporting Documentation  Indications: pain   Procedure Details  Location: shoulder - R glenohumeral  Preparation: Patient was prepped and draped in the usual sterile fashion  Needle gauge: 21 G.  Approach: posterior  Medications administered: 2 mL lidocaine PF 1% 1 %; 80 mg methylPREDNISolone acetate 80 MG/ML  Patient tolerance: patient tolerated the procedure well with no immediate complications          Osmar Terry MD    05/23/2025

## 2025-05-27 RX ORDER — CLOPIDOGREL BISULFATE 75 MG/1
75 TABLET ORAL DAILY
Qty: 90 TABLET | Refills: 1 | Status: SHIPPED | OUTPATIENT
Start: 2025-05-27

## 2025-06-04 ENCOUNTER — TELEPHONE (OUTPATIENT)
Dept: ORTHOPEDIC SURGERY | Facility: CLINIC | Age: 65
End: 2025-06-04

## 2025-06-04 NOTE — TELEPHONE ENCOUNTER
Caller: JEROMY RUCKER     Relationship to patient: PATIENT     Best call back number: [phone]    Chief complaint: RIGHT HUMERUS    Type of visit: FOLLOW UP     Requested date: ASAP     If rescheduling, when is the original appointment: PREV SEEN ON 05/23/2025 , RECEIVED JOIE INJECTION - PATIENT STATES SHE ONLY HAD RELIEF FOR 3 DAYS, PATIENT STATES THE PAIN IS PERSISTING AND WOULD LIKE TO SEE DR GR AGAIN - SOONER THAN WHAT [facility] HAS TO OFFER    IHC/ ight humerus IHC with [facility] from [facility].  MRI completed/bsw

## 2025-06-07 DIAGNOSIS — Z79.4 TYPE 2 DIABETES MELLITUS WITH DIABETIC POLYNEUROPATHY, WITH LONG-TERM CURRENT USE OF INSULIN: ICD-10-CM

## 2025-06-07 DIAGNOSIS — E11.42 TYPE 2 DIABETES MELLITUS WITH DIABETIC POLYNEUROPATHY, WITH LONG-TERM CURRENT USE OF INSULIN: ICD-10-CM

## 2025-06-09 ENCOUNTER — TELEPHONE (OUTPATIENT)
Dept: ORTHOPEDIC SURGERY | Facility: CLINIC | Age: 65
End: 2025-06-09
Payer: MEDICARE

## 2025-06-09 RX ORDER — PEN NEEDLE, DIABETIC 29 G X1/2"
NEEDLE, DISPOSABLE MISCELLANEOUS
Qty: 100 EACH | Refills: 2 | Status: SHIPPED | OUTPATIENT
Start: 2025-06-09

## 2025-07-03 ENCOUNTER — TELEPHONE (OUTPATIENT)
Dept: ORTHOPEDIC SURGERY | Facility: CLINIC | Age: 65
End: 2025-07-03
Payer: MEDICARE

## 2025-07-03 DIAGNOSIS — M25.511 CHRONIC RIGHT SHOULDER PAIN: Primary | ICD-10-CM

## 2025-07-03 DIAGNOSIS — M19.011 ARTHRITIS OF RIGHT SHOULDER: ICD-10-CM

## 2025-07-03 DIAGNOSIS — G89.29 CHRONIC RIGHT SHOULDER PAIN: Primary | ICD-10-CM

## 2025-07-03 NOTE — TELEPHONE ENCOUNTER
I spoke to Ms. Lamin.  She says her shoulder pain has improved somewhat with use of a Salonpas patch.  We discussed other conservative treatments she may try including ice, heat, Tylenol, and OTC Voltaren gel.  She is also interested in trying physical therapy as previously discussed with Dr. Terry.  I will enter this referral for her.  We agreed to cancel the appointment she has with me on July 17 for now.  She will follow-up as needed if her symptoms persist or worsen.

## 2025-07-03 NOTE — TELEPHONE ENCOUNTER
[facility] staff attempted to follow warm transfer process and was unsuccessful     Caller: Gely Kimble    Relationship to patient: Self    Best call back number: [phone]     Patient is needing: PATIENT STATES THE INJECTION SHE GOT IN HER RIGHT ARM WITH DR GR ON 5/23/25 ONLY HELPED TEMPORARYILY BUT IN THE LAST 3 DAYS SHE HAS BEEN IN HORRIBLE PAIN  PATIENT HAS BEEN SCHEDULED FOR NEW AVAILABLE WITH [facility], BUT WANTS TO KNOW IF SHE CAN GET IN SOONER OR IF DR GR HAS ANY MEDICAL ADVISE TO GIVE HER FOR THE PAIN

## 2025-07-18 ENCOUNTER — HOSPITAL ENCOUNTER (OUTPATIENT)
Dept: MAMMOGRAPHY | Facility: HOSPITAL | Age: 65
Discharge: HOME | End: 2025-07-18
Admitting: NURSE PRACTITIONER
Payer: MEDICARE

## 2025-07-18 ENCOUNTER — OFFICE VISIT (OUTPATIENT)
Dept: FAMILY MEDICINE CLINIC | Facility: CLINIC | Age: 65
End: 2025-07-18
Payer: MEDICARE

## 2025-07-18 VITALS
WEIGHT: 150.8 LBS | DIASTOLIC BLOOD PRESSURE: 70 MMHG | BODY MASS INDEX: 23.67 KG/M2 | SYSTOLIC BLOOD PRESSURE: 110 MMHG | HEIGHT: 67 IN | OXYGEN SATURATION: 95 % | TEMPERATURE: 97.3 F | HEART RATE: 78 BPM

## 2025-07-18 DIAGNOSIS — E78.49 OTHER HYPERLIPIDEMIA: ICD-10-CM

## 2025-07-18 DIAGNOSIS — G47.00 INSOMNIA, UNSPECIFIED TYPE: ICD-10-CM

## 2025-07-18 DIAGNOSIS — E11.42 TYPE 2 DIABETES MELLITUS WITH DIABETIC POLYNEUROPATHY, WITH LONG-TERM CURRENT USE OF INSULIN: Primary | ICD-10-CM

## 2025-07-18 DIAGNOSIS — Z79.4 TYPE 2 DIABETES MELLITUS WITH DIABETIC POLYNEUROPATHY, WITH LONG-TERM CURRENT USE OF INSULIN: Primary | ICD-10-CM

## 2025-07-18 DIAGNOSIS — D64.9 ANEMIA, UNSPECIFIED TYPE: ICD-10-CM

## 2025-07-18 DIAGNOSIS — F41.1 GENERALIZED ANXIETY DISORDER: ICD-10-CM

## 2025-07-18 DIAGNOSIS — G25.81 RESTLESS LEG SYNDROME: ICD-10-CM

## 2025-07-18 DIAGNOSIS — F33.1 MODERATE EPISODE OF RECURRENT MAJOR DEPRESSIVE DISORDER: ICD-10-CM

## 2025-07-18 DIAGNOSIS — Z12.31 SCREENING MAMMOGRAM FOR BREAST CANCER: ICD-10-CM

## 2025-07-18 DIAGNOSIS — I25.118 CORONARY ARTERY DISEASE OF NATIVE ARTERY OF NATIVE HEART WITH STABLE ANGINA PECTORIS: ICD-10-CM

## 2025-07-18 DIAGNOSIS — K21.9 GASTROESOPHAGEAL REFLUX DISEASE, UNSPECIFIED WHETHER ESOPHAGITIS PRESENT: ICD-10-CM

## 2025-07-18 DIAGNOSIS — Z87.891 FORMER SMOKER: ICD-10-CM

## 2025-07-18 DIAGNOSIS — E55.9 VITAMIN D DEFICIENCY: ICD-10-CM

## 2025-07-18 DIAGNOSIS — I10 PRIMARY HYPERTENSION: ICD-10-CM

## 2025-07-18 PROCEDURE — 77063 BREAST TOMOSYNTHESIS BI: CPT

## 2025-07-18 PROCEDURE — 77067 SCR MAMMO BI INCL CAD: CPT

## 2025-07-18 RX ORDER — INSULIN DETEMIR 100 [IU]/ML
24 INJECTION, SOLUTION SUBCUTANEOUS NIGHTLY
Qty: 8 ML | Refills: 1 | Status: SHIPPED | OUTPATIENT
Start: 2025-07-18

## 2025-07-18 NOTE — PATIENT INSTRUCTIONS
Continue to monitor your blood sugar once daily before a meal and record results.  Continue to monitor your blood pressure periodically and record results.  Continue to work on healthy diet and exercise.  Follow up pending lab results.  Follow up in 3 months for Medicare Wellness, or sooner if symptoms persist or worsen.

## 2025-07-18 NOTE — PROGRESS NOTES
Subjective   Gely Kimble is a 64 y.o. female.     Chief Complaint   Patient presents with    Diabetes    Hyperlipidemia    Hypertension       History of Present Illness   Patient presents for follow up DM2: takes Levemir and Metformin daily; last A1c 8.3%; monitors blood sugar, typically runs 80-140s; watches carbs/sugars in diet, could do better; has been doing more walking since has gone to first shift; has numbness/tingling in feet; has burning sensation in feet; last eye exam 8/2024 at [facility].     F/U HTN: takes Metoprolol and Lisinopril daily; cardiology increased Lisinopril to 20 mg daily recently and has helped; monitors BP, BP typically runs 110-120s/70s; no headaches; has had some orthostasis, but has improved with changing positions slowly; does still have some trouble with balance, likely due to neuropathy; still having some swelling in legs, but has improved with compression stockings; will have cramping in shins at night; no pain in calf; does not seem to be related to days has worked.     F/U Hyperlipidemia: takes Atorvastatin daily; no myalgias; not fasting today     F/U CAD: takes aspirin and Plavix daily as well as Metoprolol daily; sees cardiology.     F/U anemia: takes daily iron supplement.     F/U YARELIS: takes Pantoprazole and works well; will have symptoms if misses a dose.     F/U anxiety/depression: takes Escitalopram daily and works well; has trouble falling asleep and staying asleep; takes Tylenol PM and helps fall asleep, but not able to stay asleep; has had 2 home sleep studies and inconclusive; will be having overnight sleep test on 8/7/25; sleep medicine thinks O2 may be dropping at night; no SI/HI.     F/U RLS: takes Ropinirole nightly and helps.     F/U right upper arm pain and swelling: has swelling in right upper arm that will get inflamed at times; will have a lot of discomfort with movement; had US and WNL; had CT and WNL; saw ortho and had MRI; has lump in arm but told  "pain in right upper arm is coming from arthritis in right shoulder; had injection, but did not help; recommended 6 weeks of physical therapy; will consider surgery if PT does not help.       The following portions of the patient's history were reviewed and updated as appropriate: allergies, current medications, past family history, past medical history, past social history, past surgical history and problem list.        Current Outpatient Medications   Medication Sig Dispense Refill    acetaminophen (TYLENOL) 500 MG tablet Take 1 tablet by mouth Every 8 (Eight) Hours As Needed for Mild Pain or Moderate Pain.      aspirin 81 MG chewable tablet Chew 1 tablet Every Night.      atorvastatin (LIPITOR) 40 MG tablet TAKE ONE TABLET BY MOUTH ONCE NIGHTLY 90 tablet 1    BD Insulin Syringe Ultrafine 31G X 5/16\" 1 ML misc USE FOR LEVEMIR INJECTIONS DAILY 100 each 2    clopidogrel (PLAVIX) 75 MG tablet TAKE ONE TABLET BY MOUTH EVERY DAY 90 tablet 1    escitalopram (LEXAPRO) 20 MG tablet TAKE 1 TABLET BY MOUTH DAILY 90 tablet 0    Ferrous Sulfate  (45 Fe) MG tablet controlled-release Take 1 tablet by mouth Daily. 30 tablet 2    insulin detemir (Levemir) 100 UNIT/ML injection Inject 24 Units under the skin into the appropriate area as directed Every Night. 8 mL 1    lisinopril (PRINIVIL,ZESTRIL) 10 MG tablet Take 2 tablets by mouth Daily. 90 tablet 1    LORATADINE PO Take 1 tablet by mouth Daily.      metFORMIN ER (GLUCOPHAGE-XR) 500 MG 24 hr tablet Take 1 tablet by mouth Daily With Dinner. (Patient taking differently: Take 1 tablet by mouth Daily With Breakfast.)      metoprolol succinate XL (TOPROL-XL) 25 MG 24 hr tablet Take 1 tablet by mouth Daily. 90 tablet 2    Multiple Vitamin (MULTIVITAMIN) tablet Take 1 tablet by mouth Daily.      nitroglycerin (Nitrostat) 0.4 MG SL tablet Place 1 tablet under the tongue Every 5 (Five) Minutes As Needed for Chest Pain. Take no more than 3 doses in 15 minutes. 30 tablet 3    " rOPINIRole (REQUIP) 0.25 MG tablet TAKE 1 TABLET BY MOUTH 1 HOUR BEFORE BEDTIME 30 tablet 2    vitamin D (ERGOCALCIFEROL) 1.25 MG (99730 UT) capsule capsule Take 1 capsule by mouth 1 (One) Time Per Week.       No current facility-administered medications for this visit.       Past Medical History:   Diagnosis Date    Abnormal nuclear stress test 10/12/2023    Arthritis     COPD (chronic obstructive pulmonary disease)     Elevated cholesterol     Emphysema of lung     Gallbladder abscess     Herpes zoster 08/03/2023    History of positive hepatitis C     previously positive, never treated, negative RNA finding    Hypertension     Myocardial perfusion defect 02/16/2016    Type 2 diabetes mellitus        Past Surgical History:   Procedure Laterality Date    APPENDECTOMY      BREAST BIOPSY      CARDIAC CATHETERIZATION N/A 10/25/2023    Procedure: Left Heart Cath;  Surgeon: Tasneem Hayes MD;  Location: [facility] CATH INVASIVE LOCATION;  Service: Cardiovascular;  Laterality: N/A;    CARDIAC CATHETERIZATION N/A 10/25/2023    Procedure: Left ventriculography;  Surgeon: Tasneem Hayes MD;  Location: [facility] CATH INVASIVE LOCATION;  Service: Cardiovascular;  Laterality: N/A;    CARDIAC CATHETERIZATION N/A 10/25/2023    Procedure: Stent LEYLA coronary;  Surgeon: Tasneem Hayes MD;  Location: [facility] CATH INVASIVE LOCATION;  Service: Cardiovascular;  Laterality: N/A;    CARDIAC CATHETERIZATION N/A 10/25/2023    Procedure: Coronary angiography;  Surgeon: Tasneem Hayes MD;  Location: [facility] CATH INVASIVE LOCATION;  Service: Cardiovascular;  Laterality: N/A;    CARDIAC CATHETERIZATION N/A 10/25/2023    Procedure: Percutaneous Coronary Intervention;  Surgeon: Tasneem Hayes MD;  Location: [facility] CATH INVASIVE LOC[facility];  Service: Cardiovascular;  Laterality: N/A;    CARDIAC CATHETERIZATION N/A 11/03/2023    Procedure: Percutaneous Coronary Intervention RCA;  Surgeon: Tasneem Hayes MD;   Location:  [facility] CATH INVASIVE LOCATION;  Service: Cardiovascular;  Laterality: N/A;    CARDIAC CATHETERIZATION N/A 11/03/2023    Procedure: Coronary angiography;  Surgeon: Tasneem Hayes MD;  [facility];  Service: Cardiovascular;  Laterality: N/A;    CARDIAC CATHETERIZATION N/A 11/03/2023    Procedure: Stent LEYLA coronary;  Surgeon: Tasneem Hayes MD;  Location:  [facility] CATH INVASIVE LOCATION;  Service: Cardiovascular;  Laterality: N/A;    CARDIAC CATHETERIZATION N/A 05/17/2024    Procedure: Left Heart Cath;  Surgeon: Tasneem Hayes MD;  Location:  [facility] CATH INVASIVE LOCATION;  Service: Cardiovascular;  Laterality: N/A;    CARDIAC CATHETERIZATION N/A 05/17/2024    Procedure: Percutaneous Coronary Intervention;  Surgeon: Tasneem Hayes MD;  [facility];  Service: Cardiovascular;  Laterality: N/A;    CARDIAC CATHETERIZATION N/A 05/17/2024    Procedure: Coronary angiography;  Surgeon: Tasneem Hayes MD;  Location: [facility] CATH INVASIVE LOCATION;  Service: Cardiovascular;  Laterality: N/A;    CARDIAC CATHETERIZATION N/A 05/17/2024    Procedure: Left ventriculography;  Surgeon: Tasneem Hayes MD;  [facility];  Service: Cardiovascular;  Laterality: N/A;    CARDIAC CATHETERIZATION N/A 03/18/2025    Procedure: Left Heart Cath;  Surgeon: Tasneem Hayes MD;  [facility];  Service: Cardiovascular;  Laterality: N/A;    CARDIAC CATHETERIZATION N/A 03/18/2025    Procedure: Coronary angiography;  Surgeon: Tasneem Hayes MD;  [facility];  Service: Cardiovascular;  Laterality: N/A;    CARDIAC CATHETERIZATION N/A 03/18/2025    Procedure: Left ventriculography;  Surgeon: Tasneem Hayes MD;  [facility];  Service: Cardiovascular;  Laterality: N/A;    CHOLECYSTECTOMY      HYSTERECTOMY       OOPHORECTOMY      TUBAL ABDOMINAL LIGATION         Family History   Problem Relation Age of Onset    Diabetes Mother     Heart disease Mother     Diabetes Father     Diabetes Brother     Heart disease Maternal Grandmother     Cancer Paternal Grandfather     Breast cancer Neg Hx        Social History     Socioeconomic History    Marital status:    Tobacco Use    Smoking status: Former     Current packs/day: 0.00     Average packs/day: 1.5 packs/day for 45.8 years (68.7 ttl pk-yrs)     Types: Cigarettes     Start date:      Quit date: 10/11/2017     Years since quittin.7     Passive exposure: Past    Smokeless tobacco: Never    Tobacco comments:     Previously smoked about 1.5 packs per day for about 45 years   Vaping Use    Vaping status: Never Used   Substance and Sexual Activity    Alcohol use: Not Currently     Comment: rare margaritia 1-2 times per year    Drug use: No     Comment: in past; clean since     Sexual activity: Not Currently       Review of Systems   Constitutional:  Positive for fatigue. Negative for appetite change, chills, fever, unexpected weight gain and unexpected weight loss.   HENT:  Negative for ear pain, sore throat and trouble swallowing.    Eyes:  Blurred vision: no acute change in vision, needs new glasses; also has cataract monitoring.   Respiratory:  Negative for cough and shortness of breath. Chest tightness: some at times.   Cardiovascular:  Negative for chest pain and palpitations.   Gastrointestinal:  Negative for abdominal pain, blood in stool, constipation and diarrhea.   Endocrine: Positive for polydipsia (no change).   Genitourinary:  Negative for dysuria and frequency.   Musculoskeletal:  Negative for back pain. Arthralgias: see HPI.  Skin:  Negative for rash.   Neurological:  Negative for syncope and weakness.       Objective   Vitals:    25 1341   BP: 110/70   BP Location: Left arm   Patient Position: Sitting   Cuff Size: Adult   Pulse: 78   Temp:  "97.3 °F (36.3 °C)   TempSrc: Temporal   SpO2: 95%   Weight: 68.4 kg (150 lb 12.8 oz)   Height: 170.2 cm (67.01\")     Body mass index is 23.61 kg/m².    Physical Exam  Vitals and nursing note reviewed.   Constitutional:       General: She is not in acute distress.     Appearance: She is well-developed. She is not diaphoretic.   HENT:      Head: Normocephalic.      Right Ear: External ear normal.      Left Ear: External ear normal.   Eyes:      Conjunctiva/sclera: Conjunctivae normal.   Cardiovascular:      Rate and Rhythm: Normal rate and regular rhythm.      Pulses:           Dorsalis pedis pulses are 1+ on the right side and 1+ on the left side.        Posterior tibial pulses are 1+ on the right side and 1+ on the left side.      Heart sounds: Normal heart sounds. No murmur heard.  Pulmonary:      Effort: Pulmonary effort is normal. No respiratory distress.      Breath sounds: Normal breath sounds.   Abdominal:      General: Bowel sounds are normal.      Palpations: Abdomen is soft. There is no hepatomegaly or splenomegaly.      Tenderness: There is no abdominal tenderness.   Musculoskeletal:      Cervical back: Normal range of motion and neck supple.      Right lower leg: No edema.      Left lower leg: No edema.        Feet:    Feet:      Right foot:      Protective Sensation: 10 sites tested.   1 site sensed.     Skin integrity: Skin integrity normal.      Left foot:      Protective Sensation: 10 sites tested.  0 sites sensed.      Skin integrity: Skin integrity normal.      Comments: Diabetic Foot Exam Performed and Monofilament Test Performed    Skin:     General: Skin is warm and dry.   Neurological:      Mental Status: She is alert and oriented to person, place, and time.   Psychiatric:         Mood and Affect: Mood normal.         Behavior: Behavior normal.         Thought Content: Thought content normal.         Judgment: Judgment normal.         Lab Results   Component Value Date    WBC 5.96 03/13/2025    " RBC 4.38 03/13/2025    HGB 12.6 03/13/2025    HCT 39.9 03/13/2025    MCV 91.1 03/13/2025    MCH 28.8 03/13/2025    MCHC 31.6 03/13/2025    RDW 13.9 03/13/2025    RDWSD 47.1 03/13/2025    MPV 11.2 03/13/2025     03/13/2025    NEUTRORELPCT 66.2 03/13/2025    LYMPHORELPCT 26.3 03/13/2025    MONORELPCT 5.4 03/13/2025    EOSRELPCT 1.3 03/13/2025    BASORELPCT 0.5 03/13/2025    AUTOIGPER 0.3 03/13/2025    NEUTROABS 3.94 03/13/2025    LYMPHSABS 1.57 03/13/2025    MONOSABS 0.32 03/13/2025    EOSABS 0.08 03/13/2025    BASOSABS 0.03 03/13/2025    AUTOIGNUM 0.02 03/13/2025    NRBC 0.0 03/13/2025     Lab Results   Component Value Date    GLUCOSE 282 (H) 05/09/2025    BUN 11 05/09/2025    CREATININE 0.50 (L) 05/09/2025    EGFRIFNONA 108 03/20/2020    EGFRIFAFRI >60 10/28/2020    BCR 22.0 05/09/2025    K 4.1 05/09/2025    CO2 27.7 05/09/2025    CALCIUM 9.2 05/09/2025    ALBUMIN 4.1 05/09/2025    LABIL2 1.5 10/28/2020    AST 13 05/09/2025    ALT 12 05/09/2025      Lab Results   Component Value Date    CHLPL 113 12/10/2024    TRIG 65 02/23/2025    HDL 61 (H) 02/23/2025    VLDL 14 02/23/2025    LDL 39 02/23/2025     Lab Results   Component Value Date    TSH 1.190 08/29/2024     Lab Results   Component Value Date    HGBA1C 8.30 (H) 03/12/2025       Assessment    Problem List Items Addressed This Visit       Type 2 diabetes mellitus - Primary    Current Assessment & Plan   Diabetes is pending lab results.   Continue current treatment regimen.  Regular aerobic exercise.  Reminded to get yearly retinal exam.  Diabetes will be reassessed in 3 months  Continue Levemir and Metformin daily.         Relevant Medications    insulin detemir (Levemir) 100 UNIT/ML injection    Other Relevant Orders    Lipid Panel With LDL / HDL Ratio    Comprehensive Metabolic Panel    Hemoglobin A1c    Vitamin B12 & Folate    Other hyperlipidemia    Current Assessment & Plan   Continue Atorvastatin daily.  Continue to work on healthy diet and exercise as  tolerated.         Relevant Orders    Lipid Panel With LDL / HDL Ratio    Comprehensive Metabolic Panel    Primary hypertension    Current Assessment & Plan   Hypertension is stable and controlled  Continue current treatment regimen.  Regular aerobic exercise.  Ambulatory blood pressure monitoring.  Blood pressure will be reassessed in 3 months.  Continue Metoprolol and Lisinopril daily.  Follow up as scheduled with cardiology.         Relevant Orders    Lipid Panel With LDL / HDL Ratio    CBC & Differential    Comprehensive Metabolic Panel    Restless leg syndrome    Current Assessment & Plan   Stable.  Continue Ropinirole nightly.         Vitamin D deficiency    Relevant Orders    Vitamin D,25-Hydroxy    Moderate episode of recurrent major depressive disorder    Current Assessment & Plan   Patient's depression is a recurrent episode that is moderate without psychosis. Depression is active and stable.    Plan:   Continue current medication therapy   Continue Escitalopram daily.    Followup in 3 months.          Generalized anxiety disorder    Current Assessment & Plan   Psychological condition is stable.  Continue current treatment regimen.  Psychological condition  will be reassessed in 3 months.  Continue Escitalopram daily.         Insomnia    Current Assessment & Plan   Follow up as scheduled with sleep medicine.         Coronary artery disease of native artery of native heart with stable angina pectoris    Current Assessment & Plan   Continue aspirin and Plavix daily.  Follow up as scheduled with cardiology.         Gastroesophageal reflux disease    Current Assessment & Plan   Stable.  Continue Pantoprazole daily.         Anemia    Relevant Orders    CBC & Differential    Iron    Ferritin    Former smoker    Relevant Orders    CT Chest Low Dose Wo            Return in about 3 months (around 10/18/2025) for Medicare Wellness, Recheck.or sooner if problems or concerns.  Will send Rx for compound cream for  neuropathy in feet and see if helps.

## 2025-07-19 PROBLEM — M79.89 LEFT LEG SWELLING: Status: RESOLVED | Noted: 2024-05-28 | Resolved: 2025-07-19

## 2025-07-19 LAB
25(OH)D3+25(OH)D2 SERPL-MCNC: 36.1 NG/ML (ref 30–100)
ALBUMIN SERPL-MCNC: 4.4 G/DL (ref 3.9–4.9)
ALP SERPL-CCNC: 73 IU/L (ref 44–121)
ALT SERPL-CCNC: 17 IU/L (ref 0–32)
AST SERPL-CCNC: 17 IU/L (ref 0–40)
BASOPHILS # BLD AUTO: 0.1 X10E3/UL (ref 0–0.2)
BASOPHILS NFR BLD AUTO: 1 %
BILIRUB SERPL-MCNC: 0.3 MG/DL (ref 0–1.2)
BUN SERPL-MCNC: 12 MG/DL (ref 8–27)
BUN/CREAT SERPL: 20 (ref 12–28)
CALCIUM SERPL-MCNC: 9.4 MG/DL (ref 8.7–10.3)
CHLORIDE SERPL-SCNC: 101 MMOL/L (ref 96–106)
CHOLEST SERPL-MCNC: 132 MG/DL (ref 100–199)
CO2 SERPL-SCNC: 24 MMOL/L (ref 20–29)
CREAT SERPL-MCNC: 0.6 MG/DL (ref 0.57–1)
EGFRCR SERPLBLD CKD-EPI 2021: 99 ML/MIN/1.73
EOSINOPHIL # BLD AUTO: 0.1 X10E3/UL (ref 0–0.4)
EOSINOPHIL NFR BLD AUTO: 1 %
ERYTHROCYTE [DISTWIDTH] IN BLOOD BY AUTOMATED COUNT: 13.6 % (ref 11.7–15.4)
FERRITIN SERPL-MCNC: 51 NG/ML (ref 15–150)
FOLATE SERPL-MCNC: >20 NG/ML
GLOBULIN SER CALC-MCNC: 2.4 G/DL (ref 1.5–4.5)
GLUCOSE SERPL-MCNC: 168 MG/DL (ref 70–99)
HBA1C MFR BLD: 9 % (ref 4.8–5.6)
HCT VFR BLD AUTO: 40.8 % (ref 34–46.6)
HDLC SERPL-MCNC: 63 MG/DL
HGB BLD-MCNC: 12.7 G/DL (ref 11.1–15.9)
IMM GRANULOCYTES # BLD AUTO: 0 X10E3/UL (ref 0–0.1)
IMM GRANULOCYTES NFR BLD AUTO: 0 %
IRON SERPL-MCNC: 24 UG/DL (ref 27–139)
LDLC SERPL CALC-MCNC: 46 MG/DL (ref 0–99)
LDLC/HDLC SERPL: 0.7 RATIO (ref 0–3.2)
LYMPHOCYTES # BLD AUTO: 2.3 X10E3/UL (ref 0.7–3.1)
LYMPHOCYTES NFR BLD AUTO: 22 %
MCH RBC QN AUTO: 29.6 PG (ref 26.6–33)
MCHC RBC AUTO-ENTMCNC: 31.1 G/DL (ref 31.5–35.7)
MCV RBC AUTO: 95 FL (ref 79–97)
MONOCYTES # BLD AUTO: 0.6 X10E3/UL (ref 0.1–0.9)
MONOCYTES NFR BLD AUTO: 6 %
NEUTROPHILS # BLD AUTO: 7.3 X10E3/UL (ref 1.4–7)
NEUTROPHILS NFR BLD AUTO: 70 %
PLATELET # BLD AUTO: 227 X10E3/UL (ref 150–450)
POTASSIUM SERPL-SCNC: 4.6 MMOL/L (ref 3.5–5.2)
PROT SERPL-MCNC: 6.8 G/DL (ref 6–8.5)
RBC # BLD AUTO: 4.29 X10E6/UL (ref 3.77–5.28)
SODIUM SERPL-SCNC: 141 MMOL/L (ref 134–144)
TRIGL SERPL-MCNC: 134 MG/DL (ref 0–149)
VIT B12 SERPL-MCNC: 1205 PG/ML (ref 232–1245)
VLDLC SERPL CALC-MCNC: 23 MG/DL (ref 5–40)
WBC # BLD AUTO: 10.3 X10E3/UL (ref 3.4–10.8)

## 2025-07-19 NOTE — ASSESSMENT & PLAN NOTE
Hypertension is stable and controlled  Continue current treatment regimen.  Regular aerobic exercise.  Ambulatory blood pressure monitoring.  Blood pressure will be reassessed in 3 months.  Continue Metoprolol and Lisinopril daily.  Follow up as scheduled with cardiology.

## 2025-07-19 NOTE — ASSESSMENT & PLAN NOTE
Diabetes is pending lab results.   Continue current treatment regimen.  Regular aerobic exercise.  Reminded to get yearly retinal exam.  Diabetes will be reassessed in 3 months  Continue Levemir and Metformin daily.

## 2025-07-21 ENCOUNTER — TREATMENT (OUTPATIENT)
Dept: PHYSICAL THERAPY | Facility: CLINIC | Age: 65
End: 2025-07-21
Payer: MEDICARE

## 2025-07-21 DIAGNOSIS — G89.29 CHRONIC RIGHT SHOULDER PAIN: Primary | ICD-10-CM

## 2025-07-21 DIAGNOSIS — R29.898 DECREASED STRENGTH OF UPPER EXTREMITY: ICD-10-CM

## 2025-07-21 DIAGNOSIS — M25.611 DECREASED RANGE OF MOTION OF RIGHT SHOULDER: ICD-10-CM

## 2025-07-21 DIAGNOSIS — M25.511 CHRONIC RIGHT SHOULDER PAIN: Primary | ICD-10-CM

## 2025-07-21 DIAGNOSIS — M19.011 ARTHRITIS OF RIGHT SHOULDER: ICD-10-CM

## 2025-07-21 DIAGNOSIS — Z78.9 DIFFICULTY WITH ACTIVITIES OF DAILY LIVING: ICD-10-CM

## 2025-07-21 PROCEDURE — 97110 THERAPEUTIC EXERCISES: CPT

## 2025-07-21 PROCEDURE — 97530 THERAPEUTIC ACTIVITIES: CPT

## 2025-07-21 PROCEDURE — 97161 PT EVAL LOW COMPLEX 20 MIN: CPT

## 2025-07-21 NOTE — PROGRESS NOTES
Physical Therapy Initial Evaluation and Plan of Care                                               [address] [facility], [address]                                                           [place], [place]                                                         [phone]    Patient: Gely Kimble   : 1960  Diagnosis/ICD-10 Code:  Chronic right shoulder pain [M25.511, G89.29]  Referring practitioner: DARIAN Crádenas  Date of Initial Visit: 2025  Today's Date: 2025  Patient seen for 1 sessions           Subjective Questionnaire: QuickDASH: 56.82      Subjective Evaluation    History of Present Illness  Mechanism of injury: Pt describes pain in her R shoulder which began about 6 months ago. She initially noticed a painful mass along her lateral upper arm which formed without known injury or trauma. The mass was tender to palpation and the pain progressively worsened to the point of becoming constant with radiation into her shoulder and neck. She consulted orthopedic surgery where she received a cortisone injection. The injection did not assist with decreasing her pain and she continues to experience a constant aching and throbbing through her R lateral shoulder with superior radiation. The pain worsens with increased activity, particularly outstretched reach, overhead reach, or walking with arms at her sides. She has noticed that she cannot wash her hair or her back and has difficulty opening jars and bottles.  She also reports significant sleep disturbance due to pain when lying on her R side.    Of note, about 3 weeks ago, pt experienced onset of tingling in her R hand that is intermittent. She uses a computer most of the day while at work which also worsens her pain.     She takes tylenol arthritis which helps to decrease her pain slightly. She has not attempted any other analgesic treatments.     She had a MRI of her R shoulder in 2025 which showed:  1. No demonstrated soft  tissue mass or cyst or explanation for palpable  abnormality lateral to the proximal humerus. A lipoma surrounded by  subcutaneous fat may be occult on MRI.  2. Moderate right glenohumeral joint osteoarthritis.    PMHx is significant for HTN, DM type II, and COPD. She also has history of left frozen shoulder which she received PT for      Patient Occupation: security at [employer]- Quality of life: good    Pain  Current pain ratin  At best pain ratin  At worst pain rating: 10  Location: right lateral shoulder  Quality: dull ache and throbbing  Relieving factors: medications  Aggravating factors: sleeping, prolonged positioning, overhead activity, lifting, movement and repetitive movement  Progression: worsening    Hand dominance: right    Diagnostic Tests  X-ray: abnormal    Treatments  Previous treatment: medication and injection treatment  Patient Goals  Patient goals for therapy: decreased pain, increased motion, return to sport/leisure activities, independence with ADLs/IADLs and increased strength  Patient goal: bingo, improving sleep,           Objective          Postural Observations  Seated posture: fair  Standing posture: fair    Additional Postural Observation Details  Increased thoracic kyphosis    Observations     Additional Shoulder Observation Details  Palpable and visible mass along superior humerus    Palpation     Right   No palpable tenderness to the biceps and triceps. Tenderness of the anterior deltoid, infraspinatus, lower trapezius, posterior deltoid, supraspinatus, teres major, teres minor and upper trapezius.     Tenderness     Right Shoulder  Tenderness in the AC joint, acromion, biceps tendon (proximal), medial scapula, scapular spine and supraspinatus tendon. No tenderness in the infraspinatus tendon.     Neurological Testing     Sensation     Shoulder   Left Shoulder   Intact: light touch    Right Shoulder   Intact: Light touch    Active Range of Motion   Left Shoulder   Flexion: 133  degrees   Abduction: 112 degrees   External rotation BTH: Active external rotation behind the head: to L occiput; unable to maintain horizontal abduction.   Internal rotation BTB: Active internal rotation behind the back: to L5.     Right Shoulder   Flexion: 92 degrees with pain  Abduction: 81 degrees with pain  External rotation BTH: Active external rotation behind the head: to R occiput; unable to maintain horizontal abd.   Internal rotation BTB: Active internal rotation behind the back: to R PSIS.     Scapular Mobility     Right Shoulder   Scapular mobility: fair    Strength/Myotome Testing     Left Shoulder     Planes of Motion   Flexion: 4   Extension: 5   Abduction: 4   External rotation at 0°: 4   Internal rotation at 0°: 5     Right Shoulder     Planes of Motion   Flexion: 3-   Abduction: 3-   External rotation at 0°: 4   Internal rotation at 0°: 4- (pain)     Additional Strength Details  Internal rotation MMT on (R) most painful          Assessment & Plan       Assessment  Impairments: abnormal or restricted ROM, activity intolerance, impaired physical strength, lacks appropriate home exercise program, pain with function and weight-bearing intolerance   Functional limitations: carrying objects, lifting, sleeping, pulling, pushing, uncomfortable because of pain, moving in bed, reaching behind back, reaching overhead and unable to perform repetitive tasks   Assessment details: Gely is a 63 y/o female reporting to OP PT for initial evaluation regarding R shoulder pain that has been present for about 6 months without known EVON. She initially noticed a mass along the lateral aspect of her humerus which became progressively more painful. Today she reports constant pain in her R shoulder which she describes as a throbbing sensation. Pain is worsened with any outstretched reach or prolonged use of her R UE. This has begun to impact her ability to perform work duties and other ADLs. Her sleep is also significantly  disturbed. Upon evaluation, Gely presents with significant deficits in R shoulder range of motion, limited by pain. Strength of her R rotator cuff is functional but painful. Other MMT could not be performed due to significant pain when in the testing position. Skilled OP PT is deemed reasonable and necessary in order to address these deficits, limitations, and impairments and assist with return to prior level of function.   Prognosis: good    Goals  Plan Goals: Short Term: 4 weeks  1. Pt will be independent and compliant with initial HEP  2. Pt will report 50% improvement in R shoulder pain  3. Pt will demonstrate R shoulder AROM WFL in all planes of motion without significant pain for improved ability to perform ADLs  4. Pt will demonstrate >/= 4/5 strength in her R UE for improved ability to perform ADLs and work duties    Long Term: 8 weeks  1. Pt will tolerate progressive strengthening for her R UE for improved stability during ADL performance  2. Pt will report improved sleep through the night with less disturbance from her R shoulder pain for improved quality of life  3. Pt will report ability to wash her hair and wash her back for indication of improved R shoulder mobility and ability to perform ADLs independently  4. Pt will consult with orthopedic surgery if limited progress made through OP PT    Plan  Therapy options: will be seen for skilled therapy services  Planned modality interventions: cryotherapy, electrical stimulation/Russian stimulation and thermotherapy (hydrocollator packs)  Planned therapy interventions: abdominal trunk stabilization, body mechanics training, flexibility, functional ROM exercises, home exercise program, therapeutic activities, stretching, strengthening, soft tissue mobilization, postural training, neuromuscular re-education, manual therapy and motor coordination training  Frequency: 2x week  Duration in weeks: 8  Treatment plan discussed with: patient        Manual Therapy:     0     mins  15301;  Therapeutic Exercise:    10     mins  32060;     Neuromuscular Willie:    0    mins  08900;    Therapeutic Activity:     10     mins  33815;     Gait Trainin     mins  61626;     Ultrasound:     0     mins  16169;    Electrical Stimulation:    0     mins  12505 ( );  Dry Needling     0     mins self-pay    Timed Treatment:   20   mins   Total Treatment:     42   mins    PT SIGNATURE: Deejay Sandhu PT, DPT  [place] License #: [id number]   Deejay Sandhu PT, 25, 4:00 PM EDT  DATE TREATMENT INITIATED: 2025    Initial Certification  Certification Period: 10/19/2025  I certify that the therapy services are furnished while this patient is under my care.  The services outlined above are required by this patient, and will be reviewed every 90 days.     PHYSICIAN: Olga Winslow APRN      DATE:     Please sign and return via fax to [phone].. Thank you, [facility].

## 2025-07-22 ENCOUNTER — PRIOR AUTHORIZATION (OUTPATIENT)
Dept: FAMILY MEDICINE CLINIC | Facility: CLINIC | Age: 65
End: 2025-07-22
Payer: MEDICARE

## 2025-07-22 DIAGNOSIS — G57.93 NEUROPATHY OF BOTH FEET: Primary | ICD-10-CM

## 2025-07-22 DIAGNOSIS — E11.42 TYPE 2 DIABETES MELLITUS WITH DIABETIC POLYNEUROPATHY, WITH LONG-TERM CURRENT USE OF INSULIN: ICD-10-CM

## 2025-07-22 DIAGNOSIS — F41.1 GENERALIZED ANXIETY DISORDER: ICD-10-CM

## 2025-07-22 DIAGNOSIS — Z79.4 TYPE 2 DIABETES MELLITUS WITH DIABETIC POLYNEUROPATHY, WITH LONG-TERM CURRENT USE OF INSULIN: ICD-10-CM

## 2025-07-22 DIAGNOSIS — E55.9 VITAMIN D DEFICIENCY: Primary | ICD-10-CM

## 2025-07-22 DIAGNOSIS — F33.1 MODERATE EPISODE OF RECURRENT MAJOR DEPRESSIVE DISORDER: ICD-10-CM

## 2025-07-22 RX ORDER — INSULIN DETEMIR 100 [IU]/ML
30 INJECTION, SOLUTION SUBCUTANEOUS NIGHTLY
Start: 2025-07-22 | End: 2025-07-25 | Stop reason: SDUPTHER

## 2025-07-22 RX ORDER — FAMOTIDINE 20 MG
1000 TABLET ORAL DAILY
Start: 2025-07-22

## 2025-07-22 RX ORDER — ESCITALOPRAM OXALATE 20 MG/1
20 TABLET ORAL DAILY
Qty: 90 TABLET | Refills: 0 | Status: SHIPPED | OUTPATIENT
Start: 2025-07-22

## 2025-07-22 NOTE — TELEPHONE ENCOUNTER
I attempted a PA through cover my meds for the patients levemir but it would not go through.    KEY: [id number]    I called the patients insurance company and spoke with cj CANO and took care of the PA through phone. I am now awaiting a decision.    Case reference # [id number]    PA has been approved 4/23/2025 to 7/22/2026 and the approval letter will be scanned into the chart

## 2025-07-23 ENCOUNTER — TREATMENT (OUTPATIENT)
Dept: PHYSICAL THERAPY | Facility: CLINIC | Age: 65
End: 2025-07-23
Payer: MEDICARE

## 2025-07-23 DIAGNOSIS — M25.611 DECREASED RANGE OF MOTION OF RIGHT SHOULDER: ICD-10-CM

## 2025-07-23 DIAGNOSIS — G89.29 CHRONIC RIGHT SHOULDER PAIN: Primary | ICD-10-CM

## 2025-07-23 DIAGNOSIS — M25.511 CHRONIC RIGHT SHOULDER PAIN: Primary | ICD-10-CM

## 2025-07-23 DIAGNOSIS — M19.011 ARTHRITIS OF RIGHT SHOULDER: ICD-10-CM

## 2025-07-23 DIAGNOSIS — Z78.9 DIFFICULTY WITH ACTIVITIES OF DAILY LIVING: ICD-10-CM

## 2025-07-23 DIAGNOSIS — R29.898 DECREASED STRENGTH OF UPPER EXTREMITY: ICD-10-CM

## 2025-07-23 NOTE — PROGRESS NOTES
Physical Therapy Daily Treatment Note               [address] [facility] [address]                                                                                                                                             [place] [address]    Patient: Gely Kimble   : 1960  Referring practitioner: No ref. provider found  Date of Initial Visit: Type: THERAPY  Noted: 2025  Today's Date: 2025  Patient seen for 2 sessions       Visit Diagnoses:    ICD-10-CM ICD-9-CM   1. Chronic right shoulder pain  M25.511 719.41    G89.29 338.29   2. Arthritis of right shoulder  M19.011 716.91   3. Decreased range of motion of right shoulder  M25.611 719.51   4. Difficulty with activities of daily living  Z78.9 V49.89   5. Decreased strength of upper extremity  R29.898 729.89       Subjective Evaluation    History of Present Illness    Subjective comment: Pt reports that the pain in her (R) shoulder is not too bad today.       Objective   See Exercise, Manual, and Modality Logs for complete treatment.       Assessment & Plan       Assessment  Assessment details: Pt completed treatment with mild c/o pain in (R) shoulder. She c/o pain with introduction of wall slides at end range. She was encouraged to limit her Rom to a pain free range, but to try to increase it with she could. Attempted AAROM with pulley, but this was too painful and was stopped. Pt's HEP was updated and given to her. She responded well to ice for her (R) shoulder s/p  treatment.           Timed:         Manual Therapy:    0     mins  24297;     Therapeutic Exercise:    15     mins  75008;     Neuromuscular Willie:    0    mins  06661;    Therapeutic Activity:     10     mins  22600;     Gait Trainin     mins  32165;     Ultrasound:     0     mins  77351;    Work Rolon/Cond      0    mins   05761    Untimed:  E Stim                            0    mins   28640( [id number])    Timed Treatment:   25   mins   Total Treatment:     35    graciela Brewer, PTA  [place] License: [id number]

## 2025-07-25 DIAGNOSIS — E11.42 TYPE 2 DIABETES MELLITUS WITH DIABETIC POLYNEUROPATHY, WITH LONG-TERM CURRENT USE OF INSULIN: ICD-10-CM

## 2025-07-25 DIAGNOSIS — Z79.4 TYPE 2 DIABETES MELLITUS WITH DIABETIC POLYNEUROPATHY, WITH LONG-TERM CURRENT USE OF INSULIN: ICD-10-CM

## 2025-07-25 RX ORDER — INSULIN DETEMIR 100 [IU]/ML
30 INJECTION, SOLUTION SUBCUTANEOUS NIGHTLY
Qty: 10 ML | Refills: 2 | Status: SHIPPED | OUTPATIENT
Start: 2025-07-25 | End: 2025-07-28 | Stop reason: RX

## 2025-07-28 ENCOUNTER — TREATMENT (OUTPATIENT)
Dept: PHYSICAL THERAPY | Facility: CLINIC | Age: 65
End: 2025-07-28
Payer: MEDICARE

## 2025-07-28 DIAGNOSIS — Z79.4 TYPE 2 DIABETES MELLITUS WITH DIABETIC POLYNEUROPATHY, WITH LONG-TERM CURRENT USE OF INSULIN: Primary | ICD-10-CM

## 2025-07-28 DIAGNOSIS — M19.011 ARTHRITIS OF RIGHT SHOULDER: ICD-10-CM

## 2025-07-28 DIAGNOSIS — M25.611 DECREASED RANGE OF MOTION OF RIGHT SHOULDER: ICD-10-CM

## 2025-07-28 DIAGNOSIS — E11.42 TYPE 2 DIABETES MELLITUS WITH DIABETIC POLYNEUROPATHY, WITH LONG-TERM CURRENT USE OF INSULIN: Primary | ICD-10-CM

## 2025-07-28 DIAGNOSIS — R29.898 DECREASED STRENGTH OF UPPER EXTREMITY: ICD-10-CM

## 2025-07-28 DIAGNOSIS — Z78.9 DIFFICULTY WITH ACTIVITIES OF DAILY LIVING: ICD-10-CM

## 2025-07-28 DIAGNOSIS — M25.511 CHRONIC RIGHT SHOULDER PAIN: Primary | ICD-10-CM

## 2025-07-28 DIAGNOSIS — G89.29 CHRONIC RIGHT SHOULDER PAIN: Primary | ICD-10-CM

## 2025-07-28 PROCEDURE — 97110 THERAPEUTIC EXERCISES: CPT

## 2025-07-28 PROCEDURE — 97112 NEUROMUSCULAR REEDUCATION: CPT

## 2025-07-28 NOTE — PROGRESS NOTES
Physical Therapy Daily Progress Note                                            [address] [facility], [address]                                                           [place], [place]                                                         [phone]    Patient: Gely Kimble   : 1960  Diagnosis/ICD-10 Code:  Chronic right shoulder pain [M25.511, G89.29]  Referring practitioner: DARIAN Cárdenas  Date of Initial Visit: Type: THERAPY  Noted: 2025  Today's Date: 2025  Patient seen for 3 sessions           Subjective Evaluation    History of Present Illness    Subjective comment: gely reports significant improvement in her reported pain. She has been able to feel the effects of tylenol arthritis and is able to lift her arm much higher       Objective   See Exercise, Manual, and Modality Logs for complete treatment.       Assessment & Plan       Assessment  Assessment details: Gely completed her session with intermittent reports of pain in her R shoulder. While she demonstrates improvement in R shoulder flexion and abduction range of motion, the motion itself continues to cause pain. She had increased reports of pain in her R shoulder as the session went on, likely due to deficits in R shoulder strength and muscular endurance. Gentle strengthening exercises were introduced to address this, including bilateral shoulder external rotation and tricep extension. These were tolerated well. Plan to continue progressing all exercises to assist with improving functional use of her R UE.         Progress per Plan of Care           Manual Therapy:    0     mins  02996;  Therapeutic Exercise:    19     mins  85255;     Neuromuscular Willie:    10    mins  38379;    Therapeutic Activity:     0     mins  25079;     Gait Trainin     mins  28326;     Ultrasound:     0     mins  18372;    Electrical Stimulation:    0     mins  46358 ( );  Dry Needling     0     mins self-pay    Timed  Treatment:   29   mins   Total Treatment:     29   mins    Deejay Sandhu PT, DPT  Physical Therapist  [place] License #: [id number]  Deejay Sandhu PT, 07/28/25, 3:11 PM EDT

## 2025-07-31 ENCOUNTER — TREATMENT (OUTPATIENT)
Dept: PHYSICAL THERAPY | Facility: CLINIC | Age: 65
End: 2025-07-31
Payer: MEDICARE

## 2025-07-31 DIAGNOSIS — M25.511 CHRONIC RIGHT SHOULDER PAIN: Primary | ICD-10-CM

## 2025-07-31 DIAGNOSIS — R29.898 DECREASED STRENGTH OF UPPER EXTREMITY: ICD-10-CM

## 2025-07-31 DIAGNOSIS — G89.29 CHRONIC RIGHT SHOULDER PAIN: Primary | ICD-10-CM

## 2025-07-31 DIAGNOSIS — Z78.9 DIFFICULTY WITH ACTIVITIES OF DAILY LIVING: ICD-10-CM

## 2025-07-31 DIAGNOSIS — M25.611 DECREASED RANGE OF MOTION OF RIGHT SHOULDER: ICD-10-CM

## 2025-07-31 DIAGNOSIS — M19.011 ARTHRITIS OF RIGHT SHOULDER: ICD-10-CM

## 2025-07-31 PROCEDURE — 97110 THERAPEUTIC EXERCISES: CPT

## 2025-07-31 PROCEDURE — 97112 NEUROMUSCULAR REEDUCATION: CPT

## 2025-07-31 NOTE — PROGRESS NOTES
Physical Therapy Daily Progress Note                                            [address] [facility], [address]                                                           [place], [place]                                                         [phone]    Patient: Gely Kimble   : 1960  Diagnosis/ICD-10 Code:  Chronic right shoulder pain [M25.511, G89.29]  Referring practitioner: DARIAN Cárdenas  Date of Initial Visit: Type: THERAPY  Noted: 2025  Today's Date: 2025  Patient seen for 4 sessions           Subjective Evaluation    History of Present Illness    Subjective comment: Gely reports that her R shoulder is not bothering her as much anymore. She has been able to sleep on her R side some with less pain. She feels that she would like to drop to once per week. She is going to talk to the surgeon about remonving the lipoma       Objective   See Exercise, Manual, and Modality Logs for complete treatment.       Assessment & Plan       Assessment  Assessment details: Gely again tolerated her session very well. She denied lingering soreness from her session on Monday and continues to be diligent with her HEP. Gely's R shoulder AROM is improving significantly and is nearly full in all planes. She did not have pain with internal rotation behind the back today. She was able to perform resisted rotator cuff and scapular strengthening exercises without pain. These exercises were added to her HEP. Due to her great progress, she would like to decrease her frequency to once per week. Therapist agreeable with this plan.         Progress per Plan of Care           Manual Therapy:    0     mins  20972;  Therapeutic Exercise:    21     mins  71872;     Neuromuscular Willie:    10    mins  29291;    Therapeutic Activity:     0     mins  71984;     Gait Trainin     mins  97124;     Ultrasound:     0     mins  58195;    Electrical Stimulation:    0     mins  69810 ( );  Dry Needling      0     mins self-pay    Timed Treatment:   31   mins   Total Treatment:     31   mins    Deejay Sandhu PT, DPT  Physical Therapist  [place] License #: [id number]  Deejay Sandhu PT, 07/31/25, 2:49 PM EDT

## 2025-08-04 ENCOUNTER — TREATMENT (OUTPATIENT)
Dept: PHYSICAL THERAPY | Facility: CLINIC | Age: 65
End: 2025-08-04
Payer: MEDICARE

## 2025-08-04 DIAGNOSIS — M25.611 DECREASED RANGE OF MOTION OF RIGHT SHOULDER: ICD-10-CM

## 2025-08-04 DIAGNOSIS — M19.011 ARTHRITIS OF RIGHT SHOULDER: ICD-10-CM

## 2025-08-04 DIAGNOSIS — G89.29 CHRONIC RIGHT SHOULDER PAIN: Primary | ICD-10-CM

## 2025-08-04 DIAGNOSIS — Z78.9 DIFFICULTY WITH ACTIVITIES OF DAILY LIVING: ICD-10-CM

## 2025-08-04 DIAGNOSIS — R29.898 DECREASED STRENGTH OF UPPER EXTREMITY: ICD-10-CM

## 2025-08-04 DIAGNOSIS — M25.511 CHRONIC RIGHT SHOULDER PAIN: Primary | ICD-10-CM

## 2025-08-04 PROCEDURE — 97110 THERAPEUTIC EXERCISES: CPT

## 2025-08-04 PROCEDURE — 97112 NEUROMUSCULAR REEDUCATION: CPT

## 2025-08-05 ENCOUNTER — HOSPITAL ENCOUNTER (OUTPATIENT)
Dept: SLEEP MEDICINE | Facility: HOSPITAL | Age: 65
Discharge: HOME | End: 2025-08-05
Admitting: FAMILY MEDICINE
Payer: MEDICARE

## 2025-08-05 DIAGNOSIS — G47.30 SLEEP APNEA, UNSPECIFIED TYPE: ICD-10-CM

## 2025-08-05 DIAGNOSIS — R06.83 SNORING: ICD-10-CM

## 2025-08-05 DIAGNOSIS — G25.81 RESTLESS LEG SYNDROME: ICD-10-CM

## 2025-08-05 DIAGNOSIS — G47.8 NON-RESTORATIVE SLEEP: ICD-10-CM

## 2025-08-05 DIAGNOSIS — G47.34 SLEEP RELATED HYPOXIA: ICD-10-CM

## 2025-08-05 DIAGNOSIS — G43.909 MIGRAINE WITHOUT STATUS MIGRAINOSUS, NOT INTRACTABLE, UNSPECIFIED MIGRAINE TYPE: ICD-10-CM

## 2025-08-05 DIAGNOSIS — G47.10 HYPERSOMNIA: ICD-10-CM

## 2025-08-05 DIAGNOSIS — G47.00 INSOMNIA, UNSPECIFIED TYPE: ICD-10-CM

## 2025-08-05 DIAGNOSIS — G47.26 CIRCADIAN RHYTHM SLEEP DISORDER, SHIFT WORK TYPE: ICD-10-CM

## 2025-08-05 PROCEDURE — 95810 POLYSOM 6/> YRS 4/> PARAM: CPT

## 2025-08-07 ENCOUNTER — TELEPHONE (OUTPATIENT)
Dept: SLEEP MEDICINE | Facility: HOSPITAL | Age: 65
End: 2025-08-07
Payer: MEDICARE

## 2025-08-13 ENCOUNTER — TREATMENT (OUTPATIENT)
Dept: PHYSICAL THERAPY | Facility: CLINIC | Age: 65
End: 2025-08-13
Payer: MEDICARE

## 2025-08-13 DIAGNOSIS — M25.611 DECREASED RANGE OF MOTION OF RIGHT SHOULDER: ICD-10-CM

## 2025-08-13 DIAGNOSIS — R29.898 DECREASED STRENGTH OF UPPER EXTREMITY: ICD-10-CM

## 2025-08-13 DIAGNOSIS — G89.29 CHRONIC RIGHT SHOULDER PAIN: Primary | ICD-10-CM

## 2025-08-13 DIAGNOSIS — M19.011 ARTHRITIS OF RIGHT SHOULDER: ICD-10-CM

## 2025-08-13 DIAGNOSIS — Z78.9 DIFFICULTY WITH ACTIVITIES OF DAILY LIVING: ICD-10-CM

## 2025-08-13 DIAGNOSIS — M25.511 CHRONIC RIGHT SHOULDER PAIN: Primary | ICD-10-CM

## 2025-08-13 PROCEDURE — 97110 THERAPEUTIC EXERCISES: CPT

## 2025-08-13 PROCEDURE — 97530 THERAPEUTIC ACTIVITIES: CPT

## 2025-08-13 PROCEDURE — 97164 PT RE-EVAL EST PLAN CARE: CPT

## 2025-08-15 ENCOUNTER — TELEPHONE (OUTPATIENT)
Dept: FAMILY MEDICINE CLINIC | Facility: CLINIC | Age: 65
End: 2025-08-15
Payer: MEDICARE

## 2025-08-15 RX ORDER — CLOPIDOGREL BISULFATE 75 MG/1
75 TABLET ORAL DAILY
Qty: 90 TABLET | Refills: 1 | Status: SHIPPED | OUTPATIENT
Start: 2025-08-15

## 2025-08-15 RX ORDER — METOPROLOL SUCCINATE 25 MG/1
25 TABLET, EXTENDED RELEASE ORAL DAILY
Qty: 90 TABLET | Refills: 2 | Status: SHIPPED | OUTPATIENT
Start: 2025-08-15

## 2025-08-26 ENCOUNTER — TELEPHONE (OUTPATIENT)
Dept: SLEEP MEDICINE | Facility: HOSPITAL | Age: 65
End: 2025-08-26
Payer: MEDICARE

## 2025-08-26 ENCOUNTER — OFFICE VISIT (OUTPATIENT)
Dept: SLEEP MEDICINE | Facility: HOSPITAL | Age: 65
End: 2025-08-26
Payer: MEDICARE

## 2025-08-26 VITALS — WEIGHT: 157 LBS | OXYGEN SATURATION: 96 % | BODY MASS INDEX: 24.64 KG/M2 | HEIGHT: 67 IN | HEART RATE: 81 BPM

## 2025-08-26 DIAGNOSIS — G47.10 HYPERSOMNIA, UNSPECIFIED: ICD-10-CM

## 2025-08-26 DIAGNOSIS — R06.09 DOE (DYSPNEA ON EXERTION): Primary | ICD-10-CM

## 2025-08-26 DIAGNOSIS — G47.36 HYPOXEMIA ASSOCIATED WITH SLEEP: ICD-10-CM

## 2025-08-26 PROCEDURE — 1159F MED LIST DOCD IN RCRD: CPT | Performed by: INTERNAL MEDICINE

## 2025-08-26 PROCEDURE — 1160F RVW MEDS BY RX/DR IN RCRD: CPT | Performed by: INTERNAL MEDICINE

## 2025-08-26 PROCEDURE — G0463 HOSPITAL OUTPT CLINIC VISIT: HCPCS

## 2025-08-26 PROCEDURE — 99214 OFFICE O/P EST MOD 30 MIN: CPT | Performed by: INTERNAL MEDICINE

## 2025-08-29 ENCOUNTER — TELEPHONE (OUTPATIENT)
Dept: FAMILY MEDICINE CLINIC | Facility: CLINIC | Age: 65
End: 2025-08-29
Payer: MEDICARE

## 2025-08-29 DIAGNOSIS — E11.42 TYPE 2 DIABETES MELLITUS WITH DIABETIC POLYNEUROPATHY, WITH LONG-TERM CURRENT USE OF INSULIN: Primary | ICD-10-CM

## 2025-08-29 DIAGNOSIS — Z79.4 TYPE 2 DIABETES MELLITUS WITH DIABETIC POLYNEUROPATHY, WITH LONG-TERM CURRENT USE OF INSULIN: Primary | ICD-10-CM

## (undated) DEVICE — PK CATH CARD 40

## (undated) DEVICE — GLIDESHEATH SLENDER STAINLESS STEEL KIT: Brand: GLIDESHEATH SLENDER

## (undated) DEVICE — GUIDE CATHETER: Brand: MACH1™

## (undated) DEVICE — RUNTHROUGH NS EXTRA FLOPPY PTCA GUIDEWIRE: Brand: RUNTHROUGH

## (undated) DEVICE — CATH DIAG IMPULSE FR4 5F 100CM

## (undated) DEVICE — MINI TREK CORONARY DILATATION CATHETER 2.0 MM X 12 MM / RAPID-EXCHANGE: Brand: MINI TREK

## (undated) DEVICE — GW EMR FIX EXCHG J STD .035 3MM 260CM

## (undated) DEVICE — TR BAND RADIAL ARTERY COMPRESSION DEVICE: Brand: TR BAND

## (undated) DEVICE — KT MANIFLD CARDIAC

## (undated) DEVICE — FEMORAL ENTRY ANGIOGRAPHY SHIELD-YELLOW: Brand: RADPAD

## (undated) DEVICE — CATH DIAG IMPULSE FL3.5 5F 100CM

## (undated) DEVICE — LOU PACE DEFIB: Brand: MEDLINE INDUSTRIES, INC.

## (undated) DEVICE — DEV INDEFLATOR P/N 580289

## (undated) DEVICE — 6F .070 JR 4 100CM: Brand: CORDIS

## (undated) DEVICE — Device

## (undated) DEVICE — DGW .035 FC J3MM 260CM TEF: Brand: EMERALD

## (undated) DEVICE — TREK CORONARY DILATATION CATHETER 2.50 MM X 12 MM / RAPID-EXCHANGE: Brand: TREK